# Patient Record
Sex: MALE | Race: WHITE | NOT HISPANIC OR LATINO | Employment: UNEMPLOYED | URBAN - METROPOLITAN AREA
[De-identification: names, ages, dates, MRNs, and addresses within clinical notes are randomized per-mention and may not be internally consistent; named-entity substitution may affect disease eponyms.]

---

## 2017-01-02 ENCOUNTER — TELEPHONE (OUTPATIENT)
Dept: FAMILY MEDICINE | Facility: CLINIC | Age: 57
End: 2017-01-02

## 2017-01-02 ENCOUNTER — TELEPHONE (OUTPATIENT)
Dept: PHARMACY | Facility: OTHER | Age: 57
End: 2017-01-02

## 2017-01-02 NOTE — TELEPHONE ENCOUNTER
MTM referral from: [facility]     MTM referral outreach attempt #1 on January 2, 2017 at 2:33 PM      Outcome: Left Message    Dianne Rosa MTM Coordinator

## 2017-01-09 ENCOUNTER — HOSPITAL ENCOUNTER (OUTPATIENT)
Dept: GENERAL RADIOLOGY | Facility: CLINIC | Age: 57
Discharge: HOME | End: 2017-01-09
Attending: UROLOGY | Admitting: UROLOGY
Payer: MEDICAID

## 2017-01-09 ENCOUNTER — OFFICE VISIT (OUTPATIENT)
Dept: UROLOGY | Facility: CLINIC | Age: 57
End: 2017-01-09

## 2017-01-09 VITALS
SYSTOLIC BLOOD PRESSURE: 145 MMHG | WEIGHT: 261 LBS | DIASTOLIC BLOOD PRESSURE: 97 MMHG | BODY MASS INDEX: 34.44 KG/M2 | HEART RATE: 95 BPM

## 2017-01-09 DIAGNOSIS — N35.919 URETHRAL STRICTURE: ICD-10-CM

## 2017-01-09 DIAGNOSIS — N99.111 POSTPROCEDURAL BULBOUS URETHRAL STRICTURE: Primary | ICD-10-CM

## 2017-01-09 PROCEDURE — 51600 INJECTION FOR BLADDER X-RAY: CPT

## 2017-01-09 PROCEDURE — 25500064 ZZH RX 255 OP 636: Performed by: RADIOLOGY

## 2017-01-09 RX ORDER — IOPAMIDOL 510 MG/ML
150 INJECTION, SOLUTION INTRAVASCULAR ONCE
Status: COMPLETED | OUTPATIENT
Start: 2017-01-09 | End: 2017-01-09

## 2017-01-09 RX ADMIN — IOPAMIDOL 150 ML: 510 INJECTION, SOLUTION INTRAVASCULAR at 09:20

## 2017-01-09 ASSESSMENT — PAIN SCALES - GENERAL: PAINLEVEL: NO PAIN (0)

## 2017-01-09 NOTE — PATIENT INSTRUCTIONS
Follow up with Dr Burger for a cystoscopy on 3/27/17 at 1230 pm. Arrive to clinic with a comfortably full bladder for urine stream test    It was a pleasure meeting with you today.  Thank you for allowing me and my team the privilege of caring for you today.  YOU are the reason we are here, and I truly hope we provided you with the excellent service you deserve.  Please let us know if there is anything else we can do for you so that we can be sure you are leaving completely satisfied with your care experience.

## 2017-01-09 NOTE — PROGRESS NOTES
Urethroplasty post-operative visit:    Procedure: ventral onlay bulbar urethroplasty  Date: 16    HPI:  Flakito Hilliard is a 57 year old male who is 4 weeks status post ventral onlay urethroplasty for a 1.5 cm stricture. He had VCUG today that showed resolution of stricture with no extravasation. He emptied his bladder well during the test.    Brief hx:  history of a 5 cm bulbomembranous urethral stricture who underwent complex single stage anterior urethral reconstruction and posterior reconstruction utilizing dorsal onlay buccal graft harvested from the left. He developed a recurrence at the proximal bulbar urethra    Pain is normal  Appetite is good  Bowel movements are normal    /97 mmHg  Pulse 95  Wt 118.389 kg (261 lb)  Incision clean, dry, intact  No tenderness or evidence of cellulitis  No hematoma  Sutures are intact  Mouth healed well    Imagin2017  RUG/VCUG: no extrav       A/P: 57 year old male 4 weeks status post ventral onlay bulbar urethroplasty with history of prior dorsal onlay  Excellent outcome.    We discussed that thought his catheter was removed, he is still in the healing phase of surgery. He understands he should not lift heavy weights > 10 lbs for another 2 weeks. He is to avoid straddle activities (biking, motorcycle, horse riding, tractor work etc) for another 3 months and in general should avoid these activities. He understands should he require catheterization in the future, to make the performing provider aware of his urethral reconstruction. He expressed understanding.  F/U: 3 months for urethroscopy    _________________________________________________________________  Lenny Garza DO  Fellow/Instructor  Department of Urology

## 2017-01-09 NOTE — Clinical Note
2017       RE: Flakito Hilliard  [address] [facility] [address]     Dear Colleague,    Thank you for referring your patient, Flakito Hilliard, to the [facility] UROLOGY AND INST FOR PROSTATE AND UROLOGIC CANCERS at [facility]. Please see a copy of my visit note below.    Urethroplasty post-operative visit:    Procedure: ventral onlay bulbar urethroplasty  Date: 16    HPI:  Flakito Hilliard is a 57 year old male who is 4 weeks status post ventral onlay urethroplasty for a 1.5 cm stricture. He had VCUG today that showed resolution of stricture with no extravasation. He emptied his bladder well during the test.    Brief hx:  history of a 5 cm bulbomembranous urethral stricture who underwent complex single stage anterior urethral reconstruction and posterior reconstruction utilizing dorsal onlay buccal graft harvested from the left. He developed a recurrence at the proximal bulbar urethra    Pain is normal  Appetite is good  Bowel movements are normal    /97 mmHg  Pulse 95  Wt 118.389 kg (261 lb)  Incision clean, dry, intact  No tenderness or evidence of cellulitis  No hematoma  Sutures are intact  Mouth healed well    Imagin2017  RUG/VCUG: no extrav       A/P: 57 year old male 4 weeks status post ventral onlay bulbar urethroplasty with history of prior dorsal onlay  Excellent outcome.    We discussed that thought his catheter was removed, he is still in the healing phase of surgery. He understands he should not lift heavy weights > 10 lbs for another 2 weeks. He is to avoid straddle activities (biking, motorcycle, horse riding, tractor work etc) for another 3 months and in general should avoid these activities. He understands should he require catheterization in the future, to make the performing provider aware of his urethral reconstruction. He expressed understanding.  F/U: 3 months for  urethroscopy    _________________________________________________________________  Lenny Garza DO  Fellow/Instructor  Department of Urology

## 2017-01-09 NOTE — MR AVS SNAPSHOT
After Visit Summary   1/9/2017    Flakito Hilliard    MRN: [id number]           Patient Information     Date Of Birth          1960        Visit Information        Provider Department      1/9/2017 10:30 AM Lenny Garza DO [facility] Urology and [facility]rolog[facility]        Care Instructions    Follow up with Dr Burger for a cystoscopy on 3/27/17 at 1230 pm. Arrive to clinic with a comfortably full bladder for urine stream test    It was a pleasure meeting with you today.  Thank you for allowing me and my team the privilege of caring for you today.  YOU are the reason we are here, and I truly hope we provided you with the excellent service you deserve.  Please let us know if there is anything else we can do for you so that we can be sure you are leaving completely satisfied with your care experience.                Follow-ups after your visit        Your next 10 appointments already scheduled     Feb 06, 2017  9:30 AM   Return Visit with Rodriguez Burns   [facility] ([facility])    [address] [facility] [id number] [phone]            Mar 27, 2017 12:30 PM   (Arrive by 12:15 PM)   Cystoscopy with Niraj Burger MD   [facility] Urology and [facility] ([facility])    [address] [place] [id number] [phone]              Who to contact     Please call your clinic at [phone] to:    Ask questions about your health    Make or cancel appointments    Discuss your medicines    Learn about your test results    Speak to your doctor   If you have compliments or concerns about an experience at your clinic, or if you wish to file a complaint, please contact [facility] Patient Relations at [phone] or email us at [email or web address]         Additional Information About Your Visit        MyChart Information      BrainSINS is an electronic gateway that provides easy, online access to your medical records. With BrainSINS, you can request a clinic appointment, read your test results, renew a prescription or communicate with your care team.     To sign up for [employer]INS visit the website at [email or web address]   You will be asked to enter the access code listed below, as well as some personal information. Please follow the directions to create your username and password.     Your access code is: [email or web address]  Expires: 3/2/2017  9:19 AM     Your access code will  in 90 days. If you need help or a new code, please contact your [facility] or call [phone] for assistance.        Care EveryWhere ID     This is your Care EveryWhere ID. This could be used by other organizations to access your [place] medical records  [id number]        Your Vitals Were     Pulse                   95            Blood Pressure from Last 3 Encounters:   17 145/97   16 98/72   16 130/88    Weight from Last 3 Encounters:   17 118.389 kg (261 lb)   16 118.389 kg (261 lb)   16 118.389 kg (261 lb)              Today, you had the following     No orders found for display       Primary Care Provider Office Phone # Fax #    Susan Hand PA-C [phone]       [facility] [address] [place] [id number]        Goals        Patient-Stated    I will call and get appointment with [facility] at [place].  I will call by 16.     Goal Comments - Note created  8/15/2016 11:08 AM by Jesi Russell LSW    As of today's date 8/15/2016 goal is met at 26 - 50%.   Goal Status:  Active          Thank you!     Thank you for choosing [facility] UROLOGY AND [facility] FOR PROSTATE AND UROLOGIC CANCERS  for your care. Our goal is always to provide you with excellent care. Hearing back from our patients is one way we can continue to improve our services. Please take a few  minutes to complete the written survey that you may receive in the mail after your visit with us. Thank you!             Your Updated Medication List - Protect others around you: Learn how to safely use, store and throw away your medicines at www.disposemymeds.org.          This list is accurate as of: 1/9/17 11:39 AM.  Always use your most recent med list.                   Brand Name Dispense Instructions for use    amLODIPine 5 MG tablet    NORVASC    90 tablet    Take 1 tablet (5 mg) by mouth daily       aspirin 81 MG chewable tablet     108 tablet    Take 1 tablet (81 mg) by mouth daily       atorvastatin 20 MG tablet    LIPITOR    90 tablet    Take 1 tablet (20 mg) by mouth daily If not tolerating medication, contact clinic.       bacitracin ointment     14 g    Apply to incision daily.       buPROPion 300 MG 24 hr tablet    WELLBUTRIN XL    90 tablet    Take 1 tablet (300 mg) by mouth every morning       diltiazem 360 MG 24 hr CD capsule    CARDIZEM CD; CARTIA XT    90 capsule    Take 1 capsule (360 mg) by mouth daily       hydrochlorothiazide 25 MG tablet    HYDRODIURIL    1 tablet    Take 1 tablet (25 mg) by mouth daily Instead of lisinopril-Hydrochlorothiazide before surgery then resume lisinopril-Hydrochlorothiazide after surgery.       HYDROcodone-acetaminophen 5-325 MG per tablet    NORCO    30 tablet    Take 1-2 tablets by mouth every 4 hours as needed for moderate to severe pain maximum 12 tablet(s) per day       IBUPROFEN PO      Take 800 mg by mouth every 8 hours as needed for moderate pain       lisinopril 40 MG tablet    PRINIVIL/ZESTRIL     Take 40 mg by mouth       lisinopril-hydrochlorothiazide 20-12.5 MG per tablet    PRINZIDE/ZESTORETIC    180 tablet    Take 2 tablets by mouth daily       senna 8.6 MG tablet    SENOKOT    120 tablet    Take 1 tablet by mouth daily       sertraline 100 MG tablet    ZOLOFT    90 tablet    Take 1 tablet (100 mg) by mouth every evening

## 2017-02-13 DIAGNOSIS — F33.1 MAJOR DEPRESSIVE DISORDER, RECURRENT EPISODE, MODERATE (H): ICD-10-CM

## 2017-02-13 NOTE — TELEPHONE ENCOUNTER
Zoloft 100mg     Last Written Prescription Date: 11/2/16  Last Fill Quantity: 90, # refills: 0  Last Office Visit with [facility] primary care provider:  12/2/16        Last PHQ-9 score on record=   PHQ-9 SCORE 12/5/2016   Total Score 0   Total Score -       Marnie Rios [facility]

## 2017-02-13 NOTE — TELEPHONE ENCOUNTER
Routing refill request to provider for review/approval because:  F/U plan? MTM referral attempts not reached.    Cheryl Farnsworth, Pharm.D.  [facility]  [phone]

## 2017-02-16 RX ORDER — SERTRALINE HYDROCHLORIDE 100 MG/1
100 TABLET, FILM COATED ORAL EVERY EVENING
Qty: 90 TABLET | Refills: 1 | Status: SHIPPED | OUTPATIENT
Start: 2017-02-16 | End: 2018-03-21

## 2017-03-23 ENCOUNTER — PRE VISIT (OUTPATIENT)
Dept: UROLOGY | Facility: CLINIC | Age: 57
End: 2017-03-23

## 2017-03-23 NOTE — TELEPHONE ENCOUNTER
Patient with a history of urethroplasty coming in for a cystoscopy. Chart reviewed and all records are available. Reminder call placed asking pt to come with a full bladder.

## 2017-03-27 ENCOUNTER — OFFICE VISIT (OUTPATIENT)
Dept: UROLOGY | Facility: CLINIC | Age: 57
End: 2017-03-27

## 2017-03-27 VITALS
DIASTOLIC BLOOD PRESSURE: 98 MMHG | WEIGHT: 265 LBS | HEIGHT: 73 IN | SYSTOLIC BLOOD PRESSURE: 148 MMHG | BODY MASS INDEX: 35.12 KG/M2 | HEART RATE: 83 BPM

## 2017-03-27 DIAGNOSIS — Z87.448 HISTORY OF URETHRAL STRICTURE: ICD-10-CM

## 2017-03-27 DIAGNOSIS — N52.01 ERECTILE DYSFUNCTION DUE TO ARTERIAL INSUFFICIENCY: Primary | ICD-10-CM

## 2017-03-27 RX ORDER — SILDENAFIL 100 MG/1
100 TABLET, FILM COATED ORAL DAILY PRN
Qty: 10 TABLET | Refills: 1 | Status: SHIPPED | OUTPATIENT
Start: 2017-03-27 | End: 2017-08-18

## 2017-03-27 ASSESSMENT — PAIN SCALES - GENERAL: PAINLEVEL: NO PAIN (0)

## 2017-03-27 NOTE — NURSING NOTE
"Chief Complaint   Patient presents with     Cystoscopy     urethroplasty follow up       Blood pressure (!) 148/98, pulse 83, height 1.854 m (6' 1\"), weight 120.2 kg (265 lb). Body mass index is 34.96 kg/(m^2).    Patient Active Problem List   Diagnosis     Acute bacterial prostatitis     Lower urinary tract infectious disease     severe HTN (hypertension)     Suicidal ideation     Major depressive disorder, recurrent episode, moderate (H)     Urinary retention     Urethral stricture     PATRICE (obstructive sleep apnea)     Complicated UTI (urinary tract infection)     RIO (generalized anxiety disorder)     At risk for cardiovascular event     Elevated hemoglobin (H)     Bladder stones       Allergies   Allergen Reactions     Contrast Dye Difficulty breathing       Current Outpatient Prescriptions   Medication Sig Dispense Refill     sertraline (ZOLOFT) 100 MG tablet Take 1 tablet (100 mg) by mouth every evening 90 tablet 1     senna (SENOKOT) 8.6 MG tablet Take 1 tablet by mouth daily 120 tablet 1     bacitracin ointment Apply to incision daily. 14 g 0     lisinopril (PRINIVIL/ZESTRIL) 40 MG tablet Take 40 mg by mouth       HYDROcodone-acetaminophen (NORCO) 5-325 MG per tablet Take 1-2 tablets by mouth every 4 hours as needed for moderate to severe pain maximum 12 tablet(s) per day 30 tablet 0     diltiazem (CARDIZEM CD; CARTIA XT) 360 MG 24 hr CD capsule Take 1 capsule (360 mg) by mouth daily 90 capsule 1     hydrochlorothiazide (HYDRODIURIL) 25 MG tablet Take 1 tablet (25 mg) by mouth daily Instead of lisinopril-Hydrochlorothiazide before surgery then resume lisinopril-Hydrochlorothiazide after surgery. 1 tablet 0     buPROPion (WELLBUTRIN XL) 300 MG 24 hr tablet Take 1 tablet (300 mg) by mouth every morning 90 tablet 1     amLODIPine (NORVASC) 5 MG tablet Take 1 tablet (5 mg) by mouth daily 90 tablet 1     atorvastatin (LIPITOR) 20 MG tablet Take 1 tablet (20 mg) by mouth daily If not tolerating medication, contact " clinic. 90 tablet 3     lisinopril-hydrochlorothiazide (PRINZIDE,ZESTORETIC) 20-12.5 MG per tablet Take 2 tablets by mouth daily 180 tablet 2     aspirin 81 MG chewable tablet Take 1 tablet (81 mg) by mouth daily 108 tablet 3     IBUPROFEN PO Take 800 mg by mouth every 8 hours as needed for moderate pain         Social History   Substance Use Topics     Smoking status: Never Smoker     Smokeless tobacco: Never Used     Alcohol use Yes      Comment: No drinking for 2 weeks     Invasive Procedure Safety Checklist:    Procedure: cystoscopy    Action: Complete sections and checkboxes as appropriate.    Pre-procedure:  1. Patient ID Verified with 2 identifiers (Marquita and  or MRN) : YES    2. Procedure and site verified with patient/designee (when able) : YES    3. Accurate consent documentation in medical record : YES    4. H&P (or appropriate assessment) documented in medical record : NO  H&P must be up to 30 days prior to procedure an updated within 24 hours of                 Procedure as applicable.     5. Relevant diagnostic and radiology test results appropriately labeled and displayed as applicable : NO    6. Blood products, implants, devices, and/or special equipment available for the procedure as applicable : NO    7. Procedure site(s) marked with provider initials [Exclusions: none] : NO    8. Marking not required. Reason : Yes  Procedure does not require site marking    Time Out:     Time-Out performed immediately prior to starting procedure, including verbal and active participation of all team members addressing: YES    1. Correct patient identity.  2. Confirmed that the correct side and site are marked.  3. An accurate procedure to be done.  4. Agreement on the procedure to be done.  5. Correct patient position.  6. Relevant images and results are properly labeled and appropriately displayed.  7. The need to administer antibiotics or fluids for irrigation purposes during the procedure as applicable.  8.  Safety precautions based on patient history or medication use.    During Procedure: Verification of correct person, site, and procedure occurs any time the responsibility for care of the patient is transferred to another member of the care team.    Rosemary Bradford LPN  3/27/2017  12:24 PM

## 2017-03-27 NOTE — LETTER
"3/27/2017       RE: Flakito Hilliard  [address] [facility] [address]     Dear Colleague,    Thank you for referring your patient, Flakito Hilliard, to the [facility] UROLOGY AND INST FOR PROSTATE AND UROLOGIC CANCERS at [facility]. Please see a copy of my visit note below.    Urethroplasty Follow-up Visit with Surveillance Urethroscopy    PRE-PROCEDURE DIAGNOSIS: History of urethral stricture  POST-PROCEDURE DIAGNOSIS: No evidence of urethral stricture   PROCEDURE: Urethroscopy    HISTORY: Flakito Hilliard is a 57 year old man 3 months status-post buccal graft ventral onlay urethroplasty for urethral stricture which was done after failed prior dorsal onlay urethroplasty. He complains of ED -- present before; worse now. Gets a \"good\" erection but can't hold it.     Pain in the perineum is absent.   Numbness in the perineum is absent.     Questionnaires reviewed. See flowsheet for details.    REVIEW OF OFFICE STUDIES:  Urinary Flow Rate  Peak Flow: 17.8 mL/s  Average Flow: 11.4 mL/s  Voided (mL): 202 mL  Character of Curve: Bell Shape     DESCRIPTION OF PROCEDURE:  After informed consent was obtained, the patient was brought to the procedure room where he was placed in the supine position with all pressure points well padded.  He was prepped and draped in a sterile fashion. A flexible cystoscope was introduced through a well-lubricated urethra.  The anterior urethra up to the point of reconstruction was normal in appearance. At the site of reconstruction there was not evidence of stricture recurrence. The caliber of the urethra at the reconstruction was estimated to be 18 F at the distal end of the 1st urethroplasty and 20F throughout the new one. The flexible cystoscope passed easily. The voluntary sphincter was identified and the scope withdrawn.    ASSESSMENT AND PLAN:  Excellent outcome after urethroplasty. The patient will follow-up in 9 months with with uroflowmetry, post-void " residual urine volume measurement, Urethroplasty PROM, SEJAL, MSHQ, and CLSS and post-op questionnaires. Cystoscopy will be needed. If symptoms recur cystoscopy will be done sooner.  Rx for Viagra. Risks discussed.       Again, thank you for allowing me to participate in the care of your patient.      Sincerely,    Niraj Burger MD

## 2017-03-27 NOTE — PATIENT INSTRUCTIONS
Return in 9 months for with a full bladder for a urine flow test and a cystoscopy.    It was a pleasure meeting with you today.  Thank you for allowing me and my team the privilege of caring for you today.  YOU are the reason we are here, and I truly hope we provided you with the excellent service you deserve.  Please let us know if there is anything else we can do for you so that we can be sure you are leaving completely satisfied with your care experience.

## 2017-03-27 NOTE — MR AVS SNAPSHOT
After Visit Summary   3/27/2017    Flakito Hilliard    MRN: [id number]           Patient Information     Date Of Birth          1960        Visit Information        Provider Department      3/27/2017 12:30 PM Niraj Burger MD [facility] and [facility]        Today's Diagnoses     Erectile dysfunction due to arterial insufficiency    -  1    History of urethral stricture          Care Instructions    Return in 9 months for with a full bladder for a urine flow test and a cystoscopy.    It was a pleasure meeting with you today.  Thank you for allowing me and my team the privilege of caring for you today.  YOU are the reason we are here, and I truly hope we provided you with the excellent service you deserve.  Please let us know if there is anything else we can do for you so that we can be sure you are leaving completely satisfied with your care experience.                Follow-ups after your visit        Your next 10 appointments already scheduled     Jan 08, 2018 12:30 PM CST   (Arrive by 12:15 PM)   Cystoscopy with Niraj Burger MD   [facility] and [facility] ([facility])    [address] [place] [address] [phone]              Who to contact     Please call your clinic at [phone] to:    Ask questions about your health    Make or cancel appointments    Discuss your medicines    Learn about your test results    Speak to your doctor   If you have compliments or concerns about an experience at your clinic, or if you wish to file a complaint, please contact [facility] Patient Relations at [phone] or email us at [email or web address]         Additional Information About Your Visit        Reblehart Information     [employer] is an electronic gateway that provides easy, online access to your medical records. With [employer], you  "can request a clinic appointment, read your test results, renew a prescription or communicate with your care team.     To sign up for [employer] visit the website at [email or web address]   You will be asked to enter the access code listed below, as well as some personal information. Please follow the directions to create your username and password.     Your access code is: [email or web address]  Expires: 2017  6:30 AM     Your access code will  in 90 days. If you need help or a new code, please contact your [facility] or call [phone] for assistance.        Care EveryWhere ID     This is your Care EveryWhere ID. This could be used by other organizations to access your [facility] medical records  [id number]        Your Vitals Were     Pulse Height BMI (Body Mass Index)             83 1.854 m (6' 1\") 34.96 kg/m2          Blood Pressure from Last 3 Encounters:   17 (!) 148/98   17 (!) 145/97   16 98/72    Weight from Last 3 Encounters:   17 120.2 kg (265 lb)   17 118.4 kg (261 lb)   16 118.4 kg (261 lb)              We Performed the Following     COMPLEX UROFLOWMETRY     Cystoscopy (12306)          Today's Medication Changes          These changes are accurate as of: 3/27/17 12:52 PM.  If you have any questions, ask your nurse or doctor.               Start taking these medicines.        Dose/Directions    sildenafil 100 MG cap/tab   Commonly known as:  VIAGRA   Used for:  Erectile dysfunction due to arterial insufficiency   Started by:  Niraj Burger MD        Dose:  100 mg   Take 1 tablet (100 mg) by mouth daily as needed for erectile dysfunction   Quantity:  10 tablet   Refills:  1            Where to get your medicines      These medications were sent to [facility] [address] [address]     Phone:  [phone]     sildenafil 100 MG cap/tab             "    Primary Care Provider Office Phone # Fax #    Susan Hand PA-C [phone]       [facility] [address] [address]        Goals        General    I will call and get appointment with CNS at [place].  I will call by 8-22-16. (pt-stated)     Notes - Note created  8/15/2016 11:08 AM by Jesi Russell LSW    As of today's date 8/15/2016 goal is met at 26 - 50%.   Goal Status:  Active          Thank you!     Thank you for choosing [facility]OLOG[facility] AND [facility] PR[facility]OLOGIC [facility]CERS  for your care. Our goal is always to provide you with excellent care. Hearing back from our patients is one way we can continue to improve our services. Please take a few minutes to complete the written survey that you may receive in the mail after your visit with us. Thank you!             Your Updated Medication List - Protect others around you: Learn how to safely use, store and throw away your medicines at www.disposemymeds.org.          This list is accurate as of: 3/27/17 12:52 PM.  Always use your most recent med list.                   Brand Name Dispense Instructions for use    amLODIPine 5 MG tablet    NORVASC    90 tablet    Take 1 tablet (5 mg) by mouth daily       aspirin 81 MG chewable tablet     108 tablet    Take 1 tablet (81 mg) by mouth daily       atorvastatin 20 MG tablet    LIPITOR    90 tablet    Take 1 tablet (20 mg) by mouth daily If not tolerating medication, contact clinic.       bacitracin ointment     14 g    Apply to incision daily.       buPROPion 300 MG 24 hr tablet    WELLBUTRIN XL    90 tablet    Take 1 tablet (300 mg) by mouth every morning       diltiazem 360 MG 24 hr CD capsule    CARDIZEM CD; CARTIA XT    90 capsule    Take 1 capsule (360 mg) by mouth daily       hydrochlorothiazide 25 MG tablet    HYDRODIURIL    1 tablet    Take 1 tablet (25 mg) by mouth daily Instead of lisinopril-Hydrochlorothiazide before surgery then resume  lisinopril-Hydrochlorothiazide after surgery.       HYDROcodone-acetaminophen 5-325 MG per tablet    NORCO    30 tablet    Take 1-2 tablets by mouth every 4 hours as needed for moderate to severe pain maximum 12 tablet(s) per day       IBUPROFEN PO      Take 800 mg by mouth every 8 hours as needed for moderate pain       lisinopril 40 MG tablet    PRINIVIL/ZESTRIL     Take 40 mg by mouth       lisinopril-hydrochlorothiazide 20-12.5 MG per tablet    PRINZIDE/ZESTORETIC    180 tablet    Take 2 tablets by mouth daily       senna 8.6 MG tablet    SENOKOT    120 tablet    Take 1 tablet by mouth daily       sertraline 100 MG tablet    ZOLOFT    90 tablet    Take 1 tablet (100 mg) by mouth every evening       sildenafil 100 MG cap/tab    VIAGRA    10 tablet    Take 1 tablet (100 mg) by mouth daily as needed for erectile dysfunction

## 2017-03-27 NOTE — PROGRESS NOTES
"Urethroplasty Follow-up Visit with Surveillance Urethroscopy    PRE-PROCEDURE DIAGNOSIS: History of urethral stricture  POST-PROCEDURE DIAGNOSIS: No evidence of urethral stricture   PROCEDURE: Urethroscopy    HISTORY: Flakito Hilliard is a 57 year old man 3 months status-post buccal graft ventral onlay urethroplasty for urethral stricture which was done after failed prior dorsal onlay urethroplasty. He complains of ED -- present before; worse now. Gets a \"good\" erection but can't hold it.     Pain in the perineum is absent.   Numbness in the perineum is absent.     Questionnaires reviewed. See flowsheet for details.    REVIEW OF OFFICE STUDIES:  Urinary Flow Rate  Peak Flow: 17.8 mL/s  Average Flow: 11.4 mL/s  Voided (mL): 202 mL  Character of Curve: Bell Shape     DESCRIPTION OF PROCEDURE:  After informed consent was obtained, the patient was brought to the procedure room where he was placed in the supine position with all pressure points well padded.  He was prepped and draped in a sterile fashion. A flexible cystoscope was introduced through a well-lubricated urethra.  The anterior urethra up to the point of reconstruction was normal in appearance. At the site of reconstruction there was not evidence of stricture recurrence. The caliber of the urethra at the reconstruction was estimated to be 18 F at the distal end of the 1st urethroplasty and 20F throughout the new one. The flexible cystoscope passed easily. The voluntary sphincter was identified and the scope withdrawn.    ASSESSMENT AND PLAN:  Excellent outcome after urethroplasty. The patient will follow-up in 9 months with with uroflowmetry, post-void residual urine volume measurement, Urethroplasty PROM, SEJAL, MSHQ, and CLSS and post-op questionnaires. Cystoscopy will be needed. If symptoms recur cystoscopy will be done sooner.  Rx for Viagra. Risks discussed.     "

## 2017-04-10 DIAGNOSIS — N52.9 ED (ERECTILE DYSFUNCTION): Primary | ICD-10-CM

## 2017-04-10 RX ORDER — SILDENAFIL CITRATE 20 MG/1
100 TABLET ORAL PRN
Qty: 50 TABLET | Refills: 3 | Status: SHIPPED | OUTPATIENT
Start: 2017-04-10 | End: 2018-07-18 | Stop reason: ALTCHOICE

## 2017-04-26 ENCOUNTER — OFFICE VISIT (OUTPATIENT)
Dept: FAMILY MEDICINE | Facility: CLINIC | Age: 57
End: 2017-04-26
Payer: COMMERCIAL

## 2017-04-26 VITALS
SYSTOLIC BLOOD PRESSURE: 118 MMHG | BODY MASS INDEX: 34.07 KG/M2 | DIASTOLIC BLOOD PRESSURE: 72 MMHG | WEIGHT: 258.2 LBS | HEART RATE: 100 BPM | TEMPERATURE: 97 F

## 2017-04-26 DIAGNOSIS — J20.9 ACUTE BRONCHITIS WITH SYMPTOMS > 10 DAYS: Primary | ICD-10-CM

## 2017-04-26 PROCEDURE — 99213 OFFICE O/P EST LOW 20 MIN: CPT | Performed by: PHYSICIAN ASSISTANT

## 2017-04-26 RX ORDER — AZITHROMYCIN 250 MG/1
TABLET, FILM COATED ORAL
Qty: 6 TABLET | Refills: 0 | Status: SHIPPED | OUTPATIENT
Start: 2017-04-26 | End: 2017-05-09

## 2017-04-26 RX ORDER — CODEINE PHOSPHATE AND GUAIFENESIN 10; 100 MG/5ML; MG/5ML
1-2 SOLUTION ORAL EVERY 4 HOURS PRN
Qty: 120 ML | Refills: 0 | Status: SHIPPED | OUTPATIENT
Start: 2017-04-26 | End: 2017-08-18

## 2017-04-26 RX ORDER — PREDNISONE 20 MG/1
60 TABLET ORAL DAILY
Qty: 15 TABLET | Refills: 0 | Status: SHIPPED | OUTPATIENT
Start: 2017-04-26 | End: 2017-05-09

## 2017-04-26 ASSESSMENT — ENCOUNTER SYMPTOMS
HEADACHES: 1
WEAKNESS: 0
NAUSEA: 0
STRIDOR: 0
MUSCULOSKELETAL NEGATIVE: 1
SORE THROAT: 0
CHILLS: 0
VOMITING: 0
EYE REDNESS: 0
FEVER: 0
SHORTNESS OF BREATH: 0
EYE DISCHARGE: 0
PSYCHIATRIC NEGATIVE: 1
SPUTUM PRODUCTION: 1
COUGH: 1
WHEEZING: 1
EYE PAIN: 0

## 2017-04-26 ASSESSMENT — ANXIETY QUESTIONNAIRES
3. WORRYING TOO MUCH ABOUT DIFFERENT THINGS: NOT AT ALL
2. NOT BEING ABLE TO STOP OR CONTROL WORRYING: NOT AT ALL
6. BECOMING EASILY ANNOYED OR IRRITABLE: NOT AT ALL
GAD7 TOTAL SCORE: 0
7. FEELING AFRAID AS IF SOMETHING AWFUL MIGHT HAPPEN: NOT AT ALL
1. FEELING NERVOUS, ANXIOUS, OR ON EDGE: NOT AT ALL
5. BEING SO RESTLESS THAT IT IS HARD TO SIT STILL: NOT AT ALL

## 2017-04-26 ASSESSMENT — PATIENT HEALTH QUESTIONNAIRE - PHQ9: 5. POOR APPETITE OR OVEREATING: NOT AT ALL

## 2017-04-26 NOTE — NURSING NOTE
"Chief Complaint   Patient presents with     URI       Initial /72 (BP Location: Right arm, Patient Position: Chair, Cuff Size: Adult Large)  Pulse 100  Temp 97  F (36.1  C) (Tympanic)  Wt 258 lb 3.2 oz (117.1 kg)  BMI 34.07 kg/m2 Estimated body mass index is 34.07 kg/(m^2) as calculated from the following:    Height as of 3/27/17: 6' 1\" (1.854 m).    Weight as of this encounter: 258 lb 3.2 oz (117.1 kg).  Medication Reconciliation: complete    Health Maintenance that is potentially due pending provider review:  NONE    n/a    Aliza COMER CMA    "

## 2017-04-26 NOTE — MR AVS SNAPSHOT
"              After Visit Summary   4/26/2017    Flakito Hilliard    MRN: [id number]           Patient Information     Date Of Birth          1960        Visit Information        Provider Department      4/26/2017 8:40 AM Flakito Lutz PA-C [facility]        Today's Diagnoses     Acute bronchitis with symptoms > 10 days    -  1       Follow-ups after your visit        Follow-up notes from your care team     Return if symptoms worsen or fail to improve.      Your next 10 appointments already scheduled     Jan 08, 2018 12:30 PM CST   (Arrive by 12:15 PM)   Cystoscopy with Niraj Burger MD   [facility] and [facility]rologic C[facility] ([facility])    [address] [place] [address]  4th [facility] [address] [phone]              Who to contact     If you have questions or need follow up information about today's clinic visit or your schedule please contact [facility] directly at [phone].  Normal or non-critical lab and imaging results will be communicated to you by [employer], letter or phone within 4 business days after the clinic has received the results. If you do not hear from us within 7 days, please contact the clinic through [employer] or phone. If you have a critical or abnormal lab result, we will notify you by phone as soon as possible.  Submit refill requests through [employer] or call your pharmacy and they will forward the refill request to us. Please allow 3 business days for your refill to be completed.          Additional Information About Your Visit        [employer] Information     [employer] lets you send messages to your doctor, view your test results, renew your prescriptions, schedule appointments and more. To sign up, go to [email or web address] . Click on \"Log in\" on the left side of the screen, which will take you to the Welcome page. Then click on \"Sign up Now\" on the right side of the page.     You will " be asked to enter the access code listed below, as well as some personal information. Please follow the directions to create your username and password.     Your access code is: [email or web address]  Expires: 2017  6:30 AM     Your access code will  in 90 days. If you need help or a new code, please call your [facility] clinic or [phone].        Care EveryWhere ID     This is your Care EveryWhere ID. This could be used by other organizations to access your [facility] medical records  [id number]        Your Vitals Were     Pulse Temperature BMI (Body Mass Index)             100 97  F (36.1  C) (Tympanic) 34.07 kg/m2          Blood Pressure from Last 3 Encounters:   17 118/72   17 (!) 148/98   17 (!) 145/97    Weight from Last 3 Encounters:   17 258 lb 3.2 oz (117.1 kg)   17 265 lb (120.2 kg)   17 261 lb (118.4 kg)              Today, you had the following     No orders found for display         Today's Medication Changes          These changes are accurate as of: 17 10:06 AM.  If you have any questions, ask your nurse or doctor.               Start taking these medicines.        Dose/Directions    azithromycin 250 MG tablet   Commonly known as:  ZITHROMAX   Used for:  Acute bronchitis with symptoms > 10 days   Started by:  Flakito Lutz PA-C        Two tablets first day, then one tablet daily for four days.   Quantity:  6 tablet   Refills:  0       guaiFENesin-codeine 100-10 MG/5ML Soln solution   Commonly known as:  ROBITUSSIN AC   Used for:  Acute bronchitis with symptoms > 10 days   Started by:  Flakito Lutz PA-C        Dose:  1-2 tsp.   Take 5-10 mLs by mouth every 4 hours as needed for cough   Quantity:  120 mL   Refills:  0       predniSONE 20 MG tablet   Commonly known as:  DELTASONE   Used for:  Acute bronchitis with symptoms > 10 days   Started by:  Flakito Lutz PA-C        Dose:  60 mg   Take 3 tablets (60 mg) by mouth daily   Quantity:  15 tablet    Refills:  0            Where to get your medicines      [place] [facility] [place] [address] [place] [address]  [address] [place] [address]     Phone:  [phone]     azithromycin 250 MG tablet    predniSONE 20 MG tablet         Some of these will need a paper prescription and others can be bought over the counter.  Ask your nurse if you have questions.     Bring a paper prescription for each of these medications     guaiFENesin-codeine 100-10 MG/5ML Soln solution                Primary Care Provider Office Phone # Fax #    Susan Hand PA-C [phone]       [facility] [address] [place] [address]        Goals        General    I will call and get appointment with CNS at [place].  I will call by 8-22-16. (pt-stated)     Notes - Note created  8/15/2016 11:08 AM by Jesi Russell LSW    As of today's date 8/15/2016 goal is met at 26 - 50%.   Goal Status:  Active          Thank you!     Thank you for choosing [facility]  for your care. Our goal is always to provide you with excellent care. Hearing back from our patients is one way we can continue to improve our services. Please take a few minutes to complete the written survey that you may receive in the mail after your visit with us. Thank you!             Your Updated Medication List - Protect others around you: Learn how to safely use, store and throw away your medicines at www.disposemymeds.org.          This list is accurate as of: 4/26/17 10:06 AM.  Always use your most recent med list.                   Brand Name Dispense Instructions for use    amLODIPine 5 MG tablet    NORVASC    90 tablet    Take 1 tablet (5 mg) by mouth daily       aspirin 81 MG chewable tablet     108 tablet    Take 1 tablet (81 mg) by mouth daily       atorvastatin 20 MG tablet    LIPITOR    90 tablet    Take 1 tablet (20 mg) by mouth daily If not tolerating  medication, contact clinic.       azithromycin 250 MG tablet    ZITHROMAX    6 tablet    Two tablets first day, then one tablet daily for four days.       buPROPion 300 MG 24 hr tablet    WELLBUTRIN XL    90 tablet    Take 1 tablet (300 mg) by mouth every morning       diltiazem 360 MG 24 hr CD capsule    CARDIZEM CD; CARTIA XT    90 capsule    Take 1 capsule (360 mg) by mouth daily       guaiFENesin-codeine 100-10 MG/5ML Soln solution    ROBITUSSIN AC    120 mL    Take 5-10 mLs by mouth every 4 hours as needed for cough       IBUPROFEN PO      Take 800 mg by mouth every 8 hours as needed for moderate pain       lisinopril-hydrochlorothiazide 20-12.5 MG per tablet    PRINZIDE/ZESTORETIC    180 tablet    Take 2 tablets by mouth daily       predniSONE 20 MG tablet    DELTASONE    15 tablet    Take 3 tablets (60 mg) by mouth daily       sertraline 100 MG tablet    ZOLOFT    90 tablet    Take 1 tablet (100 mg) by mouth every evening       sildenafil 100 MG cap/tab    VIAGRA    10 tablet    Take 1 tablet (100 mg) by mouth daily as needed for erectile dysfunction       sildenafil 20 MG tablet    REVATIO/VIAGRA    50 tablet    Take 5 tablets (100 mg) by mouth as needed For Erectile Dysfunction. Never use with nitroglycerin, terazosin or doxazosin.

## 2017-04-26 NOTE — PROGRESS NOTES
HPI    SUBJECTIVE:                                                    Flakito Hilliard is a 57 year old male who presents to clinic today for cough on and off for 3 months. Sputum production is present as well as wheezing    ENT Symptoms             Symptoms: cc Present Absent Comment   Fever/Chills   x    Fatigue  x     Muscle Aches  x     Eye Irritation   x    Sneezing   x    Nasal Tavo/Drg  x     Sinus Pressure/Pain   x    Loss of smell  x     Dental pain   x    Sore Throat  x  On and off    Swollen Glands   x    Ear Pain/Fullness   x    Cough  x     Wheeze  x     Chest Pain  x     Shortness of breath  x     Rash       Other         Symptom duration:  On and off since January    Symptom severity:     Treatments tried:     Contacts:       Problem list and histories reviewed & adjusted, as indicated.  Additional history: as documented    Patient Active Problem List   Diagnosis     Acute bacterial prostatitis     Lower urinary tract infectious disease     severe HTN (hypertension)     Suicidal ideation     Major depressive disorder, recurrent episode, moderate (H)     Urinary retention     Urethral stricture     PATRICE (obstructive sleep apnea)     Complicated UTI (urinary tract infection)     RIO (generalized anxiety disorder)     At risk for cardiovascular event     Elevated hemoglobin (H)     Bladder stones     Past Surgical History:   Procedure Laterality Date     BLADDER SURGERY       CYSTOSTOMY, INSERT TUBE SUPRAPUBIC, COMBINED N/A 9/8/2014    Procedure: COMBINED CYSTOSTOMY, INSERT TUBE SUPRAPUBIC;  Surgeon: Min Prasad MD;  Location: [facility] OR     GENITOURINARY SURGERY      prostate surgery for stones     LASER HOLMIUM CYSTOSCOPY, INTERNAL URETHROTOMY, COMBINED N/A 10/24/2014    Procedure: COMBINED LASER HOLMIUM CYSTOSCOPY, INTERNAL URETHROTOMY;  Surgeon: Valentin Villatoro MD;  Location: [facility] OR     URETHROPLASTY N/A 12/10/2014    Procedure: URETHROPLASTY;  Surgeon: Niraj Burger MD;  Location: [facility] OR      URETHROPLASTY WITH BUCCAL GRAFT N/A 12/9/2016    Procedure: URETHROPLASTY WITH BUCCAL GRAFT;  Surgeon: Niraj Burger MD;  Location:  OR       Social History   Substance Use Topics     Smoking status: Never Smoker     Smokeless tobacco: Never Used     Alcohol use Yes      Comment: No drinking for 2 weeks     Family History   Problem Relation Age of Onset     Hypertension Mother      Depression Father      Hypertension Father      MENTAL ILLNESS Sister          Current Outpatient Prescriptions   Medication Sig Dispense Refill     predniSONE (DELTASONE) 20 MG tablet Take 3 tablets (60 mg) by mouth daily 15 tablet 0     azithromycin (ZITHROMAX) 250 MG tablet Two tablets first day, then one tablet daily for four days. 6 tablet 0     guaiFENesin-codeine (ROBITUSSIN AC) 100-10 MG/5ML SOLN solution Take 5-10 mLs by mouth every 4 hours as needed for cough 120 mL 0     sertraline (ZOLOFT) 100 MG tablet Take 1 tablet (100 mg) by mouth every evening 90 tablet 1     diltiazem (CARDIZEM CD; CARTIA XT) 360 MG 24 hr CD capsule Take 1 capsule (360 mg) by mouth daily 90 capsule 1     buPROPion (WELLBUTRIN XL) 300 MG 24 hr tablet Take 1 tablet (300 mg) by mouth every morning 90 tablet 1     amLODIPine (NORVASC) 5 MG tablet Take 1 tablet (5 mg) by mouth daily 90 tablet 1     atorvastatin (LIPITOR) 20 MG tablet Take 1 tablet (20 mg) by mouth daily If not tolerating medication, contact clinic. 90 tablet 3     lisinopril-hydrochlorothiazide (PRINZIDE,ZESTORETIC) 20-12.5 MG per tablet Take 2 tablets by mouth daily 180 tablet 2     aspirin 81 MG chewable tablet Take 1 tablet (81 mg) by mouth daily 108 tablet 3     IBUPROFEN PO Take 800 mg by mouth every 8 hours as needed for moderate pain       sildenafil (REVATIO/VIAGRA) 20 MG tablet Take 5 tablets (100 mg) by mouth as needed For Erectile Dysfunction. Never use with nitroglycerin, terazosin or doxazosin. (Patient not taking: Reported on 4/26/2017) 50 tablet 3     sildenafil  (VIAGRA) 100 MG cap/tab Take 1 tablet (100 mg) by mouth daily as needed for erectile dysfunction (Patient not taking: Reported on 4/26/2017) 10 tablet 1     Allergies   Allergen Reactions     Contrast Dye Difficulty breathing     Labs reviewed in EPIC    Reviewed and updated as needed this visit by clinical staff       Reviewed and updated as needed this visit by Provider       Review of Systems   Constitutional: Negative for chills and fever.   HENT: Positive for congestion. Negative for ear discharge, ear pain, hearing loss and sore throat.    Eyes: Negative for pain, discharge and redness.   Respiratory: Positive for cough, sputum production and wheezing. Negative for shortness of breath and stridor.    Cardiovascular: Negative for chest pain.   Gastrointestinal: Negative for nausea and vomiting.   Genitourinary: Negative.    Musculoskeletal: Negative.    Skin: Negative for itching and rash.   Neurological: Positive for headaches. Negative for weakness.   Endo/Heme/Allergies: Negative.    Psychiatric/Behavioral: Negative.          Physical Exam   Constitutional: He is oriented to person, place, and time and well-developed, well-nourished, and in no distress.   HENT:   Head: Normocephalic and atraumatic.   Right Ear: Hearing, tympanic membrane, external ear and ear canal normal.   Left Ear: Hearing, tympanic membrane, external ear and ear canal normal.   Nose: Rhinorrhea present. No septal deviation. Right sinus exhibits maxillary sinus tenderness. Left sinus exhibits maxillary sinus tenderness.   Mouth/Throat: Oropharyngeal exudate, posterior oropharyngeal edema and posterior oropharyngeal erythema present. No tonsillar abscesses.   Eyes: Conjunctivae and EOM are normal. Pupils are equal, round, and reactive to light. Right eye exhibits no discharge. Left eye exhibits no discharge. No scleral icterus.   Neck: Normal range of motion. Neck supple. No thyromegaly present.   Cardiovascular: Normal rate, regular  rhythm, normal heart sounds and intact distal pulses.  Exam reveals no gallop and no friction rub.    No murmur heard.  Pulmonary/Chest: Effort normal. No respiratory distress. He has wheezes. He has rhonchi. He has no rales. He exhibits no tenderness.   Abdominal: Soft. Bowel sounds are normal. He exhibits no distension and no mass. There is no tenderness. There is no rebound and no guarding.   Musculoskeletal: Normal range of motion. He exhibits no edema or tenderness.   Lymphadenopathy:     He has no cervical adenopathy.   Neurological: He is alert and oriented to person, place, and time. He has normal reflexes. No cranial nerve deficit. He exhibits normal muscle tone. Gait normal. Coordination normal.   Skin: Skin is warm and dry. No rash noted. No erythema.   Psychiatric: Mood, memory, affect and judgment normal.         (J20.9) Acute bronchitis with symptoms > 10 days  (primary encounter diagnosis)  Comment:   Plan: predniSONE (DELTASONE) 20 MG tablet,         azithromycin (ZITHROMAX) 250 MG tablet,         guaiFENesin-codeine (ROBITUSSIN AC) 100-10         MG/5ML SOLN solution          Follow up if not improving

## 2017-04-27 ASSESSMENT — PATIENT HEALTH QUESTIONNAIRE - PHQ9: SUM OF ALL RESPONSES TO PHQ QUESTIONS 1-9: 0

## 2017-04-27 ASSESSMENT — ANXIETY QUESTIONNAIRES: GAD7 TOTAL SCORE: 0

## 2017-05-09 ENCOUNTER — OFFICE VISIT (OUTPATIENT)
Dept: FAMILY MEDICINE | Facility: CLINIC | Age: 57
End: 2017-05-09
Payer: COMMERCIAL

## 2017-05-09 ENCOUNTER — RADIANT APPOINTMENT (OUTPATIENT)
Dept: GENERAL RADIOLOGY | Facility: CLINIC | Age: 57
End: 2017-05-09
Attending: PHYSICIAN ASSISTANT
Payer: COMMERCIAL

## 2017-05-09 ENCOUNTER — TELEPHONE (OUTPATIENT)
Dept: FAMILY MEDICINE | Facility: CLINIC | Age: 57
End: 2017-05-09

## 2017-05-09 VITALS
OXYGEN SATURATION: 97 % | SYSTOLIC BLOOD PRESSURE: 170 MMHG | TEMPERATURE: 98.8 F | HEART RATE: 119 BPM | WEIGHT: 262 LBS | DIASTOLIC BLOOD PRESSURE: 116 MMHG | BODY MASS INDEX: 34.57 KG/M2

## 2017-05-09 DIAGNOSIS — R05.9 COUGH: ICD-10-CM

## 2017-05-09 DIAGNOSIS — R05.9 COUGH: Primary | ICD-10-CM

## 2017-05-09 DIAGNOSIS — J20.9 ACUTE BRONCHITIS WITH SYMPTOMS > 10 DAYS: ICD-10-CM

## 2017-05-09 PROCEDURE — 99214 OFFICE O/P EST MOD 30 MIN: CPT | Performed by: PHYSICIAN ASSISTANT

## 2017-05-09 PROCEDURE — 71020 XR CHEST 2 VW: CPT

## 2017-05-09 RX ORDER — PREDNISONE 20 MG/1
60 TABLET ORAL DAILY
Qty: 21 TABLET | Refills: 0 | Status: SHIPPED | OUTPATIENT
Start: 2017-05-09 | End: 2017-08-18

## 2017-05-09 RX ORDER — LEVOFLOXACIN 500 MG/1
500 TABLET, FILM COATED ORAL DAILY
Qty: 10 TABLET | Refills: 0 | Status: SHIPPED | OUTPATIENT
Start: 2017-05-09 | End: 2017-08-18

## 2017-05-09 RX ORDER — BENZONATATE 200 MG/1
200 CAPSULE ORAL 3 TIMES DAILY PRN
Qty: 15 CAPSULE | Refills: 0 | Status: SHIPPED | OUTPATIENT
Start: 2017-05-09 | End: 2017-08-18

## 2017-05-09 ASSESSMENT — ENCOUNTER SYMPTOMS
DIZZINESS: 0
DYSURIA: 0
DOUBLE VISION: 0
WEIGHT LOSS: 0
SPUTUM PRODUCTION: 1
PHOTOPHOBIA: 0
HEMOPTYSIS: 0
INSOMNIA: 0
WEAKNESS: 0
MYALGIAS: 0
WHEEZING: 1
BACK PAIN: 0
HEADACHES: 0
CONSTIPATION: 0
DIAPHORESIS: 0
DIARRHEA: 0
NECK PAIN: 0
FOCAL WEAKNESS: 0
FREQUENCY: 0
HEARTBURN: 0
HALLUCINATIONS: 0
FEVER: 0
SENSORY CHANGE: 0
EYE REDNESS: 0
SEIZURES: 0
NEUROLOGICAL NEGATIVE: 1
COUGH: 1
NERVOUS/ANXIOUS: 0
VOMITING: 0
EYE PAIN: 0
TINGLING: 0
ORTHOPNEA: 0
SHORTNESS OF BREATH: 0
EYE DISCHARGE: 0
BLOOD IN STOOL: 0
PALPITATIONS: 0
ABDOMINAL PAIN: 0
SORE THROAT: 0
BLURRED VISION: 0
LOSS OF CONSCIOUSNESS: 0
NAUSEA: 0
DEPRESSION: 0

## 2017-05-09 ASSESSMENT — LIFESTYLE VARIABLES: SUBSTANCE_ABUSE: 0

## 2017-05-09 NOTE — MR AVS SNAPSHOT
"              After Visit Summary   5/9/2017    Flakito Hilliard    MRN: [id number]           Patient Information     Date Of Birth          1960        Visit Information        Provider Department      5/9/2017 2:00 PM Flakito Lutz PA-C [facility]        Today's Diagnoses     Cough    -  1    Acute bronchitis with symptoms > 10 days           Follow-ups after your visit        Follow-up notes from your care team     Return if symptoms worsen or fail to improve.      Your next 10 appointments already scheduled     Jan 08, 2018 12:30 PM CST   (Arrive by 12:15 PM)   Cystoscopy with Niraj Burger MD   [facility] ([facility])    [address]  [address] [facility] [address] [phone]              Who to contact     If you have questions or need follow up information about today's clinic visit or your schedule please contact [facility] directly at [phone].  Normal or non-critical lab and imaging results will be communicated to you by Gymtrackhart, letter or phone within 4 business days after the clinic has received the results. If you do not hear from us within 7 days, please contact the clinic through Prism Digitalt or phone. If you have a critical or abnormal lab result, we will notify you by phone as soon as possible.  Submit refill requests through DataRank or call your pharmacy and they will forward the refill request to us. Please allow 3 business days for your refill to be completed.          Additional Information About Your Visit        Gymtrackhart Information     DataRank lets you send messages to your doctor, view your test results, renew your prescriptions, schedule appointments and more. To sign up, go to [email or web address] . Click on \"Log in\" on the left side of the screen, which will take you to the Welcome page. Then click on \"Sign up Now\" on the right side of the page. "     You will be asked to enter the access code listed below, as well as some personal information. Please follow the directions to create your username and password.     Your access code is: [email or web address]  Expires: 2017  6:30 AM     Your access code will  in 90 days. If you need help or a new code, please call your [place] clinic or [phone].        Care EveryWhere ID     This is your Care EveryWhere ID. This could be used by other organizations to access your [facility] medical records  [id number]        Your Vitals Were     Pulse Temperature Pulse Oximetry BMI (Body Mass Index)          119 98.8  F (37.1  C) (Tympanic) 97% 34.57 kg/m2         Blood Pressure from Last 3 Encounters:   17 (!) 170/116   17 118/72   17 (!) 148/98    Weight from Last 3 Encounters:   17 262 lb (118.8 kg)   17 258 lb 3.2 oz (117.1 kg)   17 265 lb (120.2 kg)                 Today's Medication Changes          These changes are accurate as of: 17  2:37 PM.  If you have any questions, ask your nurse or doctor.               Start taking these medicines.        Dose/Directions    benzonatate 200 MG capsule   Commonly known as:  TESSALON   Used for:  Acute bronchitis with symptoms > 10 days, Cough   Started by:  Flakito Lutz PA-C        Dose:  200 mg   Take 1 capsule (200 mg) by mouth 3 times daily as needed for cough   Quantity:  15 capsule   Refills:  0       levofloxacin 500 MG tablet   Commonly known as:  LEVAQUIN   Used for:  Acute bronchitis with symptoms > 10 days   Started by:  Flakito Lutz PA-C        Dose:  500 mg   Take 1 tablet (500 mg) by mouth daily   Quantity:  10 tablet   Refills:  0            Where to get your medicines      These medications were sent to [facility] [address] [address]     Phone:  [phone]     benzonatate 200 MG capsule    levofloxacin 500 MG tablet    predniSONE 20  MG tablet                Primary Care Provider Office Phone # Fax #    Susan Hand PA-C [phone]       [facility] [address] [place] [id number]        Goals        General    I will call and get appointment with [facility] at [place].  I will call by 8-22-16. (pt-stated)     Notes - Note created  8/15/2016 11:08 AM by Jesi Russell LSW    As of today's date 8/15/2016 goal is met at 26 - 50%.   Goal Status:  Active          Thank you!     Thank you for choosing [facility]  for your care. Our goal is always to provide you with excellent care. Hearing back from our patients is one way we can continue to improve our services. Please take a few minutes to complete the written survey that you may receive in the mail after your visit with us. Thank you!             Your Updated Medication List - Protect others around you: Learn how to safely use, store and throw away your medicines at www.disposemymeds.org.          This list is accurate as of: 5/9/17  2:37 PM.  Always use your most recent med list.                   Brand Name Dispense Instructions for use    amLODIPine 5 MG tablet    NORVASC    90 tablet    Take 1 tablet (5 mg) by mouth daily       aspirin 81 MG chewable tablet     108 tablet    Take 1 tablet (81 mg) by mouth daily       atorvastatin 20 MG tablet    LIPITOR    90 tablet    Take 1 tablet (20 mg) by mouth daily If not tolerating medication, contact clinic.       benzonatate 200 MG capsule    TESSALON    15 capsule    Take 1 capsule (200 mg) by mouth 3 times daily as needed for cough       buPROPion 300 MG 24 hr tablet    WELLBUTRIN XL    90 tablet    Take 1 tablet (300 mg) by mouth every morning       diltiazem 360 MG 24 hr CD capsule    CARDIZEM CD; CARTIA XT    90 capsule    Take 1 capsule (360 mg) by mouth daily       guaiFENesin-codeine 100-10 MG/5ML Soln solution    ROBITUSSIN AC    120 mL    Take 5-10 mLs by mouth every 4 hours as needed  for cough       IBUPROFEN PO      Take 800 mg by mouth every 8 hours as needed for moderate pain       levofloxacin 500 MG tablet    LEVAQUIN    10 tablet    Take 1 tablet (500 mg) by mouth daily       lisinopril-hydrochlorothiazide 20-12.5 MG per tablet    PRINZIDE/ZESTORETIC    180 tablet    Take 2 tablets by mouth daily       predniSONE 20 MG tablet    DELTASONE    21 tablet    Take 3 tablets (60 mg) by mouth daily       sertraline 100 MG tablet    ZOLOFT    90 tablet    Take 1 tablet (100 mg) by mouth every evening       sildenafil 100 MG cap/tab    VIAGRA    10 tablet    Take 1 tablet (100 mg) by mouth daily as needed for erectile dysfunction       sildenafil 20 MG tablet    REVATIO/VIAGRA    50 tablet    Take 5 tablets (100 mg) by mouth as needed For Erectile Dysfunction. Never use with nitroglycerin, terazosin or doxazosin.

## 2017-05-09 NOTE — PROGRESS NOTES
HPI     SUBJECTIVE:                                                    Flakito Hilliard is a 57 year old male who presents to clinic today for persistent cough that has been present for many months and has been severe for the past few weeks. He was cheating with Zithromax and prednisone but symptoms did not resolve. He states that he may have felt slightly better for a short time.      Recheck cough  Was treated with Zpak and prednisone on 4/26/17  Still coughing    ENT Symptoms             Symptoms: cc Present Absent Comment   Fever/Chills  x     Fatigue  x     Muscle Aches  x     Eye Irritation   x    Sneezing   x    Nasal Tavo/Drg  x     Sinus Pressure/Pain   x    Loss of smell  x     Dental pain   x    Sore Throat  x  dryness   Swollen Glands   x    Ear Pain/Fullness   x    Cough x      Wheeze  x     Chest Pain   x    Shortness of breath  x     Rash   x    Other         Symptom duration:  3-4 weeks - on and off since Feb.   Symptom severity:  severe   Treatments tried:  zpak, prednisone, cough syrup   Contacts:  none         Problem list and histories reviewed & adjusted, as indicated.  Additional history: as documented    Patient Active Problem List   Diagnosis     Acute bacterial prostatitis     Lower urinary tract infectious disease     severe HTN (hypertension)     Suicidal ideation     Major depressive disorder, recurrent episode, moderate (H)     Urinary retention     Urethral stricture     PATRICE (obstructive sleep apnea)     Complicated UTI (urinary tract infection)     RIO (generalized anxiety disorder)     At risk for cardiovascular event     Elevated hemoglobin (H)     Bladder stones     Past Surgical History:   Procedure Laterality Date     BLADDER SURGERY       CYSTOSTOMY, INSERT TUBE SUPRAPUBIC, COMBINED N/A 9/8/2014    Procedure: COMBINED CYSTOSTOMY, INSERT TUBE SUPRAPUBIC;  Surgeon: Min Prasad MD;  Location: [facility] OR     GENITOURINARY SURGERY      prostate surgery for stones     LASER HOLMIUM  CYSTOSCOPY, INTERNAL URETHROTOMY, COMBINED N/A 10/24/2014    Procedure: COMBINED LASER HOLMIUM CYSTOSCOPY, INTERNAL URETHROTOMY;  Surgeon: Valentin Villatoro MD;  Location: [facility] OR     URETHROPLASTY N/A 12/10/2014    Procedure: URETHROPLASTY;  Surgeon: Niraj Burger MD;  Location: [facility] OR     URETHROPLASTY WITH BUCCAL GRAFT N/A 12/9/2016    Procedure: URETHROPLASTY WITH BUCCAL GRAFT;  Surgeon: Niraj Burger MD;  Location: [facility] OR       Social History   Substance Use Topics     Smoking status: Never Smoker     Smokeless tobacco: Never Used     Alcohol use Yes      Comment: No drinking for 2 weeks     Family History   Problem Relation Age of Onset     Hypertension Mother      Depression Father      Hypertension Father      MENTAL ILLNESS Sister          Current Outpatient Prescriptions   Medication Sig Dispense Refill     levofloxacin (LEVAQUIN) 500 MG tablet Take 1 tablet (500 mg) by mouth daily 10 tablet 0     predniSONE (DELTASONE) 20 MG tablet Take 3 tablets (60 mg) by mouth daily 21 tablet 0     benzonatate (TESSALON) 200 MG capsule Take 1 capsule (200 mg) by mouth 3 times daily as needed for cough 15 capsule 0     sildenafil (REVATIO/VIAGRA) 20 MG tablet Take 5 tablets (100 mg) by mouth as needed For Erectile Dysfunction. Never use with nitroglycerin, terazosin or doxazosin. 50 tablet 3     sildenafil (VIAGRA) 100 MG cap/tab Take 1 tablet (100 mg) by mouth daily as needed for erectile dysfunction 10 tablet 1     sertraline (ZOLOFT) 100 MG tablet Take 1 tablet (100 mg) by mouth every evening 90 tablet 1     diltiazem (CARDIZEM CD; CARTIA XT) 360 MG 24 hr CD capsule Take 1 capsule (360 mg) by mouth daily 90 capsule 1     buPROPion (WELLBUTRIN XL) 300 MG 24 hr tablet Take 1 tablet (300 mg) by mouth every morning 90 tablet 1     amLODIPine (NORVASC) 5 MG tablet Take 1 tablet (5 mg) by mouth daily 90 tablet 1     atorvastatin (LIPITOR) 20 MG tablet Take 1 tablet (20 mg) by mouth daily If not tolerating  medication, contact clinic. 90 tablet 3     lisinopril-hydrochlorothiazide (PRINZIDE,ZESTORETIC) 20-12.5 MG per tablet Take 2 tablets by mouth daily 180 tablet 2     aspirin 81 MG chewable tablet Take 1 tablet (81 mg) by mouth daily 108 tablet 3     IBUPROFEN PO Take 800 mg by mouth every 8 hours as needed for moderate pain       guaiFENesin-codeine (ROBITUSSIN AC) 100-10 MG/5ML SOLN solution Take 5-10 mLs by mouth every 4 hours as needed for cough (Patient not taking: Reported on 5/9/2017) 120 mL 0     Allergies   Allergen Reactions     Contrast Dye Difficulty breathing     Labs reviewed in EPIC    Reviewed and updated as needed this visit by clinical staff  Allergies       Reviewed and updated as needed this visit by Provider                 Review of Systems   Constitutional: Negative for diaphoresis, fever, malaise/fatigue and weight loss.   HENT: Negative for congestion, ear discharge, ear pain, hearing loss, nosebleeds and sore throat.    Eyes: Negative for blurred vision, double vision, photophobia, pain, discharge and redness.   Respiratory: Positive for cough, sputum production and wheezing. Negative for hemoptysis and shortness of breath.    Cardiovascular: Negative for chest pain, palpitations, orthopnea and leg swelling.   Gastrointestinal: Negative for abdominal pain, blood in stool, constipation, diarrhea, heartburn, melena, nausea and vomiting.   Genitourinary: Negative.  Negative for dysuria, frequency and urgency.   Musculoskeletal: Negative for back pain, joint pain, myalgias and neck pain.   Skin: Negative for itching and rash.   Neurological: Negative.  Negative for dizziness, tingling, sensory change, focal weakness, seizures, loss of consciousness, weakness and headaches.   Endo/Heme/Allergies: Negative.    Psychiatric/Behavioral: Negative for depression, hallucinations, substance abuse and suicidal ideas. The patient is not nervous/anxious and does not have insomnia.          Physical Exam    Constitutional: He is oriented to person, place, and time and well-developed, well-nourished, and in no distress.   HENT:   Head: Normocephalic and atraumatic.   Right Ear: External ear normal.   Left Ear: External ear normal.   Nose: Nose normal.   Mouth/Throat: Oropharynx is clear and moist.   Eyes: Conjunctivae and EOM are normal. Pupils are equal, round, and reactive to light. Right eye exhibits no discharge. Left eye exhibits no discharge. No scleral icterus.   Neck: Normal range of motion. Neck supple. No thyromegaly present.   Cardiovascular: Normal rate, regular rhythm, normal heart sounds and intact distal pulses.  Exam reveals no gallop and no friction rub.    No murmur heard.  Pulmonary/Chest: Effort normal. No respiratory distress. He has wheezes. He has no rales. He exhibits no tenderness.   Abdominal: Soft. Bowel sounds are normal. He exhibits no distension and no mass. There is no tenderness. There is no rebound and no guarding.   Musculoskeletal: Normal range of motion. He exhibits no edema or tenderness.   Lymphadenopathy:     He has no cervical adenopathy.   Neurological: He is alert and oriented to person, place, and time. He has normal reflexes. No cranial nerve deficit. He exhibits normal muscle tone. Gait normal. Coordination normal.   Skin: Skin is warm and dry. No rash noted. No erythema.   Psychiatric: Mood, memory, affect and judgment normal.         (R05) Cough  (primary encounter diagnosis)  Comment:   Plan: XR Chest 2 Views, benzonatate (TESSALON) 200 MG        capsule            (J20.9) Acute bronchitis with symptoms > 10 days  Comment:   Plan: levofloxacin (LEVAQUIN) 500 MG tablet,         predniSONE (DELTASONE) 20 MG tablet,         benzonatate (TESSALON) 200 MG capsule          Chest x-ray shows no infiltrate or other problems.  We will treat the bronchitis with a repeat course of prednisone, Levaquin 500 mg ×7-10 days and Tessalon Perles. Follow-up if symptoms do not improve

## 2017-05-09 NOTE — NURSING NOTE
"Chief Complaint   Patient presents with     Cough     Recheck after bronchitis       Initial BP (!) 170/116  Pulse 119  Temp 98.8  F (37.1  C) (Tympanic)  Wt 262 lb (118.8 kg)  SpO2 97%  BMI 34.57 kg/m2 Estimated body mass index is 34.57 kg/(m^2) as calculated from the following:    Height as of 3/27/17: 6' 1\" (1.854 m).    Weight as of this encounter: 262 lb (118.8 kg).  Medication Reconciliation: complete    Health Maintenance that is potentially due pending provider review:  NONE    n/a      "

## 2017-05-09 NOTE — TELEPHONE ENCOUNTER
Reason for call:  Patient reporting a symptom    Symptom or request: bronchitis    Duration (how long have symptoms been present): 10 days    Have you been treated for this before? Yes    Additional comments: patient called stating he has finished his 3 medications from his April 26th and says he feels possibly worse than before    Phone Number patient can be reached at:  Cell number on file:    Telephone Information:   Mobile [phone]       Best Time:  any    Can we leave a detailed message on this number:  this was a message   Mahogany Briggs  Clinic Station  [facility]      Call taken on 5/9/2017 at 7:59 AM by Mahogany Briggs

## 2017-05-09 NOTE — TELEPHONE ENCOUNTER
S-(situation): Flakito states that he is not any better    B-(background): denies fever or chills or wheezing.  Sometimes will cough 5-10 minutes without stopping.       A-(assessment): cough, no fever     R-(recommendations): advised needs to be seen if worse or no better  Tiffanie Saeed RN

## 2017-06-13 ENCOUNTER — TELEPHONE (OUTPATIENT)
Dept: FAMILY MEDICINE | Facility: CLINIC | Age: 57
End: 2017-06-13

## 2017-06-13 NOTE — TELEPHONE ENCOUNTER
I am reviewing charts today and noticed that your blood pressure was high at your last visit in clinic. We are concerned about providing good health care. If you check your blood pressure at home, please send me a few readings.  If not, you can schedule an appointment with the clinic RN for a blood pressure check.  You can call [phone] to schedule.  There is no charge for the blood pressure check in clinic.     left message for patient to return call.  Tiffanie Saeed RN

## 2017-06-27 NOTE — TELEPHONE ENCOUNTER
MTM referral from: [facility]     MTM referral outreach attempt #2 on January 3, 2017 at 3:25 PM      Outcome: Patient not reachable after several attempts, will route to MTM Pharmacist/Provider as an FYI. Thank you for the referral.    Dianne Rosa, MTM Coordinator       never

## 2017-07-05 DIAGNOSIS — I10 BENIGN ESSENTIAL HYPERTENSION: ICD-10-CM

## 2017-07-05 RX ORDER — AMLODIPINE BESYLATE 5 MG/1
TABLET ORAL
Qty: 30 TABLET | Refills: 0 | Status: SHIPPED | OUTPATIENT
Start: 2017-07-05 | End: 2017-08-14

## 2017-07-05 NOTE — TELEPHONE ENCOUNTER
amLODIPine (NORVASC) 5 MG tablet      Last Written Prescription Date: 11/10/16  Last Fill Quantity: 90, # refills: 1  Last Office Visit with G, P or [facility] prescribing provider: 5/9/17       Potassium   Date Value Ref Range Status   12/06/2016 3.9 3.4 - 5.3 mmol/L Final     Creatinine   Date Value Ref Range Status   12/06/2016 1.21 0.66 - 1.25 mg/dL Final     BP Readings from Last 3 Encounters:   05/09/17 (!) 170/116   04/26/17 118/72   03/27/17 (!) 148/98

## 2017-08-14 ENCOUNTER — TELEPHONE (OUTPATIENT)
Dept: PHARMACY | Facility: OTHER | Age: 57
End: 2017-08-14

## 2017-08-14 ENCOUNTER — TELEPHONE (OUTPATIENT)
Dept: FAMILY MEDICINE | Facility: CLINIC | Age: 57
End: 2017-08-14

## 2017-08-14 ENCOUNTER — ALLIED HEALTH/NURSE VISIT (OUTPATIENT)
Dept: FAMILY MEDICINE | Facility: CLINIC | Age: 57
End: 2017-08-14
Payer: COMMERCIAL

## 2017-08-14 VITALS — DIASTOLIC BLOOD PRESSURE: 106 MMHG | SYSTOLIC BLOOD PRESSURE: 148 MMHG

## 2017-08-14 DIAGNOSIS — I10 HTN (HYPERTENSION): Primary | ICD-10-CM

## 2017-08-14 PROCEDURE — 99207 ZZC NO CHARGE NURSE ONLY: CPT | Performed by: PHYSICIAN ASSISTANT

## 2017-08-14 NOTE — MR AVS SNAPSHOT
"              After Visit Summary   8/14/2017    Flakito Hilliard    MRN: [id number]           Patient Information     Date Of Birth          1960        Visit Information        Provider Department      8/14/2017 12:02 PM Susan Hand PA-C [facility]        Today's Diagnoses     severe HTN (hypertension)    -  1       Follow-ups after your visit        Your next 10 appointments already scheduled     Jan 08, 2018 12:30 PM CST   (Arrive by 12:15 PM)   Cystoscopy with Niraj Burger MD   [facility]rologic Cancers ([facility])    [address] [place] [address]  4th [facility] [address] [phone]              Who to contact     If you have questions or need follow up information about today's clinic visit or your schedule please contact [facility] directly at [phone].  Normal or non-critical lab and imaging results will be communicated to you by MyChart, letter or phone within 4 business days after the clinic has received the results. If you do not hear from us within 7 days, please contact the clinic through MyChart or phone. If you have a critical or abnormal lab result, we will notify you by phone as soon as possible.  Submit refill requests through Iz[employer] or call your pharmacy and they will forward the refill request to us. Please allow 3 business days for your refill to be completed.          Additional Information About Your Visit        MyChart Information     I[employer] lets you send messages to your doctor, view your test results, renew your prescriptions, schedule appointments and more. To sign up, go to [email or web address] . Click on \"Log in\" on the left side of the screen, which will take you to the Welcome page. Then click on \"Sign up Now\" on the right side of the page.     You will be asked to enter the access code listed below, as well as some personal information. Please " follow the directions to create your username and password.     Your access code is: [email or web address]  Expires: 2017  9:02 AM     Your access code will  in 90 days. If you need help or a new code, please call your [facility] clinic or [phone].        Care EveryWhere ID     This is your Care EveryWhere ID. This could be used by other organizations to access your [facility] medical records  [id number]         Blood Pressure from Last 3 Encounters:   17 (!) 142/94   17 (!) 148/106   17 (!) 170/116    Weight from Last 3 Encounters:   17 265 lb (120.2 kg)   17 262 lb (118.8 kg)   17 258 lb 3.2 oz (117.1 kg)              Today, you had the following     No orders found for display         Today's Medication Changes          These changes are accurate as of: 17 11:59 PM.  If you have any questions, ask your nurse or doctor.               Start taking these medicines.        Dose/Directions    CARTIA  MG 24 hr capsule   Used for:  Benign essential hypertension   Generic drug:  diltiazem   Started by:  Susan Hand PA-C        TAKE THREE CAPSULES (360MG) BY MOUTH DAILY   Quantity:  270 capsule   Refills:  0         These medicines have changed or have updated prescriptions.        Dose/Directions    amLODIPine 5 MG tablet   Commonly known as:  NORVASC   This may have changed:  See the new instructions.   Used for:  Benign essential hypertension   Changed by:  Susan Hand PA-C        TAKE ONE TABLET BY MOUTH EVERY DAY (MUST HAVE FOR BLOOD PRESSURE CHECK IN CLINIC OR PHARMACY FOR FUTHER REFILLS)   Quantity:  30 tablet   Refills:  0       buPROPion 300 MG 24 hr tablet   Commonly known as:  WELLBUTRIN XL   This may have changed:  See the new instructions.   Used for:  Major depressive disorder, recurrent episode, moderate (H)   Changed by:  Susan Hand PA-C        TAKE ONE TABLET BY MOUTH EVERY MORNING   Quantity:  90 tablet   Refills:  1        lisinopril-hydrochlorothiazide 20-12.5 MG per tablet   Commonly known as:  PRINZIDE/ZESTORETIC   This may have changed:  See the new instructions.   Used for:  Severe hypertension   Changed by:  Susan Hand PA-C        TAKE TWO TABLETS BY MOUTH EVERY DAY   Quantity:  180 tablet   Refills:  0            Where to get your medicines      These medications were sent to [facility] [place] [id number]     Phone:  [phone]     amLODIPine 5 MG tablet    buPROPion 300 MG 24 hr tablet    CARTIA  MG 24 hr capsule    lisinopril-hydrochlorothiazide 20-12.5 MG per tablet                Primary Care Provider Office Phone # Fax #    Susan Hand PA-C [phone]       [address] [place] [address]        Goals        General    I will call and get appointment with CNS at [place].  I will call by 8-22-16. (pt-stated)     Notes - Note created  8/15/2016 11:08 AM by Jesi Russell LSW    As of today's date 8/15/2016 goal is met at 26 - 50%.   Goal Status:  Active          Equal Access to Services     ARLYN CARRILLO AH: Hadii charles grace hadasho Soomaali, waaxda luqadaha, qaybta kaalmada adeegyada, toby clemente. So [facility] [phone].    ATENCIÓN: Si habla español, tiene a larson disposición servicios gratuitos de asistencia lingüística. Llame al [phone].    We comply with applicable federal civil rights laws and [place] laws. We do not discriminate on the basis of race, color, national origin, age, disability sex, sexual orientation or gender identity.            Thank you!     Thank you for choosing [facility]  for your care. Our goal is always to provide you with excellent care. Hearing back from our patients is one way we can continue to improve our services. Please take a few minutes to complete the written survey that you may receive in the mail after  your visit with us. Thank you!             Your Updated Medication List - Protect others around you: Learn how to safely use, store and throw away your medicines at www.disposemymeds.org.          This list is accurate as of: 8/14/17 11:59 PM.  Always use your most recent med list.                   Brand Name Dispense Instructions for use Diagnosis    amLODIPine 5 MG tablet    NORVASC    30 tablet    TAKE ONE TABLET BY MOUTH EVERY DAY (MUST HAVE FOR BLOOD PRESSURE CHECK IN CLINIC OR PHARMACY FOR FUTHER REFILLS)    Benign essential hypertension       aspirin 81 MG chewable tablet     108 tablet    Take 1 tablet (81 mg) by mouth daily    Severe hypertension       atorvastatin 20 MG tablet    LIPITOR    90 tablet    Take 1 tablet (20 mg) by mouth daily If not tolerating medication, contact clinic.    At risk for cardiovascular event       buPROPion 300 MG 24 hr tablet    WELLBUTRIN XL    90 tablet    TAKE ONE TABLET BY MOUTH EVERY MORNING    Major depressive disorder, recurrent episode, moderate (H)       CARTIA  MG 24 hr capsule   Generic drug:  diltiazem     270 capsule    TAKE THREE CAPSULES (360MG) BY MOUTH DAILY    Benign essential hypertension       IBUPROFEN PO      Take 800 mg by mouth every 8 hours as needed for moderate pain        lisinopril-hydrochlorothiazide 20-12.5 MG per tablet    PRINZIDE/ZESTORETIC    180 tablet    TAKE TWO TABLETS BY MOUTH EVERY DAY    Severe hypertension       sertraline 100 MG tablet    ZOLOFT    90 tablet    Take 1 tablet (100 mg) by mouth every evening    Major depressive disorder, recurrent episode, moderate (H)       sildenafil 100 MG cap/tab    VIAGRA    10 tablet    Take 1 tablet (100 mg) by mouth daily as needed for erectile dysfunction    Erectile dysfunction due to arterial insufficiency       sildenafil 20 MG tablet    REVATIO/VIAGRA    50 tablet    Take 5 tablets (100 mg) by mouth as needed For Erectile Dysfunction. Never use with nitroglycerin, terazosin or  doxazosin.    ED (erectile dysfunction)

## 2017-08-14 NOTE — PROGRESS NOTES
Flakito Hilliard is enrolled/participating in the retail pharmacy Blood Pressure Goals Achievement Program (BPGAP).  Flakito Hilliard was evaluated at [facility] on August 14, 2017 at which time his blood pressure was:    BP Readings from Last 3 Encounters:   08/14/17 (!) 148/106   05/09/17 (!) 170/116   04/26/17 118/72     Reviewed lifestyle modifications for blood pressure control and reduction: including making healthy food choices, managing weight, getting regular exercise, smoking cessation, reducing alcohol consumption, monitoring blood pressure regularly.     Flakito Hilliard is not experiencing symptoms.    Follow-Up: BP is at goal of < 140/90mmHg (patient 18+ years of age with or without diabetes).  Recommended follow-up at pharmacy in 6 months.     Recommendation to Provider: Patient has not taken his Lisinopril/HCTZ or Diltiazem for 2 days - Would continue current therapy and recheck BP once patient has restarted meds.    Flakito Hilliard was evaluated for enrollment into the PGEN study today.    Patient eligible for enrollment:  No - Patient on 4 different med classes  Patient interested in enrollment:  No    Completed by: Darien Ordoñez PharmD - Float Pharmacist, on behalf of [facility]

## 2017-08-14 NOTE — LETTER
[facility] PHARM D PROJECT  [address] [facility] [address]      August 28, 2017    Flakito Hilliard                                                                                                                     [address] [facility] [address]      Dear Flakito,    Your [facility] has recommended you schedule a Medication Therapy Management (MTM) appointment. MTM is designed to help you get the most of out of your medicines.     During an MTM appointment a specially trained pharmacist will review all of your medicines, both prescription and over-the-counter. They will make sure your medicines are the best choice for you and are safe and convenient for you.  MTM pharmacists work together with you and your doctor to help you understand your medicines, solve any problems related to your medicines and help you get the best results from taking your medicines.     At [facility], we strongly believe in a team approach to health care. We want to help you understand your medicines and health conditions. To learn more about how you might benefit from MTM services, watch the patient video at [email or web address].     To make an appointment, please call the MTM scheduling line at [phone] (toll-free at [phone]).    We look forward to hearing from you!        Zane May, Viviane  Medication Therapy Management Provider  Pager (voicemail): [phone]

## 2017-08-14 NOTE — TELEPHONE ENCOUNTER
MTM referral from: [facility]     MTM referral outreach attempt #1 on August 14, 2017 at 3:52 PM      Outcome: Left Message    Dianne Rosa MTM Coordinator

## 2017-08-15 NOTE — TELEPHONE ENCOUNTER
MTM referral from: [facility]     MTM referral outreach attempt #2 on August 15, 2017 at 1:07 PM      Outcome: Patient not reachable after several attempts, will route to MTM Pharmacist/Provider as an FYI. Thank you for the referral.    Dianne Rosa, MTM Coordinator

## 2017-08-18 ENCOUNTER — TELEPHONE (OUTPATIENT)
Dept: FAMILY MEDICINE | Facility: CLINIC | Age: 57
End: 2017-08-18

## 2017-08-18 ENCOUNTER — OFFICE VISIT (OUTPATIENT)
Dept: FAMILY MEDICINE | Facility: CLINIC | Age: 57
End: 2017-08-18
Payer: COMMERCIAL

## 2017-08-18 VITALS
HEIGHT: 73 IN | BODY MASS INDEX: 35.12 KG/M2 | DIASTOLIC BLOOD PRESSURE: 94 MMHG | OXYGEN SATURATION: 98 % | SYSTOLIC BLOOD PRESSURE: 142 MMHG | HEART RATE: 95 BPM | WEIGHT: 265 LBS | TEMPERATURE: 97.7 F

## 2017-08-18 DIAGNOSIS — F33.1 MAJOR DEPRESSIVE DISORDER, RECURRENT EPISODE, MODERATE (H): ICD-10-CM

## 2017-08-18 DIAGNOSIS — I10 BENIGN ESSENTIAL HYPERTENSION: Primary | ICD-10-CM

## 2017-08-18 DIAGNOSIS — N52.9 ERECTILE DYSFUNCTION, UNSPECIFIED ERECTILE DYSFUNCTION TYPE: ICD-10-CM

## 2017-08-18 DIAGNOSIS — G47.33 OSA (OBSTRUCTIVE SLEEP APNEA): ICD-10-CM

## 2017-08-18 DIAGNOSIS — M25.50 MULTIPLE JOINT PAIN: ICD-10-CM

## 2017-08-18 DIAGNOSIS — Z91.89 AT RISK FOR CARDIOVASCULAR EVENT: ICD-10-CM

## 2017-08-18 PROCEDURE — 99214 OFFICE O/P EST MOD 30 MIN: CPT | Performed by: PHYSICIAN ASSISTANT

## 2017-08-18 RX ORDER — AMLODIPINE BESYLATE 5 MG/1
10 TABLET ORAL DAILY
Qty: 30 TABLET | Refills: 0
Start: 2017-08-18 | End: 2018-03-21

## 2017-08-18 NOTE — MR AVS SNAPSHOT
After Visit Summary   8/18/2017    Flakito Hilliard    MRN: [id number]           Patient Information     Date Of Birth          1960        Visit Information        Provider Department      8/18/2017 8:00 AM Susan Hand PA-C [facility]        Today's Diagnoses     Benign essential hypertension          Care Instructions    BP -  Increase amlodipine to 10 mg - 2 tabs of current 5 mgs since just refilled  Make sure you are taking all meds listed on med sheet - let me know if find otherwise  Nurse check next week for further med adjustment, or ask to have pills switched to right strength  Watch caffeine intake  Can take some BP meds at night and some in morning - may give better control  To emergency if not feeling right -  chest pains, chest pressures, SOB or sudden change of endurance.      Joint aches -  Doubtful from cholesterol med since usually would be muscle aches, but can try off med for a few weeks to figure this out  If do better off med, call me  If not doing better off med, would go back on med    Generic viagra should be at pharmacy or I will prescribe    Strongly recommend staying on all depression meds  I would restart zoloft at 1/2 tab daily x 2 weeks before resuming usual dose of 1 tab daily    Call if questions          Follow-ups after your visit        Your next 10 appointments already scheduled     Jan 08, 2018 12:30 PM CST   (Arrive by 12:15 PM)   Cystoscopy with Niraj Burger MD   [facility] Prostate and Urologic Cancers ([facility])    [address] [place] [address] [phone]              Who to contact     If you have questions or need follow up information about today's clinic visit or your schedule please contact [facility] directly at [phone].  Normal or non-critical lab and imaging results will be communicated to you by MyChart, letter  "or phone within 4 business days after the clinic has received the results. If you do not hear from us within 7 days, please contact the clinic through [employer] or phone. If you have a critical or abnormal lab result, we will notify you by phone as soon as possible.  Submit refill requests through [employer]Juvo or call your pharmacy and they will forward the refill request to us. Please allow 3 business days for your refill to be completed.          Additional Information About Your Visit        coJuvo Information     [employer] lets you send messages to your doctor, view your test results, renew your prescriptions, schedule appointments and more. To sign up, go to [email or web address] . Click on \"Log in\" on the left side of the screen, which will take you to the Welcome page. Then click on \"Sign up Now\" on the right side of the page.     You will be asked to enter the access code listed below, as well as some personal information. Please follow the directions to create your username and password.     Your access code is: [email or web address]  Expires: 2017  9:02 AM     Your access code will  in 90 days. If you need help or a new code, please call your [facility] clinic or [phone].        Care EveryWhere ID     This is your Care EveryWhere ID. This could be used by other organizations to access your [facility] medical records  [id number]        Your Vitals Were     Pulse Temperature Height Pulse Oximetry BMI (Body Mass Index)       95 97.7  F (36.5  C) (Tympanic) 6' 1\" (1.854 m) 98% 34.96 kg/m2        Blood Pressure from Last 3 Encounters:   17 (!) 179/105   17 (!) 148/106   17 (!) 170/116    Weight from Last 3 Encounters:   17 265 lb (120.2 kg)   17 262 lb (118.8 kg)   17 258 lb 3.2 oz (117.1 kg)              We Performed the Following     RN HTN MGMT          Today's Medication Changes          These changes are accurate as of: 17  9:02 AM.  If you have any questions, ask " your nurse or doctor.               These medicines have changed or have updated prescriptions.        Dose/Directions    amLODIPine 5 MG tablet   Commonly known as:  NORVASC   This may have changed:  See the new instructions.   Used for:  Benign essential hypertension   Changed by:  Susan Hand PA-C        Dose:  10 mg   Take 2 tablets (10 mg) by mouth daily   Quantity:  30 tablet   Refills:  0         Stop taking these medicines if you haven't already. Please contact your care team if you have questions.     sildenafil 100 MG cap/tab   Commonly known as:  VIAGRA   Stopped by:  Susan Hand PA-C                Where to get your medicines      Some of these will need a paper prescription and others can be bought over the counter.  Ask your nurse if you have questions.     You don't need a prescription for these medications     amLODIPine 5 MG tablet                Primary Care Provider Office Phone # Fax #    Susan Hand PA-C [phone] [address] [place] [address]        Goals        General    I will call and get appointment with CNS at [place].  I will call by 8-22-16. (pt-stated)     Notes - Note created  8/15/2016 11:08 AM by Jesi Russell LSW    As of today's date 8/15/2016 goal is met at 26 - 50%.   Goal Status:  Active          Equal Access to Services     FIDE CARRILLO AH: Hadii charles grace hadasho Soomaali, waaxda luqadaha, qaybta kaalmada adeegyada, toby singletayr hayfeliz clemente. So [facility] [phone].    ATENCIÓN: Si habla español, tiene a larson disposición servicios gratuitos de asistencia lingüística. Llame al [phone].    We comply with applicable federal civil rights laws and [place] laws. We do not discriminate on the basis of race, color, national origin, age, disability sex, sexual orientation or gender identity.            Thank you!     Thank you for choosing [facility]  for your care. Our goal is always to  provide you with excellent care. Hearing back from our patients is one way we can continue to improve our services. Please take a few minutes to complete the written survey that you may receive in the mail after your visit with us. Thank you!             Your Updated Medication List - Protect others around you: Learn how to safely use, store and throw away your medicines at www.disposemymeds.org.          This list is accurate as of: 8/18/17  9:02 AM.  Always use your most recent med list.                   Brand Name Dispense Instructions for use Diagnosis    amLODIPine 5 MG tablet    NORVASC    30 tablet    Take 2 tablets (10 mg) by mouth daily    Benign essential hypertension       aspirin 81 MG chewable tablet     108 tablet    Take 1 tablet (81 mg) by mouth daily    Severe hypertension       atorvastatin 20 MG tablet    LIPITOR    90 tablet    Take 1 tablet (20 mg) by mouth daily If not tolerating medication, contact clinic.    At risk for cardiovascular event       buPROPion 300 MG 24 hr tablet    WELLBUTRIN XL    90 tablet    TAKE ONE TABLET BY MOUTH EVERY MORNING    Major depressive disorder, recurrent episode, moderate (H)       CARTIA  MG 24 hr capsule   Generic drug:  diltiazem     270 capsule    TAKE THREE CAPSULES (360MG) BY MOUTH DAILY    Benign essential hypertension       IBUPROFEN PO      Take 800 mg by mouth every 8 hours as needed for moderate pain        lisinopril-hydrochlorothiazide 20-12.5 MG per tablet    PRINZIDE/ZESTORETIC    180 tablet    TAKE TWO TABLETS BY MOUTH EVERY DAY    Severe hypertension       sertraline 100 MG tablet    ZOLOFT    90 tablet    Take 1 tablet (100 mg) by mouth every evening    Major depressive disorder, recurrent episode, moderate (H)       sildenafil 20 MG tablet    REVATIO/VIAGRA    50 tablet    Take 5 tablets (100 mg) by mouth as needed For Erectile Dysfunction. Never use with nitroglycerin, terazosin or doxazosin.    ED (erectile dysfunction)

## 2017-08-18 NOTE — TELEPHONE ENCOUNTER
I am reviewing charts today and noticed that his blood pressure was high at his last visit in clinic. We are concerned about providing good health care. If he checks his blood pressure at home, please send me a few readings.  If not,he can schedule an appointment with the clinic RN for a blood pressure check.  He can call [phone] to schedule.  There is no charge for the blood pressure check in clinic. left message for patient to return call.  Tiffanie Saeed RN

## 2017-08-18 NOTE — PATIENT INSTRUCTIONS
BP -  Increase amlodipine to 10 mg - 2 tabs of current 5 mgs since just refilled  Make sure you are taking all meds listed on med sheet - let me know if find otherwise  Nurse check next week for further med adjustment, or ask to have pills switched to right strength  Watch caffeine intake  Can take some BP meds at night and some in morning - may give better control  To emergency if not feeling right -  chest pains, chest pressures, SOB or sudden change of endurance.      Joint aches -  Doubtful from cholesterol med since usually would be muscle aches, but can try off med for a few weeks to figure this out  If do better off med, call me  If not doing better off med, would go back on med    Generic viagra should be at pharmacy or I will prescribe    Strongly recommend staying on all depression meds  I would restart zoloft at 1/2 tab daily x 2 weeks before resuming usual dose of 1 tab daily    Call if questions

## 2017-08-18 NOTE — PROGRESS NOTES
"  SUBJECTIVE:   Flakito Hilliard is a 57 year old male who presents to clinic today for the following health issues:    Medication Followup of Atorvastatin    Taking Medication as prescribed: yes    Side Effects:  Muscle aches in knees, hips and elbows     Medication Helping Symptoms:  Unsure        Medication Followup of Viagra    Taking Medication as prescribed: NO    Side Effects:  None    Medication Helping Symptoms:  not applicable, patient would like something different due to pricing     HTN - very high today on initial check, 179/105, hadn't been this high recently at pharmacy.  Pills taken 7:30 this morning.  3 glasses wine last night, not typical.  Coffee caffeinated 2-3 cups every morning.  Using CPAP, last saw specialist in March 2016.  Mood doing well, \"I don't get stressed anymore\", quit zoloft but continued his wellbutrin.  History of severe recurrent depression.  No chest pains, chest pressures, SOB or sudden change of endurance.   Sometimes eyes will seem funny if misses med.    Bladder - doing well, in infections, good flow.    ? Atorvastatin - knees, hips, elbows.  ? If old athletic injuries.  No history of gout, personal or fam.    Problem list and histories reviewed & adjusted, as indicated.  Additional history: as documented    BP Readings from Last 3 Encounters:   08/18/17 (!) 142/94   08/14/17 (!) 148/106   05/09/17 (!) 170/116    Wt Readings from Last 3 Encounters:   08/18/17 265 lb (120.2 kg)   05/09/17 262 lb (118.8 kg)   04/26/17 258 lb 3.2 oz (117.1 kg)        Labs reviewed in EPIC      Reviewed and updated as needed this visit by clinical staffTobacco  Allergies  Meds  Problems  Med Hx  Surg Hx  Fam Hx       Reviewed and updated as needed this visit by Provider  Allergies  Meds  Problems  Med Hx  Surg Hx  Fam Hx         ROS:  Constitutional, HEENT, cardiovascular, pulmonary, GI, , musculoskeletal, neuro, skin, endocrine and psych systems are negative, except as otherwise " "noted.      OBJECTIVE:   BP (!) 142/94 (BP Location: Right arm, Patient Position: Chair, Cuff Size: Adult Large)  Pulse 95  Temp 97.7  F (36.5  C) (Tympanic)  Ht 6' 1\" (1.854 m)  Wt 265 lb (120.2 kg)  SpO2 98%  BMI 34.96 kg/m2  Body mass index is 34.96 kg/(m^2).  GENERAL: healthy, alert and no distress  CV: regular rate and rhythm, normal S1 S2, no S3 or S4, no murmur, click or rub  PSYCH: mentation appears normal, affect normal/bright  MS: no overt joint deformities or swellings      ASSESSMENT/PLAN:       ICD-10-CM    1. Benign essential hypertension I10 amLODIPine (NORVASC) 5 MG tablet     RN HTN MGMT   2. Major depressive disorder, recurrent episode, moderate (H) F33.1    3. At risk for cardiovascular event Z91.89    4. Multiple joint pain M25.50    5. PATRICE (obstructive sleep apnea) G47.33    6. Erectile dysfunction, unspecified erectile dysfunction type N52.9    consider PATRICE recheck if BP not improving.  On 4 meds currently for HTN.    Patient Instructions   BP -  Increase amlodipine to 10 mg - 2 tabs of current 5 mgs since just refilled  Make sure you are taking all meds listed on med sheet - let me know if find otherwise  Nurse check next week for further med adjustment, or ask to have pills switched to right strength  Watch caffeine intake  Can take some BP meds at night and some in morning - may give better control  To emergency if not feeling right -  chest pains, chest pressures, SOB or sudden change of endurance.      Joint aches -  Doubtful from cholesterol med since usually would be muscle aches, but can try off med for a few weeks to figure this out  If do better off med, call me  If not doing better off med, would go back on med    Generic viagra should be at pharmacy or I will prescribe    Strongly recommend staying on all depression meds  I would restart zoloft at 1/2 tab daily x 2 weeks before resuming usual dose of 1 tab daily    Call if questions      YVON Murray " [facility]

## 2017-08-18 NOTE — NURSING NOTE
"Chief Complaint   Patient presents with     Medication Problem     viagra is too expensive      Hyperlipidemia     review medication        Initial LMP 02/24/2017 (Exact Date) Estimated body mass index is 23.49 kg/(m^2) as calculated from the following:    Height as of 4/4/17: 5' 7\" (1.702 m).    Weight as of 4/4/17: 150 lb (68 kg).  Medication Reconciliation: complete     Amber Oliver, NORMA   "

## 2017-08-18 NOTE — LETTER
[facility]  [address] [place] [id number] [phone]          August 18, 2017    Flakitocatalino Hilliard                                                                                                                     [address] [place] [phone]            Dear Flakito,  I am reviewing charts today and noticed that your blood pressure was high at your last visit in clinic. We are concerned about providing good health care. If you check your blood pressure at home, please send me a few readings.  If not, you can schedule an appointment with the clinic RN for a blood pressure check.  You can call [phone] to schedule.  There is no charge for the blood pressure check in clinic.   Tiffanie Saeed RN         Sincerely,         Susan Hand PA-C/Tiffanie Saeed RN

## 2017-08-28 NOTE — TELEPHONE ENCOUNTER
Referral letter sent.    Ingrid LauD  Medication Therapy Management Provider  Pager (voicemail): [phone]

## 2017-09-18 ENCOUNTER — ALLIED HEALTH/NURSE VISIT (OUTPATIENT)
Dept: FAMILY MEDICINE | Facility: CLINIC | Age: 57
End: 2017-09-18
Payer: COMMERCIAL

## 2017-09-18 VITALS — DIASTOLIC BLOOD PRESSURE: 88 MMHG | SYSTOLIC BLOOD PRESSURE: 140 MMHG

## 2017-09-18 DIAGNOSIS — I10 BENIGN ESSENTIAL HYPERTENSION: ICD-10-CM

## 2017-09-18 DIAGNOSIS — Z01.30 BP CHECK: Primary | ICD-10-CM

## 2017-09-18 PROCEDURE — 99207 ZZC NO CHARGE NURSE ONLY: CPT | Performed by: PHYSICIAN ASSISTANT

## 2017-09-18 RX ORDER — AMLODIPINE BESYLATE 5 MG/1
TABLET ORAL
Qty: 30 TABLET | Refills: 0 | Status: SHIPPED | OUTPATIENT
Start: 2017-09-18 | End: 2017-10-27

## 2017-09-18 NOTE — TELEPHONE ENCOUNTER
Amlodipine      Last Written Prescription Date: 08/18/17  Last Fill Quantity: 30, # refills: 0  Last Office Visit with [facility], [facility] or [facility] prescribing provider: 08/18/17       Potassium   Date Value Ref Range Status   12/06/2016 3.9 3.4 - 5.3 mmol/L Final     Creatinine   Date Value Ref Range Status   12/06/2016 1.21 0.66 - 1.25 mg/dL Final     BP Readings from Last 3 Encounters:   08/18/17 (!) 142/94   08/14/17 (!) 148/106   05/09/17 (!) 170/116

## 2017-09-18 NOTE — PROGRESS NOTES
Flakito Hilliard is enrolled/participating in the retail pharmacy Blood Pressure Goals Achievement Program (BPGAP).  Flakito Hilliard was evaluated at [facility] on September 18, 2017 at which time his blood pressure was:    BP Readings from Last 3 Encounters:   09/18/17 140/88   08/18/17 (!) 142/94   08/14/17 (!) 148/106     Reviewed lifestyle modifications for blood pressure control and reduction: including making healthy food choices, managing weight, getting regular exercise, smoking cessation, reducing alcohol consumption, monitoring blood pressure regularly.     Flakito Hilliard is not experiencing symptoms.    Follow-Up: BP is at goal of < 140/90mmHg (patient 18+ years of age with or without diabetes).  Recommended follow-up at pharmacy in 6 months.     Recommendation to Provider: continue current therapy    Flakito Hilliard was evaluated for enrollment into the PGEN study today.    Patient eligible for enrollment:  No  Patient interested in enrollment:  No    Completed by: Chayo Wilkinson, PharmD Student

## 2017-09-18 NOTE — MR AVS SNAPSHOT
"              After Visit Summary   9/18/2017    Flakito Hilliard    MRN: [id number]           Patient Information     Date Of Birth          1960        Visit Information        Provider Department      9/18/2017 3:37 PM Susan Hand PA-C [facility]        Today's Diagnoses     BP check    -  1       Follow-ups after your visit        Your next 10 appointments already scheduled     Jan 08, 2018 12:30 PM CST   (Arrive by 12:15 PM)   Cystoscopy with Niraj Burger MD   [facility] ([facility])    [address] [place] [address]  [address] [employer] [address] [phone]              Who to contact     If you have questions or need follow up information about today's clinic visit or your schedule please contact [facility] directly at [phone].  Normal or non-critical lab and imaging results will be communicated to you by MyChart, letter or phone within 4 business days after the clinic has received the results. If you do not hear from us within 7 days, please contact the clinic through MyChart or phone. If you have a critical or abnormal lab result, we will notify you by phone as soon as possible.  Submit refill requests through [employer] or call your pharmacy and they will forward the refill request to us. Please allow 3 business days for your refill to be completed.          Additional Information About Your Visit        MyChart Information     [employer] lets you send messages to your doctor, view your test results, renew your prescriptions, schedule appointments and more. To sign up, go to [email or web address] . Click on \"Log in\" on the left side of the screen, which will take you to the Welcome page. Then click on \"Sign up Now\" on the right side of the page.     You will be asked to enter the access code listed below, as well as some personal information. Please follow the directions " to create your username and password.     Your access code is: [email or web address]  Expires: 2017  9:02 AM     Your access code will  in 90 days. If you need help or a new code, please call your [place] clinic or [phone].        Care EveryWhere ID     This is your Care EveryWhere ID. This could be used by other organizations to access your [place] medical records  [id number]         Blood Pressure from Last 3 Encounters:   17 140/88   17 (!) 142/94   17 (!) 148/106    Weight from Last 3 Encounters:   17 265 lb (120.2 kg)   17 262 lb (118.8 kg)   17 258 lb 3.2 oz (117.1 kg)              Today, you had the following     No orders found for display         Today's Medication Changes          These changes are accurate as of: 17 11:59 PM.  If you have any questions, ask your nurse or doctor.               These medicines have changed or have updated prescriptions.        Dose/Directions    * amLODIPine 5 MG tablet   Commonly known as:  NORVASC   This may have changed:  Another medication with the same name was added. Make sure you understand how and when to take each.   Used for:  Benign essential hypertension   Changed by:  Susan Hand PA-C        Dose:  10 mg   Take 2 tablets (10 mg) by mouth daily   Quantity:  30 tablet   Refills:  0       * amLODIPine 5 MG tablet   Commonly known as:  NORVASC   This may have changed:  You were already taking a medication with the same name, and this prescription was added. Make sure you understand how and when to take each.   Used for:  Benign essential hypertension   Changed by:  Susan Hand PA-C        TAKE ONE TABLET BY MOUTH EVERY DAY (MUST HAVE FOR BLOOD PRESSURE CHECK IN CLINIC OR PHARMACY FOR FUTHER REFILLS)   Quantity:  30 tablet   Refills:  0       * Notice:  This list has 2 medication(s) that are the same as other medications prescribed for you. Read the directions carefully, and ask your doctor or  other care provider to review them with you.         Where to get your medicines      These medications were sent to [facility], [place] - [address] [place] [address]  [address] [place] [address]     Phone:  [phone]     amLODIPine 5 MG tablet                Primary Care Provider Office Phone # Fax #    Susan Hand PA-C [phone] [address] [place] [phone]        Goals        General    I will call and get appointment with CNS at [place].  I will call by 8-22-16. (pt-stated)     Notes - Note created  8/15/2016 11:08 AM by Jesi Russell LSW    As of today's date 8/15/2016 goal is met at 26 - 50%.   Goal Status:  Active          Equal Access to Services     FIDE CARRILLO : Hadii charles grace hadasho Soaníbal, waaxda luqadaha, qaybta kaalmada adeegyada, toby carter . So [facility] [phone].    ATENCIÓN: Si habla español, tiene a larson disposición servicios gratuitos de asistencia lingüística. Llame al [phone].    We comply with applicable federal civil rights laws and [place] laws. We do not discriminate on the basis of race, color, national origin, age, disability, sex, sexual orientation, or gender identity.            Thank you!     Thank you for choosing [facility]  for your care. Our goal is always to provide you with excellent care. Hearing back from our patients is one way we can continue to improve our services. Please take a few minutes to complete the written survey that you may receive in the mail after your visit with us. Thank you!             Your Updated Medication List - Protect others around you: Learn how to safely use, store and throw away your medicines at www.disposemymeds.org.          This list is accurate as of: 9/18/17 11:59 PM.  Always use your most recent med list.                   Brand Name Dispense Instructions for use Diagnosis    * amLODIPine 5 MG tablet     NORVASC    30 tablet    Take 2 tablets (10 mg) by mouth daily    Benign essential hypertension       * amLODIPine 5 MG tablet    NORVASC    30 tablet    TAKE ONE TABLET BY MOUTH EVERY DAY (MUST HAVE FOR BLOOD PRESSURE CHECK IN CLINIC OR PHARMACY FOR FUTHER REFILLS)    Benign essential hypertension       aspirin 81 MG chewable tablet     108 tablet    Take 1 tablet (81 mg) by mouth daily    Severe hypertension       atorvastatin 20 MG tablet    LIPITOR    90 tablet    Take 1 tablet (20 mg) by mouth daily If not tolerating medication, contact clinic.    At risk for cardiovascular event       buPROPion 300 MG 24 hr tablet    WELLBUTRIN XL    90 tablet    TAKE ONE TABLET BY MOUTH EVERY MORNING    Major depressive disorder, recurrent episode, moderate (H)       CARTIA  MG 24 hr capsule   Generic drug:  diltiazem     270 capsule    TAKE THREE CAPSULES (360MG) BY MOUTH DAILY    Benign essential hypertension       IBUPROFEN PO      Take 800 mg by mouth every 8 hours as needed for moderate pain        lisinopril-hydrochlorothiazide 20-12.5 MG per tablet    PRINZIDE/ZESTORETIC    180 tablet    TAKE TWO TABLETS BY MOUTH EVERY DAY    Severe hypertension       sertraline 100 MG tablet    ZOLOFT    90 tablet    Take 1 tablet (100 mg) by mouth every evening    Major depressive disorder, recurrent episode, moderate (H)       sildenafil 20 MG tablet    REVATIO    50 tablet    Take 5 tablets (100 mg) by mouth as needed For Erectile Dysfunction. Never use with nitroglycerin, terazosin or doxazosin.    ED (erectile dysfunction)       * Notice:  This list has 2 medication(s) that are the same as other medications prescribed for you. Read the directions carefully, and ask your doctor or other care provider to review them with you.

## 2017-09-25 ENCOUNTER — TELEPHONE (OUTPATIENT)
Dept: FAMILY MEDICINE | Facility: CLINIC | Age: 57
End: 2017-09-25

## 2017-10-27 DIAGNOSIS — Z91.89 AT RISK FOR CARDIOVASCULAR EVENT: ICD-10-CM

## 2017-10-27 DIAGNOSIS — I10 BENIGN ESSENTIAL HYPERTENSION: ICD-10-CM

## 2017-10-27 RX ORDER — ATORVASTATIN CALCIUM 20 MG/1
20 TABLET, FILM COATED ORAL DAILY
Qty: 90 TABLET | Refills: 0 | Status: SHIPPED | OUTPATIENT
Start: 2017-10-27 | End: 2018-03-19

## 2017-10-27 RX ORDER — AMLODIPINE BESYLATE 5 MG/1
TABLET ORAL
Qty: 30 TABLET | Refills: 0 | Status: SHIPPED | OUTPATIENT
Start: 2017-10-27 | End: 2017-12-18

## 2017-10-27 NOTE — LETTER
[facility]  [address] [place] [id number] [phone]        November 5, 2018    Flakito Hilliard  [address] [place] [phone]              Dear Flakito Hilliard    This is to remind you that your provider wanted you to return to the clinic for fasting lab test(s),    please fast for 10-12 hours. Morning medications can be taken with water.    You may call our office at [facility] at [phone] to schedule an appointment.    Please disregard this notice if you have already had your labs drawn or made an appointment.          Sincerely,        Susan Hand PA-C/ demarcus

## 2017-10-27 NOTE — TELEPHONE ENCOUNTER
BP Readings from Last 6 Encounters:   09/18/17 140/88   08/18/17 (!) 142/94   08/14/17 (!) 148/106   05/09/17 (!) 170/116   04/26/17 118/72   03/27/17 (!) 148/98     Component      Latest Ref Rng & Units 12/6/2016   Sodium      133 - 144 mmol/L 139   Potassium      3.4 - 5.3 mmol/L 3.9   Chloride      94 - 109 mmol/L 104   Carbon Dioxide      20 - 32 mmol/L 28   Anion Gap      3 - 14 mmol/L 7   Glucose      70 - 99 mg/dL 89   Urea Nitrogen      7 - 30 mg/dL 16   Creatinine      0.66 - 1.25 mg/dL 1.21   GFR Estimate      >60 mL/min/1.7m2 62   GFR Estimate If Black      >60 mL/min/1.7m2 75   Calcium      8.5 - 10.1 mg/dL 8.8     Component      Latest Ref Rng & Units 9/29/2016   Cholesterol      <200 mg/dL 229 (H)   Triglycerides      <150 mg/dL 196 (H)   HDL Cholesterol      >39 mg/dL 42   LDL Cholesterol Calculated      <100 mg/dL 148 (H)   Non HDL Cholesterol      <130 mg/dL 187 (H)       Refill given with attached message advising RN visit for BP check, fasting lab brettt.  ILYA Amaya RN

## 2017-12-18 DIAGNOSIS — I10 SEVERE HYPERTENSION: ICD-10-CM

## 2017-12-18 DIAGNOSIS — I10 BENIGN ESSENTIAL HYPERTENSION: ICD-10-CM

## 2017-12-18 RX ORDER — LISINOPRIL AND HYDROCHLOROTHIAZIDE 12.5; 2 MG/1; MG/1
TABLET ORAL
Qty: 180 TABLET | Refills: 0 | Status: SHIPPED | OUTPATIENT
Start: 2017-12-18 | End: 2018-04-25

## 2017-12-18 RX ORDER — AMLODIPINE BESYLATE 5 MG/1
TABLET ORAL
Qty: 30 TABLET | Refills: 0 | Status: SHIPPED | OUTPATIENT
Start: 2017-12-18 | End: 2018-02-07

## 2017-12-18 RX ORDER — DILTIAZEM HYDROCHLORIDE 120 MG/1
CAPSULE, EXTENDED RELEASE ORAL
Qty: 270 CAPSULE | Refills: 0 | Status: SHIPPED | OUTPATIENT
Start: 2017-12-18 | End: 2018-04-25

## 2018-01-02 ENCOUNTER — PRE VISIT (OUTPATIENT)
Dept: UROLOGY | Facility: CLINIC | Age: 58
End: 2018-01-02

## 2018-01-02 NOTE — TELEPHONE ENCOUNTER
Patient with a history of urethral stricture coming in for his one year cystoscopy. Chart reviewed and all records available. Pt called and reminded to come with a full bladder for a flow/pvr.

## 2018-02-07 ENCOUNTER — ALLIED HEALTH/NURSE VISIT (OUTPATIENT)
Dept: FAMILY MEDICINE | Facility: CLINIC | Age: 58
End: 2018-02-07
Payer: COMMERCIAL

## 2018-02-07 VITALS — DIASTOLIC BLOOD PRESSURE: 92 MMHG | SYSTOLIC BLOOD PRESSURE: 142 MMHG

## 2018-02-07 DIAGNOSIS — Z01.30 BP CHECK: Primary | ICD-10-CM

## 2018-02-07 DIAGNOSIS — I10 BENIGN ESSENTIAL HYPERTENSION: ICD-10-CM

## 2018-02-07 PROCEDURE — 99207 ZZC NO CHARGE NURSE ONLY: CPT | Performed by: PHYSICIAN ASSISTANT

## 2018-02-07 RX ORDER — AMLODIPINE BESYLATE 10 MG/1
10 TABLET ORAL DAILY
Qty: 30 TABLET | Refills: 0 | Status: SHIPPED | OUTPATIENT
Start: 2018-02-07 | End: 2018-03-19

## 2018-02-07 NOTE — PROGRESS NOTES
Flakito Hilliard is enrolled/participating in the retail pharmacy Blood Pressure Goals Achievement Program (BPGAP).  Flakito Hilliard was evaluated at [facility] on February 7, 2018 at which time his blood pressure was:    BP Readings from Last 3 Encounters:   02/07/18 (!) 142/92   09/18/17 140/88   08/18/17 (!) 142/94     Reviewed lifestyle modifications for blood pressure control and reduction: including making healthy food choices, managing weight, getting regular exercise, smoking cessation, reducing alcohol consumption, monitoring blood pressure regularly.     Flakito Hilliard is not experiencing symptoms.    Follow-Up: BP is not at goal of < 140/90mmHg (patient 18+ years of age with or without diabetes), Recommended follow-up in 1 month at the pharmacy. Routing to PCP as an FYI.    Recommendation to Provider: n/a     Flakito Hilliard was evaluated for enrollment into the PGEN study today.    Patient eligible for enrollment:  No  Patient interested in enrollment:  No    Completed by: Adelia Ortgea  Pharm.D.  [facility]  [place] Pharmacist  On Behalf of [facility]

## 2018-02-07 NOTE — TELEPHONE ENCOUNTER
"Requested Prescriptions   Pending Prescriptions Disp Refills     amLODIPine (NORVASC) 5 MG tablet [Pharmacy Med Name: AMLODIPINE BESYLATE 5MG TABS] 30 tablet 0     Sig: TAKE ONE TABLET BY MOUTH EVERY DAY (MUST HAVE FOR BLOOD PRESSURE CHECK IN CLINIC OR PHARMACY FOR FURTHER REFILLS)    Calcium Channel Blockers Protocol  Failed    2/7/2018  9:54 AM       Failed - Blood pressure under 140/90    BP Readings from Last 3 Encounters:   02/07/18 (!) 142/92   09/18/17 140/88   08/18/17 (!) 142/94                Failed - Normal serum creatinine on file in past 12 months    Recent Labs   Lab Test  12/06/16   1131   CR  1.21            Passed - Recent or future visit with authorizing provider    Patient had office visit in the last year or has a visit in the next 30 days with authorizing provider.  See \"Patient Info\" tab in inbasket, or \"Choose Columns\" in Meds & Orders section of the refill encounter.            Passed - Patient is age 18 or older        Last Written Prescription Date:  8/18/17  Last Fill Quantity: 30,  # refills: 0   Last Office Visit with [facility] provider:  8/18/17   Future Office Visit:       "

## 2018-03-19 DIAGNOSIS — I10 BENIGN ESSENTIAL HYPERTENSION: ICD-10-CM

## 2018-03-19 DIAGNOSIS — Z91.89 AT RISK FOR CARDIOVASCULAR EVENT: ICD-10-CM

## 2018-03-21 ENCOUNTER — OFFICE VISIT (OUTPATIENT)
Dept: FAMILY MEDICINE | Facility: CLINIC | Age: 58
End: 2018-03-21
Payer: COMMERCIAL

## 2018-03-21 VITALS — SYSTOLIC BLOOD PRESSURE: 160 MMHG | HEART RATE: 84 BPM | DIASTOLIC BLOOD PRESSURE: 90 MMHG

## 2018-03-21 DIAGNOSIS — Z01.30 BP CHECK: Primary | ICD-10-CM

## 2018-03-21 PROCEDURE — 99207 ZZC NO CHARGE NURSE ONLY: CPT

## 2018-03-21 RX ORDER — AMLODIPINE BESYLATE 10 MG/1
TABLET ORAL
Qty: 30 TABLET | Refills: 0 | Status: SHIPPED | OUTPATIENT
Start: 2018-03-21 | End: 2018-04-25

## 2018-03-21 RX ORDER — ATORVASTATIN CALCIUM 20 MG/1
TABLET, FILM COATED ORAL
Qty: 90 TABLET | Refills: 0 | Status: SHIPPED | OUTPATIENT
Start: 2018-03-21 | End: 2018-07-12

## 2018-03-21 NOTE — TELEPHONE ENCOUNTER
"Requested Prescriptions   Pending Prescriptions Disp Refills     atorvastatin (LIPITOR) 20 MG tablet [Pharmacy Med Name: ATORVASTATIN CALCIUM 20MG TABS] 90 tablet 0     Sig: TAKE ONE TABLET BY MOUTH EVERY DAY (SCHEDULE FASTING LAB APPT)    Statins Protocol Failed    3/19/2018  2:50 PM       Failed - LDL on file in past 12 months    Recent Labs   Lab Test  09/29/16   1037   LDL  148*            Passed - No abnormal creatine kinase in past 12 months    No lab results found.            Passed - Recent (12 mo) or future (30 days) visit within the authorizing provider's specialty    Patient had office visit in the last 12 months or has a visit in the next 30 days with authorizing provider or within the authorizing provider's specialty.  See \"Patient Info\" tab in inbasket, or \"Choose Columns\" in Meds & Orders section of the refill encounter.           Passed - Patient is age 18 or older   Last Written Prescription Date:  10/27/17  Last Fill Quantity: 90,  # refills: 0   Last office visit: 8/18/2017 with prescribing provider:  08/18/17   Future Office Visit:         amLODIPine (NORVASC) 10 MG tablet [Pharmacy Med Name: AMLODIPINE BESYLATE 10MG TABS] 30 tablet 0     Sig: TAKE ONE TABLET BY MOUTH EVERY DAY (APPOINTMENT NEEDED BEFORE RUNNING OUT OF THIS MEDICATION)    Calcium Channel Blockers Protocol  Failed    3/19/2018  2:50 PM       Failed - Blood pressure under 140/90 in past 12 months    BP Readings from Last 3 Encounters:   03/21/18 160/90   02/07/18 (!) 142/92   09/18/17 140/88                Failed - Normal serum creatinine on file in past 12 months    Recent Labs   Lab Test  12/06/16   1131   CR  1.21            Passed - Recent (12 mo) or future (30 days) visit within the authorizing provider's specialty    Patient had office visit in the last 12 months or has a visit in the next 30 days with authorizing provider or within the authorizing provider's specialty.  See \"Patient Info\" tab in inbasket, or \"Choose Columns\" " in Meds & Orders section of the refill encounter.           Passed - Patient is age 18 or older      Last Written Prescription Date:  02/07/18  Last Fill Quantity: 30,  # refills: 0   Last office visit: 8/18/2017 with prescribing provider:  08/18/17   Future Office Visit:

## 2018-03-21 NOTE — MR AVS SNAPSHOT
"              After Visit Summary   3/21/2018    Flakito Hilliard    MRN: [id number]           Patient Information     Date Of Birth          1960        Visit Information        Provider Department      3/21/2018 10:15 AM FL NB RN [facility]        Today's Diagnoses     BP check    -  1       Follow-ups after your visit        Your next 10 appointments already scheduled     Apr 09, 2018  7:30 AM CDT   (Arrive by 7:15 AM)   Cystoscopy with Niraj Burger MD   [facility]rology and [facility] for Prostate and Urologic Cancers ([facility])    [address] [place] [address]  4th [facility] [address] [phone]              Who to contact     If you have questions or need follow up information about today's clinic visit or your schedule please contact [facility] directly at [phone].  Normal or non-critical lab and imaging results will be communicated to you by MyChart, letter or phone within 4 business days after the clinic has received the results. If you do not hear from us within 7 days, please contact the clinic through MyChart or phone. If you have a critical or abnormal lab result, we will notify you by phone as soon as possible.  Submit refill requests through Vinobo or call your pharmacy and they will forward the refill request to us. Please allow 3 business days for your refill to be completed.          Additional Information About Your Visit        MyChart Information     Vinobo lets you send messages to your doctor, view your test results, renew your prescriptions, schedule appointments and more. To sign up, go to [email or web address] . Click on \"Log in\" on the left side of the screen, which will take you to the Welcome page. Then click on \"Sign up Now\" on the right side of the page.     You will be asked to enter the access code listed below, as well as some personal information. Please follow the directions to create your " username and password.     Your access code is: [email or web address]  Expires: 3/25/2018  7:31 AM     Your access code will  in 90 days. If you need help or a new code, please call your [place] clinic or [phone].        Care EveryWhere ID     This is your Care EveryWhere ID. This could be used by other organizations to access your [place] medical records  [id number]        Your Vitals Were     Pulse                   84            Blood Pressure from Last 3 Encounters:   18 160/90   18 (!) 142/92   17 140/88    Weight from Last 3 Encounters:   17 265 lb (120.2 kg)   17 262 lb (118.8 kg)   17 258 lb 3.2 oz (117.1 kg)              Today, you had the following     No orders found for display       Primary Care Provider Office Phone # Fax #    Susan Hand PA-C [phone] [address] [place] [address]        Goals        General    I will call and get appointment with CNS at [place].  I will call by 16. (pt-stated)     Notes - Note created  8/15/2016 11:08 AM by Jesi Russell LSW    As of today's date 8/15/2016 goal is met at 26 - 50%.   Goal Status:  Active          Equal Access to Services     FIDE CARRILLO AH: Hadii charles grace hadvangieo Soaníbal, waaxda luqadaha, qaybta kaaltoby gonzalez. So [facility] [phone].    ATENCIÓN: Si habla español, tiene a larson disposición servicios gratuitos de asistencia lingüística. Llame al [phone].    We comply with applicable federal civil rights laws and [place] laws. We do not discriminate on the basis of race, color, national origin, age, disability, sex, sexual orientation, or gender identity.            Thank you!     Thank you for choosing [facility]  for your care. Our goal is always to provide you with excellent care. Hearing back from our patients is one way we can continue to improve our services. Please take a few minutes to  complete the written survey that you may receive in the mail after your visit with us. Thank you!             Your Updated Medication List - Protect others around you: Learn how to safely use, store and throw away your medicines at www.disposemymeds.org.          This list is accurate as of 3/21/18 12:35 PM.  Always use your most recent med list.                   Brand Name Dispense Instructions for use Diagnosis    amLODIPine 10 MG tablet    NORVASC    30 tablet    Take 1 tablet (10 mg) by mouth daily Be seen for clinic appt before running out of medication.    Benign essential hypertension       aspirin 81 MG chewable tablet     108 tablet    Take 1 tablet (81 mg) by mouth daily    Severe hypertension       atorvastatin 20 MG tablet    LIPITOR    90 tablet    Take 1 tablet (20 mg) by mouth daily PLEASE SCHEDULE FASTING LAB APPT    At risk for cardiovascular event       buPROPion 300 MG 24 hr tablet    WELLBUTRIN XL    90 tablet    TAKE ONE TABLET BY MOUTH EVERY MORNING    Major depressive disorder, recurrent episode, moderate (H)       CARTIA  MG 24 hr capsule   Generic drug:  diltiazem     270 capsule    TAKE THREE CAPSULES (360MG) BY MOUTH DAILY    Benign essential hypertension       IBUPROFEN PO      Take 800 mg by mouth every 8 hours as needed for moderate pain        lisinopril-hydrochlorothiazide 20-12.5 MG per tablet    PRINZIDE/ZESTORETIC    180 tablet    TAKE TWO TABLETS BY MOUTH EVERY DAY    Severe hypertension       sildenafil 20 MG tablet    REVATIO    50 tablet    Take 5 tablets (100 mg) by mouth as needed For Erectile Dysfunction. Never use with nitroglycerin, terazosin or doxazosin.    ED (erectile dysfunction)

## 2018-03-21 NOTE — PROGRESS NOTES
Flakito Hilliard is a 58 year old male who comes in today for a Blood Pressure check because of ongoing blood pressure monitoring.    Vitals as recorded, a large cuff was used.    Patient is taking medication as prescribed  Patient is tolerating medications well.  Patient is not monitoring Blood Pressure at home.      Current complaints: none    Disposition: come back to clinic tomorrow after you have taken meds at least an hour before coming in.  Tiffanie Saeed RN

## 2018-03-22 ENCOUNTER — OFFICE VISIT (OUTPATIENT)
Dept: FAMILY MEDICINE | Facility: CLINIC | Age: 58
End: 2018-03-22
Payer: COMMERCIAL

## 2018-03-22 VITALS — DIASTOLIC BLOOD PRESSURE: 76 MMHG | RESPIRATION RATE: 18 BRPM | SYSTOLIC BLOOD PRESSURE: 134 MMHG | HEART RATE: 84 BPM

## 2018-03-22 DIAGNOSIS — Z01.30 BP CHECK: Primary | ICD-10-CM

## 2018-03-22 PROCEDURE — 99207 ZZC NO CHARGE NURSE ONLY: CPT

## 2018-03-22 NOTE — PROGRESS NOTES
Flakito Hilliard is a 58 year old male who comes in today for a Blood Pressure check because of ongoing blood pressure monitoring.    Vitals as recorded, a large cuff was used.    Patient is taking medication as prescribed  Patient is tolerating medications well.  Patient is not monitoring Blood Pressure at home.     Current complaints: none    Disposition: patient to continue with the same medication  Tiffanie Saeed RN

## 2018-03-22 NOTE — MR AVS SNAPSHOT
"              After Visit Summary   3/22/2018    Flakito Hilliard    MRN: [id number]           Patient Information     Date Of Birth          1960        Visit Information        Provider Department      3/22/2018 8:30 AM FL NB RN [facility]        Today's Diagnoses     BP check    -  1       Follow-ups after your visit        Your next 10 appointments already scheduled     Apr 09, 2018  7:30 AM CDT   (Arrive by 7:15 AM)   Cystoscopy with Niraj Burger MD   [facility]rology and [facility] for Prostate and Urologic Cancers ([facility] [place])    [address] [place] [address]  4th [facility] [address] [phone]              Who to contact     If you have questions or need follow up information about today's clinic visit or your schedule please contact [facility] directly at [phone].  Normal or non-critical lab and imaging results will be communicated to you by MyChart, letter or phone within 4 business days after the clinic has received the results. If you do not hear from us within 7 days, please contact the clinic through MyChart or phone. If you have a critical or abnormal lab result, we will notify you by phone as soon as possible.  Submit refill requests through needmade or call your pharmacy and they will forward the refill request to us. Please allow 3 business days for your refill to be completed.          Additional Information About Your Visit        MyChart Information     needmade lets you send messages to your doctor, view your test results, renew your prescriptions, schedule appointments and more. To sign up, go to [email or web address] . Click on \"Log in\" on the left side of the screen, which will take you to the Welcome page. Then click on \"Sign up Now\" on the right side of the page.     You will be asked to enter the access code listed below, as well as some personal information. Please follow the directions to create your " username and password.     Your access code is: [email or web address]  Expires: 3/25/2018  7:31 AM     Your access code will  in 90 days. If you need help or a new code, please call your [place] clinic or [phone].        Care EveryWhere ID     This is your Care EveryWhere ID. This could be used by other organizations to access your [place] medical records  [id number]        Your Vitals Were     Pulse Respirations                84 18           Blood Pressure from Last 3 Encounters:   18 134/76   18 160/90   18 (!) 142/92    Weight from Last 3 Encounters:   17 265 lb (120.2 kg)   17 262 lb (118.8 kg)   17 258 lb 3.2 oz (117.1 kg)              Today, you had the following     No orders found for display       Primary Care Provider Office Phone # Fax #    Susan Hand PA-C [phone] [address] [place] [address]        Goals        General    I will call and get appointment with CNS at [place].  I will call by 16. (pt-stated)     Notes - Note created  8/15/2016 11:08 AM by Jesi Russell LSW    As of today's date 8/15/2016 goal is met at 26 - 50%.   Goal Status:  Active          Equal Access to Services     [facility] campusLEE AH: Hadii aad ku hadasho Sojoseali, waaxda luqadaha, qaybta kaalmada estebanyakeara, toby carter . So [facility] [phone].    ATENCIÓN: Si habla español, tiene a larson disposición servicios gratuitos de asistencia lingüística. Llame al [phone].    We comply with applicable federal civil rights laws and [place] laws. We do not discriminate on the basis of race, color, national origin, age, disability, sex, sexual orientation, or gender identity.            Thank you!     Thank you for choosing [facility]  for your care. Our goal is always to provide you with excellent care. Hearing back from our patients is one way we can continue to improve our services. Please take a few  minutes to complete the written survey that you may receive in the mail after your visit with us. Thank you!             Your Updated Medication List - Protect others around you: Learn how to safely use, store and throw away your medicines at www.disposemymeds.org.          This list is accurate as of 3/22/18  3:44 PM.  Always use your most recent med list.                   Brand Name Dispense Instructions for use Diagnosis    amLODIPine 10 MG tablet    NORVASC    30 tablet    TAKE ONE TABLET BY MOUTH EVERY DAY (APPOINTMENT NEEDED BEFORE RUNNING OUT OF THIS MEDICATION)    Benign essential hypertension       aspirin 81 MG chewable tablet     108 tablet    Take 1 tablet (81 mg) by mouth daily    Severe hypertension       atorvastatin 20 MG tablet    LIPITOR    90 tablet    TAKE ONE TABLET BY MOUTH EVERY DAY (SCHEDULE FASTING LAB APPT)    At risk for cardiovascular event       buPROPion 300 MG 24 hr tablet    WELLBUTRIN XL    90 tablet    TAKE ONE TABLET BY MOUTH EVERY MORNING    Major depressive disorder, recurrent episode, moderate (H)       CARTIA  MG 24 hr capsule   Generic drug:  diltiazem     270 capsule    TAKE THREE CAPSULES (360MG) BY MOUTH DAILY    Benign essential hypertension       IBUPROFEN PO      Take 800 mg by mouth every 8 hours as needed for moderate pain        lisinopril-hydrochlorothiazide 20-12.5 MG per tablet    PRINZIDE/ZESTORETIC    180 tablet    TAKE TWO TABLETS BY MOUTH EVERY DAY    Severe hypertension       sildenafil 20 MG tablet    REVATIO    50 tablet    Take 5 tablets (100 mg) by mouth as needed For Erectile Dysfunction. Never use with nitroglycerin, terazosin or doxazosin.    ED (erectile dysfunction)

## 2018-04-02 ENCOUNTER — PRE VISIT (OUTPATIENT)
Dept: UROLOGY | Facility: CLINIC | Age: 58
End: 2018-04-02

## 2018-04-02 NOTE — TELEPHONE ENCOUNTER
Reason for visit: One year Cystoscopy     Relevant information: history of urethroplasty    Records/imaging/labs: all records available    Pt called: Generic message left asking pt to come with a full bladder    Rooming: flow/pvr

## 2018-04-09 ENCOUNTER — OFFICE VISIT (OUTPATIENT)
Dept: UROLOGY | Facility: CLINIC | Age: 58
End: 2018-04-09
Payer: COMMERCIAL

## 2018-04-09 VITALS
HEIGHT: 73 IN | SYSTOLIC BLOOD PRESSURE: 144 MMHG | BODY MASS INDEX: 35.66 KG/M2 | HEART RATE: 92 BPM | WEIGHT: 269.1 LBS | DIASTOLIC BLOOD PRESSURE: 95 MMHG

## 2018-04-09 DIAGNOSIS — Z87.448 HISTORY OF URETHRAL STRICTURE: Primary | ICD-10-CM

## 2018-04-09 ASSESSMENT — PAIN SCALES - GENERAL
PAINLEVEL: NO PAIN (0)
PAINLEVEL: NO PAIN (0)

## 2018-04-09 NOTE — LETTER
4/9/2018       RE: Flakito Hilliard  [address] [facility] [address]     Dear Colleague,    Thank you for referring your patient, Flakito Hilliard, to the [facility] UROLOGY AND [facility] FOR PROSTATE AND UROLOGIC CANCERS at [facility]. Please see a copy of my visit note below.    Urethroplasty Follow-up Visit with Surveillance Urethroscopy    PRE-PROCEDURE DIAGNOSIS: History of urethral stricture  POST-PROCEDURE DIAGNOSIS: No evidence of urethral stricture   PROCEDURE: Urethroscopy    HISTORY: Flakito Hilliard is a 58 year old man 1 year status-post buccal graft ventral onlay urethroplasty for urethral stricture that had recurred after a prior dorsal onlay.     BMG complaints: No  Positioning complaints: No  Perineal / Genital complaints: No  Urinary incontinence: Yes; pads per day: 1; 24 hour pad weight:  g      Questionnaires reviewed. See flowsheet for details.    REVIEW OF OFFICE STUDIES:  Urinary Flow Rate  Peak Flow: 16.7 mL/s  Average Flow: 10.3 mL/s  Voided (mL): 159 mL  Residual Volume by Ultrasound: 43 mL     DESCRIPTION OF PROCEDURE:  After informed consent was obtained, the patient was brought to the procedure room where he was placed in the supine position with all pressure points well padded.  He was prepped and draped in a sterile fashion. A flexible cystoscope was introduced through a well-lubricated urethra.  The anterior urethra up to the point of reconstruction was normal in appearance. At the site of reconstruction there was not evidence of stricture recurrence. The narrowest caliber of the urethra at the reconstruction was estimated to be 18F and this was located in distal bulbar urethra. Other areas of narrowing throughout the repair were more like 20F. The flexible cystoscope passed easily. The voluntary sphincter was identified and the scope withdrawn.    ASSESSMENT AND PLAN:  Excellent outcome after urethroplasty. The patient will follow-up in 12 months with with  uroflowmetry, post-void residual urine volume measurement, Urethroplasty PROM, SEJAL, MSHQ, and CLSS and post-op questionnaires. Cystoscopy will not be needed. If symptoms recur cystoscopy will be done sooner.    Again, thank you for allowing me to participate in the care of your patient.      Sincerely,    Niraj Burger MD

## 2018-04-09 NOTE — PROGRESS NOTES
Urethroplasty Follow-up Visit with Surveillance Urethroscopy    PRE-PROCEDURE DIAGNOSIS: History of urethral stricture  POST-PROCEDURE DIAGNOSIS: No evidence of urethral stricture   PROCEDURE: Urethroscopy    HISTORY: Flakito Hilliard is a 58 year old man 1 year status-post buccal graft ventral onlay urethroplasty for urethral stricture that had recurred after a prior dorsal onlay.     BMG complaints: No  Positioning complaints: No  Perineal / Genital complaints: No  Urinary incontinence: Yes; pads per day: 1; 24 hour pad weight:  g      Questionnaires reviewed. See flowsheet for details.    REVIEW OF OFFICE STUDIES:  Urinary Flow Rate  Peak Flow: 16.7 mL/s  Average Flow: 10.3 mL/s  Voided (mL): 159 mL  Residual Volume by Ultrasound: 43 mL     DESCRIPTION OF PROCEDURE:  After informed consent was obtained, the patient was brought to the procedure room where he was placed in the supine position with all pressure points well padded.  He was prepped and draped in a sterile fashion. A flexible cystoscope was introduced through a well-lubricated urethra.  The anterior urethra up to the point of reconstruction was normal in appearance. At the site of reconstruction there was not evidence of stricture recurrence. The narrowest caliber of the urethra at the reconstruction was estimated to be 18F and this was located in distal bulbar urethra. Other areas of narrowing throughout the repair were more like 20F. The flexible cystoscope passed easily. The voluntary sphincter was identified and the scope withdrawn.    ASSESSMENT AND PLAN:  Excellent outcome after urethroplasty. The patient will follow-up in 12 months with with uroflowmetry, post-void residual urine volume measurement, Urethroplasty PROM, SEJAL, MSHQ, and CLSS and post-op questionnaires. Cystoscopy will not be needed. If symptoms recur cystoscopy will be done sooner.

## 2018-04-09 NOTE — PATIENT INSTRUCTIONS
"Return on one year with a full bladder for a urine flow test.    AFTER YOUR CYSTOSCOPY        You have just completed a cystoscopy, or \"cysto\", which allowed your physician to learn more about your bladder (or to remove a stent placed after surgery). We suggest that you continue to avoid caffeine, fruit juice, and alcohol for the next 24 hours, however, you are encouraged to return to your normal activities.         A few things that are considered normal after your cystoscopy:     * Small amount of bleeding (or spotting) that clears within the next 24 hours     * Slight burning sensation with urination     * Sensation to of needing to avoid more frequently     * The feeling of \"air\" in your urine     * Mild discomfort that is relieved with Tylenol        Please contact our office promptly if you:     * Develop a fever above 101 degrees     * Are unable to urinate     * Develop bright red blood that does not stop     * Severe pain or swelling         Please contact our office with any concerns or questions @[email or web address].  "

## 2018-04-09 NOTE — NURSING NOTE
"Chief Complaint   Patient presents with     Cystoscopy     Urethroplasty follow up       Blood pressure (!) 144/95, pulse 92, height 1.854 m (6' 1\"), weight 122.1 kg (269 lb 1.6 oz). Body mass index is 35.5 kg/(m^2).    Patient Active Problem List   Diagnosis     Acute bacterial prostatitis     Lower urinary tract infectious disease     severe HTN (hypertension)     Suicidal ideation     Major depressive disorder, recurrent episode, moderate (H)     Urinary retention     Urethral stricture     PATRICE (obstructive sleep apnea)     Complicated UTI (urinary tract infection)     RIO (generalized anxiety disorder)     At risk for cardiovascular event     Elevated hemoglobin (H)     Bladder stones     Erectile dysfunction, unspecified erectile dysfunction type       Allergies   Allergen Reactions     Contrast Dye Difficulty breathing       Current Outpatient Prescriptions   Medication Sig Dispense Refill     TURMERIC CURCUMIN PO        atorvastatin (LIPITOR) 20 MG tablet TAKE ONE TABLET BY MOUTH EVERY DAY (SCHEDULE FASTING LAB APPT) 90 tablet 0     amLODIPine (NORVASC) 10 MG tablet TAKE ONE TABLET BY MOUTH EVERY DAY (APPOINTMENT NEEDED BEFORE RUNNING OUT OF THIS MEDICATION) 30 tablet 0     CARTIA  MG 24 hr capsule TAKE THREE CAPSULES (360MG) BY MOUTH DAILY 270 capsule 0     lisinopril-hydrochlorothiazide (PRINZIDE/ZESTORETIC) 20-12.5 MG per tablet TAKE TWO TABLETS BY MOUTH EVERY  tablet 0     buPROPion (WELLBUTRIN XL) 300 MG 24 hr tablet TAKE ONE TABLET BY MOUTH EVERY MORNING 90 tablet 1     sildenafil (REVATIO/VIAGRA) 20 MG tablet Take 5 tablets (100 mg) by mouth as needed For Erectile Dysfunction. Never use with nitroglycerin, terazosin or doxazosin. 50 tablet 3     IBUPROFEN PO Take 800 mg by mouth every 8 hours as needed for moderate pain         Social History   Substance Use Topics     Smoking status: Never Smoker     Smokeless tobacco: Never Used     Alcohol use Yes      Comment: No drinking for 2 weeks "       ISABEL Combs  4/9/2018  7:14 AM

## 2018-04-09 NOTE — MR AVS SNAPSHOT
"              After Visit Summary   4/9/2018    Flakito Hilliard    MRN: [id number]           Patient Information     Date Of Birth          1960        Visit Information        Provider Department      4/9/2018 7:30 AM Niraj Burger MD [facility] and [facility]        Today's Diagnoses     History of urethral stricture    -  1      Care Instructions    Return on one year with a full bladder for a urine flow test.    AFTER YOUR CYSTOSCOPY        You have just completed a cystoscopy, or \"cysto\", which allowed your physician to learn more about your bladder (or to remove a stent placed after surgery). We suggest that you continue to avoid caffeine, fruit juice, and alcohol for the next 24 hours, however, you are encouraged to return to your normal activities.         A few things that are considered normal after your cystoscopy:     * Small amount of bleeding (or spotting) that clears within the next 24 hours     * Slight burning sensation with urination     * Sensation to of needing to avoid more frequently     * The feeling of \"air\" in your urine     * Mild discomfort that is relieved with Tylenol        Please contact our office promptly if you:     * Develop a fever above 101 degrees     * Are unable to urinate     * Develop bright red blood that does not stop     * Severe pain or swelling         Please contact our office with any concerns or questions @[facility].          Follow-ups after your visit        Follow-up notes from your care team     Return in 12 months (on 4/9/2019).      Who to contact     Please call your clinic at [phone] to:    Ask questions about your health    Make or cancel appointments    Discuss your medicines    Learn about your test results    Speak to your doctor            Additional Information About Your Visit        [employer] Information     [employer] is an electronic gateway that provides easy, online access to your medical records. With " "MyChart, you can request a clinic appointment, read your test results, renew a prescription or communicate with your care team.     To sign up for [place] visit the website at [email or web address]   You will be asked to enter the access code listed below, as well as some personal information. Please follow the directions to create your username and password.     Your access code is: [email or web address]  Expires: 2018  6:31 AM     Your access code will  in 90 days. If you need help or a new code, please contact your [facility] or call [phone] for assistance.        Care EveryWhere ID     This is your Care EveryWhere ID. This could be used by other organizations to access your [place] medical records  [id number]        Your Vitals Were     Pulse Height BMI (Body Mass Index)             92 1.854 m (6' 1\") 35.5 kg/m2          Blood Pressure from Last 3 Encounters:   18 (!) 144/95   18 134/76   18 160/90    Weight from Last 3 Encounters:   18 122.1 kg (269 lb 1.6 oz)   17 120.2 kg (265 lb)   17 118.8 kg (262 lb)              We Performed the Following     CYSTOURETHROSCOPY        Primary Care Provider Office Phone # Fax #    Susan Hand PA-C [phone] [address] [place] [id number]        Goals        General    I will call and get appointment with [facility] at [place].  I will call by 16. (pt-stated)     Notes - Note created  8/15/2016 11:08 AM by Jesi Russell LSW    As of today's date 8/15/2016 goal is met at 26 - 50%.   Goal Status:  Active          Equal Access to Services     [facility] LORENA : Perlita Husain, waaxda luqadaha, qaybta kaalmada estebanyakeara, toby clemente. [facility] [phone].    ATENCIÓN: Si habla español, tiene a larson disposición servicios gratuitos de asistencia lingüística. Llame al [phone].    We comply with applicable federal " civil rights laws and [place] laws. We do not discriminate on the basis of race, color, national origin, age, disability, sex, sexual orientation, or gender identity.            Thank you!     Thank you for choosing [facility] AND [facility]  for your care. Our goal is always to provide you with excellent care. Hearing back from our patients is one way we can continue to improve our services. Please take a few minutes to complete the written survey that you may receive in the mail after your visit with us. Thank you!             Your Updated Medication List - Protect others around you: Learn how to safely use, store and throw away your medicines at www.disposemymeds.org.          This list is accurate as of 4/9/18  8:14 AM.  Always use your most recent med list.                   Brand Name Dispense Instructions for use Diagnosis    amLODIPine 10 MG tablet    NORVASC    30 tablet    TAKE ONE TABLET BY MOUTH EVERY DAY (APPOINTMENT NEEDED BEFORE RUNNING OUT OF THIS MEDICATION)    Benign essential hypertension       atorvastatin 20 MG tablet    LIPITOR    90 tablet    TAKE ONE TABLET BY MOUTH EVERY DAY (SCHEDULE FASTING LAB APPT)    At risk for cardiovascular event       buPROPion 300 MG 24 hr tablet    WELLBUTRIN XL    90 tablet    TAKE ONE TABLET BY MOUTH EVERY MORNING    Major depressive disorder, recurrent episode, moderate (H)       CARTIA  MG 24 hr capsule   Generic drug:  diltiazem     270 capsule    TAKE THREE CAPSULES (360MG) BY MOUTH DAILY    Benign essential hypertension       IBUPROFEN PO      Take 800 mg by mouth every 8 hours as needed for moderate pain        lisinopril-hydrochlorothiazide 20-12.5 MG per tablet    PRINZIDE/ZESTORETIC    180 tablet    TAKE TWO TABLETS BY MOUTH EVERY DAY    Severe hypertension       sildenafil 20 MG tablet    REVATIO    50 tablet    Take 5 tablets (100 mg) by mouth as needed For Erectile Dysfunction. Never use with nitroglycerin,  terazosin or doxazosin.    ED (erectile dysfunction)       TURMERIC CURCUMIN PO

## 2018-04-09 NOTE — NURSING NOTE
Invasive Procedure Safety Checklist:    Procedure:     Action: Complete sections and checkboxes as appropriate.    Pre-procedure:  1. Patient ID Verified with 2 identifiers (Marquita and  or MRN) : YES    2. Procedure and site verified with patient/designee (when able) : YES    3. Accurate consent documentation in medical record : YES    4. H&P (or appropriate assessment) documented in medical record : YES  H&P must be up to 30 days prior to procedure an updated within 24 hours of                 Procedure as applicable.     5. Relevant diagnostic and radiology test results appropriately labeled and displayed as applicable : YES    6. Blood products, implants, devices, and/or special equipment available for the procedure as applicable : YES    7. Procedure site(s) marked with provider initials [Exclusions: None] : NO    8. Marking not required. Reason : Yes  Procedure does not require site marking    Time Out:     Time-Out performed immediately prior to starting procedure, including verbal and active participation of all team members addressing: YES    1. Correct patient identity.  2. Confirmed that the correct side and site are marked.  3. An accurate procedure to be done.  4. Agreement on the procedure to be done.  5. Correct patient position.  6. Relevant images and results are properly labeled and appropriately displayed.  7. The need to administer antibiotics or fluids for irrigation purposes during the procedure as applicable.  8. Safety precautions based on patient history or medication use.    During Procedure: Verification of correct person, site, and procedure occurs any time the responsibility for care of the patient is transferred to another member of the care team.

## 2018-04-25 DIAGNOSIS — I10 SEVERE HYPERTENSION: ICD-10-CM

## 2018-04-25 DIAGNOSIS — I10 BENIGN ESSENTIAL HYPERTENSION: ICD-10-CM

## 2018-04-25 DIAGNOSIS — F33.1 MAJOR DEPRESSIVE DISORDER, RECURRENT EPISODE, MODERATE (H): ICD-10-CM

## 2018-04-25 RX ORDER — AMLODIPINE BESYLATE 10 MG/1
TABLET ORAL
Qty: 30 TABLET | Refills: 0 | Status: SHIPPED | OUTPATIENT
Start: 2018-04-25 | End: 2018-05-29

## 2018-04-25 RX ORDER — DILTIAZEM HYDROCHLORIDE 120 MG/1
CAPSULE, EXTENDED RELEASE ORAL
Qty: 90 CAPSULE | Refills: 0 | Status: SHIPPED | OUTPATIENT
Start: 2018-04-25 | End: 2018-05-29

## 2018-04-25 RX ORDER — LISINOPRIL AND HYDROCHLOROTHIAZIDE 12.5; 2 MG/1; MG/1
TABLET ORAL
Qty: 60 TABLET | Refills: 0 | Status: SHIPPED | OUTPATIENT
Start: 2018-04-25 | End: 2018-05-29

## 2018-04-25 RX ORDER — BUPROPION HYDROCHLORIDE 300 MG/1
TABLET ORAL
Qty: 30 TABLET | Refills: 0 | Status: SHIPPED | OUTPATIENT
Start: 2018-04-25 | End: 2018-05-29

## 2018-04-25 NOTE — TELEPHONE ENCOUNTER
"Requested Prescriptions   Pending Prescriptions Disp Refills     buPROPion (WELLBUTRIN XL) 300 MG 24 hr tablet [Pharmacy Med Name: BUPROPION HCL ER (XL) 300MG TB24] 90 tablet 1     Sig: TAKE ONE TABLET BY MOUTH EVERY MORNING    SSRIs Protocol Failed    4/25/2018  8:56 AM       Failed - PHQ-9 score less than 5 in past 6 months    Please review last PHQ-9 score.     PHQ-9 SCORE 11/14/2016 12/5/2016 4/26/2017   Total Score - 0 -   Total Score 1 - 0     RIO-7 SCORE 10/17/2016 11/14/2016 4/26/2017   Total Score - - -   Total Score 15 3 0                Passed - Medication is Bupropion    If the medication is Bupropion (Wellbutrin), and the patient is taking for smoking cessation; OK to refill.         Passed - Patient is age 18 or older       Passed - Recent (6 mo) or future (30 days) visit within the authorizing provider's specialty    Patient had office visit in the last 6 months or has a visit in the next 30 days with authorizing provider or within the authorizing provider's specialty.  See \"Patient Info\" tab in inbasket, or \"Choose Columns\" in Meds & Orders section of the refill encounter.      Last Written Prescription Date:  8/14/17  Last Fill Quantity: 90,  # refills: 1   Last office visit: 3/22/2018 with prescribing provider:     Future Office Visit:              CARTIA  MG 24 hr capsule [Pharmacy Med Name: CARTIA XT (DILTIAZEM) 120MG CP24] 270 capsule 0     Sig: TAKE THREE CAPSULES (360MG) BY MOUTH DAILY    Calcium Channel Blockers Protocol  Failed    4/25/2018  8:56 AM       Failed - Blood pressure under 140/90 in past 12 months    BP Readings from Last 3 Encounters:   04/09/18 (!) 144/95   03/22/18 134/76   03/21/18 160/90                Failed - Normal ALT in past 12 months    Recent Labs   Lab Test  05/23/16   1431   ALT  15            Failed - Normal serum creatinine on file in past 12 months    Recent Labs   Lab Test  12/06/16   1131   CR  1.21            Passed - Recent (12 mo) or future (30 days) " "visit within the authorizing provider's specialty    Patient had office visit in the last 12 months or has a visit in the next 30 days with authorizing provider or within the authorizing provider's specialty.  See \"Patient Info\" tab in inbasket, or \"Choose Columns\" in Meds & Orders section of the refill encounter.      Last Written Prescription Date:  12/18/17  Last Fill Quantity: 270,  # refills: 0   Last office visit: 3/22/2018 with prescribing provider:     Future Office Visit:             Passed - Patient is age 18 or older        amLODIPine (NORVASC) 10 MG tablet [Pharmacy Med Name: AMLODIPINE BESYLATE 10MG TABS] 30 tablet 0     Sig: TAKE ONE TABLET BY MOUTH EVERY DAY (APPOINTMENT NEEDED BEFORE RUNNING OUT OF THIS MEDICATION)    Calcium Channel Blockers Protocol  Failed    4/25/2018  8:56 AM       Failed - Blood pressure under 140/90 in past 12 months    BP Readings from Last 3 Encounters:   04/09/18 (!) 144/95   03/22/18 134/76   03/21/18 160/90                Failed - Normal ALT in past 12 months    Recent Labs   Lab Test  05/23/16   1431   ALT  15            Failed - Normal serum creatinine on file in past 12 months    Recent Labs   Lab Test  12/06/16   1131   CR  1.21            Passed - Recent (12 mo) or future (30 days) visit within the authorizing provider's specialty    Patient had office visit in the last 12 months or has a visit in the next 30 days with authorizing provider or within the authorizing provider's specialty.  See \"Patient Info\" tab in inbasket, or \"Choose Columns\" in Meds & Orders section of the refill encounter.      Last Written Prescription Date:  3/21/18  Last Fill Quantity: 30,  # refills: 0   Last office visit: 3/22/2018 with prescribing provider:     Future Office Visit:             Passed - Patient is age 18 or older        lisinopril-hydrochlorothiazide (PRINZIDE/ZESTORETIC) 20-12.5 MG per tablet [Pharmacy Med Name: LISINOPRIL-HYDROCHLORO 20-12.5 TABS] 180 tablet 0     Sig: TAKE " "TWO TABLETS BY MOUTH EVERY DAY    Diuretics (Including Combos) Protocol Failed    4/25/2018  8:56 AM       Failed - Blood pressure under 140/90 in past 12 months    BP Readings from Last 3 Encounters:   04/09/18 (!) 144/95   03/22/18 134/76   03/21/18 160/90                Failed - Normal serum creatinine on file in past 12 months    Recent Labs   Lab Test  12/06/16   1131   CR  1.21             Failed - Normal serum potassium on file in past 12 months    Recent Labs   Lab Test  12/06/16   1131   POTASSIUM  3.9                   Failed - Normal serum sodium on file in past 12 months    Recent Labs   Lab Test  12/06/16   1131   NA  139             Passed - Recent (12 mo) or future (30 days) visit within the authorizing provider's specialty    Patient had office visit in the last 12 months or has a visit in the next 30 days with authorizing provider or within the authorizing provider's specialty.  See \"Patient Info\" tab in inbasket, or \"Choose Columns\" in Meds & Orders section of the refill encounter.      Last Written Prescription Date:  12/18/17  Last Fill Quantity: 180,  # refills: 0   Last office visit: 3/22/2018 with prescribing provider:     Future Office Visit:             Passed - Patient is age 18 or older          "

## 2018-05-29 DIAGNOSIS — I10 SEVERE HYPERTENSION: ICD-10-CM

## 2018-05-29 DIAGNOSIS — F33.1 MAJOR DEPRESSIVE DISORDER, RECURRENT EPISODE, MODERATE (H): ICD-10-CM

## 2018-05-29 DIAGNOSIS — I10 BENIGN ESSENTIAL HYPERTENSION: ICD-10-CM

## 2018-05-29 RX ORDER — LISINOPRIL AND HYDROCHLOROTHIAZIDE 12.5; 2 MG/1; MG/1
TABLET ORAL
Qty: 60 TABLET | Refills: 0 | Status: SHIPPED | OUTPATIENT
Start: 2018-05-29 | End: 2018-07-12

## 2018-05-29 RX ORDER — AMLODIPINE BESYLATE 10 MG/1
TABLET ORAL
Qty: 30 TABLET | Refills: 0 | Status: SHIPPED | OUTPATIENT
Start: 2018-05-29 | End: 2018-07-12

## 2018-05-29 RX ORDER — BUPROPION HYDROCHLORIDE 300 MG/1
TABLET ORAL
Qty: 30 TABLET | Refills: 0 | Status: SHIPPED | OUTPATIENT
Start: 2018-05-29 | End: 2018-07-12

## 2018-05-29 RX ORDER — DILTIAZEM HYDROCHLORIDE 120 MG/1
CAPSULE, EXTENDED RELEASE ORAL
Qty: 90 CAPSULE | Refills: 0 | Status: SHIPPED | OUTPATIENT
Start: 2018-05-29 | End: 2018-07-12

## 2018-05-29 NOTE — TELEPHONE ENCOUNTER
"Requested Prescriptions   Pending Prescriptions Disp Refills     buPROPion (WELLBUTRIN XL) 300 MG 24 hr tablet [Pharmacy Med Name: BUPROPION HCL ER (XL) 300MG TB24] 30 tablet 0     Sig: TAKE ONE TABLET BY MOUTH EVERY MORNING    SSRIs Protocol Failed    5/29/2018  1:34 PM       Failed - PHQ-9 score less than 5 in past 6 months    Please review last PHQ-9 score.          Passed - Medication is Bupropion    If the medication is Bupropion (Wellbutrin), and the patient is taking for smoking cessation; OK to refill.         Passed - Patient is age 18 or older       Passed - Recent (6 mo) or future (30 days) visit within the authorizing provider's specialty    Patient had office visit in the last 6 months or has a visit in the next 30 days with authorizing provider or within the authorizing provider's specialty.  See \"Patient Info\" tab in inbasket, or \"Choose Columns\" in Meds & Orders section of the refill encounter.       Last Written Prescription Date:  04/25/18  Last Fill Quantity: 30,  # refills: 0   Last office visit: 3/22/2018 with prescribing provider:  08/18/17   Future Office Visit:         amLODIPine (NORVASC) 10 MG tablet [Pharmacy Med Name: AMLODIPINE BESYLATE 10MG TABS] 30 tablet 0     Sig: TAKE ONE TABLET BY MOUTH EVERY DAY (APPOINTMENT NEEDED BEFORE RUNNING OUT OF THIS MEDICATION)    Calcium Channel Blockers Protocol  Failed    5/29/2018  1:34 PM       Failed - Blood pressure under 140/90 in past 12 months    BP Readings from Last 3 Encounters:   04/09/18 (!) 144/95   03/22/18 134/76   03/21/18 160/90                Failed - Normal ALT in past 12 months    Recent Labs   Lab Test  05/23/16   1431   ALT  15            Failed - Normal serum creatinine on file in past 12 months    Recent Labs   Lab Test  12/06/16   1131   CR  1.21            Passed - Recent (12 mo) or future (30 days) visit within the authorizing provider's specialty    Patient had office visit in the last 12 months or has a visit in the next 30 " "days with authorizing provider or within the authorizing provider's specialty.  See \"Patient Info\" tab in inbasket, or \"Choose Columns\" in Meds & Orders section of the refill encounter.           Passed - Patient is age 18 or older   Last Written Prescription Date:  04/25/18  Last Fill Quantity: 30,  # refills: 0   Last office visit: 3/22/2018 with prescribing provider:  08/18/17   Future Office Visit:         CARTIA  MG 24 hr capsule [Pharmacy Med Name: CARTIA XT (DILTIAZEM) 120MG CP24] 90 capsule 0     Sig: TAKE THREE CAPSULES BY MOUTH EVERY DAY    Calcium Channel Blockers Protocol  Failed    5/29/2018  1:34 PM       Failed - Blood pressure under 140/90 in past 12 months    BP Readings from Last 3 Encounters:   04/09/18 (!) 144/95   03/22/18 134/76   03/21/18 160/90                Failed - Normal ALT in past 12 months    Recent Labs   Lab Test  05/23/16   1431   ALT  15            Failed - Normal serum creatinine on file in past 12 months    Recent Labs   Lab Test  12/06/16   1131   CR  1.21            Passed - Recent (12 mo) or future (30 days) visit within the authorizing provider's specialty    Patient had office visit in the last 12 months or has a visit in the next 30 days with authorizing provider or within the authorizing provider's specialty.  See \"Patient Info\" tab in inbasket, or \"Choose Columns\" in Meds & Orders section of the refill encounter.           Passed - Patient is age 18 or older   Last Written Prescription Date:  04/25/18  Last Fill Quantity: 90,  # refills: 0   Last office visit: 3/22/2018 with prescribing provider:  08/18/17   Future Office Visit:         lisinopril-hydrochlorothiazide (PRINZIDE/ZESTORETIC) 20-12.5 MG per tablet [Pharmacy Med Name: LISINOPRIL-HYDROCHLORO 20-12.5 TABS] 60 tablet 0     Sig: TAKE TWO TABLETS BY MOUTH EVERY DAY    Diuretics (Including Combos) Protocol Failed    5/29/2018  1:34 PM       Failed - Blood pressure under 140/90 in past 12 months    BP Readings " "from Last 3 Encounters:   04/09/18 (!) 144/95   03/22/18 134/76   03/21/18 160/90                Failed - Normal serum creatinine on file in past 12 months    Recent Labs   Lab Test  12/06/16   1131   CR  1.21             Failed - Normal serum potassium on file in past 12 months    Recent Labs   Lab Test  12/06/16   1131   POTASSIUM  3.9                   Failed - Normal serum sodium on file in past 12 months    Recent Labs   Lab Test  12/06/16   1131   NA  139             Passed - Recent (12 mo) or future (30 days) visit within the authorizing provider's specialty    Patient had office visit in the last 12 months or has a visit in the next 30 days with authorizing provider or within the authorizing provider's specialty.  See \"Patient Info\" tab in inbasket, or \"Choose Columns\" in Meds & Orders section of the refill encounter.           Passed - Patient is age 18 or older      Last Written Prescription Date:  04/25/18  Last Fill Quantity: 60,  # refills: 0   Last office visit: 3/22/2018 with prescribing provider:  08/18/17   Future Office Visit:        "

## 2018-07-12 DIAGNOSIS — I10 BENIGN ESSENTIAL HYPERTENSION: ICD-10-CM

## 2018-07-12 DIAGNOSIS — F33.1 MAJOR DEPRESSIVE DISORDER, RECURRENT EPISODE, MODERATE (H): ICD-10-CM

## 2018-07-12 DIAGNOSIS — I10 SEVERE HYPERTENSION: ICD-10-CM

## 2018-07-12 DIAGNOSIS — Z91.89 AT RISK FOR CARDIOVASCULAR EVENT: ICD-10-CM

## 2018-07-12 RX ORDER — ATORVASTATIN CALCIUM 20 MG/1
TABLET, FILM COATED ORAL
Qty: 30 TABLET | Refills: 0 | Status: SHIPPED | OUTPATIENT
Start: 2018-07-12 | End: 2018-08-23

## 2018-07-12 RX ORDER — DILTIAZEM HYDROCHLORIDE 120 MG/1
CAPSULE, EXTENDED RELEASE ORAL
Qty: 90 CAPSULE | Refills: 0 | Status: SHIPPED | OUTPATIENT
Start: 2018-07-12 | End: 2018-08-23

## 2018-07-12 RX ORDER — LISINOPRIL AND HYDROCHLOROTHIAZIDE 12.5; 2 MG/1; MG/1
TABLET ORAL
Qty: 60 TABLET | Refills: 0 | Status: SHIPPED | OUTPATIENT
Start: 2018-07-12 | End: 2018-08-23

## 2018-07-12 RX ORDER — BUPROPION HYDROCHLORIDE 300 MG/1
TABLET ORAL
Qty: 30 TABLET | Refills: 0 | Status: SHIPPED | OUTPATIENT
Start: 2018-07-12 | End: 2018-07-18

## 2018-07-12 RX ORDER — AMLODIPINE BESYLATE 10 MG/1
TABLET ORAL
Qty: 30 TABLET | Refills: 0 | Status: SHIPPED | OUTPATIENT
Start: 2018-07-12 | End: 2018-08-23

## 2018-07-12 NOTE — TELEPHONE ENCOUNTER
"Requested Prescriptions   Pending Prescriptions Disp Refills     buPROPion (WELLBUTRIN XL) 300 MG 24 hr tablet [Pharmacy Med Name: BUPROPION HCL ER (XL) 300MG TB24] 30 tablet 0     Sig: TAKE ONE TABLET BY MOUTH EVERY MORNING    SSRIs Protocol Failed    7/12/2018 12:32 PM       Failed - PHQ-9 score less than 5 in past 6 months    Please review last PHQ-9 score.     PHQ-9 SCORE 11/14/2016 12/5/2016 4/26/2017   Total Score - 0 -   Total Score 1 - 0     RIO-7 SCORE 10/17/2016 11/14/2016 4/26/2017   Total Score - - -   Total Score 15 3 0                Passed - Medication is Bupropion    If the medication is Bupropion (Wellbutrin), and the patient is taking for smoking cessation; OK to refill.         Passed - Patient is age 18 or older       Passed - Recent (6 mo) or future (30 days) visit within the authorizing provider's specialty    Patient had office visit in the last 6 months or has a visit in the next 30 days with authorizing provider or within the authorizing provider's specialty.  See \"Patient Info\" tab in inbasket, or \"Choose Columns\" in Meds & Orders section of the refill encounter.      Last Written Prescription Date:  5/29/18  Last Fill Quantity: 30,  # refills: 0   Last office visit: 3/22/2018 with prescribing provider:     Future Office Visit:   Next 5 appointments (look out 90 days)     Jul 18, 2018  9:00 AM CDT   SHORT with Susan Hand PA-C   [facility] ([facility])    [address] [place] [address] [phone]                           CARTIA  MG 24 hr capsule [Pharmacy Med Name: CARTIA XT (DILTIAZEM) 120MG CP24] 90 capsule 0     Sig: TAKE THREE CAPSULES BY MOUTH EVERY DAY (NEED TO BE SEEN IN CLINIC FOR FURTHER REFILLS)    Calcium Channel Blockers Protocol  Failed    7/12/2018 12:32 PM       Failed - Blood pressure under 140/90 in past 12 months    BP Readings from Last 3 Encounters:   04/09/18 (!) 144/95   03/22/18 134/76 " "  03/21/18 160/90                Failed - Normal ALT in past 12 months    Recent Labs   Lab Test  05/23/16   1431   ALT  15            Failed - Normal serum creatinine on file in past 12 months    Recent Labs   Lab Test  12/06/16   1131   CR  1.21            Passed - Recent (12 mo) or future (30 days) visit within the authorizing provider's specialty    Patient had office visit in the last 12 months or has a visit in the next 30 days with authorizing provider or within the authorizing provider's specialty.  See \"Patient Info\" tab in inbasket, or \"Choose Columns\" in Meds & Orders section of the refill encounter.           Passed - Patient is age 18 or older        lisinopril-hydrochlorothiazide (PRINZIDE/ZESTORETIC) 20-12.5 MG per tablet [Pharmacy Med Name: LISINOPRIL-HYDROCHLORO 20-12.5 TABS] 60 tablet 0     Sig: TAKE TWO TABLETS BY MOUTH EVERY DAY (NEED TO BE SEEN IN CLINIC FOR FURTHER REFILLS)    Diuretics (Including Combos) Protocol Failed    7/12/2018 12:32 PM       Failed - Blood pressure under 140/90 in past 12 months    BP Readings from Last 3 Encounters:   04/09/18 (!) 144/95   03/22/18 134/76   03/21/18 160/90                Failed - Normal serum creatinine on file in past 12 months    Recent Labs   Lab Test  12/06/16   1131   CR  1.21             Failed - Normal serum potassium on file in past 12 months    Recent Labs   Lab Test  12/06/16   1131   POTASSIUM  3.9                   Failed - Normal serum sodium on file in past 12 months    Recent Labs   Lab Test  12/06/16   1131   NA  139             Passed - Recent (12 mo) or future (30 days) visit within the authorizing provider's specialty    Patient had office visit in the last 12 months or has a visit in the next 30 days with authorizing provider or within the authorizing provider's specialty.  See \"Patient Info\" tab in inbasket, or \"Choose Columns\" in Meds & Orders section of the refill encounter.     Last Written Prescription Date:  5/29/18  Last Fill " "Quantity: 90,  # refills: 0   Last office visit: 3/22/2018 with prescribing provider:     Future Office Visit:   Next 5 appointments (look out 90 days)     Jul 18, 2018  9:00 AM CDT   SHORT with Susan Hand PA-C   [facility] ([facility])    [address] [place] [id number]   [phone]                           Passed - Patient is age 18 or older        amLODIPine (NORVASC) 10 MG tablet [Pharmacy Med Name: AMLODIPINE BESYLATE 10MG TABS] 30 tablet 0     Sig: TAKE ONE TABLET BY MOUTH EVERY DAY (APPT NEEDED BEFORE RUNNING OUT OF THIS MEDICATION)    Calcium Channel Blockers Protocol  Failed    7/12/2018 12:32 PM       Failed - Blood pressure under 140/90 in past 12 months    BP Readings from Last 3 Encounters:   04/09/18 (!) 144/95   03/22/18 134/76   03/21/18 160/90                Failed - Normal ALT in past 12 months    Recent Labs   Lab Test  05/23/16   1431   ALT  15            Failed - Normal serum creatinine on file in past 12 months    Recent Labs   Lab Test  12/06/16   1131   CR  1.21            Passed - Recent (12 mo) or future (30 days) visit within the authorizing provider's specialty    Patient had office visit in the last 12 months or has a visit in the next 30 days with authorizing provider or within the authorizing provider's specialty.  See \"Patient Info\" tab in inbasket, or \"Choose Columns\" in Meds & Orders section of the refill encounter.      Last Written Prescription Date:  5/29/18  Last Fill Quantity: 30,  # refills: 0   Last office visit: 3/22/2018 with prescribing provider:     Future Office Visit:   Next 5 appointments (look out 90 days)     Jul 18, 2018  9:00 AM CDT   SHORT with Susan Hand PA-C   [facility] ([facility])    [address] [place] [id number] [phone]                          Passed - Patient is age 18 or older        atorvastatin (LIPITOR) 20 MG tablet " "[Pharmacy Med Name: ATORVASTATIN CALCIUM 20MG TABS] 90 tablet 0     Sig: TAKE ONE TABLET BY MOUTH EVERY DAY (SCHEDULE FASTING LAB APPT)    Statins Protocol Failed    7/12/2018 12:32 PM       Failed - LDL on file in past 12 months    Recent Labs   Lab Test  09/29/16   1037   LDL  148*            Passed - No abnormal creatine kinase in past 12 months    No lab results found.            Passed - Recent (12 mo) or future (30 days) visit within the authorizing provider's specialty    Patient had office visit in the last 12 months or has a visit in the next 30 days with authorizing provider or within the authorizing provider's specialty.  See \"Patient Info\" tab in inbasket, or \"Choose Columns\" in Meds & Orders section of the refill encounter.      Last Written Prescription Date:  3/21/18  Last Fill Quantity: 90,  # refills: 0   Last office visit: 3/22/2018 with prescribing provider:     Future Office Visit:   Next 5 appointments (look out 90 days)     Jul 18, 2018  9:00 AM CDT   [facility]    [address] [place] [id number] [phone]                          Passed - Patient is age 18 or older          "

## 2018-07-18 ENCOUNTER — RADIANT APPOINTMENT (OUTPATIENT)
Dept: GENERAL RADIOLOGY | Facility: CLINIC | Age: 58
End: 2018-07-18
Attending: PHYSICIAN ASSISTANT
Payer: COMMERCIAL

## 2018-07-18 ENCOUNTER — OFFICE VISIT (OUTPATIENT)
Dept: FAMILY MEDICINE | Facility: CLINIC | Age: 58
End: 2018-07-18
Payer: COMMERCIAL

## 2018-07-18 VITALS
DIASTOLIC BLOOD PRESSURE: 98 MMHG | TEMPERATURE: 97.1 F | OXYGEN SATURATION: 98 % | WEIGHT: 262 LBS | RESPIRATION RATE: 20 BRPM | BODY MASS INDEX: 34.57 KG/M2 | SYSTOLIC BLOOD PRESSURE: 160 MMHG | HEART RATE: 101 BPM

## 2018-07-18 DIAGNOSIS — F32.5 MAJOR DEPRESSION IN REMISSION (H): Primary | ICD-10-CM

## 2018-07-18 DIAGNOSIS — Z91.89 AT RISK FOR CARDIOVASCULAR EVENT: ICD-10-CM

## 2018-07-18 DIAGNOSIS — I10 BENIGN ESSENTIAL HYPERTENSION: ICD-10-CM

## 2018-07-18 DIAGNOSIS — Z11.4 ENCOUNTER FOR SCREENING FOR HIV: ICD-10-CM

## 2018-07-18 DIAGNOSIS — N52.9 ERECTILE DYSFUNCTION, UNSPECIFIED ERECTILE DYSFUNCTION TYPE: ICD-10-CM

## 2018-07-18 DIAGNOSIS — D58.2 ELEVATED HEMOGLOBIN (H): ICD-10-CM

## 2018-07-18 DIAGNOSIS — M25.462 KNEE EFFUSION, LEFT: ICD-10-CM

## 2018-07-18 DIAGNOSIS — S83.209A TEAR MENISCUS KNEE: ICD-10-CM

## 2018-07-18 DIAGNOSIS — M25.562 LEFT KNEE PAIN, UNSPECIFIED CHRONICITY: ICD-10-CM

## 2018-07-18 PROCEDURE — 73560 X-RAY EXAM OF KNEE 1 OR 2: CPT | Mod: LT

## 2018-07-18 PROCEDURE — 99214 OFFICE O/P EST MOD 30 MIN: CPT | Performed by: PHYSICIAN ASSISTANT

## 2018-07-18 RX ORDER — SILDENAFIL 100 MG/1
100 TABLET, FILM COATED ORAL DAILY PRN
Qty: 12 TABLET | Refills: 11 | Status: SHIPPED | OUTPATIENT
Start: 2018-07-18 | End: 2019-09-19

## 2018-07-18 RX ORDER — AMLODIPINE BESYLATE 10 MG/1
TABLET ORAL
Qty: 30 TABLET | Refills: 0 | Status: CANCELLED | OUTPATIENT
Start: 2018-07-18

## 2018-07-18 RX ORDER — ATORVASTATIN CALCIUM 20 MG/1
TABLET, FILM COATED ORAL
Qty: 30 TABLET | Refills: 0 | Status: CANCELLED | OUTPATIENT
Start: 2018-07-18

## 2018-07-18 RX ORDER — DILTIAZEM HYDROCHLORIDE 120 MG/1
CAPSULE, COATED, EXTENDED RELEASE ORAL
Qty: 90 CAPSULE | Refills: 0 | Status: CANCELLED | OUTPATIENT
Start: 2018-07-18

## 2018-07-18 RX ORDER — SILDENAFIL CITRATE 20 MG/1
100 TABLET ORAL PRN
Qty: 50 TABLET | Refills: 3 | Status: CANCELLED | OUTPATIENT
Start: 2018-07-18

## 2018-07-18 RX ORDER — BUPROPION HYDROCHLORIDE 300 MG/1
300 TABLET ORAL EVERY MORNING
Qty: 90 TABLET | Refills: 3 | Status: ON HOLD | OUTPATIENT
Start: 2018-07-18 | End: 2019-07-24

## 2018-07-18 RX ORDER — LISINOPRIL AND HYDROCHLOROTHIAZIDE 12.5; 2 MG/1; MG/1
TABLET ORAL
Qty: 60 TABLET | Refills: 0 | Status: CANCELLED | OUTPATIENT
Start: 2018-07-18

## 2018-07-18 ASSESSMENT — PATIENT HEALTH QUESTIONNAIRE - PHQ9
SUM OF ALL RESPONSES TO PHQ QUESTIONS 1-9: 0
10. IF YOU CHECKED OFF ANY PROBLEMS, HOW DIFFICULT HAVE THESE PROBLEMS MADE IT FOR YOU TO DO YOUR WORK, TAKE CARE OF THINGS AT HOME, OR GET ALONG WITH OTHER PEOPLE: NOT DIFFICULT AT ALL
SUM OF ALL RESPONSES TO PHQ QUESTIONS 1-9: 0

## 2018-07-18 ASSESSMENT — ANXIETY QUESTIONNAIRES
3. WORRYING TOO MUCH ABOUT DIFFERENT THINGS: NOT AT ALL
GAD7 TOTAL SCORE: 0
7. FEELING AFRAID AS IF SOMETHING AWFUL MIGHT HAPPEN: NOT AT ALL
GAD7 TOTAL SCORE: 0
6. BECOMING EASILY ANNOYED OR IRRITABLE: NOT AT ALL
1. FEELING NERVOUS, ANXIOUS, OR ON EDGE: NOT AT ALL
2. NOT BEING ABLE TO STOP OR CONTROL WORRYING: NOT AT ALL
GAD7 TOTAL SCORE: 0
4. TROUBLE RELAXING: NOT AT ALL
5. BEING SO RESTLESS THAT IT IS HARD TO SIT STILL: NOT AT ALL
7. FEELING AFRAID AS IF SOMETHING AWFUL MIGHT HAPPEN: NOT AT ALL

## 2018-07-18 NOTE — MR AVS SNAPSHOT
After Visit Summary   7/18/2018    Flakito Hilliard    MRN: [id number]           Patient Information     Date Of Birth          1960        Visit Information        Provider Department      7/18/2018 9:00 AM Susan Hand PA-C [facility]        Today's Diagnoses     Major depression in remission (H)    -  1    Benign essential hypertension        At risk for cardiovascular event        Left knee pain, unspecified chronicity        Knee effusion, left        Erectile dysfunction, unspecified erectile dysfunction type        Encounter for screening for HIV          Care Instructions    Stay on depression med for life     Refill meds  For further refills, set up nurse BP appt in 2 weeks - labs at that time    Switching viagra prescription to version that should have to take less pills to get same dose.      For knee, Call [phone] to set up MRI.  Will get back to you with further plan based on that result.    Discuss shingles vaccine with pharmacy    Recheck as needed          Follow-ups after your visit        Your next 10 appointments already scheduled     Apr 15, 2019  8:00 AM CDT   (Arrive by 7:45 AM)   RETURN URETHRAL STRICTURE with Niraj Burger MD   [facility] ([facility])    [address] [place] [id number] [phone]              Future tests that were ordered for you today     Open Future Orders        Priority Expected Expires Ordered    HIV Antigen Antibody Combo Routine  7/18/2019 7/18/2018    MR Knee Left w/o Contrast Routine  7/18/2019 7/18/2018            Who to contact     If you have questions or need follow up information about today's clinic visit or your schedule please [facility] at [phone].  Normal or non-critical lab and imaging results will be communicated to you by MyChart, letter or phone  within 4 business days after the clinic has received the results. If you do not hear from us within 7 days, please contact the clinic through [employer] or phone. If you have a critical or abnormal lab result, we will notify you by phone as soon as possible.  Submit refill requests through [employer] or call your pharmacy and they will forward the refill request to us. Please allow 3 business days for your refill to be completed.          Additional Information About Your Visit        Care EveryWhere ID     This is your Care EveryWhere ID. This could be used by other organizations to access your [facility] medical records  [id number]        Your Vitals Were     Pulse Temperature Respirations Pulse Oximetry BMI (Body Mass Index)       101 97.1  F (36.2  C) (Tympanic) 20 98% 34.57 kg/m2        Blood Pressure from Last 3 Encounters:   07/18/18 (!) 160/98   04/09/18 (!) 144/95   03/22/18 134/76    Weight from Last 3 Encounters:   07/18/18 262 lb (118.8 kg)   04/09/18 269 lb 1.6 oz (122.1 kg)   08/18/17 265 lb (120.2 kg)                 Today's Medication Changes          These changes are accurate as of 7/18/18 10:08 AM.  If you have any questions, ask your nurse or doctor.               Start taking these medicines.        Dose/Directions    sildenafil 100 MG tablet   Commonly known as:  VIAGRA   Used for:  Erectile dysfunction, unspecified erectile dysfunction type   Started by:  Susan Hand PA-C        Dose:  100 mg   Take 1 tablet (100 mg) by mouth daily as needed 30 min to 4 hrs before sex. Do not use with nitroglycerin, terazosin or doxazosin.   Quantity:  12 tablet   Refills:  11         These medicines have changed or have updated prescriptions.        Dose/Directions    buPROPion 300 MG 24 hr tablet   Commonly known as:  WELLBUTRIN XL   This may have changed:  See the new instructions.   Used for:  Major depression in remission (H)   Changed by:  Susan Hand PA-C        Dose:  300 mg   Take 1  tablet (300 mg) by mouth every morning   Quantity:  90 tablet   Refills:  3         Stop taking these medicines if you haven't already. Please contact your care team if you have questions.     sildenafil 20 MG tablet   Commonly known as:  REVATIO   Stopped by:  Susan Hand PA-C                Where to get your medicines      These medications were sent to [facility] [place] [id number]     Phone:  [phone]     buPROPion 300 MG 24 hr tablet    sildenafil 100 MG tablet                Primary Care Provider Office Phone # Fax #    Susan Hand PA-C [phone]       [address] [place] [address]        Goals        General    I will call and get appointment with [facility] at [place].  I will call by 8-22-16. (pt-stated)     Notes - Note created  8/15/2016 11:08 AM by Jesi Russell LSW    As of today's date 8/15/2016 goal is met at 26 - 50%.   Goal Status:  Active          Equal Access to Services     [facility]: Hadii charles ku hadasho Soomaali, waaxda luqadaha, qaybta kaalmada adeegyakeara, toby carter . So [facility] [phone].    ATENCIÓN: Si habla español, tiene a larson disposición servicios gratuitos de asistencia lingüística. Llame al [phone].    We comply with applicable federal civil rights laws and [place] laws. We do not discriminate on the basis of race, color, national origin, age, disability, sex, sexual orientation, or gender identity.            Thank you!     Thank you for choosing [facility]  for your care. Our goal is always to provide you with excellent care. Hearing back from our patients is one way we can continue to improve our services. Please take a few minutes to complete the written survey that you may receive in the mail after your visit with us. Thank you!             Your Updated Medication List - Protect others around you:  Learn how to safely use, store and throw away your medicines at www.disposemymeds.org.          This list is accurate as of 7/18/18 10:08 AM.  Always use your most recent med list.                   Brand Name Dispense Instructions for use Diagnosis    amLODIPine 10 MG tablet    NORVASC    30 tablet    TAKE ONE TABLET BY MOUTH EVERY DAY (APPT NEEDED BEFORE RUNNING OUT OF THIS MEDICATION)    Benign essential hypertension       atorvastatin 20 MG tablet    LIPITOR    30 tablet    TAKE ONE TABLET BY MOUTH EVERY DAY (SCHEDULE FASTING LAB APPT)    At risk for cardiovascular event       buPROPion 300 MG 24 hr tablet    WELLBUTRIN XL    90 tablet    Take 1 tablet (300 mg) by mouth every morning    Major depression in remission (H)       CARTIA  MG 24 hr capsule   Generic drug:  diltiazem     90 capsule    TAKE THREE CAPSULES BY MOUTH EVERY DAY (NEED TO BE SEEN IN CLINIC FOR FURTHER REFILLS)    Benign essential hypertension       IBUPROFEN PO      Take 800 mg by mouth every 8 hours as needed for moderate pain        lisinopril-hydrochlorothiazide 20-12.5 MG per tablet    PRINZIDE/ZESTORETIC    60 tablet    TAKE TWO TABLETS BY MOUTH EVERY DAY (NEED TO BE SEEN IN CLINIC FOR FURTHER REFILLS)    Severe hypertension       sildenafil 100 MG tablet    VIAGRA    12 tablet    Take 1 tablet (100 mg) by mouth daily as needed 30 min to 4 hrs before sex. Do not use with nitroglycerin, terazosin or doxazosin.    Erectile dysfunction, unspecified erectile dysfunction type       TURMERIC CURCUMIN PO

## 2018-07-18 NOTE — NURSING NOTE
"Chief Complaint   Patient presents with     Hypertension     recheck - out of meds     Knee Pain     left knee swelling and painful     Depression     recheck       Initial BP (!) 160/98 (BP Location: Right arm)  Pulse 101  Temp 97.1  F (36.2  C) (Tympanic)  Resp 20  Wt 262 lb (118.8 kg)  SpO2 98%  BMI 34.57 kg/m2 Estimated body mass index is 34.57 kg/(m^2) as calculated from the following:    Height as of 4/9/18: 6' 1\" (1.854 m).    Weight as of this encounter: 262 lb (118.8 kg).      Health Maintenance that is potentially due pending provider review:  NONE    n/a    Is there anyone who you would like to be able to receive your results? No  If yes have patient fill out REYNOLD    "

## 2018-07-18 NOTE — PROGRESS NOTES
SUBJECTIVE:   Flakito Hilliard is a 58 year old male who presents to clinic today for the following health issues:  Answers for HPI/ROS submitted by the patient on 7/18/2018   If you checked off any problems, how difficult have these problems made it for you to do your work, take care of things at home, or get along with other people?: Not difficult at all  PHQ9 TOTAL SCORE: 0  RIO 7 TOTAL SCORE: 0  PHQ-9 ([employer]) 7/18/2018   1.  Little interest or pleasure in doing things 0   2.  Feeling down, depressed, or hopeless 0   3.  Trouble falling or staying asleep, or sleeping too much 0   4.  Feeling tired or having little energy 0   5.  Poor appetite or overeating 0   6.  Feeling bad about yourself 0   7.  Trouble concentrating 0   8.  Moving slowly or restless 0   9.  Suicidal or self-harm thoughts 0   PHQ-9 Total Score 0   Difficulty at work, home, or with people    1.  Little interest or pleasure in doing things Not at all   2.  Feeling down, depressed, or hopeless Not at all   3.  Trouble falling or staying asleep, or sleeping too much Not at all   4.  Feeling tired or having little energy Not at all   5.  Poor appetite or overeating Not at all   6.  Feeling bad about yourself Not at all   7.  Trouble concentrating Not at all   8.  Moving slowly or restless Not at all   9.  Suicidal or self-harm thoughts Not at all   PHQ-9 via [employer] TOTAL SCORE-----> 0   Difficulty at work, home, or with people Not difficult at all       Hypertension Follow-up    Outpatient blood pressures are not being checked.    Low Salt Diet: not monitoring salt    No chest pains, chest pressures, SOB or sudden change of endurance.    Ran out of med 3 days ago, didn't realize a 1 month prescription had been sent.    ASCVD risk - atorvastatin 20, no myalgias.    Depression Followup    Status since last visit: Stable     See PHQ-9 for current symptoms.  Other associated symptoms: None    Complicating factors:   Significant life event:   No   Current substance abuse:  None  Anxiety or Panic symptoms:  No    PHQ-9 11/14/2016 4/26/2017 7/18/2018   Total Score 1 0 0   Q9: Suicide Ideation Not at all Not at all Not at all       PHQ-9  English  PHQ-9   Any Language  Suicide Assessment Five-step Evaluation and Treatment (SAFE-T)    Amount of exercise or physical activity: gardens    Problems taking medications regularly: No    Medication side effects: none    Diet: regular (no restrictions)    Depression - bupropion 300.  Staying very active, enjoying his wine making hobby.  Drinks maybe 1-2 glasses wine 2-3 nights per week.    Musculoskeletal problem/pain      Duration: 3 months    Description  Location: left knee pain    Intensity:  moderate    Accompanying signs and symptoms: numbness, tingling, weakness of knee, warmth and swelling    History  Previous similar problem: no   Previous evaluation:  none    Precipitating or alleviating factors:  Trauma or overuse: YES- gardens  Aggravating factors include: none    Therapies tried and outcome: rest/inactivity and ice    Multiple sports injuries but no recent injury before this started.  Worsening x 3 mo.  Feels like will give out sometimes when standing.  Walking downhills is very painful.    Ibuprofen 800 bid.    No prostate symptoms.  Declines prostate screenings.  His recurrent UTIs doing much better.    Elev hemoglobin.    Problem list and histories reviewed & adjusted, as indicated.  Additional history: as documented    BP Readings from Last 3 Encounters:   07/18/18 (!) 160/98   04/09/18 (!) 144/95   03/22/18 134/76    Wt Readings from Last 3 Encounters:   07/18/18 262 lb (118.8 kg)   04/09/18 269 lb 1.6 oz (122.1 kg)   08/18/17 265 lb (120.2 kg)         Labs reviewed in EPIC    Reviewed and updated as needed this visit by clinical staff  Tobacco  Allergies  Meds  Problems  Med Hx  Surg Hx  Fam Hx  Soc Hx        Reviewed and updated as needed this visit by Provider  Tobacco  Allergies  Meds   Problems  Med Hx  Surg Hx  Fam Hx  Soc Hx          ROS:  Constitutional, cardiovascular, pulmonary, , musculoskeletal, and psych systems are negative, except as otherwise noted.    OBJECTIVE:     BP (!) 160/98 (BP Location: Right arm)  Pulse 101  Temp 97.1  F (36.2  C) (Tympanic)  Resp 20  Wt 262 lb (118.8 kg)  SpO2 98%  BMI 34.57 kg/m2  Body mass index is 34.57 kg/(m^2).  GENERAL: healthy, alert and no distress  RESP: lungs clear to auscultation - no rales, rhonchi or wheezes  CV: regular rate and rhythm, normal S1 S2, no S3 or S4, no murmur, click or rub  Knee has normal flexion but mildly reduced extension.  There is no crepitus and no joint space tenderness.  There is large effusion medially of joint space, no warmth tenderness or erythema.  Patella tracks well and has no tenderness with pressure and when displaced from condylar groove has no tenderness underneath.  Anterior and posterior draws negative.  Varus and valgus negative.  Zonia deferred.  He indicates pain location as indicated in picture.  PSYCH: affect calm, normal        Knee xray read by Susan Hand PA-C as question loose body on 1 xray view, not seen on subsequent view, no OA  ASSESSMENT/PLAN:       ICD-10-CM    1. Major depression in remission (H) F32.5 buPROPion (WELLBUTRIN XL) 300 MG 24 hr tablet   2. Elevated hemoglobin (H) D58.2 CBC with platelets   3. Benign essential hypertension I10    4. Left knee pain, unspecified chronicity M25.562 XR Knee Standing Left 2 Views     MR Knee Left w/o Contrast   5. Knee effusion, left M25.462 XR Knee Standing Left 2 Views     MR Knee Left w/o Contrast   6. At risk for cardiovascular event Z91.89    7. Erectile dysfunction, unspecified erectile dysfunction type N52.9 sildenafil (VIAGRA) 100 MG tablet   8. Encounter for screening for HIV Z11.4 HIV Antigen Antibody Combo   not interested in shingrix due to side effects with flu vaccine    Patient Instructions   Stay on depression  med for life     Refill meds  For further refills, set up nurse BP appt in 2 weeks - labs at that time    Switching viagra prescription to version that should have to take less pills to get same dose.      For knee, Call [phone] to set up MRI.  Will get back to you with further plan based on that result.    Discuss shingles vaccine with pharmacy    Recheck as needed      Susan Hand PA-C  [facility]

## 2018-07-18 NOTE — PATIENT INSTRUCTIONS
Stay on depression med for life     Refill meds  For further refills, set up nurse BP appt in 2 weeks - labs at that time    Switching viagra prescription to version that should have to take less pills to get same dose.      For knee, Call [phone] to set up MRI.  Will get back to you with further plan based on that result.    Discuss shingles vaccine with pharmacy    Recheck as needed

## 2018-07-19 ASSESSMENT — ANXIETY QUESTIONNAIRES: GAD7 TOTAL SCORE: 0

## 2018-07-19 ASSESSMENT — PATIENT HEALTH QUESTIONNAIRE - PHQ9: SUM OF ALL RESPONSES TO PHQ QUESTIONS 1-9: 0

## 2018-07-20 ENCOUNTER — HOSPITAL ENCOUNTER (OUTPATIENT)
Dept: MRI IMAGING | Facility: CLINIC | Age: 58
Discharge: HOME | End: 2018-07-20
Attending: PHYSICIAN ASSISTANT | Admitting: PHYSICIAN ASSISTANT
Payer: COMMERCIAL

## 2018-07-20 DIAGNOSIS — M25.462 KNEE EFFUSION, LEFT: ICD-10-CM

## 2018-07-20 DIAGNOSIS — M25.562 LEFT KNEE PAIN, UNSPECIFIED CHRONICITY: ICD-10-CM

## 2018-07-20 PROCEDURE — 73721 MRI JNT OF LWR EXTRE W/O DYE: CPT | Mod: LT

## 2018-07-27 ENCOUNTER — TRANSFERRED RECORDS (OUTPATIENT)
Dept: HEALTH INFORMATION MANAGEMENT | Facility: CLINIC | Age: 58
End: 2018-07-27

## 2018-08-23 DIAGNOSIS — F33.1 MAJOR DEPRESSIVE DISORDER, RECURRENT EPISODE, MODERATE (H): ICD-10-CM

## 2018-08-23 DIAGNOSIS — I10 BENIGN ESSENTIAL HYPERTENSION: ICD-10-CM

## 2018-08-23 DIAGNOSIS — Z91.89 AT RISK FOR CARDIOVASCULAR EVENT: ICD-10-CM

## 2018-08-23 DIAGNOSIS — I10 SEVERE HYPERTENSION: ICD-10-CM

## 2018-08-23 NOTE — TELEPHONE ENCOUNTER
"Requested Prescriptions   Pending Prescriptions Disp Refills     buPROPion (WELLBUTRIN XL) 300 MG 24 hr tablet [Pharmacy Med Name: BUPROPION HCL ER (XL) 300MG TB24] 30 tablet 0     Sig: TAKE ONE TABLET BY MOUTH EVERY MORNING    SSRIs Protocol Passed    8/23/2018  1:48 PM       Passed - PHQ-9 score less than 5 in past 6 months    Please review last PHQ-9 score.     PHQ-9 SCORE 12/5/2016 4/26/2017 7/18/2018   Total Score 0 - -   Total Score MyChart - - 0   Total Score - 0 0     RIO-7 SCORE 11/14/2016 4/26/2017 7/18/2018   Total Score - - -   Total Score - - 0 (minimal anxiety)   Total Score 3 0 0                Passed - Medication is Bupropion    If the medication is Bupropion (Wellbutrin), and the patient is taking for smoking cessation; OK to refill.         Passed - Patient is age 18 or older       Passed - Recent (6 mo) or future (30 days) visit within the authorizing provider's specialty    Patient had office visit in the last 6 months or has a visit in the next 30 days with authorizing provider or within the authorizing provider's specialty.  See \"Patient Info\" tab in [employer], or \"Choose Columns\" in Meds & Orders section of the refill encounter.      Look like this is a dupilcation from 7/18/18 90 with 3 refill            atorvastatin (LIPITOR) 20 MG tablet [Pharmacy Med Name: ATORVASTATIN CALCIUM 20MG TABS] 30 tablet 0     Sig: TAKE ONE TABLET BY MOUTH EVERY DAY    Statins Protocol Failed    8/23/2018  1:48 PM       Failed - LDL on file in past 12 months    Recent Labs   Lab Test  09/29/16   1037   LDL  148*            Passed - No abnormal creatine kinase in past 12 months    No lab results found.            Passed - Recent (12 mo) or future (30 days) visit within the authorizing provider's specialty    Patient had office visit in the last 12 months or has a visit in the next 30 days with authorizing provider or within the authorizing provider's specialty.  See \"Patient Info\" tab in [employer], or \"Choose " "Columns\" in Meds & Orders section of the refill encounter.      Last Written Prescription Date:  7/12/18  Last Fill Quantity: 30,  # refills: 0   Last office visit: 7/18/2018 with prescribing provider:     Future Office Visit:             Passed - Patient is age 18 or older        lisinopril-hydrochlorothiazide (PRINZIDE/ZESTORETIC) 20-12.5 MG per tablet [Pharmacy Med Name: LISINOPRIL-HYDROCHLORO 20-12.5 TABS] 60 tablet 0     Sig: TAKE TWO TABLETS BY MOUTH EVERY DAY (NEED TO BE SEEN IN CLINIC FOR FURTHER REFILLS)    Diuretics (Including Combos) Protocol Failed    8/23/2018  1:48 PM       Failed - Blood pressure under 140/90 in past 12 months    BP Readings from Last 3 Encounters:   07/18/18 (!) 160/98   04/09/18 (!) 144/95   03/22/18 134/76                Failed - Normal serum creatinine on file in past 12 months    Recent Labs   Lab Test  12/06/16   1131   CR  1.21             Failed - Normal serum potassium on file in past 12 months    Recent Labs   Lab Test  12/06/16   1131   POTASSIUM  3.9                   Failed - Normal serum sodium on file in past 12 months    Recent Labs   Lab Test  12/06/16   1131   NA  139             Passed - Recent (12 mo) or future (30 days) visit within the authorizing provider's specialty    Patient had office visit in the last 12 months or has a visit in the next 30 days with authorizing provider or within the authorizing provider's specialty.  See \"Patient Info\" tab in inbasket, or \"Choose Columns\" in Meds & Orders section of the refill encounter.      Last Written Prescription Date:  7/12/18  Last Fill Quantity: 60,  # refills: 0   Last office visit: 7/18/2018 with prescribing provider:     Future Office Visit:             Passed - Patient is age 18 or older        CARTIA  MG 24 hr capsule [Pharmacy Med Name: CARTIA XT (DILTIAZEM) 120MG CP24] 90 capsule 0     Sig: TAKE THREE CAPSULES BY MOUTH EVERY DAY (NEED TO BE SEEN IN CLINIC FOR FURTHER REFILLS)    Calcium Channel " "Blockers Protocol  Failed    8/23/2018  1:48 PM       Failed - Blood pressure under 140/90 in past 12 months    BP Readings from Last 3 Encounters:   07/18/18 (!) 160/98   04/09/18 (!) 144/95   03/22/18 134/76                Failed - Normal ALT in past 12 months    Recent Labs   Lab Test  05/23/16   1431   ALT  15            Failed - Normal serum creatinine on file in past 12 months    Recent Labs   Lab Test  12/06/16   1131   CR  1.21            Passed - Recent (12 mo) or future (30 days) visit within the authorizing provider's specialty    Patient had office visit in the last 12 months or has a visit in the next 30 days with authorizing provider or within the authorizing provider's specialty.  See \"Patient Info\" tab in inbasket, or \"Choose Columns\" in Meds & Orders section of the refill encounter.      Last Written Prescription Date:  7/12/18  Last Fill Quantity: 90,  # refills: 0   Last office visit: 7/18/2018 with prescribing provider:     Future Office Visit:             Passed - Patient is age 18 or older        amLODIPine (NORVASC) 10 MG tablet [Pharmacy Med Name: AMLODIPINE BESYLATE 10MG TABS] 30 tablet 0     Sig: TAKE ONE TABLET BY MOUTH EVERY DAY (APPT NEEDED BEFORE RUNNING OUT OF THIS MEDICATION)    Calcium Channel Blockers Protocol  Failed    8/23/2018  1:48 PM       Failed - Blood pressure under 140/90 in past 12 months    BP Readings from Last 3 Encounters:   07/18/18 (!) 160/98   04/09/18 (!) 144/95   03/22/18 134/76                Failed - Normal ALT in past 12 months    Recent Labs   Lab Test  05/23/16   1431   ALT  15            Failed - Normal serum creatinine on file in past 12 months    Recent Labs   Lab Test  12/06/16   1131   CR  1.21            Passed - Recent (12 mo) or future (30 days) visit within the authorizing provider's specialty    Patient had office visit in the last 12 months or has a visit in the next 30 days with authorizing provider or within the authorizing provider's specialty. " " See \"Patient Info\" tab in inbasket, or \"Choose Columns\" in Meds & Orders section of the refill encounter.      Last Written Prescription Date:  7/12/18  Last Fill Quantity: 90,  # refills: 0   Last office visit: 7/18/2018 with prescribing provider:     Future Office Visit:             Passed - Patient is age 18 or older          "

## 2018-08-24 RX ORDER — DILTIAZEM HYDROCHLORIDE 120 MG/1
360 CAPSULE, COATED, EXTENDED RELEASE ORAL DAILY
Qty: 90 CAPSULE | Refills: 0 | Status: SHIPPED | OUTPATIENT
Start: 2018-08-24 | End: 2018-10-01

## 2018-08-24 RX ORDER — BUPROPION HYDROCHLORIDE 300 MG/1
TABLET ORAL
Qty: 30 TABLET | Refills: 0 | OUTPATIENT
Start: 2018-08-24

## 2018-08-24 RX ORDER — ATORVASTATIN CALCIUM 20 MG/1
20 TABLET, FILM COATED ORAL DAILY
Qty: 30 TABLET | Refills: 0 | Status: SHIPPED | OUTPATIENT
Start: 2018-08-24 | End: 2018-10-01

## 2018-08-24 RX ORDER — AMLODIPINE BESYLATE 10 MG/1
10 TABLET ORAL DAILY
Qty: 30 TABLET | Refills: 0 | Status: SHIPPED | OUTPATIENT
Start: 2018-08-24 | End: 2018-10-01

## 2018-08-24 RX ORDER — LISINOPRIL AND HYDROCHLOROTHIAZIDE 12.5; 2 MG/1; MG/1
2 TABLET ORAL DAILY
Qty: 60 TABLET | Refills: 0 | Status: SHIPPED | OUTPATIENT
Start: 2018-08-24 | End: 2018-10-01

## 2018-08-24 NOTE — TELEPHONE ENCOUNTER
"Duplicate  Medication Detail      Disp Refills Start End ZABRINA   buPROPion (WELLBUTRIN XL) 300 MG 24 hr tablet 90 tablet 3 7/18/2018  No   Sig - Route: Take 1 tablet (300 mg) by mouth every morning - Oral   Class: E-Prescribe   Order: [id number]   E-Prescribing Status: Receipt confirmed by pharmacy (7/18/2018  9:45 AM CDT)     Called pt. He is going out of town tomorrow and will not return until after Labor Day. Explained to him we already gave him a levi period on refills last month, and he needs to have labs. Said he will \"write it on my calendar,\" and have lab work done after he returns. Future labs are already in place.    Medications are being filled for 1 time refill only due to:  Patient needs labs ..     Shruti EDGE RN        "

## 2018-10-01 DIAGNOSIS — I10 BENIGN ESSENTIAL HYPERTENSION: ICD-10-CM

## 2018-10-01 DIAGNOSIS — Z91.89 AT RISK FOR CARDIOVASCULAR EVENT: ICD-10-CM

## 2018-10-01 DIAGNOSIS — I10 SEVERE HYPERTENSION: ICD-10-CM

## 2018-10-01 RX ORDER — LISINOPRIL AND HYDROCHLOROTHIAZIDE 12.5; 2 MG/1; MG/1
2 TABLET ORAL DAILY
Qty: 60 TABLET | Refills: 0 | Status: SHIPPED | OUTPATIENT
Start: 2018-10-01 | End: 2018-11-08

## 2018-10-01 RX ORDER — ATORVASTATIN CALCIUM 20 MG/1
20 TABLET, FILM COATED ORAL DAILY
Qty: 30 TABLET | Refills: 0 | Status: SHIPPED | OUTPATIENT
Start: 2018-10-01 | End: 2018-11-08

## 2018-10-01 RX ORDER — DILTIAZEM HYDROCHLORIDE 120 MG/1
360 CAPSULE, COATED, EXTENDED RELEASE ORAL DAILY
Qty: 90 CAPSULE | Refills: 0 | Status: SHIPPED | OUTPATIENT
Start: 2018-10-01 | End: 2018-11-08

## 2018-10-01 RX ORDER — AMLODIPINE BESYLATE 10 MG/1
10 TABLET ORAL DAILY
Qty: 30 TABLET | Refills: 0 | Status: SHIPPED | OUTPATIENT
Start: 2018-10-01 | End: 2018-11-08

## 2018-10-01 NOTE — TELEPHONE ENCOUNTER
"Requested Prescriptions   Pending Prescriptions Disp Refills     CARTIA  MG 24 hr capsule [Pharmacy Med Name: CARTIA XT (DILTIAZEM) 120MG CP24] 90 capsule 0     Sig: TAKE THREE CAPSULES BY MOUTH EVERY DAY **DUE FOR LABS**    Calcium Channel Blockers Protocol  Failed    10/1/2018  9:10 AM       Failed - Blood pressure under 140/90 in past 12 months    BP Readings from Last 3 Encounters:   07/18/18 (!) 160/98   04/09/18 (!) 144/95   03/22/18 134/76                Failed - Normal ALT in past 12 months    Recent Labs   Lab Test  05/23/16   1431   ALT  15            Failed - Normal serum creatinine on file in past 12 months    Recent Labs   Lab Test  12/06/16   1131   CR  1.21            Passed - Recent (12 mo) or future (30 days) visit within the authorizing provider's specialty    Patient had office visit in the last 12 months or has a visit in the next 30 days with authorizing provider or within the authorizing provider's specialty.  See \"Patient Info\" tab in inbasket, or \"Choose Columns\" in Meds & Orders section of the refill encounter.      Last Written Prescription Date:  8/24/18  Last Fill Quantity: 90,  # refills: 0   Last office visit: 7/18/2018 with prescribing provider:     Future Office Visit:             Passed - Patient is age 18 or older        lisinopril-hydrochlorothiazide (PRINZIDE/ZESTORETIC) 20-12.5 MG per tablet [Pharmacy Med Name: LISINOPRIL-HYDROCHLORO 20-12.5 TABS] 60 tablet 0     Sig: TAKE TWO TABLETS BY MOUTH EVERY DAY **DUE FOR LABS**    Diuretics (Including Combos) Protocol Failed    10/1/2018  9:10 AM       Failed - Blood pressure under 140/90 in past 12 months    BP Readings from Last 3 Encounters:   07/18/18 (!) 160/98   04/09/18 (!) 144/95   03/22/18 134/76                Failed - Normal serum creatinine on file in past 12 months    Recent Labs   Lab Test  12/06/16   1131   CR  1.21             Failed - Normal serum potassium on file in past 12 months    Recent Labs   Lab Test  " "12/06/16   1131   POTASSIUM  3.9                   Failed - Normal serum sodium on file in past 12 months    Recent Labs   Lab Test  12/06/16   1131   NA  139             Passed - Recent (12 mo) or future (30 days) visit within the authorizing provider's specialty    Patient had office visit in the last 12 months or has a visit in the next 30 days with authorizing provider or within the authorizing provider's specialty.  See \"Patient Info\" tab in inbasket, or \"Choose Columns\" in Meds & Orders section of the refill encounter.      Last Written Prescription Date:  8/24/18  Last Fill Quantity: 60,  # refills: 0   Last office visit: 7/18/2018 with prescribing provider:     Future Office Visit:             Passed - Patient is age 18 or older        atorvastatin (LIPITOR) 20 MG tablet [Pharmacy Med Name: ATORVASTATIN CALCIUM 20MG TABS] 30 tablet 0     Sig: TAKE ONE TABLET BY MOUTH EVERY DAY **DUE FOR LABS**    Statins Protocol Failed    10/1/2018  9:10 AM       Failed - LDL on file in past 12 months    Recent Labs   Lab Test  09/29/16   1037   LDL  148*            Passed - No abnormal creatine kinase in past 12 months    No lab results found.            Passed - Recent (12 mo) or future (30 days) visit within the authorizing provider's specialty    Patient had office visit in the last 12 months or has a visit in the next 30 days with authorizing provider or within the authorizing provider's specialty.  See \"Patient Info\" tab in [employer], or \"Choose Columns\" in Meds & Orders section of the refill encounter.      Last Written Prescription Date:  8/24/18  Last Fill Quantity: 30,  # refills: 0   Last office visit: 7/18/2018 with prescribing provider:     Future Office Visit:             Passed - Patient is age 18 or older        amLODIPine (NORVASC) 10 MG tablet [Pharmacy Med Name: AMLODIPINE BESYLATE 10MG TABS] 30 tablet 0     Sig: TAKE ONE TABLET BY MOUTH EVERY DAY **DUE FOR LABS**    Calcium Channel Blockers Protocol  " "Failed    10/1/2018  9:10 AM       Failed - Blood pressure under 140/90 in past 12 months    BP Readings from Last 3 Encounters:   07/18/18 (!) 160/98   04/09/18 (!) 144/95   03/22/18 134/76                Failed - Normal ALT in past 12 months    Recent Labs   Lab Test  05/23/16   1431   ALT  15            Failed - Normal serum creatinine on file in past 12 months    Recent Labs   Lab Test  12/06/16   1131   CR  1.21            Passed - Recent (12 mo) or future (30 days) visit within the authorizing provider's specialty    Patient had office visit in the last 12 months or has a visit in the next 30 days with authorizing provider or within the authorizing provider's specialty.  See \"Patient Info\" tab in inbasket, or \"Choose Columns\" in Meds & Orders section of the refill encounter.      Last Written Prescription Date:  8/24/18  Last Fill Quantity: 30,  # refills: 0   Last office visit: 7/18/2018 with prescribing provider:     Future Office Visit:             Passed - Patient is age 18 or older          "

## 2018-11-08 DIAGNOSIS — Z91.89 AT RISK FOR CARDIOVASCULAR EVENT: ICD-10-CM

## 2018-11-08 DIAGNOSIS — I10 SEVERE HYPERTENSION: ICD-10-CM

## 2018-11-08 DIAGNOSIS — I10 BENIGN ESSENTIAL HYPERTENSION: ICD-10-CM

## 2018-11-08 NOTE — TELEPHONE ENCOUNTER
"Requested Prescriptions   Pending Prescriptions Disp Refills     lisinopril-hydrochlorothiazide (PRINZIDE/ZESTORETIC) 20-12.5 MG per tablet [Pharmacy Med Name: LISINOPRIL-HYDROCHLORO 20-12.5 TABS] 60 tablet 0     Sig: TAKE TWO TABLETS BY MOUTH EVERY DAY. MUST HAVE LAB AND BLOOD PRESSURE CHECK NOW!    Diuretics (Including Combos) Protocol Failed    11/8/2018 11:56 AM       Failed - Blood pressure under 140/90 in past 12 months    BP Readings from Last 3 Encounters:   07/18/18 (!) 160/98   04/09/18 (!) 144/95   03/22/18 134/76                Failed - Normal serum creatinine on file in past 12 months    Recent Labs   Lab Test  12/06/16   1131   CR  1.21             Failed - Normal serum potassium on file in past 12 months    Recent Labs   Lab Test  12/06/16   1131   POTASSIUM  3.9                   Failed - Normal serum sodium on file in past 12 months    Recent Labs   Lab Test  12/06/16   1131   NA  139             Passed - Recent (12 mo) or future (30 days) visit within the authorizing provider's specialty    Patient had office visit in the last 12 months or has a visit in the next 30 days with authorizing provider or within the authorizing provider's specialty.  See \"Patient Info\" tab in inbasket, or \"Choose Columns\" in Meds & Orders section of the refill encounter.      Last Written Prescription Date:  10/1/18  Last Fill Quantity: 60,  # refills: 0   Last office visit: 7/18/2018 with prescribing provider:     Future Office Visit:               Passed - Patient is age 18 or older        atorvastatin (LIPITOR) 20 MG tablet [Pharmacy Med Name: ATORVASTATIN CALCIUM 20MG TABS] 30 tablet 0     Sig: TAKE ONE TABLET BY MOUTH EVERY DAY. MUST HAVE LAB AND BLOOD PRESSURE CHECK NOW!    Statins Protocol Failed    11/8/2018 11:56 AM       Failed - LDL on file in past 12 months    Recent Labs   Lab Test  09/29/16   1037   LDL  148*            Passed - No abnormal creatine kinase in past 12 months    No lab results found.         " "   Passed - Recent (12 mo) or future (30 days) visit within the authorizing provider's specialty    Patient had office visit in the last 12 months or has a visit in the next 30 days with authorizing provider or within the authorizing provider's specialty.  See \"Patient Info\" tab in inbasket, or \"Choose Columns\" in Meds & Orders section of the refill encounter.      Last Written Prescription Date:  10/1/18  Last Fill Quantity: 30,  # refills: 0   Last office visit: 7/18/2018 with prescribing provider:     Future Office Visit:               Passed - Patient is age 18 or older        amLODIPine (NORVASC) 10 MG tablet [Pharmacy Med Name: AMLODIPINE BESYLATE 10MG TABS] 30 tablet 0     Sig: TAKE ONE TABLET BY MOUTH EVERY DAY. MUST HAVE LAB AND BLOOD PRESSURE CHECK NOW!    Calcium Channel Blockers Protocol  Failed    11/8/2018 11:56 AM       Failed - Blood pressure under 140/90 in past 12 months    BP Readings from Last 3 Encounters:   07/18/18 (!) 160/98   04/09/18 (!) 144/95   03/22/18 134/76                Failed - Normal ALT in past 12 months    Recent Labs   Lab Test  05/23/16   1431   ALT  15            Failed - Normal serum creatinine on file in past 12 months    Recent Labs   Lab Test  12/06/16   1131   CR  1.21            Passed - Recent (12 mo) or future (30 days) visit within the authorizing provider's specialty    Patient had office visit in the last 12 months or has a visit in the next 30 days with authorizing provider or within the authorizing provider's specialty.  See \"Patient Info\" tab in inbasket, or \"Choose Columns\" in Meds & Orders section of the refill encounter.      Last Written Prescription Date:  10/1/18  Last Fill Quantity: 30,  # refills: 0   Last office visit: 7/18/2018 with prescribing provider:  0   Future Office Visit:               Passed - Patient is age 18 or older        CARTIA  MG 24 hr capsule [Pharmacy Med Name: CARTIA XT (DILTIAZEM) 120MG CP24] 90 capsule 0     Sig: TAKE THREE " "CAPSULES BY MOUTH EVERY DAY. MUST HAVE LAB AND BLOOD PRESSURE CHECK NOW!    Calcium Channel Blockers Protocol  Failed    11/8/2018 11:56 AM       Failed - Blood pressure under 140/90 in past 12 months    BP Readings from Last 3 Encounters:   07/18/18 (!) 160/98   04/09/18 (!) 144/95   03/22/18 134/76                Failed - Normal ALT in past 12 months    Recent Labs   Lab Test  05/23/16   1431   ALT  15            Failed - Normal serum creatinine on file in past 12 months    Recent Labs   Lab Test  12/06/16   1131   CR  1.21            Passed - Recent (12 mo) or future (30 days) visit within the authorizing provider's specialty    Patient had office visit in the last 12 months or has a visit in the next 30 days with authorizing provider or within the authorizing provider's specialty.  See \"Patient Info\" tab in inbasket, or \"Choose Columns\" in Meds & Orders section of the refill encounter.      Last Written Prescription Date:  8/1/18  Last Fill Quantity: 90,  # refills: 0   Last office visit: 7/18/2018 with prescribing provider:     Future Office Visit:               Passed - Patient is age 18 or older          "

## 2018-11-08 NOTE — LETTER
[facility]  [address] [place] [id number]  Phone: [phone]  Fax: [phone]       November 9, 2018         Flakito Hilliard  [address] [place] [id number]            Dear Flakito:    We are concerned about your health care.  We recently provided you with medication refills.  Many medications require routine follow-up with your doctor.    Your prescription(s) have been refilled for cartia, lisinopril hydrochlorothiazide, atorvastatin and amlodipine so you may have time for the above noted follow-up. Please call to schedule soon so we can assure you have an appointment before your next refills are needed.    Thank you,      Susan Hand PA-C/ Tiffanie Saeed RN

## 2018-11-09 RX ORDER — DILTIAZEM HYDROCHLORIDE 120 MG/1
CAPSULE, EXTENDED RELEASE ORAL
Qty: 30 CAPSULE | Refills: 0 | Status: SHIPPED | OUTPATIENT
Start: 2018-11-09 | End: 2018-12-17

## 2018-11-09 RX ORDER — AMLODIPINE BESYLATE 10 MG/1
TABLET ORAL
Qty: 30 TABLET | Refills: 0 | Status: SHIPPED | OUTPATIENT
Start: 2018-11-09 | End: 2018-12-13

## 2018-11-09 RX ORDER — ATORVASTATIN CALCIUM 20 MG/1
TABLET, FILM COATED ORAL
Qty: 30 TABLET | Refills: 0 | Status: SHIPPED | OUTPATIENT
Start: 2018-11-09 | End: 2018-11-14

## 2018-11-09 RX ORDER — LISINOPRIL AND HYDROCHLOROTHIAZIDE 12.5; 2 MG/1; MG/1
TABLET ORAL
Qty: 60 TABLET | Refills: 0 | Status: SHIPPED | OUTPATIENT
Start: 2018-11-09 | End: 2018-12-13

## 2018-11-13 DIAGNOSIS — D58.2 ELEVATED HEMOGLOBIN (H): ICD-10-CM

## 2018-11-13 DIAGNOSIS — Z11.4 ENCOUNTER FOR SCREENING FOR HIV: ICD-10-CM

## 2018-11-13 DIAGNOSIS — I10 BENIGN ESSENTIAL HYPERTENSION: ICD-10-CM

## 2018-11-13 DIAGNOSIS — Z91.89 AT RISK FOR CARDIOVASCULAR EVENT: ICD-10-CM

## 2018-11-13 LAB
ANION GAP SERPL CALCULATED.3IONS-SCNC: 5 MMOL/L (ref 3–14)
BUN SERPL-MCNC: 17 MG/DL (ref 7–30)
CALCIUM SERPL-MCNC: 9 MG/DL (ref 8.5–10.1)
CHLORIDE SERPL-SCNC: 105 MMOL/L (ref 94–109)
CHOLEST SERPL-MCNC: 163 MG/DL
CO2 SERPL-SCNC: 29 MMOL/L (ref 20–32)
CREAT SERPL-MCNC: 1 MG/DL (ref 0.66–1.25)
ERYTHROCYTE [DISTWIDTH] IN BLOOD BY AUTOMATED COUNT: 12.7 % (ref 10–15)
GFR SERPL CREATININE-BSD FRML MDRD: 77 ML/MIN/1.7M2
GLUCOSE SERPL-MCNC: 93 MG/DL (ref 70–99)
HCT VFR BLD AUTO: 45.7 % (ref 40–53)
HDLC SERPL-MCNC: 47 MG/DL
HGB BLD-MCNC: 15.7 G/DL (ref 13.3–17.7)
HIV 1+2 AB+HIV1 P24 AG SERPL QL IA: NONREACTIVE
LDLC SERPL CALC-MCNC: 88 MG/DL
MCH RBC QN AUTO: 29.8 PG (ref 26.5–33)
MCHC RBC AUTO-ENTMCNC: 34.4 G/DL (ref 31.5–36.5)
MCV RBC AUTO: 87 FL (ref 78–100)
NONHDLC SERPL-MCNC: 116 MG/DL
PLATELET # BLD AUTO: 289 10E9/L (ref 150–450)
POTASSIUM SERPL-SCNC: 4.3 MMOL/L (ref 3.4–5.3)
RBC # BLD AUTO: 5.26 10E12/L (ref 4.4–5.9)
SODIUM SERPL-SCNC: 139 MMOL/L (ref 133–144)
TRIGL SERPL-MCNC: 141 MG/DL
WBC # BLD AUTO: 7.9 10E9/L (ref 4–11)

## 2018-11-13 PROCEDURE — 36415 COLL VENOUS BLD VENIPUNCTURE: CPT | Performed by: PHYSICIAN ASSISTANT

## 2018-11-13 PROCEDURE — 80048 BASIC METABOLIC PNL TOTAL CA: CPT | Performed by: PHYSICIAN ASSISTANT

## 2018-11-13 PROCEDURE — 80061 LIPID PANEL: CPT | Performed by: PHYSICIAN ASSISTANT

## 2018-11-13 PROCEDURE — 85027 COMPLETE CBC AUTOMATED: CPT | Performed by: PHYSICIAN ASSISTANT

## 2018-11-13 PROCEDURE — 87389 HIV-1 AG W/HIV-1&-2 AB AG IA: CPT | Performed by: PHYSICIAN ASSISTANT

## 2018-11-14 RX ORDER — ATORVASTATIN CALCIUM 20 MG/1
TABLET, FILM COATED ORAL
Qty: 90 TABLET | Refills: 3 | Status: ON HOLD | OUTPATIENT
Start: 2018-11-14 | End: 2019-07-24

## 2018-11-15 ENCOUNTER — TELEPHONE (OUTPATIENT)
Dept: FAMILY MEDICINE | Facility: CLINIC | Age: 58
End: 2018-11-15

## 2018-12-13 DIAGNOSIS — I10 SEVERE HYPERTENSION: ICD-10-CM

## 2018-12-13 DIAGNOSIS — I10 BENIGN ESSENTIAL HYPERTENSION: ICD-10-CM

## 2018-12-13 NOTE — TELEPHONE ENCOUNTER
"Requested Prescriptions   Pending Prescriptions Disp Refills     lisinopril-hydrochlorothiazide (PRINZIDE/ZESTORETIC) 20-12.5 MG tablet   Last Written Prescription Date:  11/9/18  Last Fill Quantity: 60,  # refills: 0   Last office visit: 7/18/2018 with prescribing provider:  MARYSE Hand    60 tablet 0     Sig: TAKE TWO TABLETS BY MOUTH EVERY DAY. MUST HAVE LAB AND BLOOD PRESSURE CHECK NOW!    Diuretics (Including Combos) Protocol Failed - 12/13/2018  3:38 PM       Failed - Blood pressure under 140/90 in past 12 months    BP Readings from Last 3 Encounters:   07/18/18 (!) 160/98   04/09/18 (!) 144/95   03/22/18 134/76                Passed - Recent (12 mo) or future (30 days) visit within the authorizing provider's specialty    Patient had office visit in the last 12 months or has a visit in the next 30 days with authorizing provider or within the authorizing provider's specialty.  See \"Patient Info\" tab in inbasket, or \"Choose Columns\" in Meds & Orders section of the refill encounter.             Passed - Patient is age 18 or older       Passed - Normal serum creatinine on file in past 12 months    Recent Labs   Lab Test 11/13/18  0959   CR 1.00             Passed - Normal serum potassium on file in past 12 months    Recent Labs   Lab Test 11/13/18  0959   POTASSIUM 4.3                   Passed - Normal serum sodium on file in past 12 months    Recent Labs   Lab Test 11/13/18  0959                 amLODIPine (NORVASC) 10 MG tablet   Last Written Prescription Date:  11/9/18  Last Fill Quantity: 30,  # refills: 0   Last office visit: 7/18/2018 with prescribing provider:  MARYSE Hand   Future Office Visit:     30 tablet 0     Sig: TAKE ONE TABLET BY MOUTH ONCE DAILY (MUST HAVE LAB AND BLOOD PRESSURE CHECK NOW AND NEEDS APPOINTMENT WITH THE DOCTOR FOR FURTHER REFILL)    Calcium Channel Blockers Protocol  Failed - 12/13/2018  3:38 PM       Failed - Blood pressure under 140/90 in past 12 months    BP " "Readings from Last 3 Encounters:   07/18/18 (!) 160/98   04/09/18 (!) 144/95   03/22/18 134/76                Passed - Recent (12 mo) or future (30 days) visit within the authorizing provider's specialty    Patient had office visit in the last 12 months or has a visit in the next 30 days with authorizing provider or within the authorizing provider's specialty.  See \"Patient Info\" tab in inbasket, or \"Choose Columns\" in Meds & Orders section of the refill encounter.             Passed - Patient is age 18 or older       Passed - Normal serum creatinine on file in past 12 months    Recent Labs   Lab Test 11/13/18  0959   CR 1.00               "

## 2018-12-17 RX ORDER — DILTIAZEM HYDROCHLORIDE 120 MG/1
CAPSULE, COATED, EXTENDED RELEASE ORAL
Qty: 30 CAPSULE | Refills: 0 | Status: SHIPPED | OUTPATIENT
Start: 2018-12-17 | End: 2019-02-28

## 2018-12-17 RX ORDER — LISINOPRIL AND HYDROCHLOROTHIAZIDE 12.5; 2 MG/1; MG/1
TABLET ORAL
Qty: 60 TABLET | Refills: 0 | Status: SHIPPED | OUTPATIENT
Start: 2018-12-17 | End: 2019-05-19

## 2018-12-17 RX ORDER — AMLODIPINE BESYLATE 10 MG/1
TABLET ORAL
Qty: 30 TABLET | Refills: 0 | Status: SHIPPED | OUTPATIENT
Start: 2018-12-17 | End: 2019-02-26

## 2019-02-18 ENCOUNTER — TELEPHONE (OUTPATIENT)
Dept: FAMILY MEDICINE | Facility: CLINIC | Age: 59
End: 2019-02-18

## 2019-02-26 DIAGNOSIS — I10 BENIGN ESSENTIAL HYPERTENSION: ICD-10-CM

## 2019-02-26 DIAGNOSIS — I10 SEVERE HYPERTENSION: ICD-10-CM

## 2019-02-26 RX ORDER — LISINOPRIL AND HYDROCHLOROTHIAZIDE 12.5; 2 MG/1; MG/1
TABLET ORAL
Qty: 30 TABLET | Refills: 0 | Status: SHIPPED | OUTPATIENT
Start: 2019-02-26 | End: 2019-06-14

## 2019-02-26 RX ORDER — AMLODIPINE BESYLATE 10 MG/1
10 TABLET ORAL DAILY
Qty: 15 TABLET | Refills: 0 | Status: SHIPPED | OUTPATIENT
Start: 2019-02-26 | End: 2019-06-14

## 2019-02-26 NOTE — TELEPHONE ENCOUNTER
"Requested Prescriptions   Pending Prescriptions Disp Refills     amLODIPine (NORVASC) 10 MG tablet [Pharmacy Med Name: AMLODIPINE BESYLATE 10MG TABS] 30 tablet 0     Sig: TAKE ONE TABLET BY MOUTH ONCE DAILY (MUST HAVE LAB AND BLOOD PRESSURE CHECK NOW AND NEEDS APPOINTMENT WITH THE DOCTOR FOR FURTHER REFILL)    Calcium Channel Blockers Protocol  Failed - 2/26/2019  8:36 AM       Failed - Blood pressure under 140/90 in past 12 months    BP Readings from Last 3 Encounters:   07/18/18 (!) 160/98   04/09/18 (!) 144/95 03/22/18 134/76                Passed - Recent (12 mo) or future (30 days) visit within the authorizing provider's specialty    Patient had office visit in the last 12 months or has a visit in the next 30 days with authorizing provider or within the authorizing provider's specialty.  See \"Patient Info\" tab in inbasket, or \"Choose Columns\" in Meds & Orders section of the refill encounter.      Last Written Prescription Date:  12/17/18  Last Fill Quantity: 30,  # refills: 0   Last office visit: 7/18/2018 with prescribing provider:     Future Office Visit:             Passed - Medication is active on med list       Passed - Patient is age 18 or older       Passed - Normal serum creatinine on file in past 12 months    Recent Labs   Lab Test 11/13/18  0959   CR 1.00             lisinopril-hydrochlorothiazide (PRINZIDE/ZESTORETIC) 20-12.5 MG tablet [Pharmacy Med Name: LISINOPRIL-HYDROCHLORO 20-12.5 TABS] 60 tablet 0     Sig: TAKE TWO TABLETS BY MOUTH EVERY DAY. MUST HAVE LAB AND BLOOD PRESSURE CHECK NOW!    Diuretics (Including Combos) Protocol Failed - 2/26/2019  8:36 AM       Failed - Blood pressure under 140/90 in past 12 months    BP Readings from Last 3 Encounters:   07/18/18 (!) 160/98   04/09/18 (!) 144/95   03/22/18 134/76                Passed - Recent (12 mo) or future (30 days) visit within the authorizing provider's specialty    Patient had office visit in the last 12 months or has a visit in the " "next 30 days with authorizing provider or within the authorizing provider's specialty.  See \"Patient Info\" tab in inbasket, or \"Choose Columns\" in Meds & Orders section of the refill encounter.             Passed - Medication is active on med list       Passed - Patient is age 18 or older       Passed - Normal serum creatinine on file in past 12 months    Recent Labs   Lab Test 11/13/18  0959   CR 1.00             Passed - Normal serum potassium on file in past 12 months    Recent Labs   Lab Test 11/13/18  0959   POTASSIUM 4.3                   Passed - Normal serum sodium on file in past 12 months    Recent Labs   Lab Test 11/13/18  0959                 Last Written Prescription Date:  12/17/18  Last Fill Quantity: 60,  # refills: 0   Last office visit: 7/18/2018 with prescribing provider:     Future Office Visit:      "

## 2019-02-26 NOTE — TELEPHONE ENCOUNTER
Call patient to let him know that he needs lab and BP check (RN or with me) before further refills.  I refilled for only 2 weeks.  Is due to see me by July if he sees nurse now.  If any mood concerns, must see me now.

## 2019-02-26 NOTE — TELEPHONE ENCOUNTER
I sent him a letter 2-18-19 that he is due for BP check  11-15-18- called him to let him know due for BP check, did not return call  Message is on med needs BP check  Tiffanie Saeed RN

## 2019-02-28 DIAGNOSIS — I10 BENIGN ESSENTIAL HYPERTENSION: ICD-10-CM

## 2019-02-28 RX ORDER — DILTIAZEM HYDROCHLORIDE 120 MG/1
CAPSULE, COATED, EXTENDED RELEASE ORAL
Qty: 14 CAPSULE | Refills: 0 | Status: ON HOLD | OUTPATIENT
Start: 2019-02-28 | End: 2019-07-24

## 2019-02-28 NOTE — TELEPHONE ENCOUNTER
"Requested Prescriptions   Pending Prescriptions Disp Refills     diltiazem ER COATED BEADS (CARTIA XT) 120 MG 24 hr capsule 30 capsule 0     Sig: TAKE THREE CAPSULES BY MOUTH EVERY DAY. MUST HAVE LAB AND BLOOD PRESSURE CHECK NOW!    Calcium Channel Blockers Protocol  Failed - 2/28/2019  1:59 PM       Failed - Blood pressure under 140/90 in past 12 months    BP Readings from Last 3 Encounters:   07/18/18 (!) 160/98   04/09/18 (!) 144/95   03/22/18 134/76                Failed - Normal ALT in past 12 months    Recent Labs   Lab Test 05/23/16  1431   ALT 15            Passed - Recent (12 mo) or future (30 days) visit within the authorizing provider's specialty    Patient had office visit in the last 12 months or has a visit in the next 30 days with authorizing provider or within the authorizing provider's specialty.  See \"Patient Info\" tab in inbasket, or \"Choose Columns\" in Meds & Orders section of the refill encounter.             Passed - Medication is active on med list       Passed - Patient is age 18 or older       Passed - Normal serum creatinine on file in past 12 months    Recent Labs   Lab Test 11/13/18  0959   CR 1.00              Last Written Prescription Date:  12/17/18  Last Fill Quantity: 30,  # refills: 0   Last office visit: 7/18/2018 with prescribing provider:  Yazan   Future Office Visit:      "

## 2019-04-04 ENCOUNTER — PRE VISIT (OUTPATIENT)
Dept: UROLOGY | Facility: CLINIC | Age: 59
End: 2019-04-04

## 2019-04-04 NOTE — TELEPHONE ENCOUNTER
Reason for visit: Urethroplasty follow up     Relevant information: pt is having insurance problems    Records/imaging/labs/orders: all records available    Pt called: yes    At Rooming: flow/pvr

## 2019-05-19 ENCOUNTER — HOSPITAL ENCOUNTER (EMERGENCY)
Facility: CLINIC | Age: 59
Discharge: PSYCHIATRIC OR SUBSTANCE USE FACILITY | End: 2019-05-19
Attending: STUDENT IN AN ORGANIZED HEALTH CARE EDUCATION/TRAINING PROGRAM | Admitting: STUDENT IN AN ORGANIZED HEALTH CARE EDUCATION/TRAINING PROGRAM
Payer: MEDICAID

## 2019-05-19 ENCOUNTER — HOSPITAL ENCOUNTER (INPATIENT)
Age: 59
End: 2019-05-19

## 2019-05-19 VITALS
SYSTOLIC BLOOD PRESSURE: 143 MMHG | WEIGHT: 260 LBS | BODY MASS INDEX: 35.21 KG/M2 | OXYGEN SATURATION: 97 % | DIASTOLIC BLOOD PRESSURE: 87 MMHG | HEART RATE: 91 BPM | RESPIRATION RATE: 16 BRPM | HEIGHT: 72 IN | TEMPERATURE: 98.3 F

## 2019-05-19 DIAGNOSIS — R41.89 THOUGHT DISORDER: ICD-10-CM

## 2019-05-19 DIAGNOSIS — F41.9 ANXIETY: ICD-10-CM

## 2019-05-19 DIAGNOSIS — Z86.59 HISTORY OF RECENT STRESSFUL LIFE EVENT: ICD-10-CM

## 2019-05-19 DIAGNOSIS — G47.00 INSOMNIA, UNSPECIFIED TYPE: ICD-10-CM

## 2019-05-19 LAB
ALBUMIN SERPL-MCNC: 3.8 G/DL (ref 3.4–5)
ALBUMIN UR-MCNC: NEGATIVE MG/DL
ALP SERPL-CCNC: 77 U/L (ref 40–150)
ALT SERPL W P-5'-P-CCNC: 26 U/L (ref 0–70)
AMPHETAMINES UR QL SCN: NEGATIVE
ANION GAP SERPL CALCULATED.3IONS-SCNC: 5 MMOL/L (ref 3–14)
APAP SERPL-MCNC: <2 MG/L (ref 10–20)
APPEARANCE UR: ABNORMAL
AST SERPL W P-5'-P-CCNC: 20 U/L (ref 0–45)
BACTERIA #/AREA URNS HPF: ABNORMAL /HPF
BARBITURATES UR QL: NEGATIVE
BASOPHILS # BLD AUTO: 0.1 10E9/L (ref 0–0.2)
BASOPHILS NFR BLD AUTO: 1 %
BENZODIAZ UR QL: NEGATIVE
BILIRUB SERPL-MCNC: 1.1 MG/DL (ref 0.2–1.3)
BILIRUB UR QL STRIP: NEGATIVE
BUN SERPL-MCNC: 9 MG/DL (ref 7–30)
CALCIUM SERPL-MCNC: 9.4 MG/DL (ref 8.5–10.1)
CANNABINOIDS UR QL SCN: NEGATIVE
CHLORIDE SERPL-SCNC: 103 MMOL/L (ref 94–109)
CO2 SERPL-SCNC: 28 MMOL/L (ref 20–32)
COCAINE UR QL: NEGATIVE
COLOR UR AUTO: YELLOW
CREAT SERPL-MCNC: 0.96 MG/DL (ref 0.66–1.25)
DIFFERENTIAL METHOD BLD: NORMAL
EOSINOPHIL # BLD AUTO: 0.2 10E9/L (ref 0–0.7)
EOSINOPHIL NFR BLD AUTO: 1.9 %
ERYTHROCYTE [DISTWIDTH] IN BLOOD BY AUTOMATED COUNT: 13.1 % (ref 10–15)
ETHANOL SERPL-MCNC: <0.01 G/DL
GFR SERPL CREATININE-BSD FRML MDRD: 87 ML/MIN/{1.73_M2}
GLUCOSE SERPL-MCNC: 101 MG/DL (ref 70–99)
GLUCOSE UR STRIP-MCNC: NEGATIVE MG/DL
HCT VFR BLD AUTO: 49.1 % (ref 40–53)
HGB BLD-MCNC: 16.6 G/DL (ref 13.3–17.7)
HGB UR QL STRIP: ABNORMAL
IMM GRANULOCYTES # BLD: 0 10E9/L (ref 0–0.4)
IMM GRANULOCYTES NFR BLD: 0.2 %
KETONES UR STRIP-MCNC: 5 MG/DL
LEUKOCYTE ESTERASE UR QL STRIP: ABNORMAL
LYMPHOCYTES # BLD AUTO: 1.2 10E9/L (ref 0.8–5.3)
LYMPHOCYTES NFR BLD AUTO: 15.3 %
MCH RBC QN AUTO: 28.9 PG (ref 26.5–33)
MCHC RBC AUTO-ENTMCNC: 33.8 G/DL (ref 31.5–36.5)
MCV RBC AUTO: 85 FL (ref 78–100)
MONOCYTES # BLD AUTO: 0.6 10E9/L (ref 0–1.3)
MONOCYTES NFR BLD AUTO: 7.3 %
MUCOUS THREADS #/AREA URNS LPF: PRESENT /LPF
NEUTROPHILS # BLD AUTO: 6 10E9/L (ref 1.6–8.3)
NEUTROPHILS NFR BLD AUTO: 74.3 %
NITRATE UR QL: NEGATIVE
NRBC # BLD AUTO: 0 10*3/UL
NRBC BLD AUTO-RTO: 0 /100
OPIATES UR QL SCN: NEGATIVE
PCP UR QL SCN: NEGATIVE
PH UR STRIP: 6 PH (ref 5–7)
PLATELET # BLD AUTO: 316 10E9/L (ref 150–450)
POTASSIUM SERPL-SCNC: 3.4 MMOL/L (ref 3.4–5.3)
PROT SERPL-MCNC: 7.5 G/DL (ref 6.8–8.8)
RBC # BLD AUTO: 5.75 10E12/L (ref 4.4–5.9)
RBC #/AREA URNS AUTO: 3 /HPF (ref 0–2)
SALICYLATES SERPL-MCNC: <2 MG/DL
SODIUM SERPL-SCNC: 136 MMOL/L (ref 133–144)
SOURCE: ABNORMAL
SP GR UR STRIP: 1.01 (ref 1–1.03)
SQUAMOUS #/AREA URNS AUTO: 2 /HPF (ref 0–1)
TSH SERPL DL<=0.005 MIU/L-ACNC: 2.01 MU/L (ref 0.4–4)
UROBILINOGEN UR STRIP-MCNC: 0 MG/DL (ref 0–2)
WBC # BLD AUTO: 8.1 10E9/L (ref 4–11)
WBC #/AREA URNS AUTO: 18 /HPF (ref 0–5)

## 2019-05-19 PROCEDURE — 80320 DRUG SCREEN QUANTALCOHOLS: CPT | Performed by: STUDENT IN AN ORGANIZED HEALTH CARE EDUCATION/TRAINING PROGRAM

## 2019-05-19 PROCEDURE — 99285 EMERGENCY DEPT VISIT HI MDM: CPT | Mod: Z6 | Performed by: STUDENT IN AN ORGANIZED HEALTH CARE EDUCATION/TRAINING PROGRAM

## 2019-05-19 PROCEDURE — 87086 URINE CULTURE/COLONY COUNT: CPT | Performed by: STUDENT IN AN ORGANIZED HEALTH CARE EDUCATION/TRAINING PROGRAM

## 2019-05-19 PROCEDURE — 25000132 ZZH RX MED GY IP 250 OP 250 PS 637: Performed by: EMERGENCY MEDICINE

## 2019-05-19 PROCEDURE — 81001 URINALYSIS AUTO W/SCOPE: CPT | Mod: XU | Performed by: STUDENT IN AN ORGANIZED HEALTH CARE EDUCATION/TRAINING PROGRAM

## 2019-05-19 PROCEDURE — 80307 DRUG TEST PRSMV CHEM ANLYZR: CPT | Performed by: STUDENT IN AN ORGANIZED HEALTH CARE EDUCATION/TRAINING PROGRAM

## 2019-05-19 PROCEDURE — 80053 COMPREHEN METABOLIC PANEL: CPT | Performed by: STUDENT IN AN ORGANIZED HEALTH CARE EDUCATION/TRAINING PROGRAM

## 2019-05-19 PROCEDURE — 25000132 ZZH RX MED GY IP 250 OP 250 PS 637: Performed by: STUDENT IN AN ORGANIZED HEALTH CARE EDUCATION/TRAINING PROGRAM

## 2019-05-19 PROCEDURE — 80329 ANALGESICS NON-OPIOID 1 OR 2: CPT | Performed by: STUDENT IN AN ORGANIZED HEALTH CARE EDUCATION/TRAINING PROGRAM

## 2019-05-19 PROCEDURE — 84443 ASSAY THYROID STIM HORMONE: CPT | Performed by: STUDENT IN AN ORGANIZED HEALTH CARE EDUCATION/TRAINING PROGRAM

## 2019-05-19 PROCEDURE — 99285 EMERGENCY DEPT VISIT HI MDM: CPT | Mod: 25 | Performed by: STUDENT IN AN ORGANIZED HEALTH CARE EDUCATION/TRAINING PROGRAM

## 2019-05-19 PROCEDURE — 85025 COMPLETE CBC W/AUTO DIFF WBC: CPT | Performed by: STUDENT IN AN ORGANIZED HEALTH CARE EDUCATION/TRAINING PROGRAM

## 2019-05-19 PROCEDURE — 90791 PSYCH DIAGNOSTIC EVALUATION: CPT

## 2019-05-19 RX ORDER — HYDROXYZINE HYDROCHLORIDE 25 MG/1
50 TABLET, FILM COATED ORAL ONCE
Status: COMPLETED | OUTPATIENT
Start: 2019-05-19 | End: 2019-05-19

## 2019-05-19 RX ORDER — OLANZAPINE 5 MG/1
5 TABLET, ORALLY DISINTEGRATING ORAL ONCE
Status: COMPLETED | OUTPATIENT
Start: 2019-05-19 | End: 2019-05-19

## 2019-05-19 RX ORDER — CEPHALEXIN 500 MG/1
1000 CAPSULE ORAL ONCE
Status: COMPLETED | OUTPATIENT
Start: 2019-05-19 | End: 2019-05-19

## 2019-05-19 RX ORDER — OLANZAPINE 5 MG/1
5 TABLET, ORALLY DISINTEGRATING ORAL AT BEDTIME
Status: DISCONTINUED | OUTPATIENT
Start: 2019-05-19 | End: 2019-05-19

## 2019-05-19 RX ADMIN — OLANZAPINE 5 MG: 5 TABLET, ORALLY DISINTEGRATING ORAL at 13:35

## 2019-05-19 RX ADMIN — HYDROXYZINE HYDROCHLORIDE 50 MG: 25 TABLET, FILM COATED ORAL at 06:55

## 2019-05-19 RX ADMIN — CEPHALEXIN 1000 MG: 500 CAPSULE ORAL at 18:17

## 2019-05-19 ASSESSMENT — MIFFLIN-ST. JEOR: SCORE: 2037.35

## 2019-05-19 NOTE — ED TRIAGE NOTES
"Patient states unable to sleep last night because his mind wont stop racing, recent stress including break up and moving and cleaning out old house. Feels very overwhelmed. Patient states that he just wants it all to stop. When asked what that meant he states that he just wants to end it all but does not have plan or know what to do. Patient states was out of Wellbutrin for three weeks and just restarted it last Thursday. Patient just continuously repeats \"I just don't know what to do anymore... I just want to rest peacefully.\"  "

## 2019-05-19 NOTE — ED PROVIDER NOTES
Emergency Department Psychiatric Patient Sign-out       Brief HPI:  This is a 59 year old male signed out to me by Dr. Hogan.  See initial ED Provider note for details of the presentation.     Patient is medically cleared for admission to a Behavioral Health unit.      Pending studies include none.      The patient is on a hold.  The type of hold is health officer.      The patient has required medication for agitation.    Medications   hydrOXYzine (ATARAX) tablet 50 mg (50 mg Oral Given 5/19/19 0655)   OLANZapine zydis (zyPREXA) ODT tab 5 mg (5 mg Oral Given 5/19/19 1335)       Exam:   Patient Vitals for the past 24 hrs:   BP Temp Temp src Pulse Heart Rate Resp SpO2 Height Weight   05/19/19 1304 -- 98.3  F (36.8  C) Oral -- -- -- -- -- --   05/19/19 1300 (!) 156/92 -- -- -- -- 14 97 % -- --   05/19/19 0730 (!) 172/113 -- -- 91 -- -- 97 % -- --   05/19/19 0715 (!) 183/113 -- -- 93 -- -- 96 % -- --   05/19/19 0700 (!) 166/113 -- -- 92 -- -- 96 % -- --   05/19/19 0630 (!) 174/103 -- -- 88 -- -- 97 % -- --   05/19/19 0620 (!) 192/120 97.9  F (36.6  C) Oral -- 88 18 97 % 1.829 m (6') 117.9 kg (260 lb)   05/19/19 0615 (!) 176/115 -- -- 95 -- -- 97 % -- --         ED Course:    There were no significant events while under my care.    Patient noted red spot on his right forearm measured 3 cm in diameter, he reports that it was a pustule that he opened and became red while he was here in the emergency department.  He was given a gram of cephalexin and told to warm pack this area.  Findings consistent with small area of cellulitis.      Impression:    ICD-10-CM    1. Anxiety F41.9 Urine Culture Aerobic Bacterial   2. Insomnia, unspecified type G47.00    3. Thought disorder R41.89    4. History of recent stressful life event Z86.59        Plan:    1. Await Transfer to Mental Health Facility      RESULTS:   Results for orders placed or performed during the hospital encounter of 05/19/19 (from the past 24 hour(s))   UA  reflex to Microscopic and Culture     Status: Abnormal    Collection Time: 05/19/19  6:48 AM   Result Value Ref Range    Color Urine Yellow     Appearance Urine Slightly Cloudy     Glucose Urine Negative NEG^Negative mg/dL    Bilirubin Urine Negative NEG^Negative    Ketones Urine 5 (A) NEG^Negative mg/dL    Specific Gravity Urine 1.010 1.003 - 1.035    Blood Urine Small (A) NEG^Negative    pH Urine 6.0 5.0 - 7.0 pH    Protein Albumin Urine Negative NEG^Negative mg/dL    Urobilinogen mg/dL 0.0 0.0 - 2.0 mg/dL    Nitrite Urine Negative NEG^Negative    Leukocyte Esterase Urine Small (A) NEG^Negative    Source Midstream Urine     RBC Urine 3 (H) 0 - 2 /HPF    WBC Urine 18 (H) 0 - 5 /HPF    Bacteria Urine Few (A) NEG^Negative /HPF    Squamous Epithelial /HPF Urine 2 (H) 0 - 1 /HPF    Mucous Urine Present (A) NEG^Negative /LPF   Drug abuse screen 77 urine (FL, RH, SH)     Status: None    Collection Time: 05/19/19  6:48 AM   Result Value Ref Range    Amphetamine Qual Urine Negative NEG^Negative    Barbiturates Qual Urine Negative NEG^Negative    Benzodiazepine Qual Urine Negative NEG^Negative    Cannabinoids Qual Urine Negative NEG^Negative    Cocaine Qual Urine Negative NEG^Negative    Opiates Qualitative Urine Negative NEG^Negative    PCP Qual Urine Negative NEG^Negative   Urine Culture Aerobic Bacterial     Status: None (Preliminary result)    Collection Time: 05/19/19  6:48 AM   Result Value Ref Range    Specimen Description Midstream Urine     Special Requests Specimen received in preservative     Culture Micro PENDING    CBC with platelets differential     Status: None    Collection Time: 05/19/19  7:06 AM   Result Value Ref Range    WBC 8.1 4.0 - 11.0 10e9/L    RBC Count 5.75 4.4 - 5.9 10e12/L    Hemoglobin 16.6 13.3 - 17.7 g/dL    Hematocrit 49.1 40.0 - 53.0 %    MCV 85 78 - 100 fl    MCH 28.9 26.5 - 33.0 pg    MCHC 33.8 31.5 - 36.5 g/dL    RDW 13.1 10.0 - 15.0 %    Platelet Count 316 150 - 450 10e9/L    Diff  Method Automated Method     % Neutrophils 74.3 %    % Lymphocytes 15.3 %    % Monocytes 7.3 %    % Eosinophils 1.9 %    % Basophils 1.0 %    % Immature Granulocytes 0.2 %    Nucleated RBCs 0 0 /100    Absolute Neutrophil 6.0 1.6 - 8.3 10e9/L    Absolute Lymphocytes 1.2 0.8 - 5.3 10e9/L    Absolute Monocytes 0.6 0.0 - 1.3 10e9/L    Absolute Eosinophils 0.2 0.0 - 0.7 10e9/L    Absolute Basophils 0.1 0.0 - 0.2 10e9/L    Abs Immature Granulocytes 0.0 0 - 0.4 10e9/L    Absolute Nucleated RBC 0.0    Comprehensive metabolic panel     Status: Abnormal    Collection Time: 05/19/19  7:06 AM   Result Value Ref Range    Sodium 136 133 - 144 mmol/L    Potassium 3.4 3.4 - 5.3 mmol/L    Chloride 103 94 - 109 mmol/L    Carbon Dioxide 28 20 - 32 mmol/L    Anion Gap 5 3 - 14 mmol/L    Glucose 101 (H) 70 - 99 mg/dL    Urea Nitrogen 9 7 - 30 mg/dL    Creatinine 0.96 0.66 - 1.25 mg/dL    GFR Estimate 87 >60 mL/min/[1.73_m2]    GFR Estimate If Black >90 >60 mL/min/[1.73_m2]    Calcium 9.4 8.5 - 10.1 mg/dL    Bilirubin Total 1.1 0.2 - 1.3 mg/dL    Albumin 3.8 3.4 - 5.0 g/dL    Protein Total 7.5 6.8 - 8.8 g/dL    Alkaline Phosphatase 77 40 - 150 U/L    ALT 26 0 - 70 U/L    AST 20 0 - 45 U/L   Salicylate level     Status: None    Collection Time: 05/19/19  7:06 AM   Result Value Ref Range    Salicylate Level <2 mg/dL   Acetaminophen level     Status: None    Collection Time: 05/19/19  7:06 AM   Result Value Ref Range    Acetaminophen Level <2 mg/L   Alcohol ethyl     Status: None    Collection Time: 05/19/19  7:06 AM   Result Value Ref Range    Ethanol g/dL <0.01 <0.01 g/dL   TSH     Status: None    Collection Time: 05/19/19  7:06 AM   Result Value Ref Range    TSH 2.01 0.40 - 4.00 mU/L         MD Efraín Curiel Scott L, MD  05/19/19 4571

## 2019-05-19 NOTE — ED NOTES
Pt having increased anxiety and nausea, plan to transfer to [facility]. Friend is coming to p/u vehicle

## 2019-05-19 NOTE — ED NOTES
Patient concerned about quarter sized reddened area on right forearm. Has known scab-like area that he squeezed yesterday and some pus-like discharge was expelled. Denies pain or irritation at this time. No fever. Continue to monitor. MD updated.

## 2019-05-19 NOTE — ED PROVIDER NOTES
"  History     Chief Complaint   Patient presents with     Anxiety     Depression     HPI  Flakito Hilliard is a 59 year old male with past medical history which includes hypertension, urinary tract infection, obstructive sleep apnea, depression and RIO who presents for evaluation of \"racing thoughts\".  Patient explains over the past 2-3 days he has felt increasingly anxious and on edge, grows frustrated at what he describes to be minor issues.  He admits that his significant other of >2 years had recently and abruptly broke up with him and he is also in the process of moving to [place], [place].  Records confirm that he had scheduled with a primary care provider on 5/13/2019 at which time he was restarted on prescription medications including antihypertensive agents and Wellbutrin.  He denies recent fever, chills, headache, chest pain, cough, shortness breath, abdominal pain, injury or physical illness.  He describes \"racing thoughts\" over the past 2-3 days and inability to sleep.  The patient admits that he has been questioning what it would be like \"to not be around anymore\" but denies or will not reveal any specific plan to harm himself or others.    Allergies:  Allergies   Allergen Reactions     Contrast Dye Difficulty breathing       Problem List:    Patient Active Problem List    Diagnosis Date Noted     Erectile dysfunction, unspecified erectile dysfunction type 08/18/2017     Priority: Medium     Bladder stones 10/24/2016     Priority: Medium     At risk for cardiovascular event 10/03/2016     Priority: Medium     Elevated hemoglobin (H) 10/03/2016     Priority: Medium     17.8.  ??? Recheck.       RIO (generalized anxiety disorder) 09/29/2016     Priority: Medium     Complicated UTI (urinary tract infection) 05/23/2016     Priority: Medium     PATRICE (obstructive sleep apnea) 10/20/2014     Priority: Medium     Severe PATRICE   -  4/10/2010 with weight 233 lbs, AHI 31.4, RDI 35.2, michael desat 84%, CPAP 7 cm " H2O optimal and included supine REM       Urethral stricture 09/09/2014     Priority: Medium     Urinary retention 09/08/2014     Priority: Medium     Major depressive disorder, recurrent episode, moderate (H) 08/29/2014     Priority: Medium     6/30/16 - Patient getting ECT at [facility].       Suicidal ideation 08/24/2014     Priority: Medium     severe HTN (hypertension) 08/11/2014     Priority: Medium     Lower urinary tract infectious disease 07/31/2014     Priority: Medium     Acute bacterial prostatitis 07/30/2014     Priority: Medium        Past Medical History:    Past Medical History:   Diagnosis Date     Depressive disorder      Hypertension      Sleep apnea        Past Surgical History:    Past Surgical History:   Procedure Laterality Date     BLADDER SURGERY       CYSTOSTOMY, INSERT TUBE SUPRAPUBIC, COMBINED N/A 9/8/2014    Procedure: COMBINED CYSTOSTOMY, INSERT TUBE SUPRAPUBIC;  Surgeon: Min Prasad MD;  Location: [facility] OR     GENITOURINARY SURGERY      prostate surgery for stones     LASER HOLMIUM CYSTOSCOPY, INTERNAL URETHROTOMY, COMBINED N/A 10/24/2014    Procedure: COMBINED LASER HOLMIUM CYSTOSCOPY, INTERNAL URETHROTOMY;  Surgeon: Valentin Villatoro MD;  Location: [facility] OR     URETHROPLASTY N/A 12/10/2014    Procedure: URETHROPLASTY;  Surgeon: Niraj Burger MD;  Location: [facility] OR     URETHROPLASTY WITH BUCCAL GRAFT N/A 12/9/2016    Procedure: URETHROPLASTY WITH BUCCAL GRAFT;  Surgeon: Niraj Burger MD;  Location: [facility] OR       Family History:    Family History   Problem Relation Age of Onset     Hypertension Mother      Depression Father      Hypertension Father      Mental Illness Sister        Social History:  Marital Status:   [4]  Social History     Tobacco Use     Smoking status: Never Smoker     Smokeless tobacco: Never Used   Substance Use Topics     Alcohol use: Yes     Comment: No drinking for 2 weeks     Drug use: No        Medications:      atorvastatin (LIPITOR) 20 MG  tablet   buPROPion (WELLBUTRIN XL) 300 MG 24 hr tablet   diltiazem ER COATED BEADS (CARTIA XT) 120 MG 24 hr capsule   lisinopril-hydrochlorothiazide (PRINZIDE/ZESTORETIC) 20-12.5 MG tablet   amLODIPine (NORVASC) 10 MG tablet   sildenafil (VIAGRA) 100 MG tablet         Review of Systems  Constitutional:  Negative for fever or recent illness.  Cardiovascular:  Negative for chest pain.  Respiratory:  Negative for cough or shortness of breath.  Gastrointestinal:  Negative for abdominal pain, nausea or vomiting.  Musculoskeletal: Denies musculoskeletal pain.  Neurological:  Negative for headache.  Skin:  Negative for skin injuries.    All others reviewed and are negative.      Physical Exam   BP: (!) 176/115  Pulse: 95  Heart Rate: 88  Temp: 97.9  F (36.6  C)  Resp: 18  Height: 182.9 cm (6')  Weight: 117.9 kg (260 lb)  SpO2: 97 %      Physical Exam  Constitutional:  Well developed, well nourished.  Appears anxious but nontoxic.  HENT:  Normocephalic and atraumatic.  Symmetric in appearance.  Eyes:  Conjunctivae are normal.  Neck:  Neck supple.  Cardiovascular:  No cyanosis.  RRR.  No audible murmurs noted.    Respiratory:  Effort normal without sign of respiratory distress.  CTAB without diminished regions.   Gastrointestinal:  Soft nondistended abdomen.  Nontender and without guarding.  No rigidity or rebound tenderness.    Musculoskeletal:  Moves extremities spontaneously.  Neurological:  Patient is alert.  Skin:  Skin is warm and dry.  Psych:  Well-kept appearance.  Patient does not show signs of intoxication.  Able to carry a conversation with organized speech but does seem uncertain about his future plans.  He seems to have insight into the severity of his anxiety and perceived condition, visibly anxious but not agitated or threatening.  Admits to questioning whether he wants to live but will not specify a plan to harm himself or commit suicide.  Denies visual or auditory hallucinations.  Without evidence of  delusions or psychosis.      ED Course        Procedures              Critical Care time:  none               Results for orders placed or performed during the hospital encounter of 05/19/19 (from the past 24 hour(s))   UA reflex to Microscopic and Culture   Result Value Ref Range    Color Urine Yellow     Appearance Urine Slightly Cloudy     Glucose Urine Negative NEG^Negative mg/dL    Bilirubin Urine Negative NEG^Negative    Ketones Urine 5 (A) NEG^Negative mg/dL    Specific Gravity Urine 1.010 1.003 - 1.035    Blood Urine Small (A) NEG^Negative    pH Urine 6.0 5.0 - 7.0 pH    Protein Albumin Urine Negative NEG^Negative mg/dL    Urobilinogen mg/dL 0.0 0.0 - 2.0 mg/dL    Nitrite Urine Negative NEG^Negative    Leukocyte Esterase Urine Small (A) NEG^Negative    Source Midstream Urine     RBC Urine 3 (H) 0 - 2 /HPF    WBC Urine 18 (H) 0 - 5 /HPF    Bacteria Urine Few (A) NEG^Negative /HPF    Squamous Epithelial /HPF Urine 2 (H) 0 - 1 /HPF    Mucous Urine Present (A) NEG^Negative /LPF   Drug abuse screen 77 urine (FL, RH, SH)   Result Value Ref Range    Amphetamine Qual Urine Negative NEG^Negative    Barbiturates Qual Urine Negative NEG^Negative    Benzodiazepine Qual Urine Negative NEG^Negative    Cannabinoids Qual Urine Negative NEG^Negative    Cocaine Qual Urine Negative NEG^Negative    Opiates Qualitative Urine Negative NEG^Negative    PCP Qual Urine Negative NEG^Negative   Urine Culture Aerobic Bacterial   Result Value Ref Range    Specimen Description Midstream Urine     Special Requests Specimen received in preservative     Culture Micro PENDING    CBC with platelets differential   Result Value Ref Range    WBC 8.1 4.0 - 11.0 10e9/L    RBC Count 5.75 4.4 - 5.9 10e12/L    Hemoglobin 16.6 13.3 - 17.7 g/dL    Hematocrit 49.1 40.0 - 53.0 %    MCV 85 78 - 100 fl    MCH 28.9 26.5 - 33.0 pg    MCHC 33.8 31.5 - 36.5 g/dL    RDW 13.1 10.0 - 15.0 %    Platelet Count 316 150 - 450 10e9/L    Diff Method Automated Method      % Neutrophils 74.3 %    % Lymphocytes 15.3 %    % Monocytes 7.3 %    % Eosinophils 1.9 %    % Basophils 1.0 %    % Immature Granulocytes 0.2 %    Nucleated RBCs 0 0 /100    Absolute Neutrophil 6.0 1.6 - 8.3 10e9/L    Absolute Lymphocytes 1.2 0.8 - 5.3 10e9/L    Absolute Monocytes 0.6 0.0 - 1.3 10e9/L    Absolute Eosinophils 0.2 0.0 - 0.7 10e9/L    Absolute Basophils 0.1 0.0 - 0.2 10e9/L    Abs Immature Granulocytes 0.0 0 - 0.4 10e9/L    Absolute Nucleated RBC 0.0    Comprehensive metabolic panel   Result Value Ref Range    Sodium 136 133 - 144 mmol/L    Potassium 3.4 3.4 - 5.3 mmol/L    Chloride 103 94 - 109 mmol/L    Carbon Dioxide 28 20 - 32 mmol/L    Anion Gap 5 3 - 14 mmol/L    Glucose 101 (H) 70 - 99 mg/dL    Urea Nitrogen 9 7 - 30 mg/dL    Creatinine 0.96 0.66 - 1.25 mg/dL    GFR Estimate 87 >60 mL/min/[1.73_m2]    GFR Estimate If Black >90 >60 mL/min/[1.73_m2]    Calcium 9.4 8.5 - 10.1 mg/dL    Bilirubin Total 1.1 0.2 - 1.3 mg/dL    Albumin 3.8 3.4 - 5.0 g/dL    Protein Total 7.5 6.8 - 8.8 g/dL    Alkaline Phosphatase 77 40 - 150 U/L    ALT 26 0 - 70 U/L    AST 20 0 - 45 U/L   Salicylate level   Result Value Ref Range    Salicylate Level <2 mg/dL   Acetaminophen level   Result Value Ref Range    Acetaminophen Level <2 mg/L   Alcohol ethyl   Result Value Ref Range    Ethanol g/dL <0.01 <0.01 g/dL   TSH   Result Value Ref Range    TSH 2.01 0.40 - 4.00 mU/L       Medications   hydrOXYzine (ATARAX) tablet 50 mg (50 mg Oral Given 5/19/19 0655)   OLANZapine zydis (zyPREXA) ODT tab 5 mg (5 mg Oral Given 5/19/19 1335)       Assessments & Plan (with Medical Decision Making)   Flakito Hilliard is a 59 year old male who presents to the department complaining of anxiety, racing thoughts, and sleeplessness.  He describes multiple life stressors recently including an abrupt and to a romantic relationship as well as difficulty planning future move.  He is visibly anxious and emotionally distraught, questions whether he wants  to live but has not verbalized to me a plan to harm himself.  He was independently interviewed by [facility] mental health  who has concerns regarding the patient's safety while in this mental state.   recommends transfer for psychiatric evaluation and management plan, patient is also in agreement with this recommendation as he has a history of anxiety, depression, and suicidal ideation in the patient treatment.  He has been given a dose of hydroxyzine for symptoms of anxiety, otherwise is only recently restarted his Wellbutrin and antihypertensive medications this week.  Eventually requested something else for anxiety and seemed to tolerate ODT Zyprexa well.  Patient has been accepted to a facility in [place], [place].  He will be signed out to oncoming physician for continued monitoring in the department until transfer arrangements can be made.       Disclaimer:  This note consists of symbols derived from keyboarding, dictation, and/or voice recognition software.  As a result, there may be errors in the script that have gone undetected.  Please consider this when interpreting information found in the chart.        I have reviewed the nursing notes.    I have reviewed the findings, diagnosis, plan and need for follow up with the patient.          Medication List      There are no discharge medications for this visit.         Final diagnoses:   Anxiety   Insomnia, unspecified type   Thought disorder   History of recent stressful life event       5/19/2019   [facility] EMERGENCY DEPARTMENT     Neftaly Hogan DO  05/19/19 1544

## 2019-05-20 LAB
BACTERIA SPEC CULT: NO GROWTH
Lab: NORMAL
SPECIMEN SOURCE: NORMAL

## 2019-06-14 ENCOUNTER — OFFICE VISIT (OUTPATIENT)
Dept: FAMILY MEDICINE | Facility: CLINIC | Age: 59
End: 2019-06-14
Payer: COMMERCIAL

## 2019-06-14 ENCOUNTER — TELEPHONE (OUTPATIENT)
Dept: FAMILY MEDICINE | Facility: CLINIC | Age: 59
End: 2019-06-14

## 2019-06-14 VITALS
HEIGHT: 72 IN | TEMPERATURE: 97.3 F | SYSTOLIC BLOOD PRESSURE: 140 MMHG | BODY MASS INDEX: 33.46 KG/M2 | DIASTOLIC BLOOD PRESSURE: 88 MMHG | WEIGHT: 247 LBS | HEART RATE: 88 BPM | RESPIRATION RATE: 18 BRPM

## 2019-06-14 DIAGNOSIS — I10 ESSENTIAL HYPERTENSION: ICD-10-CM

## 2019-06-14 DIAGNOSIS — F41.9 ANXIETY AND DEPRESSION: Primary | ICD-10-CM

## 2019-06-14 DIAGNOSIS — F32.A ANXIETY AND DEPRESSION: Primary | ICD-10-CM

## 2019-06-14 DIAGNOSIS — I10 SEVERE HYPERTENSION: ICD-10-CM

## 2019-06-14 PROCEDURE — 99214 OFFICE O/P EST MOD 30 MIN: CPT | Performed by: PHYSICIAN ASSISTANT

## 2019-06-14 RX ORDER — AMLODIPINE BESYLATE 10 MG/1
10 TABLET ORAL DAILY
Qty: 30 TABLET | Refills: 0 | Status: ON HOLD | OUTPATIENT
Start: 2019-06-14 | End: 2019-07-24

## 2019-06-14 RX ORDER — HYDROXYZINE PAMOATE 50 MG/1
50 CAPSULE ORAL 3 TIMES DAILY PRN
Qty: 90 CAPSULE | Refills: 0 | Status: ON HOLD | OUTPATIENT
Start: 2019-06-14 | End: 2019-07-24

## 2019-06-14 RX ORDER — LISINOPRIL AND HYDROCHLOROTHIAZIDE 12.5; 2 MG/1; MG/1
TABLET ORAL
Qty: 60 TABLET | Refills: 0 | Status: ON HOLD | OUTPATIENT
Start: 2019-06-14 | End: 2019-07-24

## 2019-06-14 ASSESSMENT — ENCOUNTER SYMPTOMS
EYE DISCHARGE: 0
MYALGIAS: 0
NERVOUS/ANXIOUS: 1
SHORTNESS OF BREATH: 0
BLURRED VISION: 0
VOMITING: 0
CHILLS: 0
NAUSEA: 0
SORE THROAT: 0
COUGH: 0
DIARRHEA: 0
ABDOMINAL PAIN: 0
EYE REDNESS: 0
FEVER: 0
PALPITATIONS: 0
WHEEZING: 0
HEADACHES: 0

## 2019-06-14 ASSESSMENT — ANXIETY QUESTIONNAIRES
7. FEELING AFRAID AS IF SOMETHING AWFUL MIGHT HAPPEN: SEVERAL DAYS
2. NOT BEING ABLE TO STOP OR CONTROL WORRYING: NEARLY EVERY DAY
5. BEING SO RESTLESS THAT IT IS HARD TO SIT STILL: NEARLY EVERY DAY
1. FEELING NERVOUS, ANXIOUS, OR ON EDGE: NEARLY EVERY DAY
GAD7 TOTAL SCORE: 19
6. BECOMING EASILY ANNOYED OR IRRITABLE: NEARLY EVERY DAY
3. WORRYING TOO MUCH ABOUT DIFFERENT THINGS: NEARLY EVERY DAY

## 2019-06-14 ASSESSMENT — MIFFLIN-ST. JEOR: SCORE: 1978.38

## 2019-06-14 ASSESSMENT — PATIENT HEALTH QUESTIONNAIRE - PHQ9
5. POOR APPETITE OR OVEREATING: NEARLY EVERY DAY
SUM OF ALL RESPONSES TO PHQ QUESTIONS 1-9: 23

## 2019-06-14 NOTE — TELEPHONE ENCOUNTER
Please call pharmacy-Pt has established care with Jaydon Ward MD from [facility]. Script needs to go to him. Maribel Luis RN

## 2019-06-14 NOTE — TELEPHONE ENCOUNTER
Routing refill request to provider for review/approval because: BPs out of range     BP Readings from Last 6 Encounters:   06/14/19 140/88   05/19/19 143/87   07/18/18 (!) 160/98   04/09/18 (!) 144/95   03/22/18 134/76   03/21/18 160/90     Seen by Same Day provider today.    Shruti EDGE RN

## 2019-06-14 NOTE — PROGRESS NOTES
Subjective     Flakito Hilliard is a 59 year old male who presents to clinic today for the following health issues:    HPI   Depression Followup    How are you doing with your depression since your last visit? Worsened Having racing thoughts at night and not getting much sleep.  Unsure of what to do     Are you having other symptoms that might be associated with depression? No    Have you had a significant life event?  Significant other left abruptly, was suppose to move and that didn't help, so is back      Are you feeling anxious or having panic attacks?   Yes:  very anxious    Do you have any concerns with your use of alcohol or other drugs? No    Social History     Tobacco Use     Smoking status: Never Smoker     Smokeless tobacco: Never Used   Substance Use Topics     Alcohol use: Yes     Comment: No drinking for 2 weeks     Drug use: No     PHQ 4/26/2017 7/18/2018 6/14/2019   PHQ-9 Total Score 0 0 23   Q9: Thoughts of better off dead/self-harm past 2 weeks Not at all Not at all Several days     RIO-7 SCORE 4/26/2017 7/18/2018 6/14/2019   Total Score - - -   Total Score - 0 (minimal anxiety) -   Total Score 0 0 19     No flowsheet data found.  No flowsheet data found.         Suicide Assessment Five-step Evaluation and Treatment (SAFE-T)    Amount of exercise or physical activity: None    Problems taking medications regularly: No    Medication side effects: none    Diet: regular (no restrictions)    Seeing a counselor in [place] next week. Was admitted twice in the last month for anxiety. No changes in medications were made. Today denies thoughts of hurting himself or others. Averages 3-4 hrs of sleep/night. Can't get his mind to stop thinking at night. Has used hydroxyzine in the past and that has worked well. No caffeine intake. Has been trying to walk more to help with anxiety. He is also requesting a refill of amlodipine for blood pressure.       Patient Active Problem List   Diagnosis     Acute bacterial  prostatitis     Lower urinary tract infectious disease     severe HTN (hypertension)     Suicidal ideation     Major depressive disorder, recurrent episode, moderate (H)     Urinary retention     Urethral stricture     PATRICE (obstructive sleep apnea)     Complicated UTI (urinary tract infection)     RIO (generalized anxiety disorder)     At risk for cardiovascular event     Elevated hemoglobin (H)     Bladder stones     Erectile dysfunction, unspecified erectile dysfunction type     Past Surgical History:   Procedure Laterality Date     BLADDER SURGERY       CYSTOSTOMY, INSERT TUBE SUPRAPUBIC, COMBINED N/A 9/8/2014    Procedure: COMBINED CYSTOSTOMY, INSERT TUBE SUPRAPUBIC;  Surgeon: Min Prasad MD;  Location: [facility] OR     GENITOURINARY SURGERY      prostate surgery for stones     LASER HOLMIUM CYSTOSCOPY, INTERNAL URETHROTOMY, COMBINED N/A 10/24/2014    Procedure: COMBINED LASER HOLMIUM CYSTOSCOPY, INTERNAL URETHROTOMY;  Surgeon: Valentin Villatoro MD;  Location: [facility] OR     URETHROPLASTY N/A 12/10/2014    Procedure: URETHROPLASTY;  Surgeon: Niraj Burger MD;  Location: [facility] OR     URETHROPLASTY WITH BUCCAL GRAFT N/A 12/9/2016    Procedure: URETHROPLASTY WITH BUCCAL GRAFT;  Surgeon: Niraj Burger MD;  [facility]       Social History     Tobacco Use     Smoking status: Never Smoker     Smokeless tobacco: Never Used   Substance Use Topics     Alcohol use: Yes     Comment: No drinking for 2 weeks     Family History   Problem Relation Age of Onset     Hypertension Mother      Depression Father      Hypertension Father      Mental Illness Sister          Current Outpatient Medications   Medication Sig Dispense Refill     amLODIPine (NORVASC) 10 MG tablet Take 1 tablet (10 mg) by mouth daily 30 tablet 0     atorvastatin (LIPITOR) 20 MG tablet TAKE ONE TABLET BY MOUTH EVERY DAY. 90 tablet 3     buPROPion (WELLBUTRIN XL) 300 MG 24 hr tablet Take 1 tablet (300 mg) by mouth every morning 90 tablet 3      diltiazem ER COATED BEADS (CARTIA XT) 120 MG 24 hr capsule TAKE THREE CAPSULES BY MOUTH EVERY DAY. MUST HAVE LAB AND BLOOD PRESSURE CHECK NOW! 14 capsule 0     hydrOXYzine (VISTARIL) 50 MG capsule Take 1 capsule (50 mg) by mouth 3 times daily as needed for anxiety 90 capsule 0     lisinopril-hydrochlorothiazide (PRINZIDE/ZESTORETIC) 20-12.5 MG tablet TAKE TWO TABLETS BY MOUTH EVERY DAY. MUST HAVE LAB AND BLOOD PRESSURE CHECK NOW! 30 tablet 0     sildenafil (VIAGRA) 100 MG tablet Take 1 tablet (100 mg) by mouth daily as needed 30 min to 4 hrs before sex. Do not use with nitroglycerin, terazosin or doxazosin. (Patient not taking: Reported on 6/14/2019) 12 tablet 11     Allergies   Allergen Reactions     Contrast Dye Difficulty breathing     BP Readings from Last 3 Encounters:   06/14/19 140/88   05/19/19 143/87   07/18/18 (!) 160/98    Wt Readings from Last 3 Encounters:   06/14/19 112 kg (247 lb)   05/19/19 117.9 kg (260 lb)   07/18/18 118.8 kg (262 lb)                  Reviewed and updated as needed this visit by Provider  Tobacco  Allergies  Meds  Problems  Med Hx  Surg Hx  Fam Hx         Review of Systems   Constitutional: Negative for chills, fever and malaise/fatigue.   HENT: Negative for congestion, ear pain and sore throat.    Eyes: Negative for blurred vision, discharge and redness.   Respiratory: Negative for cough, shortness of breath and wheezing.    Cardiovascular: Negative for chest pain and palpitations.   Gastrointestinal: Negative for abdominal pain, diarrhea, nausea and vomiting.   Musculoskeletal: Negative for joint pain and myalgias.   Skin: Negative for rash.   Neurological: Negative for headaches.   Psychiatric/Behavioral: The patient is nervous/anxious.            Objective    /88 (BP Location: Right arm, Patient Position: Chair, Cuff Size: Adult Large)   Pulse 88   Temp 97.3  F (36.3  C) (Tympanic)   Resp 18   Ht 1.829 m (6')   Wt 112 kg (247 lb)   BMI 33.50 kg/m    Body mass  index is 33.5 kg/m .    Physical Exam   Constitutional: He appears well-developed and well-nourished. No distress.   HENT:   Head: Normocephalic and atraumatic.   Right Ear: Tympanic membrane and ear canal normal.   Left Ear: Tympanic membrane and ear canal normal.   Mouth/Throat: Oropharynx is clear and moist.   Eyes: Pupils are equal, round, and reactive to light. Conjunctivae are normal.   Cardiovascular: Normal rate and regular rhythm.   Pulmonary/Chest: Effort normal and breath sounds normal.   Skin: Skin is warm and dry. No rash noted.   Psychiatric: He has a normal mood and affect. His behavior is normal.         Diagnostic Test Results:  none         Assessment & Plan     1. Anxiety and depression  Continue with wellbutirn. Will add hydroxyzine up to 3 times/day as needed. He has a counseling appointment in [place] next week and he will schedule follow up with his primary care provider in 1 week for recheck. Discussed in detail symptoms that would warrant emergent evaluation in the ED. Patient agrees with plan and will follow up as needed.      - hydrOXYzine (VISTARIL) 50 MG capsule; Take 1 capsule (50 mg) by mouth 3 times daily as needed for anxiety  Dispense: 90 capsule; Refill: 0    2. Essential hypertension  Refilled amlodipine x 1 month. Follow up with primary care provider in the next month.   - amLODIPine (NORVASC) 10 MG tablet; Take 1 tablet (10 mg) by mouth daily  Dispense: 30 tablet; Refill: 0         Krystina Caicedo PA-C  [facility]

## 2019-06-14 NOTE — TELEPHONE ENCOUNTER
"Requested Prescriptions   Pending Prescriptions Disp Refills     lisinopril-hydrochlorothiazide (PRINZIDE/ZESTORETIC) 20-12.5 MG tablet [Pharmacy Med Name: LISINOPRIL-HYDROCHLORO 20-12.5 TABS] 30 tablet 0     Sig: TAKE TWO TABLETS BY MOUTH ONCE DAILY (LAB AND FOR BLOOD PRESSURE CHECK NEEDED FOR REFILLS)       Diuretics (Including Combos) Protocol Failed - 6/14/2019 12:19 PM        Failed - Blood pressure under 140/90 in past 12 months     BP Readings from Last 3 Encounters:   06/14/19 140/88   05/19/19 143/87   07/18/18 (!) 160/98                 Passed - Recent (12 mo) or future (30 days) visit within the authorizing provider's specialty     Patient had office visit in the last 12 months or has a visit in the next 30 days with authorizing provider or within the authorizing provider's specialty.  See \"Patient Info\" tab in inbasket, or \"Choose Columns\" in Meds & Orders section of the refill encounter.              Passed - Medication is active on med list        Passed - Patient is age 18 or older        Passed - Normal serum creatinine on file in past 12 months     Recent Labs   Lab Test 05/19/19  0706   CR 0.96              Passed - Normal serum potassium on file in past 12 months     Recent Labs   Lab Test 05/19/19  0706   POTASSIUM 3.4                    Passed - Normal serum sodium on file in past 12 months     Recent Labs   Lab Test 05/19/19  0706                 lisinopril-hydrochlorothiazide (PRINZIDE/ZESTORETIC) 20-12.5 MG tablet  Last Written Prescription Date:  02/26/2019  Last Fill Quantity: 30 tablet,  # refills: 0   Last office visit: No previous visit found with prescribing provider:  07/18/2018 HAYDEE Hand   Future Office Visit:   Next 5 appointments (look out 90 days)    Jun 20, 2019 11:40 AM CDT  SHORT with Susan Hand PA-C  [facility] ([facility]) [address] [place] [id number] [phone]   Jun 27, 2019 10:00 AM CDT  SHORT " with Susan Hand PA-C  [facility] [address] [place] [id number] [phone]         Stephanie IRENE (LEE) (M)

## 2019-06-14 NOTE — NURSING NOTE
Chief Complaint   Patient presents with     Depression       Initial /88 (BP Location: Right arm, Patient Position: Chair, Cuff Size: Adult Large)   Pulse 88   Temp 97.3  F (36.3  C) (Tympanic)   Resp 18   Ht 1.829 m (6')   Wt 112 kg (247 lb)   BMI 33.50 kg/m   Estimated body mass index is 33.5 kg/m  as calculated from the following:    Height as of this encounter: 1.829 m (6').    Weight as of this encounter: 112 kg (247 lb).    Patient presents to the clinic using No DME    Health Maintenance that is potentially due pending provider review:  NONE    n/a    Is there anyone who you would like to be able to receive your results? No  If yes have patient fill out REYNOLD  Yosef Luna M.A.

## 2019-06-15 ASSESSMENT — ANXIETY QUESTIONNAIRES: GAD7 TOTAL SCORE: 19

## 2019-06-20 ENCOUNTER — HOSPITAL ENCOUNTER (INPATIENT)
Facility: CLINIC | Age: 59
LOS: 35 days | Discharge: PSYCHIATRIC OR SUBSTANCE USE FACILITY | End: 2019-07-25
Attending: EMERGENCY MEDICINE | Admitting: PSYCHIATRY & NEUROLOGY
Payer: COMMERCIAL

## 2019-06-20 ENCOUNTER — OFFICE VISIT (OUTPATIENT)
Dept: FAMILY MEDICINE | Facility: CLINIC | Age: 59
End: 2019-06-20
Payer: COMMERCIAL

## 2019-06-20 VITALS
WEIGHT: 244.4 LBS | SYSTOLIC BLOOD PRESSURE: 136 MMHG | BODY MASS INDEX: 33.15 KG/M2 | RESPIRATION RATE: 24 BRPM | DIASTOLIC BLOOD PRESSURE: 88 MMHG | HEART RATE: 115 BPM | TEMPERATURE: 97.9 F | OXYGEN SATURATION: 96 %

## 2019-06-20 DIAGNOSIS — K59.00 CONSTIPATION, UNSPECIFIED CONSTIPATION TYPE: ICD-10-CM

## 2019-06-20 DIAGNOSIS — F32.9 CURRENT EPISODE OF MAJOR DEPRESSIVE DISORDER WITHOUT PRIOR EPISODE, UNSPECIFIED DEPRESSION EPISODE SEVERITY: ICD-10-CM

## 2019-06-20 DIAGNOSIS — Z91.89 AT RISK FOR CARDIOVASCULAR EVENT: ICD-10-CM

## 2019-06-20 DIAGNOSIS — R33.9 URINARY RETENTION: ICD-10-CM

## 2019-06-20 DIAGNOSIS — F33.2 SEVERE RECURRENT MAJOR DEPRESSION WITHOUT PSYCHOTIC FEATURES (H): ICD-10-CM

## 2019-06-20 DIAGNOSIS — F41.9 ANXIETY: ICD-10-CM

## 2019-06-20 DIAGNOSIS — F33.2 SEVERE EPISODE OF RECURRENT MAJOR DEPRESSIVE DISORDER, WITHOUT PSYCHOTIC FEATURES (H): Primary | ICD-10-CM

## 2019-06-20 DIAGNOSIS — I10 ESSENTIAL HYPERTENSION: ICD-10-CM

## 2019-06-20 DIAGNOSIS — N39.0 LOWER URINARY TRACT INFECTIOUS DISEASE: ICD-10-CM

## 2019-06-20 DIAGNOSIS — R45.851 SUICIDAL IDEATION: Primary | ICD-10-CM

## 2019-06-20 DIAGNOSIS — F32.A ANXIETY AND DEPRESSION: ICD-10-CM

## 2019-06-20 DIAGNOSIS — R45.851 SUICIDE IDEATION: ICD-10-CM

## 2019-06-20 DIAGNOSIS — F41.9 ANXIETY AND DEPRESSION: ICD-10-CM

## 2019-06-20 DIAGNOSIS — G47.00 INSOMNIA, UNSPECIFIED TYPE: ICD-10-CM

## 2019-06-20 PROCEDURE — 99207 ZZC OFFICE-HOSPITAL ADMIT: CPT | Performed by: PHYSICIAN ASSISTANT

## 2019-06-20 PROCEDURE — 12400001 ZZH R&B MH UMMC

## 2019-06-20 PROCEDURE — 99285 EMERGENCY DEPT VISIT HI MDM: CPT | Mod: Z6 | Performed by: EMERGENCY MEDICINE

## 2019-06-20 PROCEDURE — 99285 EMERGENCY DEPT VISIT HI MDM: CPT | Performed by: EMERGENCY MEDICINE

## 2019-06-20 RX ORDER — ACETAMINOPHEN 325 MG/1
650 TABLET ORAL EVERY 4 HOURS PRN
Status: DISCONTINUED | OUTPATIENT
Start: 2019-06-20 | End: 2019-07-25 | Stop reason: HOSPADM

## 2019-06-20 RX ORDER — BISACODYL 10 MG
10 SUPPOSITORY, RECTAL RECTAL DAILY PRN
Status: DISCONTINUED | OUTPATIENT
Start: 2019-06-20 | End: 2019-07-25 | Stop reason: HOSPADM

## 2019-06-20 RX ORDER — DILTIAZEM HYDROCHLORIDE 120 MG/1
360 CAPSULE, COATED, EXTENDED RELEASE ORAL DAILY
Status: DISCONTINUED | OUTPATIENT
Start: 2019-06-21 | End: 2019-07-25 | Stop reason: HOSPADM

## 2019-06-20 RX ORDER — AMLODIPINE BESYLATE 5 MG/1
10 TABLET ORAL DAILY
Status: DISCONTINUED | OUTPATIENT
Start: 2019-06-21 | End: 2019-07-25 | Stop reason: HOSPADM

## 2019-06-20 RX ORDER — ALUMINA, MAGNESIA, AND SIMETHICONE 2400; 2400; 240 MG/30ML; MG/30ML; MG/30ML
30 SUSPENSION ORAL EVERY 4 HOURS PRN
Status: DISCONTINUED | OUTPATIENT
Start: 2019-06-20 | End: 2019-07-25 | Stop reason: HOSPADM

## 2019-06-20 RX ORDER — OLANZAPINE 5 MG/1
5-10 TABLET ORAL 3 TIMES DAILY PRN
Status: DISCONTINUED | OUTPATIENT
Start: 2019-06-20 | End: 2019-07-25 | Stop reason: HOSPADM

## 2019-06-20 RX ORDER — HYDROXYZINE HYDROCHLORIDE 25 MG/1
25-50 TABLET, FILM COATED ORAL EVERY 4 HOURS PRN
Status: DISCONTINUED | OUTPATIENT
Start: 2019-06-20 | End: 2019-07-25 | Stop reason: HOSPADM

## 2019-06-20 RX ORDER — ATORVASTATIN CALCIUM 20 MG/1
20 TABLET, FILM COATED ORAL DAILY
Status: DISCONTINUED | OUTPATIENT
Start: 2019-06-21 | End: 2019-07-25 | Stop reason: HOSPADM

## 2019-06-20 RX ORDER — OLANZAPINE 10 MG/2ML
5-10 INJECTION, POWDER, FOR SOLUTION INTRAMUSCULAR 3 TIMES DAILY PRN
Status: DISCONTINUED | OUTPATIENT
Start: 2019-06-20 | End: 2019-07-25 | Stop reason: HOSPADM

## 2019-06-20 RX ORDER — LISINOPRIL AND HYDROCHLOROTHIAZIDE 12.5; 2 MG/1; MG/1
2 TABLET ORAL DAILY
Status: DISCONTINUED | OUTPATIENT
Start: 2019-06-21 | End: 2019-06-27

## 2019-06-20 RX ORDER — TRAZODONE HYDROCHLORIDE 50 MG/1
50 TABLET, FILM COATED ORAL
Status: DISCONTINUED | OUTPATIENT
Start: 2019-06-20 | End: 2019-06-24

## 2019-06-20 RX ORDER — BUPROPION HYDROCHLORIDE 300 MG/1
300 TABLET ORAL EVERY MORNING
Status: DISCONTINUED | OUTPATIENT
Start: 2019-06-21 | End: 2019-06-24

## 2019-06-20 ASSESSMENT — ACTIVITIES OF DAILY LIVING (ADL)
DRESS: INDEPENDENT
AMBULATION: 0-->INDEPENDENT
TOILETING: 0-->INDEPENDENT
BATHING: 0-->INDEPENDENT
ORAL_HYGIENE: INDEPENDENT
DRESS: 0-->INDEPENDENT
HYGIENE/GROOMING: INDEPENDENT
LAUNDRY: WITH SUPERVISION
RETIRED_EATING: 0-->INDEPENDENT
FALL_HISTORY_WITHIN_LAST_SIX_MONTHS: NO
RETIRED_COMMUNICATION: 0-->UNDERSTANDS/COMMUNICATES WITHOUT DIFFICULTY
SWALLOWING: 0-->SWALLOWS FOODS/LIQUIDS WITHOUT DIFFICULTY
COGNITION: 0 - NO COGNITION ISSUES REPORTED
TRANSFERRING: 0-->INDEPENDENT

## 2019-06-20 ASSESSMENT — ANXIETY QUESTIONNAIRES
GAD7 TOTAL SCORE: 19
6. BECOMING EASILY ANNOYED OR IRRITABLE: NEARLY EVERY DAY
7. FEELING AFRAID AS IF SOMETHING AWFUL MIGHT HAPPEN: SEVERAL DAYS
2. NOT BEING ABLE TO STOP OR CONTROL WORRYING: NEARLY EVERY DAY
1. FEELING NERVOUS, ANXIOUS, OR ON EDGE: NEARLY EVERY DAY
5. BEING SO RESTLESS THAT IT IS HARD TO SIT STILL: NEARLY EVERY DAY
GAD7 TOTAL SCORE: 19
3. WORRYING TOO MUCH ABOUT DIFFERENT THINGS: NEARLY EVERY DAY
GAD7 TOTAL SCORE: 19
7. FEELING AFRAID AS IF SOMETHING AWFUL MIGHT HAPPEN: SEVERAL DAYS
4. TROUBLE RELAXING: NEARLY EVERY DAY

## 2019-06-20 ASSESSMENT — PATIENT HEALTH QUESTIONNAIRE - PHQ9
10. IF YOU CHECKED OFF ANY PROBLEMS, HOW DIFFICULT HAVE THESE PROBLEMS MADE IT FOR YOU TO DO YOUR WORK, TAKE CARE OF THINGS AT HOME, OR GET ALONG WITH OTHER PEOPLE: EXTREMELY DIFFICULT
SUM OF ALL RESPONSES TO PHQ QUESTIONS 1-9: 25
SUM OF ALL RESPONSES TO PHQ QUESTIONS 1-9: 25

## 2019-06-20 ASSESSMENT — MIFFLIN-ST. JEOR: SCORE: 1955.7

## 2019-06-20 ASSESSMENT — PAIN SCALES - GENERAL: PAINLEVEL: MILD PAIN (2)

## 2019-06-20 NOTE — ED NOTES
"Pt states unable to sleep over the last month, having racing thoughts and gets \"startled awake,\" requesting possible medication change.  "

## 2019-06-20 NOTE — PROGRESS NOTES
Subjective     Flakito Hilliard is a 59 year old male who presents to clinic today for the following health issues:    HPI   Depression and Anxiety Follow-Up    How are you doing with your depression since your last visit? Worsened     How are you doing with your anxiety since your last visit?  Worsened     Are you having other symptoms that might be associated with depression or anxiety? Yes:  nausea and vomiting, not eating, feels lost, problems sleeping  Wt Readings from Last 4 Encounters:   06/20/19 110.9 kg (244 lb 6.4 oz)   06/14/19 112 kg (247 lb)   05/19/19 117.9 kg (260 lb)   07/18/18 118.8 kg (262 lb)       Have you had a significant life event? Weeks ago, relationship with girlfriend ended. Thought would move to [place] but that did not happen. move    Do you have any concerns with your use of alcohol or other drugs? No    Social History     Tobacco Use     Smoking status: Never Smoker     Smokeless tobacco: Never Used   Substance Use Topics     Alcohol use: Yes     Comment: No drinking for 2 weeks     Drug use: No     PHQ 6/14/2019 6/20/2019 6/20/2019   PHQ-9 Total Score 23 25 25   Q9: Thoughts of better off dead/self-harm past 2 weeks Several days More than half the days More than half the days   F/U: Thoughts of suicide or self-harm - Yes Yes   F/U: Self harm-plan - Yes Yes   F/U: Self-harm action - No No   F/U: Safety concerns - Yes Yes     RIO-7 SCORE 6/14/2019 6/20/2019 6/20/2019   Total Score - - -   Total Score - - 19 (severe anxiety)   Total Score 19 19 19   Answers for HPI/ROS submitted by the patient on 6/20/2019   If you checked off any problems, how difficult have these problems made it for you to do your work, take care of things at home, or get along with other people?: Extremely difficult  PHQ9 TOTAL SCORE: 25  RIO 7 TOTAL SCORE: 19    Suicide Assessment Five-step Evaluation and Treatment (SAFE-T)    Amount of exercise or physical activity: walks off and on    Problems taking medications  regularly: No    Medication side effects: none    Diet: regular (no restrictions)    Moderate depression starting in his 30s, history of numerous (8+) psychiatric hospitalizations.    Last seen by me in 2018.  Was doing very well.    Now - Thinks he started not doing well in mid May, told other provider at that time he'd been off meds for 3 wks, but states things worsened when he was on meds - timing of worsening and time off meds unclear, seems he restarted meds .  Feels he is just existing.  Hard to get out of bed.  No sense of purpose.  States he has been admitted twice recently for mood - both in [place].  Worried he is going to hurt himself.  Has thought of several options of how to hurt himself including slashing large knife across his abdomen - he actually got out the knife last night with intention to use it, considered his , was stopped by acting due to concern for the mess he would make.  He wonders if he should be in hospital again but doesn't want to do that, but also doesn't want to do outpatient care.  Again, he is worried for his safety.  He was moving to [place] but is now not sure.  He did start with a new counselor [place] but [facility]    He feels very anxious today for this appt, with new nausea he's attributing to his anxiety about being here.  He requested emesis bag.  Concentration off.  No energy.  Warm feeling up back of his head sometimes.    HTN.    BP Readings from Last 3 Encounters:   19 136/88   19 140/88   19 143/87    Wt Readings from Last 3 Encounters:   19 110.9 kg (244 lb 6.4 oz)   19 112 kg (247 lb)   19 117.9 kg (260 lb)        Reviewed and updated as needed this visit by Provider  Tobacco  Allergies  Meds  Problems  Med Hx  Surg Hx  Fam Hx         Review of Systems   ROS COMP: Constitutional, cardiovascular, pulmonary, and psych systems are negative, except as otherwise noted.       Objective    /88   Pulse 115   Temp 97.9  F (36.6  C)   Resp 24   Wt 110.9 kg (244 lb 6.4 oz)   SpO2 96%   BMI 33.15 kg/m    Body mass index is 33.15 kg/m .  Physical Exam   GENERAL: healthy, alert and no distress  PSYCH: mentation appears normal, affect subdued, sits hunched looking towards ground most of visit though does meet eye contact, judgement and insight intact and appearance fair grooming and dress        Assessment & Plan       ICD-10-CM    1. Severe episode of recurrent major depressive disorder, without psychotic features (H) F33.2    2. Suicide ideation R45.851    3. Anxiety F41.9      Mobile crisis team brought to clinic to meet with patient.  Agrees he needs admission.  He is not safe to transport self, will be taken via ambulance to [facility] for admission.  [facility], behavioral , accepting patient on behalf of ED.    Patient Instructions   Via ambulance to [facility] for psychiatric hospital admission      Return for post discharge recheck.    Susan Hand PA-C  [facility]

## 2019-06-20 NOTE — ED NOTES
Bed: ED16A  Expected date: 6/20/19  Expected time:   Means of arrival:   Comments:  [facility] 54 M SI

## 2019-06-20 NOTE — NURSING NOTE
Chief Complaint   Patient presents with     Depression       Initial There were no vitals taken for this visit. Estimated body mass index is 33.5 kg/m  as calculated from the following:    Height as of 6/14/19: 1.829 m (6').    Weight as of 6/14/19: 112 kg (247 lb).    Patient presents to the clinic using No DME    Health Maintenance that is potentially due pending provider review:  NONE    n/a    Is there anyone who you would like to be able to receive your results? not asked  If yes have patient fill out REYNOLD

## 2019-06-21 LAB
CHOLEST SERPL-MCNC: 139 MG/DL
HDLC SERPL-MCNC: 46 MG/DL
LDLC SERPL CALC-MCNC: 74 MG/DL
NONHDLC SERPL-MCNC: 93 MG/DL
TRIGL SERPL-MCNC: 95 MG/DL

## 2019-06-21 PROCEDURE — 12400001 ZZH R&B MH UMMC

## 2019-06-21 PROCEDURE — 36415 COLL VENOUS BLD VENIPUNCTURE: CPT | Performed by: CLINICAL NURSE SPECIALIST

## 2019-06-21 PROCEDURE — H2032 ACTIVITY THERAPY, PER 15 MIN: HCPCS

## 2019-06-21 PROCEDURE — 99223 1ST HOSP IP/OBS HIGH 75: CPT | Mod: AI | Performed by: PSYCHIATRY & NEUROLOGY

## 2019-06-21 PROCEDURE — 80061 LIPID PANEL: CPT | Performed by: CLINICAL NURSE SPECIALIST

## 2019-06-21 PROCEDURE — 25000132 ZZH RX MED GY IP 250 OP 250 PS 637: Performed by: CLINICAL NURSE SPECIALIST

## 2019-06-21 RX ADMIN — LISINOPRIL AND HYDROCHLOROTHIAZIDE 2 TABLET: 12.5; 2 TABLET ORAL at 09:00

## 2019-06-21 RX ADMIN — TRAZODONE HYDROCHLORIDE 50 MG: 50 TABLET ORAL at 01:04

## 2019-06-21 RX ADMIN — DILTIAZEM HYDROCHLORIDE 360 MG: 120 CAPSULE, COATED, EXTENDED RELEASE ORAL at 08:59

## 2019-06-21 RX ADMIN — TRAZODONE HYDROCHLORIDE 50 MG: 50 TABLET ORAL at 22:08

## 2019-06-21 RX ADMIN — ATORVASTATIN CALCIUM 20 MG: 20 TABLET, FILM COATED ORAL at 08:58

## 2019-06-21 RX ADMIN — AMLODIPINE BESYLATE 10 MG: 5 TABLET ORAL at 08:58

## 2019-06-21 RX ADMIN — BUPROPION HYDROCHLORIDE 300 MG: 300 TABLET, FILM COATED, EXTENDED RELEASE ORAL at 08:59

## 2019-06-21 ASSESSMENT — ACTIVITIES OF DAILY LIVING (ADL)
LAUNDRY: UNABLE TO COMPLETE
HYGIENE/GROOMING: INDEPENDENT
ORAL_HYGIENE: INDEPENDENT
DRESS: INDEPENDENT

## 2019-06-21 ASSESSMENT — PATIENT HEALTH QUESTIONNAIRE - PHQ9: SUM OF ALL RESPONSES TO PHQ QUESTIONS 1-9: 25

## 2019-06-21 ASSESSMENT — ANXIETY QUESTIONNAIRES: GAD7 TOTAL SCORE: 19

## 2019-06-21 NOTE — ED NOTES
ED to Behavioral Floor Handoff    SITUATION  Flakito Hilliard is a 59 year old male who speaks English and lives in a home with family members The patient arrived in the ED by ambulance from clinic with a complaint of Suicidal (Here from clinic, suicidal, held knife to self last night, went to clinic today, cooperative in route.)  .The patient's current symptoms started/worsened 2 week(s) ago and during this time the symptoms have increased.   In the ED, pt was diagnosed with   Final diagnoses:   Suicidal ideation        Initial vitals were: BP: (!) 139/95  Pulse: 103  Temp: 98  F (36.7  C)  Resp: 16  SpO2: 95 %   --------  Is the patient diabetic? No   If yes, last blood glucose? --     If yes, was this treated in the ED? --  --------  Is the patient inebriated (ETOH) No or Impaired on other substances? No  MSSA done? N/A  Last MSSA score: --    Were withdrawal symptoms treated? N/A  Does the patient have a seizure history? No. If yes, date of most recent seizure--  --------  Is the patient patient experiencing suicidal ideation? reports the following suicide factors: knife in hand yesterday    Homicidal ideation? denies current or recent homicidal ideation or behaviors.    Self-injurious behavior/urges? denies current or recent self injurious behavior or ideation.  ------  Was pt aggressive in the ED No  Was a code called No  Is the pt now cooperative? Yes  -------  Meds given in ED: Medications - No data to display   Family present during ED course? No  Family currently present? No    BACKGROUND  Does the patient have a cognitive impairment or developmental disability? Yes  Allergies:   Allergies   Allergen Reactions     Contrast Dye Difficulty breathing   .   Social demographics are   Social History     Socioeconomic History     Marital status:      Spouse name: None     Number of children: None     Years of education: None     Highest education level: None   Occupational History     None   Social Needs      Financial resource strain: None     Food insecurity:     Worry: None     Inability: None     Transportation needs:     Medical: None     Non-medical: None   Tobacco Use     Smoking status: Never Smoker     Smokeless tobacco: Never Used   Substance and Sexual Activity     Alcohol use: Yes     Comment: No drinking for 2 weeks     Drug use: No     Sexual activity: Never   Lifestyle     Physical activity:     Days per week: None     Minutes per session: None     Stress: None   Relationships     Social connections:     Talks on phone: None     Gets together: None     Attends Synagogue service: None     Active member of club or organization: None     Attends meetings of clubs or organizations: None     Relationship status: None     Intimate partner violence:     Fear of current or ex partner: None     Emotionally abused: None     Physically abused: None     Forced sexual activity: None   Other Topics Concern     Parent/sibling w/ CABG, MI or angioplasty before 65F 55M? Not Asked   Social History Narrative     None        ASSESSMENT  Labs results Labs Ordered and Resulted from Time of ED Arrival Up to the Time of Departure from the ED - No data to display   Imaging Studies: No results found for this or any previous visit (from the past 24 hour(s)).   Most recent vital signs BP (!) 139/95   Pulse 103   Temp 98  F (36.7  C) (Oral)   Resp 16   SpO2 95%    Abnormal labs/tests/findings requiring intervention:---   Pain control: pt had none  Nausea control: pt had none    RECOMMENDATION  Are any infection precautions needed (MRSA, VRE, etc.)? Yes If yes, what infection? --  ---  Does the patient have mobility issues? independently. If yes, what device does the pt use? ---  ---  Is patient on 72 hour hold or commitment? No If on 72 hour hold, have hold and rights been given to patient? N/A  Are admitting orders written if after 10 p.m. ?N/A  Tasks needing to be completed:---     Akila Dietz   [facility] [phone]  [place] [facility]   [phone] [facility] ED

## 2019-06-21 NOTE — PHARMACY-ADMISSION MEDICATION HISTORY
"Admission Medication History status for the 6/20/2019 admission is complete.  See EPIC admission navigator for Prior to Admission medications.    Medication history sources:  Patient, Yanci Bennett, Pharmacy Dispense report (via outside meds tab), [employer]     Medication history source reliability: Good. Patient knew medication names and strengths as well as what they were used for and his last doses.     Medication adherence:  Good. Patient takes medication regularly, but admitted that sometimes he \"cheats and takes 2\" hydroxyzine at once instead of the prescribed one capsule at a time.    Changes made to PTA medication list (reason)  Added: NA  Deleted: NA  Changed: NA    Additional medication history information (including reliability of information, actions taken by pharmacist):   - Pt was alert and responsive to questions. Curious about what was happening with his ED visit.   - Pt reports that he uses hydroxyzine 50mg capsules usually just once a day, but has been using it every day recently. He also said sometimes he \"cheats and takes 2\" instead of just one.  - Doses verified from patient and [facility] for amlodipine, atorvastatin, buprobion, hydroxyzine, and lisinopril-hydrochlorothiazide. Dose for Diltiazem verified from patient and pharmacy dispense report.   - Was not able to verify the dose of sildenafil used, but pt reports it has been \"many months\" since he last used it.    Time spent in this activity: 40 minutes    Medication history completed by: Leela Webber, [facility] Pharmacy Intern    Prior to Admission medications    Medication Sig Last Dose Taking? Auth Provider   amLODIPine (NORVASC) 10 MG tablet Take 1 tablet (10 mg) by mouth daily 6/20/2019 at AM Yes Krystina Caicedo PA-C   atorvastatin (LIPITOR) 20 MG tablet TAKE ONE TABLET BY MOUTH EVERY DAY. 6/20/2019 at AM Yes Susan Hand PA-C   buPROPion (WELLBUTRIN XL) 300 MG 24 hr tablet Take 1 tablet (300 mg) by mouth every " morning 6/20/2019 at AM Yes Susan Hand PA-C   diltiazem ER COATED BEADS (CARTIA XT) 120 MG 24 hr capsule TAKE THREE CAPSULES BY MOUTH EVERY DAY. MUST HAVE LAB AND BLOOD PRESSURE CHECK NOW! 6/20/2019 at AM Yes Susan Hand PA-C   hydrOXYzine (VISTARIL) 50 MG capsule Take 1 capsule (50 mg) by mouth 3 times daily as needed for anxiety 6/19/2019 at PM Yes Krystina Caicedo PA-C   lisinopril-hydrochlorothiazide (PRINZIDE/ZESTORETIC) 20-12.5 MG tablet TAKE TWO TABLETS BY MOUTH ONCE DAILY (LAB AND FOR BLOOD PRESSURE CHECK NEEDED FOR REFILLS) 6/20/2019 at AM Yes Krystina Caicedo PA-C   sildenafil (VIAGRA) 100 MG tablet Take 1 tablet (100 mg) by mouth daily as needed 30 min to 4 hrs before sex. Do not use with nitroglycerin, terazosin or doxazosin. More than a month at Unknown time  Susan Hand PA-C

## 2019-06-21 NOTE — PROGRESS NOTES
"CLINICAL NUTRITION SERVICES - ASSESSMENT NOTE     Nutrition Prescription    RECOMMENDATIONS FOR MDs/PROVIDERS TO ORDER:  None    Malnutrition Status:    Does not meet criteria    Recommendations already ordered by Registered Dietitian (RD):  None    Future/Additional Recommendations:  Consider addition of oral nutrition supplement if po intake declines.     REASON FOR ASSESSMENT  Flakito Hilliard is a/an 59 year old male assessed by the dietitian for Admission Nutrition Risk Screen for reduced oral intake over the last month    NUTRITION HISTORY  Patient reports reduced appetite/ intake for the past few weeks. He also notes that food is not tasting as good.    CURRENT NUTRITION ORDERS  Diet: Regular  Intake/Tolerance: Patient reports he has been able to eat okay for meals here so far.    LABS  Labs reviewed    MEDICATIONS  Medications reviewed    ANTHROPOMETRICS  Height: 182.9 cm (6' 0\")  Most Recent Weight: 109.8 kg (242 lb)    IBW: 78.8 kg  BMI: Obesity Grade I BMI 30-34.9  Weight History: Chart review suggests weight loss of 18 lbs (7%) in the past 1 month. Patient confirms that he has lost weight. He estimates weight loss of 20 lbs in the past few weeks.  Wt Readings from Last 5 Encounters:   06/20/19 109.8 kg (242 lb)   06/20/19 110.9 kg (244 lb 6.4 oz)   06/14/19 112 kg (247 lb)   05/19/19 117.9 kg (260 lb)   07/18/18 118.8 kg (262 lb)     Care Everywhere:  05/28/19 114.9 kg (253 lb 6.4 oz)  05/13/19 121.6 kg (268 lb)    Dosing Weight: 87 kg (adj for >120% IBW)    ASSESSED NUTRITION NEEDS  Estimated Energy Needs: 5047-7099 kcals/day (25 - 30 kcals/kg)  Justification: Maintenance  Estimated Protein Needs: 70-87 grams protein/day (0.8 - 1 grams of pro/kg)  Justification: Maintenance  Estimated Fluid Needs: 1 mL/kcal or per MD goals   Justification: Maintenance    PHYSICAL FINDINGS  See malnutrition section below.     MALNUTRITION  % Intake: Decreased intake does not meet criteria  % Weight Loss: > 5% in 1 month " (severe)  Subcutaneous Fat Loss: None observed  Muscle Loss: None observed  Fluid Accumulation/Edema: None noted  Malnutrition Diagnosis: Patient does not meet two of the above criteria necessary for diagnosing malnutrition    NUTRITION DIAGNOSIS  Predicted inadequate nutrient intake (energy/ protein) related to historical decreased appetite/ intake as evidenced by anticipated intake less than estimated needs.    INTERVENTIONS  Implementation  Nutrition Education: encouraged intake of regular, well-balanced meals to support adequate nutrition and healing.     Goals  Patient to consume % of nutritionally adequate meal trays TID, or the equivalent with supplements/snacks.     Monitoring/Evaluation  Progress toward goals will be monitored and evaluated per protocol.      Aliza Puente, MS, RD, LD
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
## 2019-06-21 NOTE — PLAN OF CARE
BEHAVIORAL TEAM DISCUSSION    Participants: Dr. Edwards and Dell Lee ([facility])  Progress: Continuing to assess.   Continued Stay Criteria/Rationale: Assessment, evaluation, and recommendations.   Medical/Physical: None.   Precautions:   Behavioral Orders   Procedures     Code 1 - Restrict to Unit     Routine Programming     As clinically indicated     Status 15     Every 15 minutes.     Suicide precautions     Patients on Suicide Precautions should have a Combination Diet ordered that includes a Diet selection(s) AND a Behavioral Tray selection for Safe Tray - with utensils, or Safe Tray - NO utensils       Plan: The plan is to assess the patient for mental health and medication needs. The patient will be prescribed medications to treat the identified symptoms. Patient will participate in therapeutic skill building groups on the unit. [facility] to coordinate discharge/after care planning.     Rationale for change in precautions or plan: None.

## 2019-06-21 NOTE — H&P
"Admitted:     06/20/2019      PSYCHIATRIC EVALUATION      The patient was seen for 70 minutes on 06/21/2019.  Greater than 50% of time was spent on counseling and coordinating care, clarifying diagnostic/prognostic issues, presence of support in community.      CHIEF COMPLAINT AND REASON FOR ADMISSION:  The patient is a 59-year-old   male who presented with worsening of depression, suicidal thoughts.      HISTORY OF PRESENT ILLNESS:  The patient presented as a reasonably reliable historian, although he was hard of hearing and initially not so easy to get information from.  He tended to answer in general phrases such as, \"I need to get out of life craziness.\"  Eventually he told me that he was in a 2 year long relationship and that they split up.  However, this female still is his friend.  He went to [place] where his daughter lives in hopes to be employed there and be closer to his daughter; however, it did not work out.  He had to return to a place where he rents at [place], [place].  He has a personal small business; however, did not have work for a long time and currently drawing his [employer] because he does not have any source of income.  Reports poor sleep, significant weight loss, increased anxiety, waking up in the middle of the night.  Lost about 20 pounds over the last couple of weeks.   Poor attention, poor concentration.  Started having thoughts that life is not worth living and eventually put a knife to his abdomen, was thinking of suicide.  He denies using street drugs, drinking alcohol.  States that he follows compliance off and on with his Wellbutrin because he ran out of it.  Tried to stretch it for a longer period of time.      PAST PSYCHIATRIC HISTORY:  Reports that he had multiple previous psychiatric hospitalizations, including this facility; the last one was 3 or 4 weeks ago at [place], [place].  He was there for 3 or 4 days.  He denied prior suicide attempts.  Denied " self-injurious behavior.  States that he had tried multiple medications.  Out of the list of psychotropic medications that I could see was Wellbutrin, Hydroxyzine, apparently sertraline, trazodone, clonazepam, Remeron.  He specifically does not like Remeron, says it makes him feel weird.  Seroquel.  Abilify.  He reported having ECT a couple of years ago; however, could not tell me too much about that.  Apparently it happened during his hospitalization at [facility] in 2016.  The patient also has tried Effexor, possibly other medications.  In addition to the above-mentioned hospitals, he was hospitalized at [facility].      ALLERGIES:  CONTRAST DYE.      PAST MEDICAL HISTORY:  Significant for sleep apnea, history of prostatitis, ureteral stones, history of hypertension.      FAMILY AND SOCIAL HISTORY:  Mother, maternal great grandmother has depression.  Grandmother had ECT and it helped.  Father has dementia.  He graduated from high school, has some college, went to trade school.  Was  for 25 years, .  He currently has no girlfriend after splitting up with his recent significant other.  Has 2 grown up children, a son and daughter.  Has 3 grandchildren, all through his daughter.  They live in [place].  He said that he would like to be close to them, but she cannot take him because of a too small place.  He has no income.  Denied any legal problems.  Has 3 living siblings and 1 .      HOME MEDICATIONS:   1.  Norvasc 10 mg daily.   2.  Lipitor 20 mg daily.   3.  Wellbutrin- mg every morning.   4.  Diltiazem, enteric-coated 120 mg, take 3 capsules by mouth every day.   5.  Hydroxyzine 50 mg 2 times a day p.r.n. for anxiety.   6.  Lisinopril/hydrochlorothiazide 20/12.5 mg, take 2 tablets by mouth once a day.   7.  Viagra 100 mg by mouth daily as needed.      PHYSICAL EXAMINATION/REVIEW OF SYSTEMS:   For physical examination and 12-point review of  systems, please refer to physician assistant Susan Hand from [facility] note dated by 06/20/2019; I reviewed this note and agree with it.      VITAL SIGNS:  Temperature 98.8, respirations 16, blood pressure 130/88, heart rate 111.      MENTAL STATUS EXAMINATION:  , unshaven male, hard of hearing, was interviewed at the patient's lounge.  Was pleasant and cooperative.  Reported feeling depressed.  Affect was restricted, blunted, congruent with mood.  Speech was slow, monotone but spontaneous.  He frequently asked to repeat, admitted that he is hard of hearing.  Reported passive suicidal ideation but not homicidal thoughts.  Contracted for safety here.  Denied any psychotic symptoms.  There was no evidence of hypomania or giselle.  Thought processes were linear, logical and goal directed.  He was alert and oriented x 3.  Fund of knowledge was average with proper usage of vocabulary.  Ability to focus and concentrate appeared to be mildly impaired.  Immediate short and long-term memories are impaired, could be due to depression.  Insight and judgment moderately impaired.  Showed some psychomotor retardation.  Fund of knowledge is average.  Motor stance within normal limits.  Gait slow, posture stooped.      ASSESSMENT:  Major depressive disorder, current, severe.  Rule out generalized anxiety disorder.  The patient was also diagnosed in the past with dependent personality disorder.      TREATMENT PLAN:  We will continue inpatient hospitalization.  Given the fact of patient's minimal social support I felt it appropriate.  If he demands to leave before significant improvement in his mental health is achieved, put him on 72-hour hold.  I discussed with him increasing his Wellbutrin to the maximum recommended dose, 450 mg daily.  He indicated that he would be okay with this plan.  I am going to review his past medical records and how much he had benefited from ECT.  I might consider  referring him to intensive residential treatment center.         CHONG CHAMPAGNE MD             D: 2019   T: 2019   MT: HAYLEY      Name:     XAVIER GAMBLE   MRN:      [id number]        Account:      [id number]   :      1960        Admitted:     2019                   Document: [id number]       cc: Susan Hand PA-C

## 2019-06-21 NOTE — ED PROVIDER NOTES
History     Chief Complaint   Patient presents with     Suicidal     Here from clinic, suicidal, held knife to self last night, went to clinic today, cooperative in route.     HPI  Flakito Hilliard is a 59 year old male who presents with suicidal ideation.  Ranjeet has a history of depression and multiple prior hospitalizations.  He is currently taking Wellbutrin and states that he stopped taking this for about a week in May but is currently taking his medication as prescribed.  He states he has had ongoing depression and suicidal ideation since May.  He was hospitalized in [place] at that time and states that since discharge he has had ongoing depression and suicidal thoughts.  He has a plan to cut his abdomen with a knife and actually held a knife up to his abdomen yesterday while contemplating suicide.  He reports stress related to social relations.  He states he lives on her property and has been having problems with the owner.  He states that the owner is an alcoholic and he has been having difficulty communicating with him.  Also the nearby neighbor has been causing him stress with arguments recently.  He denies any drugs or alcohol.      Allergies:  Allergies   Allergen Reactions     Contrast Dye Difficulty breathing       Problem List:    Patient Active Problem List    Diagnosis Date Noted     Erectile dysfunction, unspecified erectile dysfunction type 08/18/2017     Priority: Medium     Bladder stones 10/24/2016     Priority: Medium     At risk for cardiovascular event 10/03/2016     Priority: Medium     Elevated hemoglobin (H) 10/03/2016     Priority: Medium     17.8.  ??? Recheck.       RIO (generalized anxiety disorder) 09/29/2016     Priority: Medium     Complicated UTI (urinary tract infection) 05/23/2016     Priority: Medium     PATRICE (obstructive sleep apnea) 10/20/2014     Priority: Medium     Severe PATRICE   -  4/10/2010 with weight 233 lbs, AHI 31.4, RDI 35.2, michael desat 84%, CPAP 7 cm H2O optimal and  included supine REM       Urethral stricture 09/09/2014     Priority: Medium     Urinary retention 09/08/2014     Priority: Medium     Major depressive disorder, recurrent episode, moderate (H) 08/29/2014     Priority: Medium     6/30/16 - Patient getting ECT at [facility].       Suicidal ideation 08/24/2014     Priority: Medium     severe HTN (hypertension) 08/11/2014     Priority: Medium     Lower urinary tract infectious disease 07/31/2014     Priority: Medium     Acute bacterial prostatitis 07/30/2014     Priority: Medium        Past Medical History:    Past Medical History:   Diagnosis Date     Depressive disorder      Hypertension      Sleep apnea        Past Surgical History:    Past Surgical History:   Procedure Laterality Date     BLADDER SURGERY       CYSTOSTOMY, INSERT TUBE SUPRAPUBIC, COMBINED N/A 9/8/2014    Procedure: COMBINED CYSTOSTOMY, INSERT TUBE SUPRAPUBIC;  Surgeon: Min Prasad MD;  Location: [facility] OR     GENITOURINARY SURGERY      prostate surgery for stones     LASER HOLMIUM CYSTOSCOPY, INTERNAL URETHROTOMY, COMBINED N/A 10/24/2014    Procedure: COMBINED LASER HOLMIUM CYSTOSCOPY, INTERNAL URETHROTOMY;  Surgeon: Valentin Villatoro MD;  Location: [facility] OR     URETHROPLASTY N/A 12/10/2014    Procedure: URETHROPLASTY;  Surgeon: Niraj Burger MD;  Location: [facility] OR     URETHROPLASTY WITH BUCCAL GRAFT N/A 12/9/2016    Procedure: URETHROPLASTY WITH BUCCAL GRAFT;  Surgeon: Niraj Burger MD;  Location: [facility] OR       Family History:    Family History   Problem Relation Age of Onset     Hypertension Mother      Depression Father      Hypertension Father      Mental Illness Sister        Social History:  Marital Status:   [4]  Social History     Tobacco Use     Smoking status: Never Smoker     Smokeless tobacco: Never Used   Substance Use Topics     Alcohol use: Yes     Comment: No drinking for 2 weeks     Drug use: No        Medications:      No current outpatient medications on  file.      Review of Systems   All other systems reviewed and are negative.      Physical Exam   BP: (!) 139/95  Pulse: 103  Heart Rate: 96  Temp: 98  F (36.7  C)  Resp: 16  Height: 182.9 cm (6')  Weight: 109.8 kg (242 lb)  SpO2: 95 %      Physical Exam   Constitutional: He is oriented to person, place, and time. No distress.   HENT:   Head: Normocephalic and atraumatic.   Neck: Normal range of motion. Neck supple.   Cardiovascular: Normal rate.   Pulmonary/Chest: Effort normal. No respiratory distress. He has no wheezes.   Abdominal: Soft. There is no tenderness. There is no rebound and no guarding.   Musculoskeletal: Normal range of motion.   Neurological: He is alert and oriented to person, place, and time. No sensory deficit.   Skin: Skin is warm and dry. He is not diaphoretic.   Psychiatric: He has a normal mood and affect. His behavior is normal.   Nursing note and vitals reviewed.      ED Course        Procedures               Critical Care time:  none               No results found for this or any previous visit (from the past 24 hour(s)).    Medications   amLODIPine (NORVASC) tablet 10 mg (has no administration in time range)   atorvastatin (LIPITOR) tablet 20 mg (has no administration in time range)   buPROPion (WELLBUTRIN XL) 24 hr tablet 300 mg (has no administration in time range)   diltiazem ER COATED BEADS (CARDIZEM CD/CARTIA XT) 24 hr capsule 360 mg (has no administration in time range)   lisinopril-hydrochlorothiazide (PRINZIDE/ZESTORETIC) 20-12.5 MG per tablet 2 tablet (has no administration in time range)   hydrOXYzine (ATARAX) tablet 25-50 mg (has no administration in time range)   acetaminophen (TYLENOL) tablet 650 mg (has no administration in time range)   alum & mag hydroxide-simethicone (MYLANTA ES/MAALOX  ES) suspension 30 mL (has no administration in time range)   magnesium hydroxide (MILK OF MAGNESIA) suspension 30 mL (has no administration in time range)   bisacodyl (DULCOLAX) Suppository  10 mg (has no administration in time range)   traZODone (DESYREL) tablet 50 mg (has no administration in time range)   OLANZapine (zyPREXA) tablet 5-10 mg (has no administration in time range)     Or   OLANZapine (zyPREXA) injection 5-10 mg (has no administration in time range)       Assessments & Plan (with Medical Decision Making)   This is a 59-year-old male with history of depression who presents with suicidal ideation with a plan to cut himself in the abdomen with a knife. He will be admitted to psychiatry for further evaluation.      I have reviewed the nursing notes.    I have reviewed the findings, diagnosis, plan and need for follow up with the patient.          Medication List      There are no discharge medications for this visit.         Final diagnoses:   Suicidal ideation       6/20/2019   UR 20NB     Diallo Abarca MD  06/21/19 0031

## 2019-06-21 NOTE — PROGRESS NOTES
Pt was active in the milieu, social with others and attended groups this shift. Pt was calm and friendly to talk with. Pt reported no concerns. Pt reported no hallucinations, SI or SIB.        06/21/19 1600   Behavioral Health   Hallucinations denies / not responding to hallucinations   Thinking intact   Orientation time: oriented;date: oriented;place: oriented;person: oriented   Memory baseline memory   Insight insight appropriate to situation   Judgement intact   Eye Contact at examiner   Affect full range affect   Mood mood is calm   Physical Appearance/Attire attire appropriate to age and situation   Hygiene well groomed   Suicidality   (none reported)   1. Wish to be Dead No   2. Non-Specific Active Suicidal Thoughts  No   Self Injury   (none reported)   Elopement   (none observed)   Activity   (active in the milieu)   Speech coherent;clear   Medication Sensitivity no stated side effects   Psychomotor / Gait steady;balanced   Activities of Daily Living   Hygiene/Grooming independent   Oral Hygiene independent   Dress independent   Laundry unable to complete   Room Organization independent

## 2019-06-21 NOTE — PROGRESS NOTES
BELONGINGS:  Wallet, shoes, cell phone, keys, socks    BELONGINGS SENT TO SECURITY:  None      A               Admission:  I am responsible for any personal items that are not sent to the safe or pharmacy.  [facility] is not responsible for loss, theft or damage of any property in my possession.    Signature:  _________________________________ Date: _______  Time: _____                                              Staff Signature:  ____________________________ Date: ________  Time: _____      2nd Staff person, if patient is unable/unwilling to sign:    Signature: ________________________________ Date: ________  Time: _____     Discharge:  [facility] has returned all of my personal belongings:    Signature: _________________________________ Date: ________  Time: _____                                          Staff Signature:  ____________________________ Date: ________  Time: _____

## 2019-06-21 NOTE — PROGRESS NOTES
" 06/21/19 2200   Art Therapy   Type of Intervention structured groups   Response participates with encouragement/ redirection/ cues   Hours 3   Treatment Detail    (Art Therapy - DBt Wave of emotions/ Self Care   Problem- depression/ suicidal ideation      Goal- apply metaphor to their life through art directive, cope, express, regulate     Outcome- Pt attended all  three groups . He was most calm when working on a detailed coloring sheet where he layered and blended colored pencils and seemed pleased wit the results.  He talked a lot about enjoying his [place]dkids at the cabin. Affect was flat and his answers to group conversations were cynical and depressive. He said he felt \" confused.\" and he didn't understand what we were talking about when having conversations about DBT and Self care. He did stay present even when he felt confused as he reported or irritable. He also seemed relaxed when observed working [facility] a peer [facility].  "

## 2019-06-21 NOTE — PROGRESS NOTES
"SPIRITUAL HEALTH SERVICES  SPIRITUAL ASSESSMENT Progress Note  [facility] ([facility]) Station [address]     REFERRAL SOURCE: I did visit this morning patient Ranjeet per [facility] consult order. Pt was willing to talk but he is new for the unit and looks very confused. Pt said, \"why I don't understand what people saying or talking with each other? Why is things are not normal anymore? [place] what people saying? Am I the only one thinking this way or things really happen the way I see it?\" Pt is confused and didn't filter what he hear and take it the way it is and that makes him to be confused. I tried to make the pt calm down and talking about change and hope. I also encouraged him to talk with his Doctor or medical team the way he feel, in order to get good help and he promised me to do that. At the end of our conversation, I offered him a prayer and pt like that.    PLAN: I will remain open to provide spiritual care for the pt as needed.    aClin Card M.Div. ([place]lem), M.Th., D.Min., [facility]  Staff   Pager [phone]    "

## 2019-06-21 NOTE — PLAN OF CARE
Admission:     Admitted voluntarily to [facility] for increasing depression and suicidal ideation. Denies any past attempts. States he's been in mental health 6-7 times in the past. Reports poor sleep for the past month, poor appetite, racing thoughts at night, anhedonia, increased isolation, and low motivation. Cites stressors as an abrupt breakup of a relationship approximately a month ago, and dissatisfaction with living environment and his landlord. (See chart review for further details and hx)    Presents with blunted affect. Is well groomed. Speech is clear and coherent. Is calm, polite and cooperative completing admission. Oriented to room and unit. Encouraged to notify staff of needs, questions, and concerns. Pt voices understanding.

## 2019-06-22 PROCEDURE — 12400001 ZZH R&B MH UMMC

## 2019-06-22 PROCEDURE — 25000132 ZZH RX MED GY IP 250 OP 250 PS 637: Performed by: CLINICAL NURSE SPECIALIST

## 2019-06-22 PROCEDURE — 25000132 ZZH RX MED GY IP 250 OP 250 PS 637: Performed by: PSYCHIATRY & NEUROLOGY

## 2019-06-22 RX ADMIN — LISINOPRIL AND HYDROCHLOROTHIAZIDE 2 TABLET: 12.5; 2 TABLET ORAL at 08:57

## 2019-06-22 RX ADMIN — BENZOCAINE, MENTHOL 1 LOZENGE: 15; 3.6 LOZENGE ORAL at 20:36

## 2019-06-22 RX ADMIN — BUPROPION HYDROCHLORIDE 300 MG: 300 TABLET, FILM COATED, EXTENDED RELEASE ORAL at 08:57

## 2019-06-22 RX ADMIN — AMLODIPINE BESYLATE 10 MG: 5 TABLET ORAL at 08:57

## 2019-06-22 RX ADMIN — TRAZODONE HYDROCHLORIDE 50 MG: 50 TABLET ORAL at 21:40

## 2019-06-22 RX ADMIN — ACETAMINOPHEN 650 MG: 325 TABLET, FILM COATED ORAL at 22:45

## 2019-06-22 RX ADMIN — HYDROXYZINE HYDROCHLORIDE 25 MG: 25 TABLET ORAL at 13:40

## 2019-06-22 RX ADMIN — DILTIAZEM HYDROCHLORIDE 360 MG: 120 CAPSULE, COATED, EXTENDED RELEASE ORAL at 08:57

## 2019-06-22 RX ADMIN — TRAZODONE HYDROCHLORIDE 50 MG: 50 TABLET ORAL at 22:45

## 2019-06-22 RX ADMIN — BENZOCAINE, MENTHOL 1 LOZENGE: 15; 3.6 LOZENGE ORAL at 21:40

## 2019-06-22 RX ADMIN — ATORVASTATIN CALCIUM 20 MG: 20 TABLET, FILM COATED ORAL at 08:57

## 2019-06-22 ASSESSMENT — ACTIVITIES OF DAILY LIVING (ADL)
HYGIENE/GROOMING: INDEPENDENT
LAUNDRY: WITH SUPERVISION
ORAL_HYGIENE: INDEPENDENT
DRESS: INDEPENDENT
ORAL_HYGIENE: INDEPENDENT
DRESS: STREET CLOTHES
HYGIENE/GROOMING: INDEPENDENT
LAUNDRY: WITH SUPERVISION

## 2019-06-22 NOTE — PROGRESS NOTES
Pt said he thought his Wellbutrin was going to be increased, by his Dr. He said he will talk to him on Mon re this.

## 2019-06-22 NOTE — PROGRESS NOTES
Pt spent his tme in the room and comes out for making requests. He appears anxious and reports being depressed. He states that he did not sleep well last nite. He appears to be not focusing to one subject. He reports that he did not have a good appetite but forced self to eat. He reported in meeting that he was supposed to have increment of one medication but it did not happen.     06/22/19 1428   Behavioral Health   Hallucinations denies / not responding to hallucinations   Thinking distractable   Orientation time: oriented;date: oriented;place: oriented;person: oriented   Memory baseline memory   Insight poor   Judgement impaired   Eye Contact at examiner   Affect tense   Mood depressed;anxious   Physical Appearance/Attire appears stated age   Hygiene well groomed   Suicidality other (see comments)  (Denies)   1. Wish to be Dead No   2. Non-Specific Active Suicidal Thoughts  No   Self Injury other (see comment)  (Denies)   Activity isolative;withdrawn   Speech clear;coherent   Medication Sensitivity no stated side effects   Psychomotor / Gait balanced;steady   Psycho Education   Type of Intervention 1:1 intervention   Response participates, initiates socially appropriate   Hours 0.5   Activities of Daily Living   Hygiene/Grooming independent   Oral Hygiene independent   Dress street clothes   Laundry with supervision   Room Organization independent   Activity   Activity Assistance Provided independent

## 2019-06-22 NOTE — PROGRESS NOTES
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 He said rectal swab screening  likely was not done; no record of these in lab results, and pt does not recall having these. He said a private room for this situation is preferable. But, considering pt is on a behavioral unit, and not being treated for VRE currently, semi private room is acceptable. To encourage good hand hygiene/standard precautions. Explained this to pt, who verbalized understanding.

## 2019-06-22 NOTE — PROGRESS NOTES
06/21/19 2114   Therapeutic Recreation   Type of Intervention structured groups   Activity game   Response Participates, initiates socially appropriate   Hours 1     Pt participated in Therapeutic Recreation group with focus on leisure participation, social engagement, and strategic cognitive reasoning.  Engaged and cooperative in group recreational intervention via a group game.  Pt was a full participant throughout the entire duration of group, often acting out the clues. Pt mood was calm. Pt stated he enjoyed the game and had fun after the activity concluded. Pt showed progress towards goals.

## 2019-06-23 PROCEDURE — 25000132 ZZH RX MED GY IP 250 OP 250 PS 637: Performed by: PSYCHIATRY & NEUROLOGY

## 2019-06-23 PROCEDURE — 25000132 ZZH RX MED GY IP 250 OP 250 PS 637: Performed by: CLINICAL NURSE SPECIALIST

## 2019-06-23 PROCEDURE — 12400001 ZZH R&B MH UMMC

## 2019-06-23 RX ADMIN — DILTIAZEM HYDROCHLORIDE 360 MG: 120 CAPSULE, COATED, EXTENDED RELEASE ORAL at 08:30

## 2019-06-23 RX ADMIN — ATORVASTATIN CALCIUM 20 MG: 20 TABLET, FILM COATED ORAL at 08:30

## 2019-06-23 RX ADMIN — BENZOCAINE, MENTHOL 1 LOZENGE: 15; 3.6 LOZENGE ORAL at 16:24

## 2019-06-23 RX ADMIN — BENZOCAINE, MENTHOL 1 LOZENGE: 15; 3.6 LOZENGE ORAL at 13:16

## 2019-06-23 RX ADMIN — LISINOPRIL AND HYDROCHLOROTHIAZIDE 2 TABLET: 12.5; 2 TABLET ORAL at 08:29

## 2019-06-23 RX ADMIN — BENZOCAINE, MENTHOL 1 LOZENGE: 15; 3.6 LOZENGE ORAL at 09:36

## 2019-06-23 RX ADMIN — BUPROPION HYDROCHLORIDE 300 MG: 300 TABLET, FILM COATED, EXTENDED RELEASE ORAL at 08:30

## 2019-06-23 RX ADMIN — AMLODIPINE BESYLATE 10 MG: 5 TABLET ORAL at 08:30

## 2019-06-23 RX ADMIN — TRAZODONE HYDROCHLORIDE 50 MG: 50 TABLET ORAL at 21:04

## 2019-06-23 RX ADMIN — ACETAMINOPHEN 650 MG: 325 TABLET, FILM COATED ORAL at 21:04

## 2019-06-23 RX ADMIN — BENZOCAINE, MENTHOL 1 LOZENGE: 15; 3.6 LOZENGE ORAL at 21:04

## 2019-06-23 ASSESSMENT — ACTIVITIES OF DAILY LIVING (ADL)
DRESS: STREET CLOTHES;INDEPENDENT
HYGIENE/GROOMING: HANDWASHING;SHOWER;INDEPENDENT
DRESS: SCRUBS (BEHAVIORAL HEALTH);INDEPENDENT
LAUNDRY: WITH SUPERVISION
HYGIENE/GROOMING: HANDWASHING;SHOWER;INDEPENDENT
ORAL_HYGIENE: INDEPENDENT
ORAL_HYGIENE: INDEPENDENT

## 2019-06-23 ASSESSMENT — MIFFLIN-ST. JEOR: SCORE: 1977.02

## 2019-06-23 NOTE — PLAN OF CARE
"Pt was visible in the milieu and social with others. Denied Si/SIB/AH/VH. Pt reports was anxious earlier but prn hydroxyzine helped. Pt says his goal for today was \"tell people what is happening tome\". Pt states he feels like he has no feelings about anything. Pt says he has been in the hospital many times and when he leaves he feels like he has not gained/learned anything. Pt states he is frustrated that he can't tell what is happening to him and why he feels this way. Pt says he thinks he has \"blocked everything out\" as a way of coping. Pt says he has  with a girlfriend recently and he seems to go through this depression whenever he has problem with relationships of looses a family to death. He says he doesn't know how to deal with such losses and he ends up depressed.  "

## 2019-06-23 NOTE — PLAN OF CARE
"Pt states his mood is \"neutral.\" He is a bit disheveled and appears a bit unsettled. He denies si; rates his depression 4-5, anxiety 5. He said he doesn't have much of an appetite, but is eating meals. Pt said he slept pretty well, last night-then said he had some trouble getting back to sleep. He will try to move and walk around more, today. He said behind his knees feels \"tight.\" His other goal for the day is \" to use distraction to stay calm.\"   "

## 2019-06-24 PROCEDURE — G0177 OPPS/PHP; TRAIN & EDUC SERV: HCPCS

## 2019-06-24 PROCEDURE — 12400001 ZZH R&B MH UMMC

## 2019-06-24 PROCEDURE — 90853 GROUP PSYCHOTHERAPY: CPT

## 2019-06-24 PROCEDURE — 25000132 ZZH RX MED GY IP 250 OP 250 PS 637: Performed by: PSYCHIATRY & NEUROLOGY

## 2019-06-24 PROCEDURE — 99233 SBSQ HOSP IP/OBS HIGH 50: CPT | Performed by: PSYCHIATRY & NEUROLOGY

## 2019-06-24 PROCEDURE — 25000132 ZZH RX MED GY IP 250 OP 250 PS 637: Performed by: CLINICAL NURSE SPECIALIST

## 2019-06-24 RX ORDER — TRAZODONE HYDROCHLORIDE 100 MG/1
100 TABLET ORAL
Status: DISCONTINUED | OUTPATIENT
Start: 2019-06-24 | End: 2019-07-25 | Stop reason: HOSPADM

## 2019-06-24 RX ADMIN — DILTIAZEM HYDROCHLORIDE 360 MG: 120 CAPSULE, COATED, EXTENDED RELEASE ORAL at 08:51

## 2019-06-24 RX ADMIN — BENZOCAINE, MENTHOL 1 LOZENGE: 15; 3.6 LOZENGE ORAL at 11:16

## 2019-06-24 RX ADMIN — AMLODIPINE BESYLATE 10 MG: 5 TABLET ORAL at 08:51

## 2019-06-24 RX ADMIN — BUPROPION HYDROCHLORIDE 300 MG: 300 TABLET, FILM COATED, EXTENDED RELEASE ORAL at 08:51

## 2019-06-24 RX ADMIN — BENZOCAINE, MENTHOL 1 LOZENGE: 15; 3.6 LOZENGE ORAL at 19:39

## 2019-06-24 RX ADMIN — LISINOPRIL AND HYDROCHLOROTHIAZIDE 2 TABLET: 12.5; 2 TABLET ORAL at 08:52

## 2019-06-24 RX ADMIN — TRAZODONE HYDROCHLORIDE 100 MG: 100 TABLET ORAL at 21:48

## 2019-06-24 RX ADMIN — ATORVASTATIN CALCIUM 20 MG: 20 TABLET, FILM COATED ORAL at 08:51

## 2019-06-24 ASSESSMENT — ACTIVITIES OF DAILY LIVING (ADL)
LAUNDRY: WITH SUPERVISION
DRESS: SCRUBS (BEHAVIORAL HEALTH)
DRESS: SCRUBS (BEHAVIORAL HEALTH)
LAUNDRY: WITH SUPERVISION
ORAL_HYGIENE: INDEPENDENT
HYGIENE/GROOMING: INDEPENDENT
HYGIENE/GROOMING: INDEPENDENT
ORAL_HYGIENE: INDEPENDENT

## 2019-06-24 NOTE — PROGRESS NOTES
Pt was active in milieu and appropriate with staff and peers. Pt reported feeling depressed and anxious. Pt denies SI and SIB.          06/23/19 1531   Behavioral Health   Hallucinations denies / not responding to hallucinations   Thinking distractable   Orientation person: oriented;place: oriented;date: oriented;time: oriented   Memory baseline memory   Insight poor   Judgement impaired   Eye Contact at examiner   Affect blunted, flat   Mood depressed;mood is calm   Physical Appearance/Attire attire appropriate to age and situation   Hygiene neglected grooming - unclean body, hair, teeth   Suicidality other (see comments)  (Denies)   1. Wish to be Dead No   2. Non-Specific Active Suicidal Thoughts  No   3. Active Sucidal Ideation with any Methods (Not Plan) Without Intent to Act  No   4. Active Suicidal Ideation with Some Intent to Act, Without Specific Plan  No   5. Active Suicidal Ideation with Specific Plan and Intent  No   Self Injury other (see comment)  (None Observed)   Activity other (see comment)  (Active in milieu)   Speech clear;coherent   Medication Sensitivity no stated side effects;no observed side effects   Psychomotor / Gait balanced;steady   Coping/Psychosocial   Verbalized Emotional State anxiety;depression   Activities of Daily Living   Hygiene/Grooming handwashing;shower;independent   Oral Hygiene independent   Dress scrubs (behavioral health);independent   Laundry with supervision   Room Organization independent

## 2019-06-24 NOTE — PROGRESS NOTES
Pt attended groups this shift and he has been tense when checking with him. He denies suicidal ideations but expresses relationship and losing his parents. He appears tense when talking to him.     06/24/19 1423   Behavioral Health   Hallucinations denies / not responding to hallucinations   Thinking distractable   Orientation time: oriented;date: oriented;place: oriented;person: oriented   Memory baseline memory   Insight poor   Judgement impaired   Eye Contact at examiner   Affect/Mood (WDL) mood   Affect full range affect   Mood hopeless;mood is calm   Physical Appearance/Attire appears stated age   Hygiene well groomed   Suicidality other (see comments)  (Denies)   1. Wish to be Dead No   2. Non-Specific Active Suicidal Thoughts  No   Self Injury other (see comment)  (Denies)   Activity other (see comment)  (Social with others)   Speech coherent;clear   Medication Sensitivity no stated side effects   Psychomotor / Gait balanced;steady   Psycho Education   Type of Intervention 1:1 intervention   Response participates, initiates socially appropriate   Hours 0.5   Activities of Daily Living   Hygiene/Grooming independent   Oral Hygiene independent   Dress scrubs (behavioral health)   Laundry with supervision   Room Organization independent   Activity   Activity Assistance Provided independent

## 2019-06-24 NOTE — PROGRESS NOTES
"   06/24/19 1518   General Information   Date Initially Attended OT 06/24/19     Attended 2/3 occupational therapy groups offered this date. Overall content and cooperative.      Leisure exploration and participation group offered for increased intrinsic motivation to engage in social, non-obligatory occupations via a group game. Structured group was used to promote positive milieu interaction. Pt Response: Full participation. Demonstrated openness with peers however had difficulty articulating thoughts and stated \"I just can't think\" several times during prompted question. Continue to assess.     Pt's first attendance in Occupational Therapy Clinic. Pt Response: I to initiate, gather materials, sequence and adjust to workspace demands as needed for a detailed coloring project. Fair focus, planning, and problem solving. Able to ask for assistance as needed and appeared comfortable in the presence of peers and staff.     OT staff will meet with pt to review the role of occupational therapy and explain the value of having them involved in their treatment plan including options to meet current needs/self-identified goals. As group attendance is established, Pt will be given self-assessment to inform OT initial assessment.      "

## 2019-06-25 LAB
ALBUMIN UR-MCNC: 10 MG/DL
AMPHETAMINES UR QL SCN: NEGATIVE
APPEARANCE UR: ABNORMAL
BACTERIA #/AREA URNS HPF: ABNORMAL /HPF
BARBITURATES UR QL: NEGATIVE
BENZODIAZ UR QL: NEGATIVE
BILIRUB UR QL STRIP: NEGATIVE
CANNABINOIDS UR QL SCN: NEGATIVE
COCAINE UR QL: NEGATIVE
COLOR UR AUTO: YELLOW
ETHANOL UR QL SCN: NEGATIVE
GLUCOSE UR STRIP-MCNC: NEGATIVE MG/DL
HGB UR QL STRIP: NEGATIVE
HYALINE CASTS #/AREA URNS LPF: 1 /LPF (ref 0–2)
KETONES UR STRIP-MCNC: NEGATIVE MG/DL
LEUKOCYTE ESTERASE UR QL STRIP: ABNORMAL
MUCOUS THREADS #/AREA URNS LPF: PRESENT /LPF
NITRATE UR QL: POSITIVE
OPIATES UR QL SCN: NEGATIVE
PH UR STRIP: 6.5 PH (ref 5–7)
RBC #/AREA URNS AUTO: 3 /HPF (ref 0–2)
SOURCE: ABNORMAL
SP GR UR STRIP: 1.01 (ref 1–1.03)
SQUAMOUS #/AREA URNS AUTO: 1 /HPF (ref 0–1)
UROBILINOGEN UR STRIP-MCNC: NORMAL MG/DL (ref 0–2)
WBC #/AREA URNS AUTO: 35 /HPF (ref 0–5)
WBC CLUMPS #/AREA URNS HPF: PRESENT /HPF

## 2019-06-25 PROCEDURE — 80320 DRUG SCREEN QUANTALCOHOLS: CPT | Performed by: PSYCHIATRY & NEUROLOGY

## 2019-06-25 PROCEDURE — 84630 ASSAY OF ZINC: CPT | Performed by: PSYCHIATRY & NEUROLOGY

## 2019-06-25 PROCEDURE — 80307 DRUG TEST PRSMV CHEM ANLYZR: CPT | Performed by: PSYCHIATRY & NEUROLOGY

## 2019-06-25 PROCEDURE — G0177 OPPS/PHP; TRAIN & EDUC SERV: HCPCS

## 2019-06-25 PROCEDURE — 36415 COLL VENOUS BLD VENIPUNCTURE: CPT | Performed by: PSYCHIATRY & NEUROLOGY

## 2019-06-25 PROCEDURE — 81001 URINALYSIS AUTO W/SCOPE: CPT | Performed by: PSYCHIATRY & NEUROLOGY

## 2019-06-25 PROCEDURE — 87086 URINE CULTURE/COLONY COUNT: CPT | Performed by: PSYCHIATRY & NEUROLOGY

## 2019-06-25 PROCEDURE — 25000132 ZZH RX MED GY IP 250 OP 250 PS 637: Performed by: CLINICAL NURSE SPECIALIST

## 2019-06-25 PROCEDURE — 12400001 ZZH R&B MH UMMC

## 2019-06-25 PROCEDURE — 25000132 ZZH RX MED GY IP 250 OP 250 PS 637: Performed by: PSYCHIATRY & NEUROLOGY

## 2019-06-25 RX ADMIN — AMLODIPINE BESYLATE 10 MG: 5 TABLET ORAL at 08:12

## 2019-06-25 RX ADMIN — BUPROPION 450 MG: 150 TABLET, EXTENDED RELEASE ORAL at 08:13

## 2019-06-25 RX ADMIN — ATORVASTATIN CALCIUM 20 MG: 20 TABLET, FILM COATED ORAL at 08:13

## 2019-06-25 RX ADMIN — TRAZODONE HYDROCHLORIDE 100 MG: 100 TABLET ORAL at 21:41

## 2019-06-25 RX ADMIN — LISINOPRIL AND HYDROCHLOROTHIAZIDE 2 TABLET: 12.5; 2 TABLET ORAL at 08:13

## 2019-06-25 RX ADMIN — DILTIAZEM HYDROCHLORIDE 360 MG: 120 CAPSULE, COATED, EXTENDED RELEASE ORAL at 08:12

## 2019-06-25 ASSESSMENT — ACTIVITIES OF DAILY LIVING (ADL)
HYGIENE/GROOMING: INDEPENDENT
DRESS: SCRUBS (BEHAVIORAL HEALTH)
HYGIENE/GROOMING: INDEPENDENT
DRESS: INDEPENDENT
ORAL_HYGIENE: INDEPENDENT
ORAL_HYGIENE: INDEPENDENT

## 2019-06-25 ASSESSMENT — MIFFLIN-ST. JEOR: SCORE: 1978.84

## 2019-06-25 NOTE — PROGRESS NOTES
"   06/24/19 2000   Group Therapy Session   Group Attendance attended group session   Total Time (minutes) 45   Group Type psychotherapeutic   Group Topic Covered coping skills/lifestyle management   Patient Participation/Contribution cooperative with task;discussed personal experience with topic   Patient participated in psychotherapy group which included a mindfulness activity focusing on the 5 senses and then processing as a group. Ranjeet reported having difficulties thinking of his favorite sights, sounds, smells, etc. \"I've never thought about it before.\" He also stated that he has little sense of smell, except bad smells, or taste lately. He hopes to get those senses back because he misses \"the taste of a juicy hamburger.\"    He presented as tired and sad. He engaged in the activity and shared with the group.    "

## 2019-06-25 NOTE — PROGRESS NOTES
" Note  The patient participated in an hour long case management led group. The focus of the group was on Positive Thinking and Self-Affirmations to aid in mental health recovery. At the beginning of the group, the patient reported that he was feeling \"Confused.\" The patient had a flat affect throughout the group and was appropriate with both writer and peers in the group.     "

## 2019-06-25 NOTE — PROGRESS NOTES
"[facility] [place], [place]   Psychiatric Progress Note        Interim History:   The patient's care was discussed with the treatment team during the daily team meeting and/or staff's chart notes were reviewed.  Staff report patient spends a lot of time outside of his room, goes to groups, reports still feeling very depressed, but denies presence of Suicidal ideation. During today's visit he stated that he had been struggling with depression for a long time, not being able to figure out how he was different from others who were more successful in life: sometimes I think that I am dumb\". I pointed out that feeling that he had some deficiencies, bad about himself in general was very common for depressed people. Suggested that changing his attitude might take long time, encouraged him to be more patient with self. I also suggested to increase Wellbutrin XL, Trazodone doses, check Zn as he complained of muscle cramps. He agreed with my suggestions.         Medications:       amLODIPine  10 mg Oral Daily     atorvastatin  20 mg Oral Daily     buPROPion  300 mg Oral QAM     diltiazem ER COATED BEADS  360 mg Oral Daily     lisinopril-hydrochlorothiazide  2 tablet Oral Daily          Allergies:     Allergies   Allergen Reactions     Contrast Dye Difficulty breathing          Labs:   No results found for this or any previous visit (from the past 24 hour(s)).       Psychiatric Examination:     /80   Pulse 89   Temp 98.3  F (36.8  C) (Oral)   Resp 16   Ht 1.829 m (6')   Wt 111.9 kg (246 lb 11.2 oz)   SpO2 98%   BMI 33.46 kg/m    Weight is 246 lbs 11.2 oz  Body mass index is 33.46 kg/m .  Orthostatic Vitals       Most Recent      Sitting Orthostatic /95 06/24 0700    Sitting Orthostatic Pulse (bpm) 97 06/24 0700    Standing Orthostatic /91 06/24 0700    Standing Orthostatic Pulse (bpm) 101 06/24 0700            Appearance: dressed in hospital garbs  Attitude:  cooperative  Eye " Contact:  partial  Mood: depressed  Affect: constricted, sad  Speech: slow, monotone  Psychomotor Behavior: psychomotor retardation  Throught Process:  Slow, but logical  Associations:  tight  Thought Content: denies Suicidal ideation, psychosis  Insight: partial  Judgement:  Moderately impaired  Oriented to:  Self, time and place  Attention Span and Concentration: mildly impaired  Recent and Remote Memory: fair    Clinical Global Impressions  First:  Considering your total clinical experience with this particular patient population, how severe are the patient's symptoms at this time?: 7 (06/21/19 1701)  Compared to the patient's condition at the START of treatment, this patient's condition is:: 4 (06/21/19 1701)  Most recent:  Considering your total clinical experience with this particular patient population, how severe are the patient's symptoms at this time?: 7 (06/21/19 1701)  Compared to the patient's condition at the START of treatment, this patient's condition is:: 4 (06/21/19 1701)         Precautions:     Behavioral Orders   Procedures     Code 1 - Restrict to Unit     Routine Programming     As clinically indicated     Status 15     Every 15 minutes.     Suicide precautions     Patients on Suicide Precautions should have a Combination Diet ordered that includes a Diet selection(s) AND a Behavioral Tray selection for Safe Tray - with utensils, or Safe Tray - NO utensils            DIagnoses:     Major depressive disorder, current, severe.  Rule out generalized anxiety disorder.  The patient was also diagnosed in the past with dependent personality disorder.          Plan:     Will increase Wellbutrin XL dose, Trazodone prn. Will check as per discussion above Zn and UA. We discussed ECT, but defer decision about that until later on (patient wants to see if increased Wellbutrin XL is effective). Will also consider IRTS.

## 2019-06-25 NOTE — PLAN OF CARE
48 hour nursing assessment: Pt has been visible on the unit. Pt attended groups this shift. Pt stated that he got a good nights sleep last night and woke up feeling well, but that mood diminished as the day went on and pt became depressed. Pt denies SI/SIB. Pt is pleasant upon approach and selectively social.

## 2019-06-25 NOTE — PLAN OF CARE
"  Problem: OT General Care Plan  Goal: OT Frequency  Description  Pt will complete self-assessment within 3 days of initial attendance to OT group.     Attended 2/2 occupational therapy groups offered this date. Content, somewhat sad affect. Cooperative.    Mental health management group focusing on coping skill exploration. Education provided on maladaptive versus adaptive response to stress /symptoms and use within routine. Pt Response: Had difficulty identifying coping skills utilized to reduce stress and manage symptoms as stated \"I can't think of anything. 'm just not in the state of mind.. I typically just shut down. I never listened to what people taught me in the past\". Reported desire learn more about his diagnosis. Somewhat accepting of additional suggestions within conversation with peers and staff.     Occupational therapy clinic to continue work on I project. Pt Response: Demonstrated consistent performance skills as observed on previous dates.     Outcome: Improving     "

## 2019-06-26 LAB
BACTERIA SPEC CULT: NORMAL
Lab: NORMAL
SPECIMEN SOURCE: NORMAL

## 2019-06-26 PROCEDURE — 90837 PSYTX W PT 60 MINUTES: CPT

## 2019-06-26 PROCEDURE — 25000132 ZZH RX MED GY IP 250 OP 250 PS 637: Performed by: CLINICAL NURSE SPECIALIST

## 2019-06-26 PROCEDURE — G0177 OPPS/PHP; TRAIN & EDUC SERV: HCPCS

## 2019-06-26 PROCEDURE — 99232 SBSQ HOSP IP/OBS MODERATE 35: CPT | Performed by: PSYCHIATRY & NEUROLOGY

## 2019-06-26 PROCEDURE — 25000132 ZZH RX MED GY IP 250 OP 250 PS 637: Performed by: PSYCHIATRY & NEUROLOGY

## 2019-06-26 PROCEDURE — H2032 ACTIVITY THERAPY, PER 15 MIN: HCPCS

## 2019-06-26 PROCEDURE — 12400001 ZZH R&B MH UMMC

## 2019-06-26 RX ADMIN — ATORVASTATIN CALCIUM 20 MG: 20 TABLET, FILM COATED ORAL at 08:51

## 2019-06-26 RX ADMIN — AMLODIPINE BESYLATE 10 MG: 5 TABLET ORAL at 08:50

## 2019-06-26 RX ADMIN — LISINOPRIL AND HYDROCHLOROTHIAZIDE 2 TABLET: 12.5; 2 TABLET ORAL at 08:50

## 2019-06-26 RX ADMIN — BUPROPION 450 MG: 150 TABLET, EXTENDED RELEASE ORAL at 08:50

## 2019-06-26 RX ADMIN — DILTIAZEM HYDROCHLORIDE 360 MG: 120 CAPSULE, COATED, EXTENDED RELEASE ORAL at 08:50

## 2019-06-26 RX ADMIN — TRAZODONE HYDROCHLORIDE 100 MG: 100 TABLET ORAL at 22:33

## 2019-06-26 ASSESSMENT — ACTIVITIES OF DAILY LIVING (ADL)
HYGIENE/GROOMING: INDEPENDENT
ORAL_HYGIENE: INDEPENDENT
LAUNDRY: WITH SUPERVISION
DRESS: INDEPENDENT
ORAL_HYGIENE: INDEPENDENT
HYGIENE/GROOMING: INDEPENDENT
DRESS: SCRUBS (BEHAVIORAL HEALTH)
LAUNDRY: UNABLE TO COMPLETE

## 2019-06-26 NOTE — PROGRESS NOTES
Individual verbal psychotherapy assessment.    See group DMT note.       06/26/19 1500   Dance Movement Therapy   Type of Intervention 1:1 intervention   Response participates, initiates socially appropriate   Hours 1

## 2019-06-26 NOTE — PROGRESS NOTES
Writer emailed Eufemia Rausch to inquire about the availability of a psychotherapist to be able to see the patient, at his request.

## 2019-06-26 NOTE — PROGRESS NOTES
Patient reports continued depression but denies SI/SIB. Patient was visible and social throughout the shift.     06/25/19 2100   Behavioral Health   Hallucinations denies / not responding to hallucinations   Thinking intact   Orientation person: oriented;place: oriented;date: oriented;time: oriented   Memory baseline memory   Insight admits / accepts   Judgement intact   Eye Contact at examiner   Affect full range affect   Mood depressed   Physical Appearance/Attire appears stated age;attire appropriate to age and situation   Hygiene well groomed   Suicidality other (see comments)  (Denies)   1. Wish to be Dead No   2. Non-Specific Active Suicidal Thoughts  No   Self Injury other (see comment)  (Denies)   Activity other (see comment)  (Visible and social.)   Speech clear;coherent   Medication Sensitivity no stated side effects;no observed side effects   Psychomotor / Gait balanced;steady   Activities of Daily Living   Hygiene/Grooming independent   Oral Hygiene independent   Dress independent   Room Organization independent

## 2019-06-26 NOTE — PROGRESS NOTES
Pt attended groups this shift and he denies suicidal ideations or any hallucinations. He was observed in the lounge with others but distancing from others. No complain reported except he states that he does not know what to do.     06/26/19 1357   Behavioral Health   Hallucinations denies / not responding to hallucinations   Thinking distractable   Orientation time: oriented;date: oriented;place: oriented;person: oriented   Memory baseline memory   Insight insight appropriate to situation   Judgement impaired   Eye Contact at examiner   Affect full range affect   Mood depressed;anxious   Physical Appearance/Attire appears stated age   Hygiene well groomed   Suicidality other (see comments)  (Denies)   1. Wish to be Dead No   2. Non-Specific Active Suicidal Thoughts  No   Self Injury other (see comment)  (Denies)   Activity other (see comment)  (Social with others)   Speech coherent;clear   Medication Sensitivity no stated side effects   Psychomotor / Gait balanced;steady   Psycho Education   Type of Intervention structured groups   Response participates, initiates socially appropriate   Hours 0.5   Activities of Daily Living   Hygiene/Grooming independent   Oral Hygiene independent   Dress scrubs (behavioral health)   Laundry unable to complete   Room Organization independent   Activity   Activity Assistance Provided independent

## 2019-06-26 NOTE — PLAN OF CARE
Problem: OT General Care Plan  Goal: OT Frequency  Description  Pt will demonstrate consistent engagement in OT group activities that support recovery as evidenced by participating in >1 scheduled OT group/day for 5 days.     Attended 2/2 occupational therapy groups offered this date. Overall restricted affect yet full engagement.    Occupational therapy clinic. Pt Response: Demonstrated consistent performance skills as observed on previous dates.   Leisure Group: [employer] game with prompted questions to facilitate insight development r/t establishing healthy boundaries. Pt Response: I to participate; demonstrated good sustained attention, visual scanning, and following direction. Reported feeling like he has healthy boundaries as he has exercised assertive communication with family members in the past. Would like to strengthen boundary setting with his ex-wife.    Outcome: Improving

## 2019-06-26 NOTE — PROGRESS NOTES
Pt participated in a full group session of dance/movement therapy (DMT) and individual verbal psychotherapy.   Pt demonstratds significant depressive symptoms with cognitive confusion.  He expressed an inability to experience ralph doing the things he used to enjoy, as well as an inability to integrate previous losses.    He is eager to have someone to talk with because he cannot understand his multiple hospitalizations nor the recommendations of hospital staff. His  treatment team will have more information about his course of ECT and then can better determine length of stay this hospitalization.  He is uncomfortable (even confused) by movement therapy and is open to and more comfortable with individual verbal psychotherapy.     Will continue to assess pt clinical needs.       06/26/19 1015   Dance Movement Therapy   Type of Intervention structured groups   Response participates with encouragement   Hours 1

## 2019-06-26 NOTE — PROGRESS NOTES
Occupational Therapy Initial Assessment     Description: OT staff met with pt to review the role of occupational therapy and explain the value of having them involved in their treatment plan including options to meet current needs/self-identified goals. The below evaluation is a compilation of chart review, functional performance observation, and brief interview.     Pt reported minimal engagement in daily occupations d/t recent move and significant anhedonia. Currently unemployed.      06/26/19 0938   Clinical Impression   Affect Restricted   Orientation Oriented to person, place and time   Appearance and ADLs General cleanliness observed in most areas   Attention to Internal Stimuli No observed signs   Interaction Skills Interacts appropriately with staff;Interacts appropriately with peers   Ability to Communicate Needs Independent   Verbal Content Articulate   Ability to Maintain Boundaries Maintains appropriate physical boundaries;Maintains appropriate verbal boundaries   Participation Independently participates   Concentration Concentrates 30+ minutes   Ability to Concentrate With structure;Needs further assessment   Follows and Comprehends Directions Needs further assessment  (observation limited to simple tasks)   Memory Recall impaired, but retains orientation  (expressed difficulty remembering past events )   Organization Independently organizes simple tasks   Decision Making Independent   Planning and Problem Solving Independently plans ahead   Ability to Apply and Learn Concepts Comprehends concepts, but needs assist to apply   Frustrations / Stress Tolerance   (needs assistance to identify and utilize coping skills)   Level of Insight Insightful into needs, issues, goals  (issues: anhedonia and depression. goals: increased mood and engagement in occupations)   Self Esteem Needs further assessment   Social Supports Has knowledge of support systems  (limited to daughter which he has minimal contact with.  decreased contact with family)       Pt had a difficult time identifying interests to explore healthy coping strategies, however has been observed in leisure groups/games and colors a complex coloring poster during OT Clinics. Hopes to improve overall mood and engagement in significant and meaningful occupations.     Assessment: Pt would benefit from engagement in OT groups that support healthy recovery, specifically exploration of positive coping skills for symptom management/relapse prevention.     Plan: Initiate occupational therapy goals per plan of care.     Pt will demonstrate consistent engagement in OT group activities that support recovery as evidenced by participating in >1 scheduled OT group/day for 5 days.

## 2019-06-27 PROBLEM — F33.2 SEVERE RECURRENT MAJOR DEPRESSION WITHOUT PSYCHOTIC FEATURES (H): Status: ACTIVE | Noted: 2019-06-27

## 2019-06-27 LAB
ANION GAP SERPL CALCULATED.3IONS-SCNC: 6 MMOL/L (ref 3–14)
BUN SERPL-MCNC: 20 MG/DL (ref 7–30)
CALCIUM SERPL-MCNC: 9.1 MG/DL (ref 8.5–10.1)
CHLORIDE SERPL-SCNC: 104 MMOL/L (ref 94–109)
CO2 SERPL-SCNC: 29 MMOL/L (ref 20–32)
CREAT SERPL-MCNC: 1.19 MG/DL (ref 0.66–1.25)
GFR SERPL CREATININE-BSD FRML MDRD: 67 ML/MIN/{1.73_M2}
GLUCOSE SERPL-MCNC: 146 MG/DL (ref 70–99)
POTASSIUM SERPL-SCNC: 3.5 MMOL/L (ref 3.4–5.3)
SODIUM SERPL-SCNC: 139 MMOL/L (ref 133–144)
ZINC SERPL-MCNC: 81.6 UG/DL (ref 60–120)

## 2019-06-27 PROCEDURE — 25000132 ZZH RX MED GY IP 250 OP 250 PS 637: Performed by: CLINICAL NURSE SPECIALIST

## 2019-06-27 PROCEDURE — 25000132 ZZH RX MED GY IP 250 OP 250 PS 637: Performed by: PSYCHIATRY & NEUROLOGY

## 2019-06-27 PROCEDURE — 99232 SBSQ HOSP IP/OBS MODERATE 35: CPT | Performed by: PHYSICIAN ASSISTANT

## 2019-06-27 PROCEDURE — 99232 SBSQ HOSP IP/OBS MODERATE 35: CPT | Performed by: PSYCHIATRY & NEUROLOGY

## 2019-06-27 PROCEDURE — 12400001 ZZH R&B MH UMMC

## 2019-06-27 PROCEDURE — 93010 ELECTROCARDIOGRAM REPORT: CPT | Performed by: INTERNAL MEDICINE

## 2019-06-27 PROCEDURE — G0177 OPPS/PHP; TRAIN & EDUC SERV: HCPCS

## 2019-06-27 PROCEDURE — 36415 COLL VENOUS BLD VENIPUNCTURE: CPT | Performed by: PHYSICIAN ASSISTANT

## 2019-06-27 PROCEDURE — 93005 ELECTROCARDIOGRAM TRACING: CPT

## 2019-06-27 PROCEDURE — 99207 ZZC CDG-HISTORY COMP: MEETS EXP. PROBLEM FOCUSED - DOWN CODED LACK OF HPI: CPT | Performed by: PHYSICIAN ASSISTANT

## 2019-06-27 PROCEDURE — 80048 BASIC METABOLIC PNL TOTAL CA: CPT | Performed by: PHYSICIAN ASSISTANT

## 2019-06-27 RX ORDER — LISINOPRIL 40 MG/1
40 TABLET ORAL DAILY
Status: DISCONTINUED | OUTPATIENT
Start: 2019-06-28 | End: 2019-07-15

## 2019-06-27 RX ORDER — HYDROCHLOROTHIAZIDE 12.5 MG/1
25 CAPSULE ORAL DAILY
Status: DISCONTINUED | OUTPATIENT
Start: 2019-06-28 | End: 2019-07-15

## 2019-06-27 RX ADMIN — TRAZODONE HYDROCHLORIDE 100 MG: 100 TABLET ORAL at 21:58

## 2019-06-27 RX ADMIN — AMLODIPINE BESYLATE 10 MG: 5 TABLET ORAL at 08:22

## 2019-06-27 RX ADMIN — DILTIAZEM HYDROCHLORIDE 360 MG: 120 CAPSULE, COATED, EXTENDED RELEASE ORAL at 08:22

## 2019-06-27 RX ADMIN — BUPROPION 450 MG: 150 TABLET, EXTENDED RELEASE ORAL at 08:22

## 2019-06-27 RX ADMIN — ATORVASTATIN CALCIUM 20 MG: 20 TABLET, FILM COATED ORAL at 08:22

## 2019-06-27 RX ADMIN — LISINOPRIL AND HYDROCHLOROTHIAZIDE 2 TABLET: 12.5; 2 TABLET ORAL at 08:22

## 2019-06-27 ASSESSMENT — MIFFLIN-ST. JEOR: SCORE: 1973.85

## 2019-06-27 NOTE — PROGRESS NOTES
Behavioral Health  Note   Behavioral Health  Spirituality Group Note     Unit 20    Name: Flakito Hilliard    YOB: 1960   MRN: [id number]    Age: 59 year old     Patient attended -led group, which included discussion of spirituality, coping with illness and building resilience.   Patient attended group for 1.0 hrs.   The patient actively participated in group discussion     [facility]  Staff    Page [phone]

## 2019-06-27 NOTE — PROGRESS NOTES
Patient attended psycho-education group Re: mental illness diagnoses and treatment.  Appropriately participated.  Discussed his hopes of starting ECT.

## 2019-06-27 NOTE — CONSULTS
Internal Medicine Consult    Flakito Hilliard MRN# [id number]   Age: 59 year old YOB: 1960     Date of Admission: 6/20/2019  Date of Consult:  6/27/2019    Requesting Service: Behavioral Health - Sarkis Edwards MD  Reason for Consult: Pre ECT Medicine Evaluation   Indication for ECT: Refractory depression    Chief Complaint: Depression    HPI: Flakito Hilliard is a 59 year old male with history of HTN, sleep apnea, and depression who was admitted to inpatient psychiatry for worsening depression and suicidal ideation. The patient was noted to have improvement in symptoms with ECT in the past, therefore being considered for further management. At present, the patient denies any physical complaints.     Review of Systems:   Cardiovascular: negative  Pulmonary: No shortness of breath, dyspnea on exertion, cough, or hemoptysis  Neurological: negative    Past Medical History:   Prior Anesthesia: Yes  If yes, any complications: No    Prior ECT: Yes  If yes,   unknown  Response: Effective per patient  Side Effects: None    Cardiovascular: CAD - No, MI - No, HTN - Yes, CHF - No, pacemaker or ICD - No  Pulmonary: Asthma/COPD - No, Prior respiratory failure or need for emergent intubation - No, On theophylline - No (note that theophylline use is a contraindication to proceeding with ECT, needs to be tapered off prior)  Neurological: Brain tumor - No, TIA/CVA - No, Dementia - No, Neuromuscular Disease (including post polio syndrome) - No, Seizures and/or Epilepsy - No, Head Injury - YES, concussion. Date-2012., Intracranial Hemorrhage - No      Past Surgical History:   Procedure Laterality Date     BLADDER SURGERY       CYSTOSTOMY, INSERT TUBE SUPRAPUBIC, COMBINED N/A 9/8/2014    Procedure: COMBINED CYSTOSTOMY, INSERT TUBE SUPRAPUBIC;  Surgeon: Min Prasad MD;  Location: [facility] OR     GENITOURINARY SURGERY      prostate surgery for stones     LASER HOLMIUM CYSTOSCOPY, INTERNAL URETHROTOMY, COMBINED N/A  10/24/2014    Procedure: COMBINED LASER HOLMIUM CYSTOSCOPY, INTERNAL URETHROTOMY;  Surgeon: Valentin Villatoro MD;  Location: [facility] OR     URETHROPLASTY N/A 12/10/2014    Procedure: URETHROPLASTY;  Surgeon: Niraj Burger MD;  Location: [facility] OR     URETHROPLASTY WITH BUCCAL GRAFT N/A 12/9/2016    Procedure: URETHROPLASTY WITH BUCCAL GRAFT;  Surgeon: Niraj Burger MD;  Location: [facility] OR       Allergies:   Allergies   Allergen Reactions     Contrast Dye Difficulty breathing       Medication list reviewed.    Physical Exam:  /79   Pulse 83   Temp 97.3  F (36.3  C) (Oral)   Resp 16   Ht 1.829 m (6')   Wt 111.6 kg (246 lb)   SpO2 98%   BMI 33.36 kg/m     Cardiovascular: RRR. S1, S2. No murmurs, rubs, gallops.   Pulmonary: Effort normal. Lungs CTAB with no wheezing, rales, rhonchi.   Neurological: A&Ox3. No focal deficits. PERRLA.     Data:  EKG: Normal sinus rhythm with 1st degree AVB. No ST or T wave changes to suggest ischemia. Normal axis. QTc 425.    Head Imaging: None    Labs:   ROUTINE IP LABS (Last four results)  Recent Labs   Lab 06/27/19  1340      POTASSIUM 3.5   CHLORIDE 104   CO2 29   ANIONGAP 6   *   BUN 20   CR 1.19   AMILCAR 9.1                  Recommendations:   - Diuretics and oral hypoglycemics should be held the morning of ECT.   - All other antihypertensive medications can be continued.   - Patient currently takes combo lisinopril-hydrochlorothiazide, therefore will separate to aid with holding diuretics during ECT.     Patient does not have absolute medical contraindications to proceeding with ECT at this time. Treatment plan per psychiatry.       Felix Ruvalcaba PA-C  Internal Medicine [facility] Hospitalist  [facility]  Pager [phone]

## 2019-06-27 NOTE — PROGRESS NOTES
[facility], [place]   Psychiatric Progress Note        Interim History:     The patient's care was discussed with the treatment team during the daily team meeting and/or staff's chart notes were reviewed.  Staff report patient spends a lot of time outside of his room, goes to groups, reports still feeling very depressed, but denies presence of Suicidal ideation. He, in fact, appeared to be slightly more animated during his today's visit with me. He said that he was hoping to start ECT as soon as possible and that ECT would help with his depression. He denied any other concerns. Was reported being compliant with care, meds, pleasant and polite with treatment team members and other patients on the floor.          Medications:       amLODIPine  10 mg Oral Daily     atorvastatin  20 mg Oral Daily     buPROPion  450 mg Oral QAM     diltiazem ER COATED BEADS  360 mg Oral Daily     lisinopril-hydrochlorothiazide  2 tablet Oral Daily          Allergies:     Allergies   Allergen Reactions     Contrast Dye Difficulty breathing          Labs:     Recent Results (from the past 24 hour(s))   Basic metabolic panel    Collection Time: 06/27/19  1:40 PM   Result Value Ref Range    Sodium 139 133 - 144 mmol/L    Potassium 3.5 3.4 - 5.3 mmol/L    Chloride 104 94 - 109 mmol/L    Carbon Dioxide 29 20 - 32 mmol/L    Anion Gap 6 3 - 14 mmol/L    Glucose 146 (H) 70 - 99 mg/dL    Urea Nitrogen 20 7 - 30 mg/dL    Creatinine 1.19 0.66 - 1.25 mg/dL    GFR Estimate 67 >60 mL/min/[1.73_m2]    GFR Estimate If Black 77 >60 mL/min/[1.73_m2]    Calcium 9.1 8.5 - 10.1 mg/dL          Psychiatric Examination:     /79   Pulse 83   Temp 97.3  F (36.3  C) (Oral)   Resp 16   Ht 1.829 m (6')   Wt 111.6 kg (246 lb)   SpO2 98%   BMI 33.36 kg/m    Weight is 246 lbs 0 oz  Body mass index is 33.36 kg/m .     Orthostatic Vitals       Most Recent      Sitting Orthostatic /73 06/27 0821    Sitting Orthostatic Pulse  (bpm) 75 06/27 0821    Standing Orthostatic /80 06/26 0846    Standing Orthostatic Pulse (bpm) 103 06/26 0846         Appearance: dressed in hospital garbs  Attitude: cooperative  Eye Contact:  partial  Mood: depressed  Affect: constricted, sad, though, slightly more animated today  Speech: slow, monotone  Psychomotor Behavior: psychomotor retardation  Throught Process:  Slow, but logical  Associations:  tight  Thought Content: denies Suicidal ideation, psychosis  Insight: partial  Judgement:  Mildly impaired  Oriented to:  Self, time and place  Attention Span and Concentration: mildly impaired  Recent and Remote Memory: fair    Clinical Global Impressions  First:  Considering your total clinical experience with this particular patient population, how severe are the patient's symptoms at this time?: 7 (06/21/19 1701)  Compared to the patient's condition at the START of treatment, this patient's condition is:: 4 (06/21/19 1701)  Most recent:  Considering your total clinical experience with this particular patient population, how severe are the patient's symptoms at this time?: 7 (06/21/19 1701)  Compared to the patient's condition at the START of treatment, this patient's condition is:: 4 (06/21/19 1701)         Precautions:     Behavioral Orders   Procedures     Code 1 - Restrict to Unit     Electroconvulsive therapy     ECT every Monday, Wednesday, and Friday     Routine Programming     As clinically indicated     Status 15     Every 15 minutes.     Suicide precautions     Patients on Suicide Precautions should have a Combination Diet ordered that includes a Diet selection(s) AND a Behavioral Tray selection for Safe Tray - with utensils, or Safe Tray - NO utensils            DIagnoses:     Major depressive disorder, current, severe.  Rule out generalized anxiety disorder.  The patient was also diagnosed in the past with dependent personality disorder.          Plan:     Will, hopefully, start ECT tomorrow. No  medication changes today. Will continue to provide support and structure.  Will also consider IRTS.

## 2019-06-27 NOTE — PLAN OF CARE
"  Problem: OT General Care Plan  Goal: OT Frequency  Description  Pt will demonstrate consistent engagement in OT group activities that support recovery as evidenced by participating in >1 scheduled OT group/day for 5 days.     Attended 2/2 occupational therapy groups offered this date. Overall congruent affect and moderate engagement.    Mental health management group with a focus on coping through movement to facilitate relaxation and stress management via floor yoga and a 10-minute body scan meditation. Pt Response: Followed and engaged in min-no simple yoga practice as stated \"I can't get into those poses, however verbalized significant positive response to the meditation.   Topic group for general health and coping: Collage focused on insight development specifically past, present, and future supports/barriers related to recovery. Pt Response: Able to organize and apply concepts to collage, sequence activity, and clean-up. Demonstrated openness with peers; Pt identified positive memories with family and children in the past, experiencing a \"stormy\" season in life, and unclear future however somewhat hopeful. Significant increase in socialization and range of affect this session.     Outcome: Improving     "

## 2019-06-27 NOTE — PROGRESS NOTES
06/26/19 2200   Art Therapy   Type of Intervention structured groups   Response participates with encouragement   Hours 1   Treatment Detail    Art Therapy -animal symbolism   Problem- depression/ suicidal ideation      Goal-  cope, express, regulate, symbolism through Art Therapy directives     Outcome- Pt attended  group. He said he felt ok and he has social and had full affect. He chose the white tailed deer for his animal. He lauren the deer behind some tall grass. He seemed pleased with his representation. He said he used to hunt, but he just likes to watch the deer move in nature.

## 2019-06-27 NOTE — PLAN OF CARE
Pt visible in the milieu for the most part, attended and participated in groups this shift.  Pt continues to report depression, affect remains flat blunted. Pt was med compliant and denies any medication side effects.   Pt denies SI.

## 2019-06-27 NOTE — PROGRESS NOTES
CLINICAL NUTRITION SERVICES - REASSESSMENT NOTE     Nutrition Prescription    RECOMMENDATIONS FOR MDs/PROVIDERS TO ORDER:  None at this time    Malnutrition Status:    Patient does not meet two of the above criteria necessary for diagnosing malnutrition    Recommendations already ordered by Registered Dietitian (RD):  None at this time    Future/Additional Recommendations:  Monitor PO, wt trends, and appetite.     EVALUATION OF THE PROGRESS TOWARD GOALS   Diet: Regular    Intake: Pt reports that he doesn't really have an appetite but that he knows he should eat 3 meals a day so he has been eating >75% of meal trays TID.      NEW FINDINGS   Wt trends: Wt stable the past week. Up 4 lbs from admission (6/20). Down 14 lbs (5%) from UBW of 260 lbs.   Pt reported that he would be fine if his weight stays in the ~245 lb range.   06/27/19 0900 111.6 kg (246 lb) AS   06/25/19 0700 112.1 kg (247 lb 1.6 oz) TK   06/23/19 0824 111.9 kg (246 lb 11.2 oz) AP   06/20/19 2235 109.8 kg (242 lb) DO     MALNUTRITION  % Intake: No decreased intake noted  % Weight Loss: Up to 5% in 1 month (non-severe)  Subcutaneous Fat Loss: None observed  Muscle Loss: None observed  Fluid Accumulation/Edema: None noted  Malnutrition Diagnosis: Patient does not meet two of the above criteria necessary for diagnosing malnutrition    Previous Goals   Patient to consume % of nutritionally adequate meal trays TID, or the equivalent with supplements/snacks.  Evaluation: Met    Previous Nutrition Diagnosis  Predicted inadequate nutrient intake (energy/ protein) related to historical decreased appetite/ intake as evidenced by anticipated intake less than estimated needs.  Evaluation: Changed below    CURRENT NUTRITION DIAGNOSIS  Predicted inadequate nutrient intake (energy/protein) related to decreased appetite but still eating 3 meals a day as evidenced by potential for decline.       INTERVENTIONS  Implementation  Nutrition Education: Discussed current  appetite/intake. Encouraged continued intake of 3 meals a day.     Goals  Patient to consume % of nutritionally adequate meal trays TID, or the equivalent with supplements/snacks.    Monitoring/Evaluation  Progress toward goals will be monitored and evaluated per protocol.    Emerita Bautista, Nutrition   Unit pager: [phone]

## 2019-06-28 LAB
HGB BLD-MCNC: 16.5 G/DL (ref 13.3–17.7)
INTERPRETATION ECG - MUSE: NORMAL

## 2019-06-28 PROCEDURE — 25000132 ZZH RX MED GY IP 250 OP 250 PS 637: Performed by: CLINICAL NURSE SPECIALIST

## 2019-06-28 PROCEDURE — G0177 OPPS/PHP; TRAIN & EDUC SERV: HCPCS

## 2019-06-28 PROCEDURE — 25000132 ZZH RX MED GY IP 250 OP 250 PS 637: Performed by: PSYCHIATRY & NEUROLOGY

## 2019-06-28 PROCEDURE — 12400001 ZZH R&B MH UMMC

## 2019-06-28 PROCEDURE — 85018 HEMOGLOBIN: CPT | Performed by: PSYCHIATRY & NEUROLOGY

## 2019-06-28 PROCEDURE — 93005 ELECTROCARDIOGRAM TRACING: CPT

## 2019-06-28 PROCEDURE — 25000132 ZZH RX MED GY IP 250 OP 250 PS 637: Performed by: PHYSICIAN ASSISTANT

## 2019-06-28 PROCEDURE — 99232 SBSQ HOSP IP/OBS MODERATE 35: CPT | Performed by: PSYCHIATRY & NEUROLOGY

## 2019-06-28 PROCEDURE — 36415 COLL VENOUS BLD VENIPUNCTURE: CPT | Performed by: PSYCHIATRY & NEUROLOGY

## 2019-06-28 RX ADMIN — ATORVASTATIN CALCIUM 20 MG: 20 TABLET, FILM COATED ORAL at 11:56

## 2019-06-28 RX ADMIN — BUPROPION 450 MG: 150 TABLET, EXTENDED RELEASE ORAL at 11:56

## 2019-06-28 RX ADMIN — AMLODIPINE BESYLATE 10 MG: 5 TABLET ORAL at 08:48

## 2019-06-28 RX ADMIN — LISINOPRIL 40 MG: 40 TABLET ORAL at 08:48

## 2019-06-28 RX ADMIN — DILTIAZEM HYDROCHLORIDE 360 MG: 120 CAPSULE, COATED, EXTENDED RELEASE ORAL at 08:48

## 2019-06-28 RX ADMIN — TRAZODONE HYDROCHLORIDE 100 MG: 100 TABLET ORAL at 21:39

## 2019-06-28 RX ADMIN — HYDROCHLOROTHIAZIDE 25 MG: 12.5 CAPSULE ORAL at 11:56

## 2019-06-28 ASSESSMENT — ACTIVITIES OF DAILY LIVING (ADL)
ORAL_HYGIENE: INDEPENDENT
DRESS: INDEPENDENT
LAUNDRY: WITH SUPERVISION
HYGIENE/GROOMING: INDEPENDENT

## 2019-06-28 NOTE — PROGRESS NOTES
Pt talked about his long melchor of depression. Pt seemed hopeless and giving up on any effort to regain his mental health. Pt said nothing seemed to help him get better and also believe is support system is poor. He believe his friends and family make a big deal of little things does which makes him feel uncomfortable. Pt do not believe ECT will help with his depression; however agreed to start it tomorrow.

## 2019-06-28 NOTE — PROGRESS NOTES
"   06/28/19 1500   Occupational Therapy   Type of Intervention structured groups   Response Participates with encouragement   Hours 1     Required encouragement to attend 1/2 occupational therapy groups offered this date. Overall content and cooperative.     Meditative creative expression task for sustained attention and stress management. Pt Response:  Congruent affect; appropriate within group expectations. Appeared to achieve a relaxed state during this activity. Demonstrated poor attention as Pt included minimal detail to task and reported \"I don't know.. I was just coloring. I didn't really get into it\". Would benefit from more goal-directed, structured interventions.     "

## 2019-06-28 NOTE — PROGRESS NOTES
ECT called this AM to inform RN that patient must have a hemoglobin completed within the past 30 days in order for ECT to occur. Patients last hemoglobin was completed on 05/19/19. Orders needed for hemoglobin.  Provider notified.
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
## 2019-06-28 NOTE — PROGRESS NOTES
Call received from Lab re unable to get patient hemoglobin result due to blood clot in tubing.  Lab to re draw the blood asap by sending a staff over; to draw another blood from patient.  ECT nurse notified, told staff would report this to Dr. Liu.

## 2019-06-28 NOTE — PROGRESS NOTES
[facility], [place]   Psychiatric Progress Note        Interim History:     The patient's care was discussed with the treatment team during the daily team meeting and/or staff's chart notes were reviewed.  Staff report patient spends a lot of time outside of his room, goes to groups, reports still feeling very depressed, though, it was noted by number of staff members, starts conversation, shares good things from his past. Today he sounded put down by the fact that he had to wait for ECT and it was cancelled: Hgb could not be drawn as first sample was clotted. He, nevertheless, said that he realized he needed to be patient. He denied Suicidal ideation. Was seen reading book or watching TV.            Medications:       amLODIPine  10 mg Oral Daily     atorvastatin  20 mg Oral Daily     buPROPion  450 mg Oral QAM     diltiazem ER COATED BEADS  360 mg Oral Daily     hydrochlorothiazide  25 mg Oral Daily     lisinopril  40 mg Oral Daily          Allergies:     Allergies   Allergen Reactions     Contrast Dye Difficulty breathing          Labs:     Recent Results (from the past 24 hour(s))   Hemoglobin    Collection Time: 06/28/19 10:16 AM   Result Value Ref Range    Hemoglobin 16.5 13.3 - 17.7 g/dL          Psychiatric Examination:     BP (!) 160/101 (BP Location: Left arm)   Pulse 99   Temp 98.3  F (36.8  C) (Oral)   Resp 16   Ht 1.829 m (6')   Wt 111.6 kg (246 lb)   SpO2 98%   BMI 33.36 kg/m    Weight is 246 lbs 0 oz  Body mass index is 33.36 kg/m .     Orthostatic Vitals       Most Recent      Sitting Orthostatic /101 06/28 0837    Sitting Orthostatic Pulse (bpm) 96 06/28 0837    Standing Orthostatic /83 06/28 0837    Standing Orthostatic Pulse (bpm) 103 06/28 0837         Appearance: dressed in hospital garbs  Attitude: cooperative  Eye Contact:  partial  Mood: depressed  Affect: constricted, sad,  Speech: slow, monotone  Psychomotor Behavior: psychomotor  retardation  Throught Process:  Slow, but logical  Associations:  tight  Thought Content: denies Suicidal ideation, psychosis  Insight: partial  Judgement:  Mildly impaired  Oriented to:  Self, time and place  Attention Span and Concentration: mildly impaired  Recent and Remote Memory: fair    Clinical Global Impressions  First:  Considering your total clinical experience with this particular patient population, how severe are the patient's symptoms at this time?: 7 (06/21/19 1701)  Compared to the patient's condition at the START of treatment, this patient's condition is:: 4 (06/21/19 1701)  Most recent:  Considering your total clinical experience with this particular patient population, how severe are the patient's symptoms at this time?: 5 (6/28/2019 )  Compared to the patient's condition at the START of treatment, this patient's condition is:: 3 (6/28/2019)         Precautions:     Behavioral Orders   Procedures     Code 1 - Restrict to Unit     Electroconvulsive therapy     ECT every Monday, Wednesday, and Friday     Routine Programming     As clinically indicated     Status 15     Every 15 minutes.     Suicide precautions     Patients on Suicide Precautions should have a Combination Diet ordered that includes a Diet selection(s) AND a Behavioral Tray selection for Safe Tray - with utensils, or Safe Tray - NO utensils            DIagnoses:     Major depressive disorder, current, severe.  Rule out generalized anxiety disorder.  The patient was also diagnosed in the past with dependent personality disorder.          Plan:     ECT was cancelled today. Will plan 1st ECT on Monday. No medication changes today. Will continue to provide support and structure.  Will also consider IRTS after discharge.

## 2019-06-28 NOTE — PROGRESS NOTES
"   06/27/19 2000   Group Therapy Session   Group Attendance attended group session   Total Time (minutes) 45   Group Type psychotherapeutic   Group Topic Covered problem solving  (depression)   Patient Participation/Contribution confused;discussed personal experience with topic   CBT activity was utilized to help individuals identify and process a problem while considering various coping strategies. Ranjeet reported feeling confused. Each problem he identified, he reported feeling hopeless to find any sort of resolution. His mood was depressed and frustrated. Affect flat. He asked, \"how does anyone love or like themselves?\" He described not feeling ralph in anything, just \"going through the motions.\" He reported feeling as though \"everyone else understands, but I don't.\" This writer suggested taking small steps and encouraged him find one thing each day that he enjoys (something from dinner, a crossword, a movie, hot shower, etc.) and to name it. He agreed to try.  "

## 2019-06-29 PROCEDURE — 25000132 ZZH RX MED GY IP 250 OP 250 PS 637: Performed by: PSYCHIATRY & NEUROLOGY

## 2019-06-29 PROCEDURE — 25000132 ZZH RX MED GY IP 250 OP 250 PS 637: Performed by: PHYSICIAN ASSISTANT

## 2019-06-29 PROCEDURE — 12400001 ZZH R&B MH UMMC

## 2019-06-29 PROCEDURE — 25000132 ZZH RX MED GY IP 250 OP 250 PS 637: Performed by: CLINICAL NURSE SPECIALIST

## 2019-06-29 RX ADMIN — AMLODIPINE BESYLATE 10 MG: 5 TABLET ORAL at 08:48

## 2019-06-29 RX ADMIN — ATORVASTATIN CALCIUM 20 MG: 20 TABLET, FILM COATED ORAL at 08:48

## 2019-06-29 RX ADMIN — DILTIAZEM HYDROCHLORIDE 360 MG: 120 CAPSULE, COATED, EXTENDED RELEASE ORAL at 08:48

## 2019-06-29 RX ADMIN — LISINOPRIL 40 MG: 40 TABLET ORAL at 08:49

## 2019-06-29 RX ADMIN — TRAZODONE HYDROCHLORIDE 100 MG: 100 TABLET ORAL at 21:25

## 2019-06-29 RX ADMIN — HYDROCHLOROTHIAZIDE 25 MG: 12.5 CAPSULE ORAL at 08:48

## 2019-06-29 RX ADMIN — BUPROPION 450 MG: 150 TABLET, EXTENDED RELEASE ORAL at 08:48

## 2019-06-29 ASSESSMENT — ACTIVITIES OF DAILY LIVING (ADL)
LAUNDRY: WITH SUPERVISION
DRESS: INDEPENDENT
HYGIENE/GROOMING: INDEPENDENT
ORAL_HYGIENE: INDEPENDENT

## 2019-06-29 NOTE — PROGRESS NOTES
Pt denies SI/SIB and hallucinations. Pt reports feeling depressed. Pt states he will begin ECT on Monday but is not sure if it will help. Pt was calm and cooperative, reading in the milieu and watching TV.         06/28/19 2046   Behavioral Health   Hallucinations denies / not responding to hallucinations   Thinking poor concentration   Orientation person: oriented;place: oriented;time: oriented;date: oriented   Memory baseline memory   Insight poor   Judgement impaired   Eye Contact at examiner   Affect full range affect   Mood mood is calm   Physical Appearance/Attire attire appropriate to age and situation   Hygiene well groomed   Suicidality other (see comments)  (denies)   1. Wish to be Dead No   2. Non-Specific Active Suicidal Thoughts  No   Self Injury other (see comment)  (denies)   Elopement   (none observed)   Activity isolative;other (see comment)  (visible in the milieu)   Speech clear;coherent   Medication Sensitivity no stated side effects   Psychomotor / Gait balanced;steady   Activities of Daily Living   Hygiene/Grooming independent   Oral Hygiene independent   Dress independent   Laundry with supervision   Room Organization independent

## 2019-06-29 NOTE — PLAN OF CARE
Pt remains isolative and withdrawn. Pt rated dep at 6/10 and denied anxiety or hallucinations. Pt is hopeful that ECT will work, which will begin this Monday. Pt stated that he doesn't remember if it worked in the past or not, but that the doctors said it did. Pt denies SI/SIB or hallucinations. Pt spent the majority of the shift in his room sleeping. Pt was visible in the milieu eating his breakfast and lunch.

## 2019-06-30 PROCEDURE — 25000132 ZZH RX MED GY IP 250 OP 250 PS 637: Performed by: PHYSICIAN ASSISTANT

## 2019-06-30 PROCEDURE — 25000132 ZZH RX MED GY IP 250 OP 250 PS 637: Performed by: PSYCHIATRY & NEUROLOGY

## 2019-06-30 PROCEDURE — 25000132 ZZH RX MED GY IP 250 OP 250 PS 637: Performed by: CLINICAL NURSE SPECIALIST

## 2019-06-30 PROCEDURE — 12400001 ZZH R&B MH UMMC

## 2019-06-30 RX ADMIN — BUPROPION 450 MG: 150 TABLET, EXTENDED RELEASE ORAL at 09:06

## 2019-06-30 RX ADMIN — ATORVASTATIN CALCIUM 20 MG: 20 TABLET, FILM COATED ORAL at 09:06

## 2019-06-30 RX ADMIN — AMLODIPINE BESYLATE 10 MG: 5 TABLET ORAL at 09:07

## 2019-06-30 RX ADMIN — HYDROCHLOROTHIAZIDE 25 MG: 12.5 CAPSULE ORAL at 09:06

## 2019-06-30 RX ADMIN — TRAZODONE HYDROCHLORIDE 100 MG: 100 TABLET ORAL at 21:34

## 2019-06-30 RX ADMIN — LISINOPRIL 40 MG: 40 TABLET ORAL at 09:05

## 2019-06-30 RX ADMIN — DILTIAZEM HYDROCHLORIDE 360 MG: 120 CAPSULE, COATED, EXTENDED RELEASE ORAL at 09:06

## 2019-06-30 RX ADMIN — HYDROXYZINE HYDROCHLORIDE 25 MG: 25 TABLET ORAL at 09:07

## 2019-06-30 ASSESSMENT — ACTIVITIES OF DAILY LIVING (ADL)
DRESS: INDEPENDENT
HYGIENE/GROOMING: INDEPENDENT
ORAL_HYGIENE: INDEPENDENT
ORAL_HYGIENE: INDEPENDENT
HYGIENE/GROOMING: INDEPENDENT
DRESS: INDEPENDENT

## 2019-06-30 ASSESSMENT — MIFFLIN-ST. JEOR: SCORE: 1980.65

## 2019-06-30 NOTE — PROGRESS NOTES
06/30/19 1434   Behavioral Health   Hallucinations denies / not responding to hallucinations   Thinking poor concentration   Orientation person: oriented;date: oriented;place: oriented   Memory baseline memory   Insight poor   Judgement impaired   Eye Contact at examiner   Affect blunted, flat   Mood mood is calm   Physical Appearance/Attire attire appropriate to age and situation   Hygiene well groomed   Suicidality other (see comments)  (denies)   1. Wish to be Dead No   2. Non-Specific Active Suicidal Thoughts  No   Self Injury other (see comment)  (denies)   Elopement   (nothing to reportq)   Activity isolative;withdrawn   Speech clear;coherent   Medication Sensitivity no observed side effects   Psychomotor / Gait steady   Psycho Education   Type of Intervention 1:1 intervention   Response participates, initiates socially appropriate   Hours 0.5   Treatment Detail   (check in)   Activities of Daily Living   Hygiene/Grooming independent   Oral Hygiene independent   Dress independent   Room Organization independent   The patient was in his room most of the shift.  The patient reports his goal was to not isolate all shift and did come out of his room for about an hour and for both meals, but he was not social with peers while out.  The patient reports he is struggling with racing thoughts and is nervious about ECT with is starting tomorrow.  The patient was encouraged to try and stay occupied.  The patient was encouraged several times to engaged in his ADL's and the patient stated he would shower after lunch and did.  The patient denies SI and SiB.

## 2019-06-30 NOTE — PLAN OF CARE
Patient was intermittently visible out in the milieu and isolative to room at times, appeared withdrawn had a flat blunted affect, medication complaint, ate dinner, asked for a prn Trazodone 100 mg for sleep, denies any SI/SIB no hallucinations observed or reported patient did endorse depression rate of 7/10 denies anxiety, patient stated had nightmares that disturbed his sleep pattern last night, no other concerns will continue to monitor for changes.

## 2019-07-01 ENCOUNTER — ANESTHESIA (OUTPATIENT)
Dept: BEHAVIORAL HEALTH | Facility: CLINIC | Age: 59
End: 2019-07-01

## 2019-07-01 ENCOUNTER — ANESTHESIA EVENT (OUTPATIENT)
Dept: BEHAVIORAL HEALTH | Facility: CLINIC | Age: 59
End: 2019-07-01

## 2019-07-01 PROCEDURE — 25000132 ZZH RX MED GY IP 250 OP 250 PS 637: Performed by: PHYSICIAN ASSISTANT

## 2019-07-01 PROCEDURE — 25000132 ZZH RX MED GY IP 250 OP 250 PS 637: Performed by: CLINICAL NURSE SPECIALIST

## 2019-07-01 PROCEDURE — 25000125 ZZHC RX 250: Performed by: ANESTHESIOLOGY

## 2019-07-01 PROCEDURE — 90870 ELECTROCONVULSIVE THERAPY: CPT

## 2019-07-01 PROCEDURE — 25000128 H RX IP 250 OP 636: Performed by: ANESTHESIOLOGY

## 2019-07-01 PROCEDURE — 99232 SBSQ HOSP IP/OBS MODERATE 35: CPT | Mod: 25 | Performed by: PSYCHIATRY & NEUROLOGY

## 2019-07-01 PROCEDURE — 25000132 ZZH RX MED GY IP 250 OP 250 PS 637: Performed by: PSYCHIATRY & NEUROLOGY

## 2019-07-01 PROCEDURE — 12400001 ZZH R&B MH UMMC

## 2019-07-01 RX ORDER — KETOROLAC TROMETHAMINE 30 MG/ML
30 INJECTION, SOLUTION INTRAMUSCULAR; INTRAVENOUS ONCE
Status: COMPLETED | OUTPATIENT
Start: 2019-07-01 | End: 2019-07-01

## 2019-07-01 RX ORDER — KETOROLAC TROMETHAMINE 30 MG/ML
30 INJECTION, SOLUTION INTRAMUSCULAR; INTRAVENOUS EVERY 6 HOURS PRN
Status: DISCONTINUED | OUTPATIENT
Start: 2019-07-01 | End: 2019-07-01

## 2019-07-01 RX ADMIN — BUPROPION 450 MG: 150 TABLET, EXTENDED RELEASE ORAL at 12:30

## 2019-07-01 RX ADMIN — METHOHEXITAL SODIUM 100 MG: 500 INJECTION, POWDER, LYOPHILIZED, FOR SOLUTION INTRAMUSCULAR; INTRAVENOUS; RECTAL at 10:47

## 2019-07-01 RX ADMIN — LISINOPRIL 40 MG: 40 TABLET ORAL at 08:25

## 2019-07-01 RX ADMIN — ATORVASTATIN CALCIUM 20 MG: 20 TABLET, FILM COATED ORAL at 12:30

## 2019-07-01 RX ADMIN — AMLODIPINE BESYLATE 10 MG: 5 TABLET ORAL at 08:26

## 2019-07-01 RX ADMIN — TRAZODONE HYDROCHLORIDE 100 MG: 100 TABLET ORAL at 20:36

## 2019-07-01 RX ADMIN — KETOROLAC TROMETHAMINE 30 MG: 30 INJECTION, SOLUTION INTRAMUSCULAR at 11:13

## 2019-07-01 RX ADMIN — DILTIAZEM HYDROCHLORIDE 360 MG: 120 CAPSULE, COATED, EXTENDED RELEASE ORAL at 08:26

## 2019-07-01 RX ADMIN — HYDROCHLOROTHIAZIDE 25 MG: 12.5 CAPSULE ORAL at 12:29

## 2019-07-01 RX ADMIN — Medication 100 MG: at 10:47

## 2019-07-01 ASSESSMENT — ACTIVITIES OF DAILY LIVING (ADL)
HYGIENE/GROOMING: INDEPENDENT
LAUNDRY: WITH SUPERVISION
ORAL_HYGIENE: INDEPENDENT
DRESS: INDEPENDENT

## 2019-07-01 NOTE — PROCEDURES
Procedure/Surgery Information   [facility], [place]    Bedside Procedure Note  Date of Service (when I performed the procedure): 07/01/2019    Flakito Hilliard is a 59 year old male patient.  1. Suicidal ideation    2. Current episode of major depressive disorder without prior episode, unspecified depression episode severity      Past Medical History:   Diagnosis Date     Depressive disorder      Hypertension      Sleep apnea      Temp: 97.8  F (36.6  C) Temp src: Oral BP: 150/89                Procedures     Wilmer Liu     [facility], [place]   ECT Procedure Note   07/01/2019    Flakito Hilliard [id number]   59 year old 1960     Patient Status: Inpatient    Is this the first in a series of 12 treatments?  Yes    History and Physical: Reviewed in medical record    Consent: Informed consent was signed by: patient    Date Consent Signed: 6/27/19      Allergies   Allergen Reactions     Contrast Dye Difficulty breathing       Weight:  247 lbs 8 oz              Indications for ECT:   Medications ineffective and History of good ECT response in one or more previous episodes of illness         Clinical Narrative:   Patient was admitted with worsening depression and suicidal ideation.  He's had positive response to ECT in the past.  He's starting ECT for depression.  NPO after 2400.  Alert and Oriented x 3.  Mood remains very depressed.           Diagnosis:   Major depression         Pause for the Cause:     Right patient Yes   Right procedure/laterality settings: Yes          Intra-Procedure Documentation:     ECT #: 1   Treatment number this series: 1   Total treatment number: 1 + 2 previous series      Type of ECT:  Right, unilateral ultrabrief    ECT Medications:    Brevital: 100mg (switch to Etomidate 16mg next Tx)  Succinyl Choline: 100mg    ECT Strip Summary:   Energy Level: 50 percent  Motor Seizure Duration: 17 seconds  EEG Seizure Duration: 17  seconds    Complications: No    Plan:   Next ECT on 7/3/19    Wilmer Liu MD

## 2019-07-01 NOTE — PROGRESS NOTES
Patient adequate for discharge. Report called to inpatient RN Juwan. VSS, A/O, IV removed. Discharged with staff at this time. Patient states feeling scared due to the paralytic still in system when he woke up. Anesthesia aware and will not give as much paralytic next ECT treatment. 30 mg IV toradol given for Right sided headache at 11:03am. No NSAID medications to be given until 5pm tonight. Report given to bedside RN on unit 20.

## 2019-07-01 NOTE — PROGRESS NOTES
06/30/19 2100   Significant Event   Significant Event Other (see comments)  (shift summary)     Pt was withdrawn and isolative during the shift, sleeping/napping in his room, briefly came out in the milieu, ate dinner and watched TV.  No stated SI, SIB or hallucinations.  Calm, cooperative, flat/blunted affect and depressed..

## 2019-07-01 NOTE — ANESTHESIA POSTPROCEDURE EVALUATION
Anesthesia POST Procedure Evaluation    Patient: Flakito Hilliard   MRN:     [id number] Gender:   male   Age:    59 year old :      1960        Preoperative Diagnosis: * No pre-op diagnosis entered *   * No procedures listed *   Postop Comments: No value filed.       Anesthesia Type:  General  General    Reportable Event: NO     PAIN: Uncomplicated   Sign Out status: Comfortable, Well controlled pain     PONV: No PONV   Sign Out status:  No Nausea or Vomiting     Neuro/Psych: Uneventful perioperative course   Sign Out Status: Preoperative baseline; Age appropriate mentation     Airway/Resp.: Uneventful perioperative course   Sign Out Status: Non labored breathing, age appropriate RR; Resp. Status within EXPECTED Parameters     CV: Uneventful perioperative course   Sign Out status: Appropriate BP and perfusion indices; Appropriate HR/Rhythm     Disposition:   Sign Out in:  PACU  Disposition:  Phase II; Home  Recovery Course: Uneventful  Follow-Up: Not required           Last Anesthesia Record Vitals:  [facility] VITALS  2019 1031 - 2019 1131      2019             Resp Rate (set):  8          Last PACU Vitals:  Vitals Value Taken Time   /75 2019 11:24 AM   Temp     Pulse 77 2019 11:24 AM   Resp 16 2019 11:24 AM   SpO2 93 % 2019 11:18 AM   Temp src     NIBP     Pulse     SpO2     Resp     Temp     Ht Rate     Temp 2           Electronically Signed By: Mara Arellano MD, 2019, 11:31 AM

## 2019-07-01 NOTE — ANESTHESIA PREPROCEDURE EVALUATION
Anesthesia Pre-Procedure Evaluation    Patient: Flakito Hilliard   MRN:     [id number] Gender:   male   Age:    59 year old :      1960        Preoperative Diagnosis: * No surgery found *        Past Medical History:   Diagnosis Date     Depressive disorder      Hypertension      Sleep apnea       Past Surgical History:   Procedure Laterality Date     BLADDER SURGERY       CYSTOSTOMY, INSERT TUBE SUPRAPUBIC, COMBINED N/A 2014    Procedure: COMBINED CYSTOSTOMY, INSERT TUBE SUPRAPUBIC;  Surgeon: Min Prasad MD;  Location: [facility] OR     GENITOURINARY SURGERY      prostate surgery for stones     LASER HOLMIUM CYSTOSCOPY, INTERNAL URETHROTOMY, COMBINED N/A 10/24/2014    Procedure: COMBINED LASER HOLMIUM CYSTOSCOPY, INTERNAL URETHROTOMY;  Surgeon: Valentin Villatoro MD;  Location: [facility] OR     URETHROPLASTY N/A 12/10/2014    Procedure: URETHROPLASTY;  Surgeon: Niraj Burger MD;  Location: [facility] OR     URETHROPLASTY WITH BUCCAL GRAFT N/A 2016    Procedure: URETHROPLASTY WITH BUCCAL GRAFT;  Surgeon: Niraj Burger MD;  Location:  OR               [facility] AN PHYSICAL EXAM    Lab Results   Component Value Date    WBC 8.1 2019    HGB 16.5 2019    HCT 49.1 2019     2019     2019    POTASSIUM 3.5 2019    CHLORIDE 104 2019    CO2 29 2019    BUN 20 2019    CR 1.19 2019     (H) 2019    AMILCAR 9.1 2019    ALBUMIN 3.8 2019    PROTTOTAL 7.5 2019    ALT 26 2019    AST 20 2019    ALKPHOS 77 2019    BILITOTAL 1.1 2019    TSH 2.01 2019    T4 1.19 2016       Preop Vitals  BP Readings from Last 3 Encounters:   19 115/77   19 136/88   19 140/88    Pulse Readings from Last 3 Encounters:   19 98   19 115   19 88      Resp Readings from Last 3 Encounters:   19 16   19 24   19 18    SpO2 Readings from Last 3 Encounters:   19  98%   06/20/19 96%   05/19/19 97%      Temp Readings from Last 1 Encounters:   07/01/19 36.3  C (97.3  F) (Oral)    Ht Readings from Last 1 Encounters:   06/20/19 1.829 m (6')      Wt Readings from Last 1 Encounters:   06/30/19 112.3 kg (247 lb 8 oz)    Estimated body mass index is 33.57 kg/m  as calculated from the following:    Height as of this encounter: 1.829 m (6').    Weight as of this encounter: 112.3 kg (247 lb 8 oz).     LDA:  Peripheral IV 05/19/19 Left Upper arm (Active)   Number of days: 43            Assessment:   ASA SCORE: 2       Documentation: H&P complete; Preop Testing complete; Consents complete   Proceeding: Proceed without further delay  Tobacco Use:  NO Active use of Tobacco/UNKNOWN Tobacco use status     Plan:   Anes. Type:  General   Pre-Induction: Midazolam IV; Acetaminophen PO   Induction:  IV (Standard)   Airway: Oral ETT   Access/Monitoring: PIV   Maintenance: Balanced   Emergence: Procedure Site   Logistics: Same Day Surgery     Postop Pain/Sedation Strategy:  Standard-Options: Opioids PRN     PONV Management:  Adult Risk Factors:, Non-Smoker, Postop Opioids     CONSENT: Direct conversation   Plan and risks discussed with: Patient                            Mara Arellano MD

## 2019-07-01 NOTE — PLAN OF CARE
48 hour nursing assessment: Pt evaluation continues. Assessed mood, anxiety, thoughts and behavior.    Pt had his first ECT treatment today. Stated that he felt weird and scared d/t not being able to move his hands right after ECT. Rated dep at 4/10 and denied having anxiety or hallucinations. Pt endorses having SI thoughts only with no plan here, and contracts for safety here. Pt stated that when he woke up this morning he thought about a noose and a tree in his backyard, and that this was an option. Pt stated that he feels safe here. States he ate his lunch after ECT, but that his sleep has been off and on. Nursing to continue to monitor.

## 2019-07-01 NOTE — PROGRESS NOTES
[facility], [place]   Psychiatric Progress Note        Interim History:     The patient's care was discussed with the treatment team during the daily team meeting and/or staff's chart notes were reviewed.  Staff report patient spends a lot of time outside of his room, goes to groups, reports still feeling very depressed, though, it was noted by number of staff members, starts conversation, shares good things from his past. Today he had 1st ECT, reported waking up after ECT still being unable to move his extremities, it scared him. He also reported feeling sore, especially, his jaw. He also reported vague and passive Suicidal ideation, no plan, repeatedly contracted for safety here.             Medications:       amLODIPine  10 mg Oral Daily     atorvastatin  20 mg Oral Daily     buPROPion  450 mg Oral QAM     diltiazem ER COATED BEADS  360 mg Oral Daily     hydrochlorothiazide  25 mg Oral Daily     lisinopril  40 mg Oral Daily          Allergies:     Allergies   Allergen Reactions     Contrast Dye Difficulty breathing          Labs:     No results found for this or any previous visit (from the past 24 hour(s)).       Psychiatric Examination:     /79   Pulse 76   Temp 97.4  F (36.3  C) (Oral)   Resp 16   Ht 1.829 m (6')   Wt 112.3 kg (247 lb 8 oz)   SpO2 96%   BMI 33.57 kg/m    Weight is 247 lbs 8 oz  Body mass index is 33.57 kg/m .     Orthostatic Vitals       Most Recent      Sitting Orthostatic /89 06/30 0856    Sitting Orthostatic Pulse (bpm) 104 06/30 0856    Standing Orthostatic /81 06/30 0856    Standing Orthostatic Pulse (bpm) 114 06/30 0856         Appearance: dressed in hospital garbs  Attitude: cooperative  Eye Contact:  partial  Mood: depressed  Affect: constricted, sad,  Speech: slow, monotone  Psychomotor Behavior: psychomotor retardation  Throught Process:  Slow, but logical  Associations:  tight  Thought Content: denies Suicidal ideation,  psychosis  Insight: partial  Judgement:  Mildly impaired  Oriented to:  Self, time and place  Attention Span and Concentration: mildly impaired  Recent and Remote Memory: fair    Clinical Global Impressions  First:  Considering your total clinical experience with this particular patient population, how severe are the patient's symptoms at this time?: 7 (06/21/19 1701)  Compared to the patient's condition at the START of treatment, this patient's condition is:: 4 (06/21/19 1701)  Most recent:  Considering your total clinical experience with this particular patient population, how severe are the patient's symptoms at this time?: 5 (6/28/2019 )  Compared to the patient's condition at the START of treatment, this patient's condition is:: 3 (6/28/2019)         Precautions:     Behavioral Orders   Procedures     Code 1 - Restrict to Unit     Electroconvulsive therapy     ECT every Monday, Wednesday, and Friday     Electroconvulsive therapy     Series of up to 12 treatments. Begin Date: 6/28/19     Treating Psychiatrist providing ECT: Alexa    Notified on:  6/26/19     Routine Programming     As clinically indicated     Status 15     Every 15 minutes.     Suicide precautions     Patients on Suicide Precautions should have a Combination Diet ordered that includes a Diet selection(s) AND a Behavioral Tray selection for Safe Tray - with utensils, or Safe Tray - NO utensils            DIagnoses:     Major depressive disorder, current, severe.  Rule out generalized anxiety disorder.  The patient was also diagnosed in the past with dependent personality disorder.          Plan:     ECT #1 today and next ECT on Wednesday. No medication changes today. Will continue to provide support and structure.  Will also consider IRTS after discharge.

## 2019-07-02 PROCEDURE — 25000132 ZZH RX MED GY IP 250 OP 250 PS 637: Performed by: PSYCHIATRY & NEUROLOGY

## 2019-07-02 PROCEDURE — 25000132 ZZH RX MED GY IP 250 OP 250 PS 637: Performed by: CLINICAL NURSE SPECIALIST

## 2019-07-02 PROCEDURE — H2032 ACTIVITY THERAPY, PER 15 MIN: HCPCS

## 2019-07-02 PROCEDURE — 12400001 ZZH R&B MH UMMC

## 2019-07-02 PROCEDURE — 25000132 ZZH RX MED GY IP 250 OP 250 PS 637: Performed by: PHYSICIAN ASSISTANT

## 2019-07-02 PROCEDURE — G0177 OPPS/PHP; TRAIN & EDUC SERV: HCPCS

## 2019-07-02 RX ADMIN — AMLODIPINE BESYLATE 10 MG: 5 TABLET ORAL at 08:14

## 2019-07-02 RX ADMIN — ACETAMINOPHEN 650 MG: 325 TABLET, FILM COATED ORAL at 08:17

## 2019-07-02 RX ADMIN — LISINOPRIL 40 MG: 40 TABLET ORAL at 08:14

## 2019-07-02 RX ADMIN — ATORVASTATIN CALCIUM 20 MG: 20 TABLET, FILM COATED ORAL at 08:14

## 2019-07-02 RX ADMIN — BUPROPION 450 MG: 150 TABLET, EXTENDED RELEASE ORAL at 08:14

## 2019-07-02 RX ADMIN — TRAZODONE HYDROCHLORIDE 100 MG: 100 TABLET ORAL at 21:09

## 2019-07-02 RX ADMIN — HYDROCHLOROTHIAZIDE 25 MG: 12.5 CAPSULE ORAL at 08:14

## 2019-07-02 RX ADMIN — DILTIAZEM HYDROCHLORIDE 360 MG: 120 CAPSULE, COATED, EXTENDED RELEASE ORAL at 08:14

## 2019-07-02 ASSESSMENT — ACTIVITIES OF DAILY LIVING (ADL)
ORAL_HYGIENE: INDEPENDENT
HYGIENE/GROOMING: INDEPENDENT
LAUNDRY: WITH SUPERVISION
LAUNDRY: UNABLE TO COMPLETE
HYGIENE/GROOMING: INDEPENDENT
DRESS: INDEPENDENT
ORAL_HYGIENE: INDEPENDENT
DRESS: SCRUBS (BEHAVIORAL HEALTH);INDEPENDENT

## 2019-07-02 ASSESSMENT — MIFFLIN-ST. JEOR: SCORE: 1976

## 2019-07-02 NOTE — PROGRESS NOTES
" Note  The patient participated in an hour long case management led group. The focus of the group was on Coping Skills and Creating Coping Cards to aid in mental health recovery. At the beginning of the group, the patient reported that he was feeling \"Okay.\" The patient had a flat affect throughout the group and was appropriate with both writer and peers in the group.     "

## 2019-07-02 NOTE — PLAN OF CARE
"  Problem: OT General Care Plan  Goal: OT Frequency  Description  Pt will demonstrate consistent engagement in OT group activities that support recovery as evidenced by participating in >1 scheduled OT group/day for 5 days.     Attended 2/2 occupational therapy groups offered this date. Overall content and cooperative after encouragement.     Physical wellness sampler for mental health management and coping. Education was provided on benefits of movement for depression and anxiety as well as low-tech materials for use post discharge. Pt Response: Independent to participate in bodyweight and resistance band exercises, stretching, and self-massage. Reported positive response to intervention, as appeared to have a regulated response.   Multi-step creative expressions craft this date for relaxation, creative expression and coping with symptoms. Education provided on mental health benefits of mindfulness. Pt Response: I to initiate, make decisions, sequence and adjust to workspace demands as needed. Utilized verbal and visual instructions for direction. Demonstrated fair focus, planning, frustration tolerance, and problem solving. Reported overall experience as \"fine\". Continue to assess for personally motivating activities.     Outcome: Improving     "

## 2019-07-02 NOTE — PROGRESS NOTES
Pt had a calm shift. Pt went to groups. Pt had no SI or SIB. Pt did have a body odor, so  May need to take a shower.        07/02/19 1503   Behavioral Health   Hallucinations denies / not responding to hallucinations   Thinking intact   Orientation person: oriented;place: oriented;date: oriented   Memory baseline memory   Insight poor   Judgement intact   Eye Contact at examiner   Affect full range affect   Mood mood is calm   Physical Appearance/Attire attire appropriate to age and situation   Hygiene body odor   Suicidality other (see comments)  (pt denies)   1. Wish to be Dead No   2. Non-Specific Active Suicidal Thoughts  No   Self Injury other (see comment)  (denies)   Activity other (see comment)  (active in milieu)   Speech clear;coherent   Medication Sensitivity no stated side effects;no observed side effects   Psychomotor / Gait balanced;steady   Activities of Daily Living   Hygiene/Grooming independent   Oral Hygiene independent   Dress scrubs (behavioral health);independent   Laundry unable to complete   Room Organization independent

## 2019-07-02 NOTE — PLAN OF CARE
Patient was withdrawn and isolative stayed in his room most of the shift, stated ECT didn't have much effect on his depression, still endorses depression of 7/10 and feels hopeless and sad, denies SI/SIB ate dinner, asked for prn Trazodone 100 mg for sleep, and went to bed no other concerns will continue to monitor.

## 2019-07-03 ENCOUNTER — ANESTHESIA EVENT (OUTPATIENT)
Dept: BEHAVIORAL HEALTH | Facility: CLINIC | Age: 59
End: 2019-07-03

## 2019-07-03 ENCOUNTER — ANESTHESIA (OUTPATIENT)
Dept: BEHAVIORAL HEALTH | Facility: CLINIC | Age: 59
End: 2019-07-03

## 2019-07-03 PROCEDURE — 25000125 ZZHC RX 250: Performed by: ANESTHESIOLOGY

## 2019-07-03 PROCEDURE — 25000132 ZZH RX MED GY IP 250 OP 250 PS 637: Performed by: CLINICAL NURSE SPECIALIST

## 2019-07-03 PROCEDURE — 25000132 ZZH RX MED GY IP 250 OP 250 PS 637: Performed by: PHYSICIAN ASSISTANT

## 2019-07-03 PROCEDURE — 90853 GROUP PSYCHOTHERAPY: CPT

## 2019-07-03 PROCEDURE — 12400001 ZZH R&B MH UMMC

## 2019-07-03 PROCEDURE — 25000132 ZZH RX MED GY IP 250 OP 250 PS 637: Performed by: PSYCHIATRY & NEUROLOGY

## 2019-07-03 PROCEDURE — 90870 ELECTROCONVULSIVE THERAPY: CPT

## 2019-07-03 PROCEDURE — G0177 OPPS/PHP; TRAIN & EDUC SERV: HCPCS

## 2019-07-03 PROCEDURE — 25000128 H RX IP 250 OP 636: Performed by: PSYCHIATRY & NEUROLOGY

## 2019-07-03 PROCEDURE — 25000125 ZZHC RX 250: Performed by: PSYCHIATRY & NEUROLOGY

## 2019-07-03 PROCEDURE — 25000128 H RX IP 250 OP 636: Performed by: ANESTHESIOLOGY

## 2019-07-03 PROCEDURE — H2032 ACTIVITY THERAPY, PER 15 MIN: HCPCS

## 2019-07-03 PROCEDURE — 99232 SBSQ HOSP IP/OBS MODERATE 35: CPT | Mod: 25 | Performed by: PSYCHIATRY & NEUROLOGY

## 2019-07-03 RX ORDER — LABETALOL HYDROCHLORIDE 5 MG/ML
INJECTION, SOLUTION INTRAVENOUS PRN
Status: DISCONTINUED | OUTPATIENT
Start: 2019-07-03 | End: 2019-07-03

## 2019-07-03 RX ORDER — KETOROLAC TROMETHAMINE 30 MG/ML
30 INJECTION, SOLUTION INTRAMUSCULAR; INTRAVENOUS
Status: COMPLETED | OUTPATIENT
Start: 2019-07-03 | End: 2019-07-03

## 2019-07-03 RX ORDER — ETOMIDATE 2 MG/ML
INJECTION INTRAVENOUS PRN
Status: DISCONTINUED | OUTPATIENT
Start: 2019-07-03 | End: 2019-07-03

## 2019-07-03 RX ORDER — LIDOCAINE HYDROCHLORIDE 20 MG/ML
40 INJECTION, SOLUTION EPIDURAL; INFILTRATION; INTRACAUDAL; PERINEURAL
Status: COMPLETED | OUTPATIENT
Start: 2019-07-03 | End: 2019-07-03

## 2019-07-03 RX ADMIN — LABETALOL HYDROCHLORIDE 10 MG: 5 INJECTION INTRAVENOUS at 13:11

## 2019-07-03 RX ADMIN — AMLODIPINE BESYLATE 10 MG: 5 TABLET ORAL at 08:07

## 2019-07-03 RX ADMIN — LISINOPRIL 40 MG: 40 TABLET ORAL at 08:07

## 2019-07-03 RX ADMIN — TRAZODONE HYDROCHLORIDE 100 MG: 100 TABLET ORAL at 21:27

## 2019-07-03 RX ADMIN — DILTIAZEM HYDROCHLORIDE 360 MG: 120 CAPSULE, COATED, EXTENDED RELEASE ORAL at 08:07

## 2019-07-03 RX ADMIN — ETOMIDATE 20 MG: 2 INJECTION INTRAVENOUS at 13:01

## 2019-07-03 RX ADMIN — BUPROPION 450 MG: 150 TABLET, EXTENDED RELEASE ORAL at 14:14

## 2019-07-03 RX ADMIN — Medication 80 MG: at 13:01

## 2019-07-03 RX ADMIN — KETOROLAC TROMETHAMINE 30 MG: 30 INJECTION, SOLUTION INTRAMUSCULAR at 13:06

## 2019-07-03 RX ADMIN — HYDROCHLOROTHIAZIDE 25 MG: 12.5 CAPSULE ORAL at 14:14

## 2019-07-03 RX ADMIN — LIDOCAINE HYDROCHLORIDE 40 MG: 20 INJECTION, SOLUTION EPIDURAL; INFILTRATION; INTRACAUDAL; PERINEURAL at 13:05

## 2019-07-03 ASSESSMENT — ACTIVITIES OF DAILY LIVING (ADL)
DRESS: INDEPENDENT
ORAL_HYGIENE: INDEPENDENT
HYGIENE/GROOMING: INDEPENDENT

## 2019-07-03 NOTE — ANESTHESIA POSTPROCEDURE EVALUATION
Anesthesia POST Procedure Evaluation    Patient: Flakito Hilliard   MRN:     [id number] Gender:   male   Age:    59 year old :      1960        Preoperative Diagnosis: * No pre-op diagnosis entered *   * No procedures listed *   Postop Comments: No value filed.       Anesthesia Type:  General  No value filed.    Reportable Event: NO     PAIN: Uncomplicated   Sign Out status: Comfortable, Well controlled pain     PONV: No PONV   Sign Out status:  No Nausea or Vomiting     Neuro/Psych: Uneventful perioperative course   Sign Out Status: Preoperative baseline; Age appropriate mentation     Airway/Resp.: Uneventful perioperative course   Sign Out Status: Non labored breathing, age appropriate RR; Resp. Status within EXPECTED Parameters     CV: Uneventful perioperative course   Sign Out status: Appropriate BP and perfusion indices; Appropriate HR/Rhythm     Disposition:   Sign Out in:  PACU  Disposition:  Floor  Recovery Course: Uneventful  Follow-Up: Not required           Last Anesthesia Record Vitals:  CRNA VITALS  7/3/2019 1251 - 7/3/2019 1351      7/3/2019             Resp Rate (set):  8          Last PACU Vitals:  Vitals Value Taken Time   /88 7/3/2019  1:52 PM   Temp 36.6  C (97.8  F) 7/3/2019  1:52 PM   Pulse     Resp 16 7/3/2019  1:52 PM   SpO2 93 % 7/3/2019  1:52 PM   Temp src     NIBP     Pulse     SpO2     Resp     Temp     Ht Rate     Temp 2           Electronically Signed By: Irina Bhatt MD, July 3, 2019, 2:02 PM

## 2019-07-03 NOTE — PROGRESS NOTES
CLINICAL NUTRITION SERVICES - REASSESSMENT NOTE     Nutrition Prescription    RECOMMENDATIONS FOR MDs/PROVIDERS TO ORDER:  None at this time    Malnutrition Status:    Patient does not meet two of the above criteria necessary for diagnosing malnutrition but is at risk    Recommendations already ordered by Registered Dietitian (RD):  None at this time    Future/Additional Recommendations:  - If PO intake declines, consider ordering supplements      EVALUATION OF THE PROGRESS TOWARD GOALS   Diet: Regular    Intake: Per patient report - he still has a poor appetite but continues to eat about 75% of meals because he knows he is supposed to. He also eats a HS snack of osman crackers and peanut butter. Since about mid May when his depression worsened, he lost most of his sense of taste. He doesn't enjoy eating anymore.      NEW FINDINGS   Weight history: The pt's weight has remained since last reassessed on 6/27/19 and admission on 6/20/18  Wt Readings from Last 10 Encounters:   07/02/19 111.8 kg (246 lb 7.6 oz)   06/30/19 112.3 kg (247 lb 8 oz)   06/27/19  111.6 kg (246 lb)   06/25/19  112.1 kg (247 lb 1.6 oz)   06/23/19  111.9 kg (246 lb 11.2 oz)   06/20/19 110.9 kg (244 lb 6.4 oz)   06/14/19 112 kg (247 lb)   05/19/19 117.9 kg (260 lb)   07/18/18 118.8 kg (262 lb)   04/09/18 122.1 kg (269 lb 1.6 oz)       MALNUTRITION  % Intake: No decreased intake noted  % Weight Loss: Up to 5% in 1 month (non-severe)  Subcutaneous Fat Loss: None observed  Muscle Loss: None observed  Fluid Accumulation/Edema: None noted  Malnutrition Diagnosis: Patient does not meet two of the above criteria necessary for diagnosing malnutrition but is at risk    Previous Goals   Patient to consume % of nutritionally adequate meal trays TID, or the equivalent with supplements/snacks.  Evaluation: Met    Previous Nutrition Diagnosis  Predicted inadequate nutrient intake (energy/protein) related to decreased appetite but still eating 3 meals a  day as evidenced by potential for decline.    Evaluation: No change    CURRENT NUTRITION DIAGNOSIS  Predicted inadequate nutrient intake (energy/protein) related to decreased appetite but still eating 3 meals a day as evidenced by potential for decline.      INTERVENTIONS  Implementation  - Provided nutrition education on ways to experiment with the taste of foods by suggested adding different seasonings/sauces to see if this improves meal time enjoyment.     Goals  Patient to consume % of nutritionally adequate meal trays TID, or the equivalent with supplements/snacks.    Monitoring/Evaluation  Progress toward goals will be monitored and evaluated per protocol.      Reny Esquivel RD    I have reviewed and agree with above reassessment and plan of care.    Tiffanie Espinal RD, LD

## 2019-07-03 NOTE — PROGRESS NOTES
"Pt was more open to movement when framed as stretching, sports, \"wedding dances,\" humming....  Pt was delighted and able to chuckle at the movements of others.       07/03/19 1015   Dance Movement Therapy   Type of Intervention structured groups   Response participates with encouragement   Hours 1     "

## 2019-07-03 NOTE — PLAN OF CARE
Pt was visible in the milieu, had ECT today denies headache or any discomfort at this time. Pt reports feeling really tired after ECT and is currently resting in his room.   Pt denies SI/SIB.  Reports appetite and sleep is good. Will continue to assess.

## 2019-07-03 NOTE — ANESTHESIA PREPROCEDURE EVALUATION
Anesthesia Pre-Procedure Evaluation    Patient: Flakito Hilliard   MRN:     [id number] Gender:   male   Age:    59 year old :      1960        Preoperative Diagnosis: * No surgery found *        Past Medical History:   Diagnosis Date     Depressive disorder      Hypertension      Sleep apnea       Past Surgical History:   Procedure Laterality Date     BLADDER SURGERY       CYSTOSTOMY, INSERT TUBE SUPRAPUBIC, COMBINED N/A 2014    Procedure: COMBINED CYSTOSTOMY, INSERT TUBE SUPRAPUBIC;  Surgeon: Min Prasad MD;  Location: [facility] OR     GENITOURINARY SURGERY      prostate surgery for stones     LASER HOLMIUM CYSTOSCOPY, INTERNAL URETHROTOMY, COMBINED N/A 10/24/2014    Procedure: COMBINED LASER HOLMIUM CYSTOSCOPY, INTERNAL URETHROTOMY;  Surgeon: Valentin Villatoro MD;  Location: [facility] OR     URETHROPLASTY N/A 12/10/2014    Procedure: URETHROPLASTY;  Surgeon: Niraj Burger MD;  Location: [facility] OR     URETHROPLASTY WITH BUCCAL GRAFT N/A 2016    Procedure: URETHROPLASTY WITH BUCCAL GRAFT;  Surgeon: Niraj Burger MD;  Location: [facility] OR          Anesthesia Evaluation     .             ROS/MED HX    ENT/Pulmonary:     (+)sleep apnea, , . .    Neurologic:       Cardiovascular:     (+) hypertension----. : . . . :. .       METS/Exercise Tolerance:     Hematologic:         Musculoskeletal:         GI/Hepatic:         Renal/Genitourinary:     (+) Nephrolithiasis ,       Endo:         Psychiatric:     (+) psychiatric history depression      Infectious Disease:         Malignancy:         Other:                     [facility] AN PHYSICAL EXAM      Lab Results   Component Value Date    WBC 8.1 2019    HGB 16.5 2019    HCT 49.1 2019     2019     2019    POTASSIUM 3.5 2019    CHLORIDE 104 2019    CO2 29 2019    BUN 20 2019    CR 1.19 2019     (H) 2019    AMILCAR 9.1 2019    ALBUMIN 3.8 2019    PROTTOTAL 7.5 2019     ALT 26 2019    AST 20 2019    ALKPHOS 77 2019    BILITOTAL 1.1 2019    TSH 2.01 2019    T4 1.19 2016       Preop Vitals  BP Readings from Last 3 Encounters:   19 (!) 162/95   19 136/88   19 140/88    Pulse Readings from Last 3 Encounters:   19 106   19 115   19 88      Resp Readings from Last 3 Encounters:   19 16   19 24   19 18    SpO2 Readings from Last 3 Encounters:   19 95%   19 96%   19 97%      Temp Readings from Last 1 Encounters:   19 36.4  C (97.5  F) (Tympanic)    Ht Readings from Last 1 Encounters:   19 1.829 m (6')      Wt Readings from Last 1 Encounters:   19 111.8 kg (246 lb 7.6 oz)    Estimated body mass index is 33.43 kg/m  as calculated from the following:    Height as of 19: 1.829 m (6').    Weight as of 19: 111.8 kg (246 lb 7.6 oz).     LDA:  Peripheral IV 19 Left Upper arm (Active)   Number of days: 45            Assessment:   ASA SCORE: 2       Documentation: H&P complete; Preop Testing complete; Consents complete   Proceeding: Proceed without further delay  Tobacco Use:  NO Active use of Tobacco/UNKNOWN Tobacco use status     Plan:   Anes. Type:  General   Pre-Induction: Midazolam IV; Acetaminophen PO   Induction:  IV (Standard)   Airway: Oral ETT   Access/Monitoring: PIV   Maintenance: Balanced   Emergence: Procedure Site   Logistics: Same Day Surgery     Postop Pain/Sedation Strategy:  Standard-Options: Opioids PRN     PONV Management:  Adult Risk Factors:, Non-Smoker, Postop Opioids     CONSENT: Direct conversation   Plan and risks discussed with: Patient              Anesthesia Pre-Procedure Evaluation    Patient: Flakito Hilliard   MRN:     [id number] Gender:   male   Age:    59 year old :      1960        Preoperative Diagnosis: * No surgery found *        Past Medical History:   Diagnosis Date     Depressive disorder      Hypertension       Sleep apnea       Past Surgical History:   Procedure Laterality Date     BLADDER SURGERY       CYSTOSTOMY, INSERT TUBE SUPRAPUBIC, COMBINED N/A 9/8/2014    Procedure: COMBINED CYSTOSTOMY, INSERT TUBE SUPRAPUBIC;  Surgeon: Min Prasad MD;  Location: [facility] OR     GENITOURINARY SURGERY      prostate surgery for stones     LASER HOLMIUM CYSTOSCOPY, INTERNAL URETHROTOMY, COMBINED N/A 10/24/2014    Procedure: COMBINED LASER HOLMIUM CYSTOSCOPY, INTERNAL URETHROTOMY;  Surgeon: Valentin Villatoro MD;  [facility]     URETHROPLASTY N/A 12/10/2014    Procedure: URETHROPLASTY;  Surgeon: Niraj Burger MD;  [facility]     URETHROPLASTY WITH BUCCAL GRAFT N/A 12/9/2016    Procedure: URETHROPLASTY WITH BUCCAL GRAFT;  Surgeon: Niraj Burger MD;  Location:  OR               [facility] PHYSICAL EXAM    Lab Results   Component Value Date    WBC 8.1 05/19/2019    HGB 16.5 06/28/2019    HCT 49.1 05/19/2019     05/19/2019     06/27/2019    POTASSIUM 3.5 06/27/2019    CHLORIDE 104 06/27/2019    CO2 29 06/27/2019    BUN 20 06/27/2019    CR 1.19 06/27/2019     (H) 06/27/2019    AMILCAR 9.1 06/27/2019    ALBUMIN 3.8 05/19/2019    PROTTOTAL 7.5 05/19/2019    ALT 26 05/19/2019    AST 20 05/19/2019    ALKPHOS 77 05/19/2019    BILITOTAL 1.1 05/19/2019    TSH 2.01 05/19/2019    T4 1.19 05/25/2016       Preop Vitals  BP Readings from Last 3 Encounters:   07/03/19 (!) 162/95   06/20/19 136/88   06/14/19 140/88    Pulse Readings from Last 3 Encounters:   07/03/19 106   06/20/19 115   06/14/19 88      Resp Readings from Last 3 Encounters:   07/03/19 16   06/20/19 24   06/14/19 18    SpO2 Readings from Last 3 Encounters:   07/03/19 95%   06/20/19 96%   05/19/19 97%      Temp Readings from Last 1 Encounters:   07/03/19 36.4  C (97.5  F) (Tympanic)    Ht Readings from Last 1 Encounters:   06/20/19 1.829 m (6')      Wt Readings from Last 1 Encounters:   07/02/19 111.8 kg (246 lb 7.6 oz)    Estimated body mass  index is 33.43 kg/m  as calculated from the following:    Height as of 6/20/19: 1.829 m (6').    Weight as of 7/2/19: 111.8 kg (246 lb 7.6 oz).     LDA:  Peripheral IV 05/19/19 Left Upper arm (Active)   Number of days: 45            Assessment:   ASA SCORE: 2       Documentation: H&P complete; Preop Testing complete; Consents complete   Proceeding: Proceed without further delay  Tobacco Use:  NO Active use of Tobacco/UNKNOWN Tobacco use status     Plan:   Anes. Type:  General   Pre-Induction: Midazolam IV; Acetaminophen PO   Induction:  IV (Standard)   Airway: Oral ETT   Access/Monitoring: PIV   Maintenance: Balanced   Emergence: Procedure Site   Logistics: Same Day Surgery     Postop Pain/Sedation Strategy:  Standard-Options: Opioids PRN     PONV Management:  Adult Risk Factors:, Non-Smoker, Postop Opioids     CONSENT: Direct conversation   Plan and risks discussed with: Patient                            MD Irina Bone MD

## 2019-07-03 NOTE — PLAN OF CARE
Problem: OT General Care Plan  Goal: OT Frequency  Description  Pt will demonstrate consistent engagement in OT group activities that support recovery as evidenced by participating in >1 scheduled OT group/day for 5 days.     Attended 1/2 occupational therapy groups offered this date. Overall content and cooperative.  Occupational therapy clinic to to continue work on an independent project. Pt Response: Demonstrated consistent performance skills as observed on previous dates. Increased socialization.     Outcome: Improving

## 2019-07-03 NOTE — PROGRESS NOTES
07/02/19 2100   Behavioral Health   Hallucinations denies / not responding to hallucinations   Thinking intact   Orientation person: oriented;place: oriented   Memory baseline memory   Insight insight appropriate to events   Judgement impaired   Eye Contact at examiner   Affect full range affect   Mood mood is calm   Physical Appearance/Attire neat   Hygiene well groomed   Suicidality other (see comments)  (denies)   1. Wish to be Dead No   2. Non-Specific Active Suicidal Thoughts  No   Self Injury other (see comment)  (denies)   Activity other (see comment)  (visible in the milieu and attended groupds)   Speech clear;coherent   Activities of Daily Living   Hygiene/Grooming independent   Oral Hygiene independent   Dress independent   Laundry with supervision   Room Organization independent     Pt denied SI and SIB.  Pt ate supper, attended groups and was visible in the milieu.  Pt reported feeling anxious(7) and nervous (7) after speaking to his  about a therapist coming to see him tomorrow.  Pt reported he was constipated for two days, but had a dowel movement late afternoon today.

## 2019-07-03 NOTE — PROCEDURES
Procedure/Surgery Information   [facility], [place]    Bedside Procedure Note  Date of Service (when I performed the procedure): 07/03/2019    Flakito Hilliard is a 59 year old male patient.  1. Suicidal ideation    2. Current episode of major depressive disorder without prior episode, unspecified depression episode severity    3. Severe recurrent major depression without psychotic features (H)      Past Medical History:   Diagnosis Date     Depressive disorder      Hypertension      Sleep apnea      Temp: 97.5  F (36.4  C) Temp src: Tympanic BP: (!) 162/95 Pulse: 106   Resp: 16 SpO2: 95 % O2 Device: None (Room air)      Procedures     Wilmer Liu     [facility], [place]   ECT Procedure Note   07/03/2019    Flakito Hilliard [id number]   59 year old 1960     Patient Status: Inpatient    Is this the first in a series of 12 treatments?  No    History and Physical: Reviewed in medical record    Consent: Informed consent was signed by: patient    Date Consent Signed: 6/27/19      Allergies   Allergen Reactions     Contrast Dye Difficulty breathing       Weight:  246 lbs 7.59 oz              Indications for ECT:   Medications ineffective and History of good ECT response in one or more previous episodes of illness         Clinical Narrative:   Patient was admitted with worsening depression and suicidal ideation.  He's had positive response to ECT in the past.  He's starting ECT for depression.  NPO after 2400.  Alert and Oriented x 3.  Mood remains very depressed.  He got Toradol post ECT on 7/1/19 for HA.   He says the anesthetic wore off before the muscle relaxant.            Diagnosis:   Major depression         Pause for the Cause:     Right patient Yes   Right procedure/laterality settings: Yes          Intra-Procedure Documentation:     ECT #: 2   Treatment number this series: 2   Total treatment number: 2 + 2 previous series      Type of ECT:  Right,  unilateral ultrabrief    ECT Medications:    Toradol:  30mg   Etomidate 20mg (decrease to 18mg next Tx)  Succinyl Choline: 80mg (down from 100mg)  Next Tx give Versed: 2mg immediately after seizure (as he has the sense that he can't breathe and is paralyzed but he is breathing and not paralyzed).  Today:  Labetalol:  10mg post ECT for high bp     ECT Strip Summary:   Energy Level: 50 percent  Motor Seizure Duration:  18 seconds  EEG Seizure Duration:   25 seconds    Complications: No    Plan:   Next ECT on 7/5/19    Wilmer Liu MD

## 2019-07-03 NOTE — PROGRESS NOTES
Patient adequate for discharge. Report called to inpatient RN Suzanne. VSS, A/O, IV removed. Discharged with staff at this time. Toradol given 30 mg at 1306, no NSAIDS for 6 hours. Also please put depend on prior to treatment due to incontinence. New pants provided for patient. RN upstairs aware.

## 2019-07-03 NOTE — PROGRESS NOTES
07/02/19 2100   General Information   Art Directive other (see comments)   AT directive is to create a personal shield as a metaphor for inner strengths, protection/safety, future goals. Goals of directive: emotional expression, identifying personal strength and/or goals, trauma containment. Pt was a quiet participant, focused on task for the full duration of group. Pt identified personal skills as helping others and being a good grandfather as well as helping his daughter with repairs/projects around her house. Pt struggled with identifying a personal goal/ambition and left this section of the shield blank.  Pts mood was calm, engaged in artwork.

## 2019-07-03 NOTE — PROGRESS NOTES
[facility], [place]   Psychiatric Progress Note        Interim History:     The patient's care was discussed with the treatment team during the daily team meeting and/or staff's chart notes were reviewed.  Staff report patient spends a lot of time outside of his room, goes to groups, still appears to be flat and unmotivated. He was seen today in his bed. He again complained of waking up after 2nd ECT feeling paralyzed. I asked if he discussed it with his ECT team, he said he did (I found in Dr. Liu's note that Succinylcholine dose was decreased and Dr. Liu would consider giving patient next time Versed to decrease his anxiety. Flakito reports still feeling depressed and at times overwhelmed when he thinks about his future, but denies presence of Suicidal ideation. This is positive development. He says he understands that 2 ECT treatments is not enough to lead to significantly and persistently better mood. He also seemed to be ambivalent about going to IRTS after discharge, but, then, agreed that with minimal support he has in community it would be the best option. It sounded like he was more concerned about being at IRTS in a very noisy area and OK [place]RTS [place].          Medications:       amLODIPine  10 mg Oral Daily     atorvastatin  20 mg Oral Daily     buPROPion  450 mg Oral QAM     diltiazem ER COATED BEADS  360 mg Oral Daily     hydrochlorothiazide  25 mg Oral Daily     lisinopril  40 mg Oral Daily          Allergies:     Allergies   Allergen Reactions     Contrast Dye Difficulty breathing          Labs:     No results found for this or any previous visit (from the past 24 hour(s)).       Psychiatric Examination:     /73   Pulse 75   Temp 98  F (36.7  C) (Oral)   Resp 16   Ht 1.829 m (6')   Wt 111.8 kg (246 lb 7.6 oz)   SpO2 96%   BMI 33.43 kg/m    Weight is 246 lbs 7.59 oz  Body mass index is 33.43 kg/m .     Orthostatic Vitals       Most Recent       Sitting Orthostatic /89 07/02 0814    Sitting Orthostatic Pulse (bpm) 106 07/02 0814    Standing Orthostatic /83 07/03 0759    Standing Orthostatic Pulse (bpm) 109 07/03 0759         Appearance: dressed in hospital garbs  Attitude: cooperative  Eye Contact:  partial  Mood: slightly less depressed  Affect: constricted, sad,  Speech: slow, monotone  Psychomotor Behavior: psychomotor retardation  Throught Process:  Slow, but logical  Associations:  tight  Thought Content: denies Suicidal ideation, psychosis  Insight: partial  Judgement:  Mildly impaired  Oriented to:  Self, time and place  Attention Span and Concentration: mildly impaired  Recent and Remote Memory: fair    Clinical Global Impressions  First:  Considering your total clinical experience with this particular patient population, how severe are the patient's symptoms at this time?: 7 (06/21/19 1701)  Compared to the patient's condition at the START of treatment, this patient's condition is:: 4 (06/21/19 1701)  Most recent:  Considering your total clinical experience with this particular patient population, how severe are the patient's symptoms at this time?: 5 (6/28/2019 )  Compared to the patient's condition at the START of treatment, this patient's condition is:: 3 (6/28/2019)         Precautions:     Behavioral Orders   Procedures     Code 1 - Restrict to Unit     Electroconvulsive therapy     ECT every Monday, Wednesday, and Friday     Electroconvulsive therapy     Series of up to 12 treatments. Begin Date: 6/28/19     Treating Psychiatrist providing ECT: Alexa    Notified on:  6/26/19     Routine Programming     As clinically indicated     Status 15     Every 15 minutes.     Suicide precautions     Patients on Suicide Precautions should have a Combination Diet ordered that includes a Diet selection(s) AND a Behavioral Tray selection for Safe Tray - with utensils, or Safe Tray - NO utensils            DIagnoses:     Major depressive disorder,  current, severe.  Rule out generalized anxiety disorder.  The patient was also diagnosed in the past with dependent personality disorder.          Plan:     ECT #2 today and next ECT on Friday. No medication changes today. Will continue to provide support and structure.  Will also consider IRTS after discharge.

## 2019-07-04 LAB
ALBUMIN UR-MCNC: 10 MG/DL
APPEARANCE UR: ABNORMAL
BACTERIA #/AREA URNS HPF: ABNORMAL /HPF
BILIRUB UR QL STRIP: NEGATIVE
COLOR UR AUTO: YELLOW
GLUCOSE UR STRIP-MCNC: NEGATIVE MG/DL
HGB UR QL STRIP: NEGATIVE
KETONES UR STRIP-MCNC: NEGATIVE MG/DL
LEUKOCYTE ESTERASE UR QL STRIP: ABNORMAL
MUCOUS THREADS #/AREA URNS LPF: PRESENT /LPF
NITRATE UR QL: POSITIVE
PH UR STRIP: 6.5 PH (ref 5–7)
RBC #/AREA URNS AUTO: 2 /HPF (ref 0–2)
SOURCE: ABNORMAL
SP GR UR STRIP: 1.02 (ref 1–1.03)
SQUAMOUS #/AREA URNS AUTO: 2 /HPF (ref 0–1)
UROBILINOGEN UR STRIP-MCNC: NORMAL MG/DL (ref 0–2)
WBC #/AREA URNS AUTO: 32 /HPF (ref 0–5)

## 2019-07-04 PROCEDURE — G0177 OPPS/PHP; TRAIN & EDUC SERV: HCPCS

## 2019-07-04 PROCEDURE — 81001 URINALYSIS AUTO W/SCOPE: CPT | Performed by: PSYCHIATRY & NEUROLOGY

## 2019-07-04 PROCEDURE — 12400001 ZZH R&B MH UMMC

## 2019-07-04 PROCEDURE — 25000132 ZZH RX MED GY IP 250 OP 250 PS 637: Performed by: CLINICAL NURSE SPECIALIST

## 2019-07-04 PROCEDURE — 90853 GROUP PSYCHOTHERAPY: CPT

## 2019-07-04 PROCEDURE — 25000132 ZZH RX MED GY IP 250 OP 250 PS 637: Performed by: PSYCHIATRY & NEUROLOGY

## 2019-07-04 PROCEDURE — 25000132 ZZH RX MED GY IP 250 OP 250 PS 637: Performed by: PHYSICIAN ASSISTANT

## 2019-07-04 RX ADMIN — DILTIAZEM HYDROCHLORIDE 360 MG: 120 CAPSULE, COATED, EXTENDED RELEASE ORAL at 08:42

## 2019-07-04 RX ADMIN — ATORVASTATIN CALCIUM 20 MG: 20 TABLET, FILM COATED ORAL at 08:43

## 2019-07-04 RX ADMIN — HYDROCHLOROTHIAZIDE 25 MG: 12.5 CAPSULE ORAL at 08:43

## 2019-07-04 RX ADMIN — AMLODIPINE BESYLATE 10 MG: 5 TABLET ORAL at 08:42

## 2019-07-04 RX ADMIN — TRAZODONE HYDROCHLORIDE 100 MG: 100 TABLET ORAL at 21:48

## 2019-07-04 RX ADMIN — LISINOPRIL 40 MG: 40 TABLET ORAL at 08:42

## 2019-07-04 RX ADMIN — BUPROPION 450 MG: 150 TABLET, EXTENDED RELEASE ORAL at 08:42

## 2019-07-04 ASSESSMENT — MIFFLIN-ST. JEOR: SCORE: 1967.04

## 2019-07-04 ASSESSMENT — ACTIVITIES OF DAILY LIVING (ADL)
DRESS: INDEPENDENT
LAUNDRY: WITH SUPERVISION
ORAL_HYGIENE: INDEPENDENT
HYGIENE/GROOMING: INDEPENDENT

## 2019-07-04 NOTE — PROGRESS NOTES
07/04/19 1607   Behavioral Health   Hallucinations denies / not responding to hallucinations   Thinking poor concentration   Orientation person: oriented;place: oriented;date: oriented;time: oriented   Memory baseline memory   Insight poor   Judgement impaired   Eye Contact at examiner   Affect blunted, flat   Mood mood is calm   Physical Appearance/Attire attire appropriate to age and situation   Hygiene well groomed   Suicidality other (see comments)  (Denied)   1. Wish to be Dead No   2. Non-Specific Active Suicidal Thoughts  No   Self Injury other (see comment)  (Denied)   Elopement   (None Observed)   Activity other (see comment)  (attended all milieu group activities)   Speech coherent;clear   Medication Sensitivity no stated side effects   Psychomotor / Gait steady;balanced   Psycho Education   Type of Intervention 1:1 intervention   Response participates, initiates socially appropriate   Hours 0.5   Treatment Detail Check-In   Activities of Daily Living   Hygiene/Grooming independent   Oral Hygiene independent   Dress independent   Laundry with supervision   Room Organization independent     Pt was visible in the milieu for the majority of the shift. While in the milieu, pt ate meals, showered, socialized with peers, and engaged in community meeting and all therapeutic groups held for this shift. Pt expressed not having the will and drive to implement the changes he desires in his life to get him out of this depressive state. Pt reported feeling anxious and depressed, rating both of them a 7 and 5, respectively. However, pt denied having SI/SIB thoughts.

## 2019-07-04 NOTE — PROGRESS NOTES
Behavioral Health  Note   Behavioral Health  Spirituality Group Note     Unit 20    Name: Flakito Hilliard    YOB: 1960   MRN: [id number]    Age: 59 year old     Patient attended -led group, which included discussion of spirituality, coping with illness and building resilience.   Patient attended group for 1.0 hrs.   The patient actively participated in group discussion. Pt participated well but always say, I don't get it what we talked about because I am very confused. Pt said the same in his last week spirituality group time.    [facility]  Staff    Page [phone]

## 2019-07-04 NOTE — PLAN OF CARE
"Ranjeet attended an OT discussion group this morning on the topic of summertime, in both its challenging & joyful aspects. Presented with frustrated mood. Stated that he has \"brought himself here\" (referring to multiple inpatient units) many times, though \"never gets anything out of it,\" and alluded to his perception that others are gleaning more rewards from treatment than he. Despondent overall but open to the proposal of new points of view.   "

## 2019-07-04 NOTE — PROGRESS NOTES
"   07/03/19 2000   Group Therapy Session   Group Attendance attended group session   Total Time (minutes) 50   Group Type psychotherapeutic   Group Topic Covered emotions/expression   Patient Participation/Contribution cooperative with task;discussed personal experience with topic;listened actively   Psychotherapy group goal:  Identify and process emotions using techniques from expressive therapies.    Ranjeet was actively engaged in the activity (creating emotions wheel) and shared with the group. He presented as depressed; however, he demonstrated improved affect, some insight into his emotions, and ability to focus on the activity.  When discussing his \"emotions wheel,\" he reported that he lauren representations of \"happy\" and \"comfortable,\" but doesn't feel those emotions at this time. This writer commended him for including them. He was able to share that he has felt those emotions in the past and has some hope of feeling them again.   "

## 2019-07-04 NOTE — PROGRESS NOTES
07/03/19 2200   Significant Event   Significant Event Other (see comments)  (shift summary)     Pt was visible in the milieu, social, watched TV, participated and attended groups.  Flat/blunted affect, calm, cooperative, no stated SI, SIB or hallucinations.

## 2019-07-05 ENCOUNTER — ANESTHESIA (OUTPATIENT)
Dept: BEHAVIORAL HEALTH | Facility: CLINIC | Age: 59
End: 2019-07-05

## 2019-07-05 ENCOUNTER — ANESTHESIA EVENT (OUTPATIENT)
Dept: BEHAVIORAL HEALTH | Facility: CLINIC | Age: 59
End: 2019-07-05

## 2019-07-05 PROCEDURE — 25000125 ZZHC RX 250: Performed by: PSYCHIATRY & NEUROLOGY

## 2019-07-05 PROCEDURE — 25000128 H RX IP 250 OP 636: Performed by: PSYCHIATRY & NEUROLOGY

## 2019-07-05 PROCEDURE — 25000132 ZZH RX MED GY IP 250 OP 250 PS 637: Performed by: PSYCHIATRY & NEUROLOGY

## 2019-07-05 PROCEDURE — 25000128 H RX IP 250 OP 636: Performed by: ANESTHESIOLOGY

## 2019-07-05 PROCEDURE — 99232 SBSQ HOSP IP/OBS MODERATE 35: CPT | Mod: 25 | Performed by: PSYCHIATRY & NEUROLOGY

## 2019-07-05 PROCEDURE — 25000132 ZZH RX MED GY IP 250 OP 250 PS 637: Performed by: PHYSICIAN ASSISTANT

## 2019-07-05 PROCEDURE — 12400001 ZZH R&B MH UMMC

## 2019-07-05 PROCEDURE — 25000132 ZZH RX MED GY IP 250 OP 250 PS 637: Performed by: CLINICAL NURSE SPECIALIST

## 2019-07-05 PROCEDURE — 90870 ELECTROCONVULSIVE THERAPY: CPT

## 2019-07-05 PROCEDURE — H2032 ACTIVITY THERAPY, PER 15 MIN: HCPCS

## 2019-07-05 PROCEDURE — 25000125 ZZHC RX 250: Performed by: ANESTHESIOLOGY

## 2019-07-05 PROCEDURE — 90837 PSYTX W PT 60 MINUTES: CPT

## 2019-07-05 RX ORDER — CAFFEINE AND SODIUM BENZOATE 125 MG/ML
250 INJECTION, SOLUTION INTRAMUSCULAR; INTRAVENOUS
Status: COMPLETED | OUTPATIENT
Start: 2019-07-05 | End: 2019-07-05

## 2019-07-05 RX ORDER — KETOROLAC TROMETHAMINE 30 MG/ML
30 INJECTION, SOLUTION INTRAMUSCULAR; INTRAVENOUS
Status: COMPLETED | OUTPATIENT
Start: 2019-07-05 | End: 2019-07-05

## 2019-07-05 RX ORDER — LIDOCAINE HYDROCHLORIDE 20 MG/ML
40 INJECTION, SOLUTION EPIDURAL; INFILTRATION; INTRACAUDAL; PERINEURAL
Status: COMPLETED | OUTPATIENT
Start: 2019-07-05 | End: 2019-07-05

## 2019-07-05 RX ORDER — ETOMIDATE 2 MG/ML
INJECTION INTRAVENOUS PRN
Status: DISCONTINUED | OUTPATIENT
Start: 2019-07-05 | End: 2019-07-05

## 2019-07-05 RX ORDER — LABETALOL HYDROCHLORIDE 5 MG/ML
INJECTION, SOLUTION INTRAVENOUS PRN
Status: DISCONTINUED | OUTPATIENT
Start: 2019-07-05 | End: 2019-07-05

## 2019-07-05 RX ADMIN — DILTIAZEM HYDROCHLORIDE 360 MG: 120 CAPSULE, COATED, EXTENDED RELEASE ORAL at 08:01

## 2019-07-05 RX ADMIN — LISINOPRIL 40 MG: 40 TABLET ORAL at 08:01

## 2019-07-05 RX ADMIN — TRAZODONE HYDROCHLORIDE 100 MG: 100 TABLET ORAL at 21:51

## 2019-07-05 RX ADMIN — ATORVASTATIN CALCIUM 20 MG: 20 TABLET, FILM COATED ORAL at 10:58

## 2019-07-05 RX ADMIN — BUPROPION 450 MG: 150 TABLET, EXTENDED RELEASE ORAL at 10:57

## 2019-07-05 RX ADMIN — LIDOCAINE HYDROCHLORIDE 40 MG: 20 INJECTION, SOLUTION EPIDURAL; INFILTRATION; INTRACAUDAL; PERINEURAL at 09:54

## 2019-07-05 RX ADMIN — KETOROLAC TROMETHAMINE 30 MG: 30 INJECTION, SOLUTION INTRAMUSCULAR at 09:47

## 2019-07-05 RX ADMIN — CAFFEINE AND SODIUM BENZOATE 250 MG: 125 INJECTION, SOLUTION INTRAMUSCULAR; INTRAVENOUS at 09:55

## 2019-07-05 RX ADMIN — AMLODIPINE BESYLATE 10 MG: 5 TABLET ORAL at 08:01

## 2019-07-05 RX ADMIN — ETOMIDATE 18 MG: 2 INJECTION INTRAVENOUS at 09:53

## 2019-07-05 RX ADMIN — LABETALOL HYDROCHLORIDE 10 MG: 5 INJECTION INTRAVENOUS at 10:04

## 2019-07-05 RX ADMIN — Medication 80 MG: at 09:53

## 2019-07-05 RX ADMIN — HYDROCHLOROTHIAZIDE 25 MG: 12.5 CAPSULE ORAL at 10:57

## 2019-07-05 RX ADMIN — MIDAZOLAM 2 MG: 1 INJECTION INTRAMUSCULAR; INTRAVENOUS at 10:00

## 2019-07-05 ASSESSMENT — ACTIVITIES OF DAILY LIVING (ADL)
HYGIENE/GROOMING: INDEPENDENT
ORAL_HYGIENE: INDEPENDENT
LAUNDRY: WITH SUPERVISION
ORAL_HYGIENE: INDEPENDENT
DRESS: SCRUBS (BEHAVIORAL HEALTH);INDEPENDENT
HYGIENE/GROOMING: HANDWASHING;INDEPENDENT
DRESS: INDEPENDENT

## 2019-07-05 NOTE — PROGRESS NOTES
[facility], [place]   Psychiatric Progress Note        Interim History:     The patient's care was discussed with the treatment team during the daily team meeting and/or staff's chart notes were reviewed.  Staff report patient spends a lot of time outside of his room, goes to groups, appears to be slightly more social. He told RN that after ECT he sees new things in the world every day. During today's visit with me he told me that he felt slightly more optimistic about his future, but frequently overwhelmed with worries. Denied presence of Suicidal ideation. Today he had better experience with ECT, said he didn't wake up feeling paralyzed and scared. Told me that he had a history of UTI and urinary tract reconstructive surgery of unclear type. Denied burning on urination.         Medications:       amLODIPine  10 mg Oral Daily     atorvastatin  20 mg Oral Daily     buPROPion  450 mg Oral QAM     diltiazem ER COATED BEADS  360 mg Oral Daily     hydrochlorothiazide  25 mg Oral Daily     lisinopril  40 mg Oral Daily          Allergies:     Allergies   Allergen Reactions     Contrast Dye Difficulty breathing          Labs:     No results found for this or any previous visit (from the past 24 hour(s)).       Psychiatric Examination:     /77 (BP Location: Right arm)   Pulse 80   Temp 98.8  F (37.1  C) (Temporal)   Resp 16   Ht 1.829 m (6')   Wt 110.9 kg (244 lb 8 oz)   SpO2 96%   BMI 33.16 kg/m    Weight is 244 lbs 8 oz  Body mass index is 33.16 kg/m .     Orthostatic Vitals       Most Recent      Sitting Orthostatic /86 07/05 0700    Sitting Orthostatic Pulse (bpm) 103 07/05 0700    Standing Orthostatic /78 07/05 0700    Standing Orthostatic Pulse (bpm) 111 07/05 0700         Appearance: dressed in hospital garbs  Attitude: cooperative  Eye Contact:  partial  Mood: less depressed  Affect: constricted, sad,  Speech: less monotone  Psychomotor Behavior: psychomotor  retardation less pronounced  Throught Process:  Slow, but logical  Associations:  tight  Thought Content: denies Suicidal ideation, psychosis  Insight: partial  Judgement:  Mildly impaired  Oriented to:  Self, time and place  Attention Span and Concentration: mildly impaired  Recent and Remote Memory: fair    Clinical Global Impressions  First:  Considering your total clinical experience with this particular patient population, how severe are the patient's symptoms at this time?: 7 (06/21/19 1701)  Compared to the patient's condition at the START of treatment, this patient's condition is:: 4 (06/21/19 1701)  Most recent:  Considering your total clinical experience with this particular patient population, how severe are the patient's symptoms at this time?: 5 (6/28/2019 )  Compared to the patient's condition at the START of treatment, this patient's condition is:: 3 (6/28/2019)         Precautions:     Behavioral Orders   Procedures     Code 1 - Restrict to Unit     Code 2     Electroconvulsive therapy     ECT every Monday, Wednesday, and Friday     Electroconvulsive therapy     Series of up to 12 treatments. Begin Date: 6/28/19     Treating Psychiatrist providing ECT: Alexa    Notified on:  6/26/19     Routine Programming     As clinically indicated     Status 15     Every 15 minutes.     Suicide precautions     Patients on Suicide Precautions should have a Combination Diet ordered that includes a Diet selection(s) AND a Behavioral Tray selection for Safe Tray - with utensils, or Safe Tray - NO utensils            DIagnoses:     Major depressive disorder, current, severe.  Rule out generalized anxiety disorder.  The patient was also diagnosed in the past with dependent personality disorder.          Plan:     ECT #3 today and next ECT on Monday. No medication changes today. Will continue to provide support and structure. Will ask IM to review urinalysis.  Will also consider IRTS after discharge.

## 2019-07-05 NOTE — PROGRESS NOTES
"   07/04/19 2000   Group Therapy Session   Group Attendance attended group session   Total Time (minutes) 60   Group Type psychotherapeutic   Group Topic Covered other (see comments)   Patient Participation/Contribution cooperative with task;discussed personal experience with topic;offered helpful suggestions to peers;listened actively   CBT activity was utilized to help individuals reduce worry. Individuals were asked to identify a worry, consider worse consequence, and successful coping strategies used in the past.  Ranjeet reported having difficulty with this.  He remained engaged with group and provided feedback. Ranjeet identified feeling awkward being in the hospital yet he presented as calm and thoughtful.    Group then played \"therapy Jenga.\" Ranjeet and other group members became engaged in processing the various questions and had a positive therapeutic discussion.       "

## 2019-07-05 NOTE — PROCEDURES
Procedure/Surgery Information   [facility], [place]    Bedside Procedure Note  Date of Service (when I performed the procedure): 07/05/2019    Flakito Hilliard is a 59 year old male patient.  1. Suicidal ideation    2. Current episode of major depressive disorder without prior episode, unspecified depression episode severity    3. Severe recurrent major depression without psychotic features (H)      Past Medical History:   Diagnosis Date     Depressive disorder      Hypertension      Sleep apnea      Temp: 97.8  F (36.6  C) Temp src: Oral BP: 129/66 Pulse: 72   Resp: 16          Procedures     Wilmer Liu     [facility], [place]   ECT Procedure Note   07/05/2019    Flakito Hilliard [id number]   59 year old 1960     Patient Status: Inpatient    Is this the first in a series of 12 treatments?  No    History and Physical: Reviewed in medical record    Consent: Informed consent was signed by: patient    Date Consent Signed: 6/27/19      Allergies   Allergen Reactions     Contrast Dye Difficulty breathing       Weight:  244 lbs 8 oz              Indications for ECT:   Medications ineffective and History of good ECT response in one or more previous episodes of illness         Clinical Narrative:   Patient was admitted with worsening depression and suicidal ideation.  He's had positive response to ECT in the past.  He's starting ECT for depression.  NPO after 2400.  Alert and Oriented x 3.  Mood is depressed and anxious.   He has a feeling he can't breathe when he wakes up, and yet he is breathing well and has good oxygen saturation.  Today he'll get Versed when he wakes to help with his sense of anxiety.            Diagnosis:   Major depression         Pause for the Cause:     Right patient Yes   Right procedure/laterality settings: Yes          Intra-Procedure Documentation:     ECT #: 3   Treatment number this series: 3   Total treatment number: 3 + 2 previous  series      Type of ECT:  Right, unilateral ultrabrief    ECT Medications:    Toradol:  30mg   Lidocaine: 40mg  Etomidate 18mg   Caffeine: 250mg  Succinyl Choline: 80mg (increase back to 100mg next Tx)  Versed: 2mg immediately after seizure (as he has the sense that he can't breathe and is paralyzed but he is breathing and not paralyzed).  Today:  Labetalol:  10mg post ECT for high bp     ECT Strip Summary:   Energy Level: 55 percent  Motor Seizure Duration:  41 seconds  EEG Seizure Duration:   58 seconds    Complications: No    Plan:   Next ECT on 7/8/19    Wilmer Liu MD

## 2019-07-05 NOTE — PROGRESS NOTES
Patient adequate for discharge. Report called to inpatient RN Unit 20. VSS, A/O, IV removed. Discharged with staff at this time.

## 2019-07-05 NOTE — PLAN OF CARE
"Pt reported \"there is something new every day,\" since he started ect. Today it is that he was having bad dreams last night-something referring to females; vague re this. He said he is also having racing thoughts, in the mornings. At that time he had told a PA he was having some dizziness; he denied this when asked by this RN. Later after ect, pt did c/o some dizziness. He said this was better, after laying down to rest for a bit; BP wnl. Pt reports he is \"afraid and anxious,\" re if the ect will work. He said it has not helped him, so far. He denies si; rates depression 6; anxiety 6-7. He said he doesn't have much of an appetite, and is feeling \"run down.\" Pt stated he may attend grps in the afternoon, after he naps. Pt's goal for the day was \"to get through ect.\"  "

## 2019-07-05 NOTE — ANESTHESIA PREPROCEDURE EVALUATION
Anesthesia Pre-Procedure Evaluation    Patient: Flakito Hilliard   MRN:     [id number] Gender:   male   Age:    59 year old :      1960        Preoperative Diagnosis: * No surgery found *        Past Medical History:   Diagnosis Date     Depressive disorder      Hypertension      Sleep apnea       Past Surgical History:   Procedure Laterality Date     BLADDER SURGERY       CYSTOSTOMY, INSERT TUBE SUPRAPUBIC, COMBINED N/A 2014    Procedure: COMBINED CYSTOSTOMY, INSERT TUBE SUPRAPUBIC;  Surgeon: Min Prasad MD;  Location: [facility]     GENITOURINARY SURGERY      prostate surgery for stones     LASER HOLMIUM CYSTOSCOPY, INTERNAL URETHROTOMY, COMBINED N/A 10/24/2014    Procedure: COMBINED LASER HOLMIUM CYSTOSCOPY, INTERNAL URETHROTOMY;  Surgeon: Valentin Villatoro MD;  Location: [facility] OR     URETHROPLASTY N/A 12/10/2014    Procedure: URETHROPLASTY;  Surgeon: Niraj Burger MD;  Location: [facility]     URETHROPLASTY WITH BUCCAL GRAFT N/A 2016    Procedure: URETHROPLASTY WITH BUCCAL GRAFT;  Surgeon: Niraj Burger MD;  Location: [facility]          Anesthesia Evaluation     .             ROS/MED HX    ENT/Pulmonary:     (+)sleep apnea, , . .    Neurologic:       Cardiovascular:     (+) hypertension----. : . . . :. .       METS/Exercise Tolerance:     Hematologic:         Musculoskeletal:         GI/Hepatic:         Renal/Genitourinary:     (+) Nephrolithiasis ,       Endo:         Psychiatric:     (+) psychiatric history depression      Infectious Disease:         Malignancy:         Other:                         PHYSICAL EXAM:   Mental Status/Neuro: A/A/O   Airway: Facies: Feasible  Mallampati: II  Mouth/Opening: Full  TM distance: > 6 cm  Neck ROM: Full   Respiratory: Auscultation: CTAB     Resp. Rate: Normal     Resp. Effort: Normal      CV: Rhythm: Regular  Rate: Age appropriate  Heart: Normal Sounds   Comments:      Dental: Normal                  Lab Results   Component Value Date    WBC 8.1  05/19/2019    HGB 16.5 06/28/2019    HCT 49.1 05/19/2019     05/19/2019     06/27/2019    POTASSIUM 3.5 06/27/2019    CHLORIDE 104 06/27/2019    CO2 29 06/27/2019    BUN 20 06/27/2019    CR 1.19 06/27/2019     (H) 06/27/2019    AMILCAR 9.1 06/27/2019    ALBUMIN 3.8 05/19/2019    PROTTOTAL 7.5 05/19/2019    ALT 26 05/19/2019    AST 20 05/19/2019    ALKPHOS 77 05/19/2019    BILITOTAL 1.1 05/19/2019    TSH 2.01 05/19/2019    T4 1.19 05/25/2016       Preop Vitals  BP Readings from Last 3 Encounters:   07/04/19 129/66   06/20/19 136/88   06/14/19 140/88    Pulse Readings from Last 3 Encounters:   07/04/19 72   06/20/19 115   06/14/19 88      Resp Readings from Last 3 Encounters:   07/05/19 16   06/20/19 24   06/14/19 18    SpO2 Readings from Last 3 Encounters:   07/05/19 96%   06/20/19 96%   05/19/19 97%      Temp Readings from Last 1 Encounters:   07/05/19 36.6  C (97.8  F) (Oral)    Ht Readings from Last 1 Encounters:   06/20/19 1.829 m (6')      Wt Readings from Last 1 Encounters:   07/04/19 110.9 kg (244 lb 8 oz)    Estimated body mass index is 33.16 kg/m  as calculated from the following:    Height as of 6/20/19: 1.829 m (6').    Weight as of 7/4/19: 110.9 kg (244 lb 8 oz).     LDA:  Peripheral IV 07/05/19 Anterior;Left Wrist (Active)   Site Assessment WDL 7/5/2019  9:44 AM   Line Status Saline locked 7/5/2019  9:44 AM   Dressing Intervention New dressing  7/5/2019  9:44 AM   Number of days: 0            Assessment:   ASA SCORE: 2    NPO Status: > 6 hours since completed Solid [employer]   Documentation: H&P complete; Preop Testing complete; Consents complete   Proceeding: Proceed without further delay  Tobacco Use:  NO Active use of Tobacco/UNKNOWN Tobacco use status     Plan:   Anes. Type:  General      Induction:  IV (Standard)   Airway: Native Airway   Access/Monitoring: PIV   Maintenance: Balanced   Emergence: Procedure Site   Logistics: Same Day Surgery     Postop Pain/Sedation  Strategy:  Standard-Options: Opioids PRN     PONV Management:  Adult Risk Factors:, Non-Smoker, Postop Opioids     CONSENT: Direct conversation   Plan and risks discussed with: Patient              Anesthesia Pre-Procedure Evaluation    Patient: Flakito Hilliard   MRN:     [id number] Gender:   male   Age:    59 year old :      1960        Preoperative Diagnosis: * No surgery found *        Past Medical History:   Diagnosis Date     Depressive disorder      Hypertension      Sleep apnea       Past Surgical History:   Procedure Laterality Date     BLADDER SURGERY       CYSTOSTOMY, INSERT TUBE SUPRAPUBIC, COMBINED N/A 2014    Procedure: COMBINED CYSTOSTOMY, INSERT TUBE SUPRAPUBIC;  Surgeon: Min Prasad MD;  Location: [facility]     GENITOURINARY SURGERY      prostate surgery for stones     LASER HOLMIUM CYSTOSCOPY, INTERNAL URETHROTOMY, COMBINED N/A 10/24/2014    Procedure: COMBINED LASER HOLMIUM CYSTOSCOPY, INTERNAL URETHROTOMY;  Surgeon: Valentin Villatoro MD;  Location: [facility]     URETHROPLASTY N/A 12/10/2014    Procedure: URETHROPLASTY;  Surgeon: Niraj Burger MD;  Location: [facility]     URETHROPLASTY WITH BUCCAL GRAFT N/A 2016    Procedure: URETHROPLASTY WITH BUCCAL GRAFT;  Surgeon: Niraj Burger MD;  [facility]               JZG FV AN PHYSICAL EXAM    Lab Results   Component Value Date    WBC 8.1 2019    HGB 16.5 2019    HCT 49.1 2019     2019     2019    POTASSIUM 3.5 2019    CHLORIDE 104 2019    CO2 29 2019    BUN 20 2019    CR 1.19 2019     (H) 2019    AMILCAR 9.1 2019    ALBUMIN 3.8 2019    PROTTOTAL 7.5 2019    ALT 26 2019    AST 20 2019    ALKPHOS 77 2019    BILITOTAL 1.1 2019    TSH 2.01 2019    T4 1.19 2016       Preop Vitals  BP Readings from Last 3 Encounters:   19 129/66   19 136/88   19 140/88    Pulse Readings from  Last 3 Encounters:   07/04/19 72   06/20/19 115   06/14/19 88      Resp Readings from Last 3 Encounters:   07/05/19 16   06/20/19 24   06/14/19 18    SpO2 Readings from Last 3 Encounters:   07/05/19 96%   06/20/19 96%   05/19/19 97%      Temp Readings from Last 1 Encounters:   07/05/19 36.6  C (97.8  F) (Oral)    Ht Readings from Last 1 Encounters:   06/20/19 1.829 m (6')      Wt Readings from Last 1 Encounters:   07/04/19 110.9 kg (244 lb 8 oz)    Estimated body mass index is 33.16 kg/m  as calculated from the following:    Height as of 6/20/19: 1.829 m (6').    Weight as of 7/4/19: 110.9 kg (244 lb 8 oz).     LDA:  Peripheral IV 07/05/19 Anterior;Left Wrist (Active)   Site Assessment WDL 7/5/2019  9:44 AM   Line Status Saline locked 7/5/2019  9:44 AM   Dressing Intervention New dressing  7/5/2019  9:44 AM   Number of days: 0            Assessment:   ASA SCORE: 2       Documentation: H&P complete; Preop Testing complete; Consents complete   Proceeding: Proceed without further delay  Tobacco Use:  NO Active use of Tobacco/UNKNOWN Tobacco use status     Plan:   Anes. Type:  General   Pre-Induction: Midazolam IV; Acetaminophen PO   Induction:  IV (Standard)   Airway: Oral ETT   Access/Monitoring: PIV   Maintenance: Balanced   Emergence: Procedure Site   Logistics: Same Day Surgery     Postop Pain/Sedation Strategy:  Standard-Options: Opioids PRN     PONV Management:  Adult Risk Factors:, Non-Smoker, Postop Opioids     CONSENT: Direct conversation   Plan and risks discussed with: Patient                            MD Irina Bone MD

## 2019-07-05 NOTE — ANESTHESIA POSTPROCEDURE EVALUATION
Anesthesia POST Procedure Evaluation    Patient: Flakito Hilliard   MRN:     [id number] Gender:   male   Age:    59 year old :      1960        Preoperative Diagnosis: * No pre-op diagnosis entered *   * No procedures listed *   Postop Comments: No value filed.       Anesthesia Type:  General  No value filed.    Reportable Event: NO     PAIN: Uncomplicated   Sign Out status: Comfortable, Well controlled pain     PONV: No PONV   Sign Out status:  No Nausea or Vomiting     Neuro/Psych: Uneventful perioperative course   Sign Out Status: Preoperative baseline; Age appropriate mentation     Airway/Resp.: Uneventful perioperative course   Sign Out Status: Non labored breathing, age appropriate RR; Resp. Status within EXPECTED Parameters     CV: Uneventful perioperative course   Sign Out status: Appropriate BP and perfusion indices; Appropriate HR/Rhythm     Disposition:   Sign Out in:  PACU  Disposition:  Floor  Recovery Course: Uneventful  Follow-Up: Not required           Last Anesthesia Record Vitals:  CRNA VITALS  2019 0936 - 2019 1036      2019             Resp Rate (set):  8          Last PACU Vitals:  No vitals data found for the desired time range.        Electronically Signed By: Irina Bhatt MD, 2019, 11:37 AM

## 2019-07-05 NOTE — PLAN OF CARE
BEHAVIORAL TEAM DISCUSSION    Participants: Dr. Edwards, Dell Lee (CTC), Rosalinda (RN)  Progress: No Progress; staff report that the patient is still very flat and depressed.   Continued Stay Criteria/Rationale: Continued stabilization and ECT treatments.   Medical/Physical: No acute medical issues.   Precautions:   Behavioral Orders   Procedures     Code 1 - Restrict to Unit     Code 2     Electroconvulsive therapy     ECT every Monday, Wednesday, and Friday     Electroconvulsive therapy     Series of up to 12 treatments. Begin Date: 6/28/19     Treating Psychiatrist providing ECT: Alexa    Notified on:  6/26/19     Routine Programming     As clinically indicated     Status 15     Every 15 minutes.     Suicide precautions     Patients on Suicide Precautions should have a Combination Diet ordered that includes a Diet selection(s) AND a Behavioral Tray selection for Safe Tray - with utensils, or Safe Tray - NO utensils       Plan: The patient will continue with ECT treatments with his 3rd being today. The patient will be referred to IRTs placements as a disposition.   Rationale for change in precautions or plan: None.

## 2019-07-05 NOTE — PROGRESS NOTES
UA results from 7/4/19 are available in Epic for the provider to assess and address. Day staff notified and noet for provider placed in chart.

## 2019-07-05 NOTE — PROGRESS NOTES
"Pt was eager to engage in individual psychotherapy despite a morning ECT treatment.  He explained he had rested and eaten something and felt up for talking.      The session was primarily verbal, though pt shared many stories with strong sensory experiences, connecting him to memories from his past. Themes of belonging, and never feeling a sense of belonging permeated his sharing.  Pt was filled with questions about his past and present, with an eagerness to figure things out (where he will live, how he will survive financially, who he will relate with.)  Pt had significant negative self-talk with impatience toward his ability to understand and figure out \"what people are talking about here.\"  [Staff in the hospital.] He feels as if he \"should know this stuff already\" but doesn't.    Pt struggled some with word-finding during the session, however, he was clear about his physical senses and intuitive knowledge.  Long term memories were not only in tact, but very emotionally present for him (for example, a hospitalization of his adult son related to alcohol abuse.)  One therapeutic goal could be to support his current identity exploration based on making meaning out of past experiences.         07/05/19 1300   Dance Movement Therapy   Type of Intervention structured groups   Response participates, initiates socially appropriate   Hours 1.5     "

## 2019-07-06 PROCEDURE — 12400001 ZZH R&B MH UMMC

## 2019-07-06 PROCEDURE — 25000132 ZZH RX MED GY IP 250 OP 250 PS 637: Performed by: PHYSICIAN ASSISTANT

## 2019-07-06 PROCEDURE — 87086 URINE CULTURE/COLONY COUNT: CPT | Performed by: PHYSICIAN ASSISTANT

## 2019-07-06 PROCEDURE — 25000132 ZZH RX MED GY IP 250 OP 250 PS 637: Performed by: CLINICAL NURSE SPECIALIST

## 2019-07-06 PROCEDURE — 99231 SBSQ HOSP IP/OBS SF/LOW 25: CPT | Performed by: PHYSICIAN ASSISTANT

## 2019-07-06 PROCEDURE — 25000132 ZZH RX MED GY IP 250 OP 250 PS 637: Performed by: PSYCHIATRY & NEUROLOGY

## 2019-07-06 RX ADMIN — LISINOPRIL 40 MG: 40 TABLET ORAL at 08:58

## 2019-07-06 RX ADMIN — AMLODIPINE BESYLATE 10 MG: 5 TABLET ORAL at 08:55

## 2019-07-06 RX ADMIN — BUPROPION 450 MG: 150 TABLET, EXTENDED RELEASE ORAL at 08:53

## 2019-07-06 RX ADMIN — HYDROCHLOROTHIAZIDE 25 MG: 12.5 CAPSULE ORAL at 08:54

## 2019-07-06 RX ADMIN — DILTIAZEM HYDROCHLORIDE 360 MG: 120 CAPSULE, COATED, EXTENDED RELEASE ORAL at 08:54

## 2019-07-06 RX ADMIN — TRAZODONE HYDROCHLORIDE 100 MG: 100 TABLET ORAL at 21:03

## 2019-07-06 RX ADMIN — ATORVASTATIN CALCIUM 20 MG: 20 TABLET, FILM COATED ORAL at 08:55

## 2019-07-06 ASSESSMENT — ACTIVITIES OF DAILY LIVING (ADL)
HYGIENE/GROOMING: INDEPENDENT
DRESS: INDEPENDENT
ORAL_HYGIENE: INDEPENDENT
LAUNDRY: WITH SUPERVISION
HYGIENE/GROOMING: HANDWASHING;INDEPENDENT
DRESS: STREET CLOTHES;INDEPENDENT
ORAL_HYGIENE: INDEPENDENT

## 2019-07-06 NOTE — PLAN OF CARE
"Pt has a notably brighter affect today, vs yesterday. He reports his mood is \"content.\" He denies si; rates depression and anxiety both 4. Pt was happy that he slept \"very well\" last night. He is in the milieu, and attended community mtg. He knows to get a clean catch urine spec, for uc. Will bladder scan *1 post void, per order. Pt denies urinary sx/pain, and is afebrile. His goal for the day is \"to take a shower.\"  "

## 2019-07-06 NOTE — PROGRESS NOTES
Patient reports continued anxiety but overall improved mood, denies current SI/SIB. Patient was visible and social with full range affect throughout the shift and participated appropriately in all groups.     07/05/19 2300   Behavioral Health   Hallucinations denies / not responding to hallucinations   Thinking intact   Orientation person: oriented;place: oriented;date: oriented;time: oriented   Memory baseline memory   Insight admits / accepts   Judgement intact   Eye Contact at examiner   Affect full range affect   Mood anxious   Physical Appearance/Attire appears stated age;attire appropriate to age and situation   Hygiene well groomed   Suicidality other (see comments)  (Denies)   1. Wish to be Dead No   2. Non-Specific Active Suicidal Thoughts  No   Self Injury other (see comment)  (Denies)   Activity other (see comment)  (Visible and social.)   Speech clear;coherent   Medication Sensitivity no stated side effects;no observed side effects   Psychomotor / Gait balanced;steady   Activities of Daily Living   Hygiene/Grooming independent   Oral Hygiene independent   Dress independent   Laundry with supervision   Room Organization independent

## 2019-07-06 NOTE — PROGRESS NOTES
07/05/19 2200   Therapeutic Recreation   Type of Intervention structured groups   Activity game   Response Participates, initiates socially appropriate   Hours 1     Pt participated in Therapeutic Recreation group with focus on leisure participation, social engagement, and strategic cognitive reasoning.  Engaged and cooperative in group recreational intervention via a group game.  Pt was a full participant throughout the entire duration of group. Showed progress in session goals. Pt mood was calm and appropriate with interactions. Pt was a positive participant throughout the activity.

## 2019-07-06 NOTE — PROGRESS NOTES
"LITA Gary notified via page re pt's PVR of 40cc; uc sent. Pt also verbalizing twice, that he was distressed by another pt-\"going on and on in community mtg this morning.\" His mood has been somewhat more tense, vs earlier am assessment. He has mainly been in his room; out for meals.  "

## 2019-07-06 NOTE — CONSULTS
Internal Medicine Follow Up Note     Patient: Flakito Hilliard  MRN: [id number]  Admission Date: 6/20/2019  Hospital Day # 16    Assessment & Recommendations: Flakito Hilliard is a 59 year old woman with a history of major depressive disorder, HTN, PATRICE, and urethral stricture s/p dorsal onlay with recurrence s/p buccal graft ventral onlay urethroplasty in 2016. He is admitted to station 20 with depression. Medicine seeing today for abnormal UA.      Urethral Stricture s/p Urethroplasty.   Abnormal UA.   Extensive urologic history followed by Dr. Burger last in 4/2018 w/ cystoscopy showing excellent outcome after urethroplasty. Was due for f/u this spring (w/ uroflowmetry and PVR measurement) but missed d/t insurance lapse and distance from clinic. Is having post void dribbling/leakage. Question if new retention as as result of UTI or potentially could contribute to UTIs. Afebrile, no dysuria, no flank pain. UAs have been abnormal dating back to 2014 - admit UA 6/25 w/ + nitrite, large LE, 35 WBC but cx grew mixed urogenital luis fernando. UA was repeated 7/4 for unclear reasons and appears similar but no cx was sent.   - Discussed w/ RN to check a post void residual and page IM w/ results.   - Discussed w/ patient clean catch sample to be sent for urine cx. If retaining urine and new bacterial growth could consider treatment w/ report of his irritative voiding symptoms.   - Needs to schedule f/u w/ Dr. Burger after discharge. Can discuss at that time if he can be followed by urology closer to home or not.     Medicine will follow urine cx and bladder scan results, please page with any additional concerns.     Ade Bunch PA-C  Hospitalist Service  Pager: [phone]  _________________________________________________________________    Subjective & Interval History:  Ranjeet is feeling better overall today. Slept well. Missed his one year follow up with Dr. Burger (urology) this spring due to insurance lapse and distance  for him to get to clinic. Struggles with post void dribbling/leakage - unsure if he is emptying all the way. No fevers or flank pain. No dysuria. Has had past infections but difficult for him to sort out what is his normal vs. changed symptoms.     Last 24 hour care team notes reviewed.   ROS: 4 point ROS (including Respiratory, CV, GI and ) was performed and negative unless otherwise noted in HPI.     Medications: Reviewed in EPIC.    Physical Exam:    Blood pressure (!) 148/94, pulse 102, temperature 97.6  F (36.4  C), temperature source Oral, resp. rate 16, height 1.829 m (6'), weight 110.9 kg (244 lb 8 oz), SpO2 96 %.    GENERAL: Alert and oriented x 3. Ambulatory on unit, appears comfortable.   GI: Abdomen soft, non distended, non tender.

## 2019-07-07 LAB
BACTERIA SPEC CULT: NO GROWTH
Lab: NORMAL
SPECIMEN SOURCE: NORMAL

## 2019-07-07 PROCEDURE — H2032 ACTIVITY THERAPY, PER 15 MIN: HCPCS

## 2019-07-07 PROCEDURE — 25000132 ZZH RX MED GY IP 250 OP 250 PS 637: Performed by: PSYCHIATRY & NEUROLOGY

## 2019-07-07 PROCEDURE — 25000132 ZZH RX MED GY IP 250 OP 250 PS 637: Performed by: CLINICAL NURSE SPECIALIST

## 2019-07-07 PROCEDURE — 12400001 ZZH R&B MH UMMC

## 2019-07-07 PROCEDURE — 25000132 ZZH RX MED GY IP 250 OP 250 PS 637: Performed by: PHYSICIAN ASSISTANT

## 2019-07-07 RX ADMIN — AMLODIPINE BESYLATE 10 MG: 5 TABLET ORAL at 08:42

## 2019-07-07 RX ADMIN — ATORVASTATIN CALCIUM 20 MG: 20 TABLET, FILM COATED ORAL at 08:42

## 2019-07-07 RX ADMIN — DILTIAZEM HYDROCHLORIDE 360 MG: 120 CAPSULE, COATED, EXTENDED RELEASE ORAL at 08:43

## 2019-07-07 RX ADMIN — TRAZODONE HYDROCHLORIDE 100 MG: 100 TABLET ORAL at 22:08

## 2019-07-07 RX ADMIN — BUPROPION 450 MG: 150 TABLET, EXTENDED RELEASE ORAL at 08:42

## 2019-07-07 RX ADMIN — LISINOPRIL 40 MG: 40 TABLET ORAL at 08:42

## 2019-07-07 RX ADMIN — HYDROCHLOROTHIAZIDE 25 MG: 12.5 CAPSULE ORAL at 08:43

## 2019-07-07 ASSESSMENT — ACTIVITIES OF DAILY LIVING (ADL)
DRESS: SCRUBS (BEHAVIORAL HEALTH);INDEPENDENT
HYGIENE/GROOMING: INDEPENDENT
ORAL_HYGIENE: INDEPENDENT
HYGIENE/GROOMING: HANDWASHING;INDEPENDENT
ORAL_HYGIENE: INDEPENDENT
DRESS: SCRUBS (BEHAVIORAL HEALTH)

## 2019-07-07 ASSESSMENT — MIFFLIN-ST. JEOR: SCORE: 1956.61

## 2019-07-07 NOTE — PROGRESS NOTES
07/06/19 2100   Behavioral Health   Hallucinations denies / not responding to hallucinations   Thinking distractable   Orientation person: oriented;place: oriented   Memory baseline memory   Insight poor   Judgement impaired   Eye Contact at examiner   Affect full range affect   Mood mood is calm   Physical Appearance/Attire neat   Hygiene well groomed   Suicidality other (see comments)  (denies)   1. Wish to be Dead No   2. Non-Specific Active Suicidal Thoughts  No   Self Injury other (see comment)  (denies)   Activity other (see comment)  (visible in the milieu watching tv)   Speech clear;coherent   Activities of Daily Living   Hygiene/Grooming independent   Oral Hygiene independent   Dress independent   Laundry with supervision   Room Organization independent       Pt denied SI and SIB.  Pt ate supper, visible in the milieu and socialized with others.

## 2019-07-07 NOTE — PLAN OF CARE
"Pt is extremely extrinsically focused, with minimal insight. He feels he has no control over his life or emotions. He has negative comments and views-\"Even thinking about flowers in the fields doesn't make me feel good.\" He has difficulty grasping the differences/overlaps between grief and depression. His responses are somewhat delayed, and he perseverates on how unfulfilling his life has been. Pt states his mood is \"scared.\" He denies si; rates depression and anxiety both 6-7. He doesn't know if he'll go to groups, as he really doesn't want to be around people. He had breakfast, but said food doesn't taste good.  He said he may stay in his room and nap, today. Pt is not able to be particularly open to suggestions for support-both in hosp, and after discharge. He is also not able to make a goal for his day, as of yet. Support and reassurance given.  "

## 2019-07-08 ENCOUNTER — ANESTHESIA (OUTPATIENT)
Dept: BEHAVIORAL HEALTH | Facility: CLINIC | Age: 59
End: 2019-07-08

## 2019-07-08 ENCOUNTER — ANESTHESIA EVENT (OUTPATIENT)
Dept: BEHAVIORAL HEALTH | Facility: CLINIC | Age: 59
End: 2019-07-08

## 2019-07-08 PROCEDURE — 90853 GROUP PSYCHOTHERAPY: CPT

## 2019-07-08 PROCEDURE — 12400001 ZZH R&B MH UMMC

## 2019-07-08 PROCEDURE — 25000128 H RX IP 250 OP 636: Performed by: PSYCHIATRY & NEUROLOGY

## 2019-07-08 PROCEDURE — 25000132 ZZH RX MED GY IP 250 OP 250 PS 637: Performed by: PSYCHIATRY & NEUROLOGY

## 2019-07-08 PROCEDURE — 25000132 ZZH RX MED GY IP 250 OP 250 PS 637: Performed by: CLINICAL NURSE SPECIALIST

## 2019-07-08 PROCEDURE — 90870 ELECTROCONVULSIVE THERAPY: CPT

## 2019-07-08 PROCEDURE — 25000128 H RX IP 250 OP 636: Performed by: ANESTHESIOLOGY

## 2019-07-08 PROCEDURE — 25000125 ZZHC RX 250: Performed by: ANESTHESIOLOGY

## 2019-07-08 PROCEDURE — 25000125 ZZHC RX 250: Performed by: PSYCHIATRY & NEUROLOGY

## 2019-07-08 PROCEDURE — 99232 SBSQ HOSP IP/OBS MODERATE 35: CPT | Mod: 25 | Performed by: PSYCHIATRY & NEUROLOGY

## 2019-07-08 PROCEDURE — 25000132 ZZH RX MED GY IP 250 OP 250 PS 637: Performed by: PHYSICIAN ASSISTANT

## 2019-07-08 RX ORDER — LIDOCAINE HYDROCHLORIDE 20 MG/ML
40 INJECTION, SOLUTION EPIDURAL; INFILTRATION; INTRACAUDAL; PERINEURAL
Status: COMPLETED | OUTPATIENT
Start: 2019-07-08 | End: 2019-07-08

## 2019-07-08 RX ORDER — CAFFEINE AND SODIUM BENZOATE 125 MG/ML
250 INJECTION, SOLUTION INTRAMUSCULAR; INTRAVENOUS
Status: ACTIVE | OUTPATIENT
Start: 2019-07-08 | End: 2019-07-08

## 2019-07-08 RX ORDER — LABETALOL HYDROCHLORIDE 5 MG/ML
INJECTION, SOLUTION INTRAVENOUS PRN
Status: DISCONTINUED | OUTPATIENT
Start: 2019-07-08 | End: 2019-07-08

## 2019-07-08 RX ORDER — LIDOCAINE HYDROCHLORIDE 20 MG/ML
INJECTION, SOLUTION INFILTRATION; PERINEURAL PRN
Status: DISCONTINUED | OUTPATIENT
Start: 2019-07-08 | End: 2019-07-08

## 2019-07-08 RX ORDER — ETOMIDATE 2 MG/ML
INJECTION INTRAVENOUS PRN
Status: DISCONTINUED | OUTPATIENT
Start: 2019-07-08 | End: 2019-07-08

## 2019-07-08 RX ORDER — KETOROLAC TROMETHAMINE 30 MG/ML
30 INJECTION, SOLUTION INTRAMUSCULAR; INTRAVENOUS
Status: COMPLETED | OUTPATIENT
Start: 2019-07-08 | End: 2019-07-08

## 2019-07-08 RX ORDER — CAFFEINE AND SODIUM BENZOATE 125 MG/ML
250 INJECTION, SOLUTION INTRAMUSCULAR; INTRAVENOUS
Status: COMPLETED | OUTPATIENT
Start: 2019-07-08 | End: 2019-07-08

## 2019-07-08 RX ADMIN — AMLODIPINE BESYLATE 10 MG: 5 TABLET ORAL at 07:24

## 2019-07-08 RX ADMIN — ETOMIDATE 18 MG: 2 INJECTION INTRAVENOUS at 10:57

## 2019-07-08 RX ADMIN — DILTIAZEM HYDROCHLORIDE 360 MG: 120 CAPSULE, COATED, EXTENDED RELEASE ORAL at 07:24

## 2019-07-08 RX ADMIN — LISINOPRIL 40 MG: 40 TABLET ORAL at 07:24

## 2019-07-08 RX ADMIN — BUPROPION 450 MG: 150 TABLET, EXTENDED RELEASE ORAL at 12:10

## 2019-07-08 RX ADMIN — MIDAZOLAM 2 MG: 1 INJECTION INTRAMUSCULAR; INTRAVENOUS at 10:59

## 2019-07-08 RX ADMIN — KETOROLAC TROMETHAMINE 30 MG: 30 INJECTION, SOLUTION INTRAMUSCULAR at 10:49

## 2019-07-08 RX ADMIN — LIDOCAINE HYDROCHLORIDE 40 MG: 20 INJECTION, SOLUTION EPIDURAL; INFILTRATION; INTRACAUDAL; PERINEURAL at 10:57

## 2019-07-08 RX ADMIN — HYDROCHLOROTHIAZIDE 25 MG: 12.5 CAPSULE ORAL at 12:09

## 2019-07-08 RX ADMIN — Medication 100 MG: at 10:57

## 2019-07-08 RX ADMIN — LABETALOL HYDROCHLORIDE 10 MG: 5 INJECTION INTRAVENOUS at 11:04

## 2019-07-08 RX ADMIN — LIDOCAINE HYDROCHLORIDE 40 MG: 20 INJECTION, SOLUTION INFILTRATION; PERINEURAL at 10:56

## 2019-07-08 RX ADMIN — TRAZODONE HYDROCHLORIDE 100 MG: 100 TABLET ORAL at 21:36

## 2019-07-08 RX ADMIN — CAFFEINE AND SODIUM BENZOATE 250 MG: 125 INJECTION, SOLUTION INTRAMUSCULAR; INTRAVENOUS at 10:48

## 2019-07-08 RX ADMIN — LABETALOL HYDROCHLORIDE 10 MG: 5 INJECTION INTRAVENOUS at 11:08

## 2019-07-08 RX ADMIN — ATORVASTATIN CALCIUM 20 MG: 20 TABLET, FILM COATED ORAL at 12:10

## 2019-07-08 ASSESSMENT — ACTIVITIES OF DAILY LIVING (ADL)
DRESS: INDEPENDENT
HYGIENE/GROOMING: INDEPENDENT
ORAL_HYGIENE: INDEPENDENT

## 2019-07-08 NOTE — ANESTHESIA PREPROCEDURE EVALUATION
Anesthesia Pre-Procedure Evaluation    Patient: Flakito Hilliard   MRN:     [id number] Gender:   male   Age:    59 year old :      1960        Preoperative Diagnosis: * No surgery found *        Past Medical History:   Diagnosis Date     Depressive disorder      Hypertension      Sleep apnea       Past Surgical History:   Procedure Laterality Date     BLADDER SURGERY       CYSTOSTOMY, INSERT TUBE SUPRAPUBIC, COMBINED N/A 2014    Procedure: COMBINED CYSTOSTOMY, INSERT TUBE SUPRAPUBIC;  Surgeon: Min Prasad MD;  [facility]: [facility]     GENITOURINARY SURGERY      prostate surgery for stones     LASER HOLMIUM CYSTOSCOPY, INTERNAL URETHROTOMY, COMBINED N/A 10/24/2014    Procedure: COMBINED LASER HOLMIUM CYSTOSCOPY, INTERNAL URETHROTOMY;  Surgeon: Valentin Villatoro MD;  Location: [facility]     URETHROPLASTY N/A 12/10/2014    Procedure: URETHROPLASTY;  Surgeon: Niraj Burger MD;  Location: [facility]     URETHROPLASTY WITH BUCCAL GRAFT N/A 2016    Procedure: URETHROPLASTY WITH BUCCAL GRAFT;  Surgeon: Niraj Burger MD;  [facility]          Anesthesia Evaluation     .             ROS/MED HX    ENT/Pulmonary:     (+)sleep apnea, , . .    Neurologic:       Cardiovascular:     (+) hypertension----. : . . . :. .       METS/Exercise Tolerance:     Hematologic:         Musculoskeletal:         GI/Hepatic:         Renal/Genitourinary:     (+) Nephrolithiasis ,       Endo:         Psychiatric:     (+) psychiatric history depression      Infectious Disease:         Malignancy:         Other:                         PHYSICAL EXAM:   Mental Status/Neuro: A/A/O   Airway: Facies: Feasible  Mallampati: II  Mouth/Opening: Full  TM distance: > 6 cm  Neck ROM: Full   Respiratory: Auscultation: CTAB     Resp. Rate: Normal     Resp. Effort: Normal      CV: Rhythm: Regular  Rate: Age appropriate  Heart: Normal Sounds   Comments:      Dental: Normal                  Lab Results   Component Value Date    WBC 8.1  05/19/2019    HGB 16.5 06/28/2019    HCT 49.1 05/19/2019     05/19/2019     06/27/2019    POTASSIUM 3.5 06/27/2019    CHLORIDE 104 06/27/2019    CO2 29 06/27/2019    BUN 20 06/27/2019    CR 1.19 06/27/2019     (H) 06/27/2019    AMILCAR 9.1 06/27/2019    ALBUMIN 3.8 05/19/2019    PROTTOTAL 7.5 05/19/2019    ALT 26 05/19/2019    AST 20 05/19/2019    ALKPHOS 77 05/19/2019    BILITOTAL 1.1 05/19/2019    TSH 2.01 05/19/2019    T4 1.19 05/25/2016       Preop Vitals  BP Readings from Last 3 Encounters:   07/07/19 (!) 133/93   06/20/19 136/88   06/14/19 140/88    Pulse Readings from Last 3 Encounters:   07/07/19 92   06/20/19 115   06/14/19 88      Resp Readings from Last 3 Encounters:   07/08/19 16   06/20/19 24   06/14/19 18    SpO2 Readings from Last 3 Encounters:   07/08/19 94%   06/20/19 96%   05/19/19 97%      Temp Readings from Last 1 Encounters:   07/08/19 36.7  C (98  F) (Oral)    Ht Readings from Last 1 Encounters:   06/20/19 1.829 m (6')      Wt Readings from Last 1 Encounters:   07/07/19 109.9 kg (242 lb 3.2 oz)    Estimated body mass index is 32.85 kg/m  as calculated from the following:    Height as of 6/20/19: 1.829 m (6').    Weight as of 7/7/19: 109.9 kg (242 lb 3.2 oz).     LDA:            Assessment:   ASA SCORE: 2    NPO Status: > 6 hours since completed [employer]   Documentation: H&P complete; Preop Testing complete; Consents complete   Proceeding: Proceed without further delay  Tobacco Use:  NO Active use of Tobacco/UNKNOWN Tobacco use status     Plan:   Anes. Type:  General      Induction:  IV (Standard)   Airway: Native Airway   Access/Monitoring: PIV   Maintenance: Balanced   Emergence: Procedure Site   Logistics: Same Day Surgery     Postop Pain/Sedation Strategy:  Standard-Options: Opioids PRN     PONV Management:  Adult Risk Factors:, Non-Smoker, Postop Opioids     CONSENT: Direct conversation   Plan and risks discussed with: Patient              Anesthesia Pre-Procedure  Evaluation    Patient: Flakito Hilliard   MRN:     [id number] Gender:   male   Age:    59 year old :      1960        Preoperative Diagnosis: * No surgery found *        Past Medical History:   Diagnosis Date     Depressive disorder      Hypertension      Sleep apnea       Past Surgical History:   Procedure Laterality Date     BLADDER SURGERY       CYSTOSTOMY, INSERT TUBE SUPRAPUBIC, COMBINED N/A 2014    Procedure: COMBINED CYSTOSTOMY, INSERT TUBE SUPRAPUBIC;  Surgeon: Min Prasad MD;  [facility]     GENITOURINARY SURGERY      prostate surgery for stones     LASER HOLMIUM CYSTOSCOPY, INTERNAL URETHROTOMY, COMBINED N/A 10/24/2014    Procedure: COMBINED LASER HOLMIUM CYSTOSCOPY, INTERNAL URETHROTOMY;  Surgeon: Valentin Villatoro MD;  [facility]     URETHROPLASTY N/A 12/10/2014    Procedure: URETHROPLASTY;  Surgeon: Niraj Burger MD;  [facility]: [facility]     URETHROPLASTY WITH BUCCAL GRAFT N/A 2016    Procedure: URETHROPLASTY WITH BUCCAL GRAFT;  Surgeon: Niraj Burger MD;  Location:  OR               [facility] AN PHYSICAL EXAM    Lab Results   Component Value Date    WBC 8.1 2019    HGB 16.5 2019    HCT 49.1 2019     2019     2019    POTASSIUM 3.5 2019    CHLORIDE 104 2019    CO2 29 2019    BUN 20 2019    CR 1.19 2019     (H) 2019    AMILCAR 9.1 2019    ALBUMIN 3.8 2019    PROTTOTAL 7.5 2019    ALT 26 2019    AST 20 2019    ALKPHOS 77 2019    BILITOTAL 1.1 2019    TSH 2.01 2019    T4 1.19 2016       Preop Vitals  BP Readings from Last 3 Encounters:   19 (!) 133/93   19 136/88   19 140/88    Pulse Readings from Last 3 Encounters:   19 92   19 115   19 88      Resp Readings from Last 3 Encounters:   19 16   19 24   19 18    SpO2 Readings from Last 3 Encounters:   19 94%   19 96%    05/19/19 97%      Temp Readings from Last 1 Encounters:   07/08/19 36.7  C (98  F) (Oral)    Ht Readings from Last 1 Encounters:   06/20/19 1.829 m (6')      Wt Readings from Last 1 Encounters:   07/07/19 109.9 kg (242 lb 3.2 oz)    Estimated body mass index is 32.85 kg/m  as calculated from the following:    Height as of 6/20/19: 1.829 m (6').    Weight as of 7/7/19: 109.9 kg (242 lb 3.2 oz).     LDA:            Assessment:   ASA SCORE: 2       Documentation: H&P complete; Preop Testing complete; Consents complete   Proceeding: Proceed without further delay  Tobacco Use:  NO Active use of Tobacco/UNKNOWN Tobacco use status     Plan:   Anes. Type:  General   Pre-Induction: Midazolam IV; Acetaminophen PO   Induction:  IV (Standard)   Airway: Oral ETT   Access/Monitoring: PIV   Maintenance: Balanced   Emergence: Procedure Site   Logistics: Same Day Surgery     Postop Pain/Sedation Strategy:  Standard-Options: Opioids PRN     PONV Management:  Adult Risk Factors:, Non-Smoker, Postop Opioids     CONSENT: Direct conversation   Plan and risks discussed with: Patient                            MD Radha Sullivan MD

## 2019-07-08 NOTE — PLAN OF CARE
Pt has been out in the milieu most of the shift. Pt had ECT today. No side effects noted as of right now. Pt endorses depression but denies all other MH Sx. Pt appears flat and blunted, brightens on approach. Pt is polite, cooperative and med compliant. Will continue to monitor for safety.

## 2019-07-08 NOTE — PROCEDURES
Procedure/Surgery Information   [facility], [place]    Bedside Procedure Note  Date of Service (when I performed the procedure): 07/08/2019    Flakito Hilliard is a 59 year old male patient.  1. Suicidal ideation    2. Current episode of major depressive disorder without prior episode, unspecified depression episode severity    3. Severe recurrent major depression without psychotic features (H)      Past Medical History:   Diagnosis Date     Depressive disorder      Hypertension      Sleep apnea      Temp: 98  F (36.7  C) Temp src: Oral BP: (!) 133/93 Pulse: 92   Resp: 16 SpO2: 94 % O2 Device: None (Room air)      Procedures     Wilmer Liu     [facility], [place]   ECT Procedure Note   07/08/2019    Flakito Hilliard [id number]   59 year old 1960     Patient Status: Inpatient    Is this the first in a series of 12 treatments?  No    History and Physical: Reviewed in medical record    Consent: Informed consent was signed by: patient    Date Consent Signed: 6/27/19      Allergies   Allergen Reactions     Contrast Dye Difficulty breathing       Weight:  242 lbs 3.2 oz              Indications for ECT:   Medications ineffective and History of good ECT response in one or more previous episodes of illness         Clinical Narrative:   Patient was admitted with worsening depression and suicidal ideation.  He's had positive response to ECT in the past.  He's starting ECT for depression.  NPO after 2400.  Alert and Oriented x 3.    Mood is getting better.   He had no problems with his last ECT.          Diagnosis:   Major depression         Pause for the Cause:     Right patient Yes   Right procedure/laterality settings: Yes          Intra-Procedure Documentation:     ECT #: 4   Treatment number this series: 4   Total treatment number: 4 + 2 previous series      Type of ECT:  Right, unilateral ultrabrief    ECT Medications:    Toradol:  30mg   Lidocaine:  40mg  Etomidate 18mg  Caffeine: 250mg (in IV room)  Succinyl Choline: 100mg   Versed: 2mg immediately after seizure (as he has the sense that he can't breathe and is paralyzed but he is breathing and not paralyzed).  Today:  Labetalol:  10mg post ECT for high bp     ECT Strip Summary:   Energy Level: 55 percent  Motor Seizure Duration:  36 seconds  EEG Seizure Duration:   42 seconds    Complications: No    Plan:   Next ECT on 7/10/19    Wilmer Liu MD

## 2019-07-08 NOTE — PROGRESS NOTES
Name  Phone Update   Abiodun Dumont  [phone] 7/8 -  Referral Sent   [place] [facility]  [phone] 7/8 -  Referral Sent   [facility]  [phone] 7/8 -  Referral Sent   [facility]  [phone] 7/8 -  Referral Sent   [facility]  [phone] 7/8 -  Referral Sent   [place] [phone] 7/8 -  Referral Sent   [facility]  [phone] or [place] [phone] 7/8 -  Referral Sent   [facility]  [phone] 7/8 -  Referral Sent   Trans[facility] on [place] [phone] 7/8 -  Referral Sent   [facility]  [phone] 7/8 -  Referral Sent   [facility]  [phone] 7/8 -  Referral Sent   [facility]  [phone] 7/8 -  Referral Sent   [facility] [phone] 7/8 -  Referral Sent.

## 2019-07-08 NOTE — PROGRESS NOTES
Participated in Music Therapy group with focus on mood elevation, validation and decreasing anxiety and improved group cohesiveness. Engaged and cooperative in music listening interventions.   Showed progress in session goals.     Mostly quiet, but did request a Journey song at the end of group.

## 2019-07-08 NOTE — PROGRESS NOTES
"Patient reports continued depression, but denies SI/SIB. Patient states he has difficulty concentrating, feeling like a \"fog\" is over him, and states his problems all stem form the end of his relationship. Patient says he does not know how to move on and does not know what he wants to do or enjoys doing. Patient was visible in the milieu but mostly withdrawn with flat to sad affect.     07/07/19 2100   Behavioral Health   Hallucinations denies / not responding to hallucinations   Thinking distractable;poor concentration   Orientation person: oriented;place: oriented;date: oriented;time: oriented   Memory baseline memory   Insight poor   Judgement impaired   Eye Contact at examiner   Affect sad   Mood depressed   Physical Appearance/Attire disheveled   Hygiene other (see comment)  (Okay)   Suicidality other (see comments)  (Denies)   1. Wish to be Dead No   2. Non-Specific Active Suicidal Thoughts  No   Self Injury other (see comment)  (Denies)   Activity withdrawn   Speech clear;coherent   Medication Sensitivity no stated side effects;no observed side effects   Psychomotor / Gait balanced;steady   Therapeutic Recreation   Activity leisure education   Music Therapy   Type of Participation Music therapy group   Response Participates independently;Participates with encouragement   Hours 1   Activities of Daily Living   Hygiene/Grooming independent   Oral Hygiene independent   Dress scrubs (behavioral health)   Room Organization independent     "

## 2019-07-08 NOTE — PROGRESS NOTES
Brief Medicine Note     Chart reviewed. Urine culture negative at 48h, unlikely UTI.  Recommend outpatient follow up with urologist for irritative voiding symptoms.     Please page medicine if culture later returns positive, although highly unlikely at this point in time.     Medicine will sign off. Please page on call provider for any additional concerns.    Felix Ruvalcaba PA-C

## 2019-07-08 NOTE — PROGRESS NOTES
Patient adequate for discharge. Report called to inpatient LOPEZ Doyle. VSS, A/O, IV removed. Discharged with staff at this time.

## 2019-07-08 NOTE — PROGRESS NOTES
"[facility], [place]   Psychiatric Progress Note        Interim History:     The patient's care was discussed with the treatment team during the daily team meeting and/or staff's chart notes were reviewed.  Staff report patient spends a lot of time outside of his room, goes to groups, appears to be slightly more social. Today he had ECT#4. Said during today's visit with me that he felt that there was \"light in the end of tunnel\", denied having Suicidal ideation. He also started asking questions about discharge: he initially told me that he might go to his place, then, agreed that there was no support for him there and that he would benefit from going to IRTS, instead. He also asked about how many more ECTs he would get, I told him that average number is around 8-10, but it would depend on his progress. He accepted my explanation.          Medications:       amLODIPine  10 mg Oral Daily     atorvastatin  20 mg Oral Daily     buPROPion  450 mg Oral QAM     caffeine-sodium benzoate  250 mg Intravenous Once in ECT     diltiazem ER COATED BEADS  360 mg Oral Daily     hydrochlorothiazide  25 mg Oral Daily     lisinopril  40 mg Oral Daily          Allergies:     Allergies   Allergen Reactions     Contrast Dye Difficulty breathing          Labs:     No results found for this or any previous visit (from the past 24 hour(s)).       Psychiatric Examination:     /77 (BP Location: Left arm)   Pulse 73   Temp 98.3  F (36.8  C) (Oral)   Resp 16   Ht 1.829 m (6')   Wt 109.9 kg (242 lb 3.2 oz)   SpO2 92%   BMI 32.85 kg/m    Weight is 242 lbs 3.2 oz  Body mass index is 32.85 kg/m .     Orthostatic Vitals       Most Recent      Sitting Orthostatic /82 07/08 1143    Sitting Orthostatic Pulse (bpm) 74 07/08 1143    Standing Orthostatic BP 97/68 07/08 1143    Standing Orthostatic Pulse (bpm) 74 07/08 1143         Appearance: dressed in hospital garbs  Attitude: cooperative  Eye Contact:  " partial  Mood: less depressed  Affect: constricted, sad,  Speech: less monotone  Psychomotor Behavior: psychomotor retardation less pronounced  Throught Process:  Slow, but logical  Associations:  tight  Thought Content: denies Suicidal ideation, psychosis  Insight: partial  Judgement:  Mildly impaired, but improving   Oriented to:  Self, time and place  Attention Span and Concentration: mildly impaired  Recent and Remote Memory: fair    Clinical Global Impressions  First:  Considering your total clinical experience with this particular patient population, how severe are the patient's symptoms at this time?: 7 (06/21/19 1701)  Compared to the patient's condition at the START of treatment, this patient's condition is:: 4 (06/21/19 1701)  Most recent:  Considering your total clinical experience with this particular patient population, how severe are the patient's symptoms at this time?: 4 (7/8/2019)  Compared to the patient's condition at the START of treatment, this patient's condition is:: 3 (7/8/2019)         Precautions:     Behavioral Orders   Procedures     Code 1 - Restrict to Unit     Code 2     Electroconvulsive therapy     ECT every Monday, Wednesday, and Friday     Electroconvulsive therapy     Series of up to 12 treatments. Begin Date: 6/28/19     Treating Psychiatrist providing ECT: Alexa    Notified on:  6/26/19     Routine Programming     As clinically indicated     Status 15     Every 15 minutes.     Suicide precautions     Patients on Suicide Precautions should have a Combination Diet ordered that includes a Diet selection(s) AND a Behavioral Tray selection for Safe Tray - with utensils, or Safe Tray - NO utensils            DIagnoses:     Major depressive disorder, current, severe.  Rule out generalized anxiety disorder.  The patient was also diagnosed in the past with dependent personality disorder.          Plan:     ECT #4 today and next ECT on Wednesday. No medication changes today. Will continue  to provide support and structure. CTC will start looking into IRTS options.

## 2019-07-08 NOTE — ANESTHESIA POSTPROCEDURE EVALUATION
Anesthesia POST Procedure Evaluation    Patient: Flakito Hilliard   MRN:     [id number] Gender:   male   Age:    59 year old :      1960        Preoperative Diagnosis: * No pre-op diagnosis entered *   * No procedures listed *   Postop Comments: No value filed.       Anesthesia Type:  General  No value filed.    Reportable Event: NO     PAIN: Uncomplicated   Sign Out status: Comfortable, Well controlled pain     PONV: No PONV   Sign Out status:  No Nausea or Vomiting     Neuro/Psych: Uneventful perioperative course   Sign Out Status: Preoperative baseline; Age appropriate mentation     Airway/Resp.: Uneventful perioperative course   Sign Out Status: Non labored breathing, age appropriate RR; Resp. Status within EXPECTED Parameters     CV: Uneventful perioperative course   Sign Out status: Appropriate BP and perfusion indices; Appropriate HR/Rhythm     Disposition:   Sign Out in:  PACU  Disposition:  Phase II; Home  Recovery Course: Uneventful  Follow-Up: Not required           Last Anesthesia Record Vitals:  CRNA VITALS  2019 1040 - 2019 1132      2019             Resp Rate (set):  8          Last PACU Vitals:  No vitals data found for the desired time range.        Electronically Signed By: Radha Smalls MD, 2019, 11:32 AM

## 2019-07-09 PROCEDURE — 25000132 ZZH RX MED GY IP 250 OP 250 PS 637: Performed by: PHYSICIAN ASSISTANT

## 2019-07-09 PROCEDURE — G0177 OPPS/PHP; TRAIN & EDUC SERV: HCPCS

## 2019-07-09 PROCEDURE — 25000132 ZZH RX MED GY IP 250 OP 250 PS 637: Performed by: PSYCHIATRY & NEUROLOGY

## 2019-07-09 PROCEDURE — 25000132 ZZH RX MED GY IP 250 OP 250 PS 637: Performed by: CLINICAL NURSE SPECIALIST

## 2019-07-09 PROCEDURE — 12400001 ZZH R&B MH UMMC

## 2019-07-09 RX ADMIN — HYDROCHLOROTHIAZIDE 25 MG: 12.5 CAPSULE ORAL at 08:36

## 2019-07-09 RX ADMIN — AMLODIPINE BESYLATE 10 MG: 5 TABLET ORAL at 08:36

## 2019-07-09 RX ADMIN — LISINOPRIL 40 MG: 40 TABLET ORAL at 08:36

## 2019-07-09 RX ADMIN — TRAZODONE HYDROCHLORIDE 100 MG: 100 TABLET ORAL at 21:00

## 2019-07-09 RX ADMIN — ATORVASTATIN CALCIUM 20 MG: 20 TABLET, FILM COATED ORAL at 08:36

## 2019-07-09 RX ADMIN — DILTIAZEM HYDROCHLORIDE 360 MG: 120 CAPSULE, COATED, EXTENDED RELEASE ORAL at 08:37

## 2019-07-09 RX ADMIN — BUPROPION 450 MG: 150 TABLET, EXTENDED RELEASE ORAL at 08:36

## 2019-07-09 RX ADMIN — HYDROXYZINE HYDROCHLORIDE 50 MG: 25 TABLET ORAL at 11:12

## 2019-07-09 ASSESSMENT — ACTIVITIES OF DAILY LIVING (ADL)
LAUNDRY: WITH SUPERVISION
HYGIENE/GROOMING: INDEPENDENT
DRESS: SCRUBS (BEHAVIORAL HEALTH)
HYGIENE/GROOMING: INDEPENDENT
ORAL_HYGIENE: INDEPENDENT
DRESS: INDEPENDENT
ORAL_HYGIENE: INDEPENDENT

## 2019-07-09 ASSESSMENT — MIFFLIN-ST. JEOR: SCORE: 1958.8

## 2019-07-09 NOTE — PROGRESS NOTES
"   07/09/19 1300   Behavioral Health   Hallucinations denies / not responding to hallucinations   Thinking distractable;poor concentration   Orientation person: oriented;place: oriented;date: oriented;time: oriented   Memory baseline memory   Insight insight appropriate to events;insight appropriate to situation   Judgement impaired   Eye Contact at examiner   Affect blunted, flat;other (see comments)  (hopeless)   Mood hopeless;depressed;other (see comments)  (\"when am I going to be back to normal?\")   Physical Appearance/Attire appears stated age;attire appropriate to age and situation   Hygiene well groomed   1. Wish to be Dead No   2. Non-Specific Active Suicidal Thoughts  No   Self Injury other (see comment)  (denies)   Activity isolative;other (see comment)  (participant in groups)   Speech clear;coherent   Medication Sensitivity no stated side effects;no observed side effects   Psychomotor / Gait slow;balanced;steady   Group Therapy Session   Group Attendance attended group session   Activities of Daily Living   Hygiene/Grooming independent   Oral Hygiene independent   Dress independent   Room Organization independent   Activity   Activity Assistance Provided independent     Pt was active during the day and participated in the groups. Pt did not appear to engage with other patients unless prompted to by groups.  When discussing Pt's care pt appeared hopeless stating \"I just don't know, when will I get back to normal?  I'm 59 years old and I go through the same cycle, a relationship ends badly and I get down.\"  Pt appeared upset by this and then stated \"I just wanna lay down but that won't help.\"  Pt then attempted to nap and spend some time alone in his room.\"    "

## 2019-07-09 NOTE — PROGRESS NOTES
Name  Phone Update   Abiodun Dumont  [phone] 7/8 -  Referral Sent   [place] [facility]  [phone] 7/8 -  Referral Sent   [facility]  [phone] 7/8 -  Referral Sent   [facility]  [phone] 7/8 -  Referral Sent   [facility]  [phone] 7/8 -  Referral Sent   [place] [phone] 7/8 -  Referral Sent    7/9 - VM from Debbie requesting return call.     7/9 - Returned call to Debbie. Writer left VM for return call.    [facility]  [phone] or [place] [phone] 7/8 -  Referral Sent   [facility]  [phone] 7/8 -  Referral Sent   [facility] [place] [phone] 7/8 -  Referral Sent   [facility]  [phone] 7/8 -  Referral Sent   [facility]  [phone] 7/8 -  Referral Sent   [facility]  [phone] 7/8 -  Referral Sent   [facility] [phone] 7/8 -  Referral Sent.    7/9 - VM from Kee. Would like to know if the patient has any providers and see how he is doing.     7/9 - Lft VM for Kee. Explained that pt still presents as flat and endorses depressive symptoms. Attending would like more treatments. Also stated will need otpt providers.    [facility]   7/8 - Referral Sent.    7/9 - Spoke with Ju. Will not have opening until 8/5. Pt on wait-list.

## 2019-07-09 NOTE — PROGRESS NOTES
07/09/19 1400   Occupational Therapy   Type of Intervention structured groups   Response Initiates, socially acceptable   Hours 1       Wellness and coping skill based game: Movement Frontback. Education was provided on the sensory system, mind-body connection, and the use of movement and sensory strategies for self regulation. Pt Response: Full participation with congruent affect.

## 2019-07-09 NOTE — PLAN OF CARE
Patient was visible in the milieu socially engaging with peers able to verbalize need, was seen reading a book, attended groups, appeared calm and had a brighter affect, stated he was feeling much better today than past days, denies SI/SIB, no hallucinations reported or observed, is optimistic about future goals, ate dinner well, asked for a prn Trazodone 100 mg and went to sleep, no other concerns will continue to monitor.

## 2019-07-09 NOTE — PROGRESS NOTES
"   07/08/19 2000   Group Therapy Session   Group Attendance attended group session   Total Time (minutes) 50   Group Type psychotherapeutic   Group Topic Covered other (see comments)   Patient Participation/Contribution cooperative with task;discussed personal experience with topic;listened actively;offered helpful suggestions to peers   Patient participated in psychotherapy group which included a mindfulness activity focusing on the 5 senses and then processing as a group.  Ranjeet was actively engaged in the activity and processing with the group. He reports feeling his mood lifting since starting ECT.  He was able to identify senses that he enjoys (sights, sounds, smells, etc) whereas in a previous group he struggled. He did ask why it felt difficult to think of things he enjoys while here in the hospital. He reported that the favorite sensory experiences he identified were all attached to memories with other people in his life.  He stated, \"that's why I don't like being lonely.\"    The group processed how depression, anxiety, etc can make it hard for us to remember the positives.    Ranjeet presented as calm, thoughtful, and smiled several times. Other group members noticed the difference in his affect.   "

## 2019-07-10 ENCOUNTER — ANESTHESIA EVENT (OUTPATIENT)
Dept: BEHAVIORAL HEALTH | Facility: CLINIC | Age: 59
End: 2019-07-10

## 2019-07-10 ENCOUNTER — ANESTHESIA (OUTPATIENT)
Dept: BEHAVIORAL HEALTH | Facility: CLINIC | Age: 59
End: 2019-07-10

## 2019-07-10 PROCEDURE — 25000128 H RX IP 250 OP 636: Performed by: PSYCHIATRY & NEUROLOGY

## 2019-07-10 PROCEDURE — 25000132 ZZH RX MED GY IP 250 OP 250 PS 637: Performed by: PHYSICIAN ASSISTANT

## 2019-07-10 PROCEDURE — H2032 ACTIVITY THERAPY, PER 15 MIN: HCPCS

## 2019-07-10 PROCEDURE — 25000125 ZZHC RX 250: Performed by: ANESTHESIOLOGY

## 2019-07-10 PROCEDURE — 25000132 ZZH RX MED GY IP 250 OP 250 PS 637: Performed by: CLINICAL NURSE SPECIALIST

## 2019-07-10 PROCEDURE — 12400001 ZZH R&B MH UMMC

## 2019-07-10 PROCEDURE — 99231 SBSQ HOSP IP/OBS SF/LOW 25: CPT | Mod: 25 | Performed by: PSYCHIATRY & NEUROLOGY

## 2019-07-10 PROCEDURE — 25000125 ZZHC RX 250: Performed by: PSYCHIATRY & NEUROLOGY

## 2019-07-10 PROCEDURE — 25000128 H RX IP 250 OP 636: Performed by: ANESTHESIOLOGY

## 2019-07-10 PROCEDURE — G0177 OPPS/PHP; TRAIN & EDUC SERV: HCPCS

## 2019-07-10 PROCEDURE — 25000132 ZZH RX MED GY IP 250 OP 250 PS 637: Performed by: PSYCHIATRY & NEUROLOGY

## 2019-07-10 PROCEDURE — 90870 ELECTROCONVULSIVE THERAPY: CPT

## 2019-07-10 RX ORDER — KETOROLAC TROMETHAMINE 30 MG/ML
30 INJECTION, SOLUTION INTRAMUSCULAR; INTRAVENOUS
Status: COMPLETED | OUTPATIENT
Start: 2019-07-10 | End: 2019-07-10

## 2019-07-10 RX ORDER — ETOMIDATE 2 MG/ML
INJECTION INTRAVENOUS PRN
Status: DISCONTINUED | OUTPATIENT
Start: 2019-07-10 | End: 2019-07-10

## 2019-07-10 RX ORDER — LIDOCAINE HYDROCHLORIDE 20 MG/ML
40 INJECTION, SOLUTION EPIDURAL; INFILTRATION; INTRACAUDAL; PERINEURAL
Status: COMPLETED | OUTPATIENT
Start: 2019-07-10 | End: 2019-07-10

## 2019-07-10 RX ORDER — LABETALOL HYDROCHLORIDE 5 MG/ML
INJECTION, SOLUTION INTRAVENOUS PRN
Status: DISCONTINUED | OUTPATIENT
Start: 2019-07-10 | End: 2019-07-10

## 2019-07-10 RX ORDER — CAFFEINE AND SODIUM BENZOATE 125 MG/ML
250 INJECTION, SOLUTION INTRAMUSCULAR; INTRAVENOUS
Status: COMPLETED | OUTPATIENT
Start: 2019-07-10 | End: 2019-07-10

## 2019-07-10 RX ADMIN — LABETALOL HYDROCHLORIDE 10 MG: 5 INJECTION INTRAVENOUS at 10:39

## 2019-07-10 RX ADMIN — LIDOCAINE HYDROCHLORIDE 40 MG: 20 INJECTION, SOLUTION EPIDURAL; INFILTRATION; INTRACAUDAL; PERINEURAL at 10:32

## 2019-07-10 RX ADMIN — AMLODIPINE BESYLATE 10 MG: 5 TABLET ORAL at 07:50

## 2019-07-10 RX ADMIN — MIDAZOLAM 2 MG: 1 INJECTION INTRAMUSCULAR; INTRAVENOUS at 10:33

## 2019-07-10 RX ADMIN — Medication 120 MG: at 10:29

## 2019-07-10 RX ADMIN — ETOMIDATE 18 MG: 2 INJECTION INTRAVENOUS at 10:29

## 2019-07-10 RX ADMIN — TRAZODONE HYDROCHLORIDE 100 MG: 100 TABLET ORAL at 21:42

## 2019-07-10 RX ADMIN — BUPROPION 450 MG: 150 TABLET, EXTENDED RELEASE ORAL at 11:30

## 2019-07-10 RX ADMIN — CAFFEINE AND SODIUM BENZOATE 250 MG: 125 INJECTION, SOLUTION INTRAMUSCULAR; INTRAVENOUS at 10:33

## 2019-07-10 RX ADMIN — DILTIAZEM HYDROCHLORIDE 360 MG: 120 CAPSULE, COATED, EXTENDED RELEASE ORAL at 07:50

## 2019-07-10 RX ADMIN — HYDROCHLOROTHIAZIDE 25 MG: 12.5 CAPSULE ORAL at 11:30

## 2019-07-10 RX ADMIN — LISINOPRIL 40 MG: 40 TABLET ORAL at 07:50

## 2019-07-10 RX ADMIN — ATORVASTATIN CALCIUM 20 MG: 20 TABLET, FILM COATED ORAL at 11:30

## 2019-07-10 RX ADMIN — KETOROLAC TROMETHAMINE 30 MG: 30 INJECTION, SOLUTION INTRAMUSCULAR; INTRAVENOUS at 10:23

## 2019-07-10 ASSESSMENT — ACTIVITIES OF DAILY LIVING (ADL)
ORAL_HYGIENE: INDEPENDENT
HYGIENE/GROOMING: INDEPENDENT
DRESS: SCRUBS (BEHAVIORAL HEALTH);INDEPENDENT
LAUNDRY: WITH SUPERVISION

## 2019-07-10 NOTE — PROGRESS NOTES
"[facility], [place]   Psychiatric Progress Note        Interim History:     The patient's care was discussed with the treatment team during the daily team meeting and/or staff's chart notes were reviewed.  Staff report patient spends a lot of time outside of his room, goes to groups, appears to be slightly more social. Today he had ECT#5. Said during today's visit with me that, overall, he felt better, mood wise. Denied presence of Suicidal ideation. Reported still ruminating, especially, early morning or night about his life: \"I need to figure out this depression\". Overall, he is cooperative, goes to groups, although, not always after ECT. Denied having any recent side effects after ECT. I informed him that tomorrow he would have an interview with BRET MARQUEZ. He was glad to hear about it.          Medications:       amLODIPine  10 mg Oral Daily     atorvastatin  20 mg Oral Daily     buPROPion  450 mg Oral QAM     diltiazem ER COATED BEADS  360 mg Oral Daily     hydrochlorothiazide  25 mg Oral Daily     lisinopril  40 mg Oral Daily          Allergies:     Allergies   Allergen Reactions     Contrast Dye Difficulty breathing          Labs:     No results found for this or any previous visit (from the past 24 hour(s)).       Psychiatric Examination:     /74   Pulse 69   Temp 97.3  F (36.3  C) (Temporal)   Resp 14   Ht 1.829 m (6')   Wt 110.1 kg (242 lb 10.9 oz)   SpO2 95%   BMI 32.91 kg/m    Weight is 242 lbs 10.92 oz  Body mass index is 32.91 kg/m .     Orthostatic Vitals       Most Recent      Sitting Orthostatic /92 07/10 0745    Sitting Orthostatic Pulse (bpm) 96 07/10 0745    Standing Orthostatic /82 07/10 0745    Standing Orthostatic Pulse (bpm) 99 07/10 0745         Appearance: dressed in hospital garbs  Attitude: cooperative  Eye Contact:  Partial, better  Mood: less depressed  Affect: constricted,   Speech: less monotone  Psychomotor Behavior: psychomotor " retardation less pronounced  Throught Process:  Slow, but logical  Associations:  tight  Thought Content: denies Suicidal ideation, psychosis  Insight: partial  Judgement:  Mildly impaired, but improving   Oriented to:  Self, time and place  Attention Span and Concentration: mildly impaired  Recent and Remote Memory: fair    Clinical Global Impressions  First:  Considering your total clinical experience with this particular patient population, how severe are the patient's symptoms at this time?: 7 (06/21/19 1701)  Compared to the patient's condition at the START of treatment, this patient's condition is:: 4 (06/21/19 1701)  Most recent:  Considering your total clinical experience with this particular patient population, how severe are the patient's symptoms at this time?: 4 (7/8/2019)  Compared to the patient's condition at the START of treatment, this patient's condition is:: 3 (7/8/2019)         Precautions:     Behavioral Orders   Procedures     Code 1 - Restrict to Unit     Code 2     Electroconvulsive therapy     ECT every Monday, Wednesday, and Friday     Electroconvulsive therapy     Series of up to 12 treatments. Begin Date: 6/28/19     Treating Psychiatrist providing ECT: Alexa    Notified on:  6/26/19     Routine Programming     As clinically indicated     Status 15     Every 15 minutes.     Suicide precautions     Patients on Suicide Precautions should have a Combination Diet ordered that includes a Diet selection(s) AND a Behavioral Tray selection for Safe Tray - with utensils, or Safe Tray - NO utensils            DIagnoses:     Major depressive disorder, current, severe.  Rule out generalized anxiety disorder.  The patient was also diagnosed in the past with dependent personality disorder.          Plan:     ECT #5 today and next ECT on Friday. No medication changes today. Will continue to provide support and structure. Interview with [place] [facility] tomorrow.

## 2019-07-10 NOTE — PLAN OF CARE
Pt denies SI/SIB or hallucinations. Rated his dep at 4/10 and denied having any anxiety. Pt had ECT #5 today. Pt stated that he feels less depressed than when he first came in here. Stated goal was to get through ECT. Pt also stated that it's hard for him to concentrate and that his attention span is short. Pt ate lunch after ECT, then spent the rest of the shift sleeping in his room. Pt also has an interview scheduled for tomorrow with OALACEY MARQUEZ.

## 2019-07-10 NOTE — PROGRESS NOTES
Name  Phone Update   Abiodun Dumont  [phone] 7/8 -  Referral Sent   [facility]  [phone] 7/8 -  Referral Sent   [facility]  [phone] 7/8 -  Referral Sent   Jose Luis Barros  [phone] 7/8 -  Referral Sent   [facility]  [phone] 7/8 -  Referral Sent   [facility] [phone] 7/8 -  Referral Sent    7/9 - VM from Debbie requesting return call.     7/9 - Returned call to Debbie. Writer left VM for return call.    7/10 - VM from Debbie. Debbie stated that he would be able to meet with the pt today for an intrvw.    7/10 - Spoke with Debbie. Will cme to intervw the pt tomorrow at 3:30pm on the unit.     [facility]  [phone] or [place] [phone] 7/8 -  Referral Sent   [facility]  [phone] 7/8 -  Referral Sent   [facility] [place] [phone] 7/8 -  Referral Sent   [facility]  [phone] 7/8 -  Referral Sent   [facility]  [phone] 7/8 -  Referral Sent   [facility]  [phone] 7/8 -  Referral Sent   [facility] [phone] 7/8 -  Referral Sent.    7/9 - VM from Kee. Would like to know if the patient has any providers and see how he is doing.     7/9 - Lft VM for Kee. Explained that pt still presents as flat and endorses depressive symptoms. Attending would like more treatments. Also stated will need otpt providers.    [facility]   7/8 - Referral Sent.    7/9 - Spoke with Ju. Will not have opening until 8/5. Pt on wait-list.

## 2019-07-10 NOTE — ANESTHESIA PREPROCEDURE EVALUATION
Anesthesia Pre-Procedure Evaluation    Patient: Flakito Hilliard   MRN:     [id number] Gender:   male   Age:    59 year old :      1960        Preoperative Diagnosis: * No surgery found *        Past Medical History:   Diagnosis Date     Depressive disorder      Hypertension      Sleep apnea       Past Surgical History:   Procedure Laterality Date     BLADDER SURGERY       CYSTOSTOMY, INSERT TUBE SUPRAPUBIC, COMBINED N/A 2014    Procedure: COMBINED CYSTOSTOMY, INSERT TUBE SUPRAPUBIC;  Surgeon: Min Prasad MD;  [facility]: [facility]     GENITOURINARY SURGERY      prostate surgery for stones     LASER HOLMIUM CYSTOSCOPY, INTERNAL URETHROTOMY, COMBINED N/A 10/24/2014    Procedure: COMBINED LASER HOLMIUM CYSTOSCOPY, INTERNAL URETHROTOMY;  Surgeon: Valentin Villatoro MD;  Location: [facility] OR     URETHROPLASTY N/A 12/10/2014    Procedure: URETHROPLASTY;  Surgeon: Niraj Burger MD;  Location: [facility] OR     URETHROPLASTY WITH BUCCAL GRAFT N/A 2016    Procedure: URETHROPLASTY WITH BUCCAL GRAFT;  Surgeon: Niraj Burger MD;  [facility] OR          Anesthesia Evaluation     .             ROS/MED HX    ENT/Pulmonary:     (+)sleep apnea, , . .    Neurologic:       Cardiovascular:     (+) hypertension----. : . . . :. .       METS/Exercise Tolerance:     Hematologic:         Musculoskeletal:         GI/Hepatic:         Renal/Genitourinary:     (+) Nephrolithiasis ,       Endo:         Psychiatric:     (+) psychiatric history depression      Infectious Disease:         Malignancy:         Other:                         PHYSICAL EXAM:   Mental Status/Neuro: A/A/O   Airway: Facies: Feasible  Mallampati: II  Mouth/Opening: Full  TM distance: > 6 cm  Neck ROM: Full   Respiratory: Auscultation: CTAB     Resp. Rate: Normal     Resp. Effort: Normal      CV: Rhythm: Regular  Rate: Age appropriate  Heart: Normal Sounds   Comments:      Dental: Normal                  Lab Results   Component Value Date    WBC 8.1  05/19/2019    HGB 16.5 06/28/2019    HCT 49.1 05/19/2019     05/19/2019     06/27/2019    POTASSIUM 3.5 06/27/2019    CHLORIDE 104 06/27/2019    CO2 29 06/27/2019    BUN 20 06/27/2019    CR 1.19 06/27/2019     (H) 06/27/2019    AMILCAR 9.1 06/27/2019    ALBUMIN 3.8 05/19/2019    PROTTOTAL 7.5 05/19/2019    ALT 26 05/19/2019    AST 20 05/19/2019    ALKPHOS 77 05/19/2019    BILITOTAL 1.1 05/19/2019    TSH 2.01 05/19/2019    T4 1.19 05/25/2016       Preop Vitals  BP Readings from Last 3 Encounters:   07/10/19 131/87   06/20/19 136/88   06/14/19 140/88    Pulse Readings from Last 3 Encounters:   07/10/19 94   06/20/19 115   06/14/19 88      Resp Readings from Last 3 Encounters:   07/10/19 16   06/20/19 24   06/14/19 18    SpO2 Readings from Last 3 Encounters:   07/10/19 95%   06/20/19 96%   05/19/19 97%      Temp Readings from Last 1 Encounters:   07/10/19 36.6  C (97.8  F) (Oral)    Ht Readings from Last 1 Encounters:   06/20/19 1.829 m (6')      Wt Readings from Last 1 Encounters:   07/09/19 110.1 kg (242 lb 10.9 oz)    Estimated body mass index is 32.91 kg/m  as calculated from the following:    Height as of 6/20/19: 1.829 m (6').    Weight as of 7/9/19: 110.1 kg (242 lb 10.9 oz).     LDA:  Peripheral IV 07/10/19 Left Hand (Active)   Number of days: 0            Assessment:   ASA SCORE: 2    NPO Status: > 6 hours since completed [employer]   Documentation: H&P complete; Preop Testing complete; Consents complete   Proceeding: Proceed without further delay  Tobacco Use:  NO Active use of Tobacco/UNKNOWN Tobacco use status     Plan:   Anes. Type:  General      Induction:  IV (Standard)   Airway: Native Airway   Access/Monitoring: PIV   Maintenance: Balanced   Emergence: Procedure Site   Logistics: Same Day Surgery     Postop Pain/Sedation Strategy:  Standard-Options: Opioids PRN     PONV Management:  Adult Risk Factors:, Non-Smoker, Postop Opioids     CONSENT: Direct conversation   Plan and risks  discussed with: Patient              Anesthesia Pre-Procedure Evaluation    Patient: Flakito Hilliard   MRN:     [id number] Gender:   male   Age:    59 year old :      1960        Preoperative Diagnosis: * No surgery found *        Past Medical History:   Diagnosis Date     Depressive disorder      Hypertension      Sleep apnea       Past Surgical History:   Procedure Laterality Date     BLADDER SURGERY       CYSTOSTOMY, INSERT TUBE SUPRAPUBIC, COMBINED N/A 2014    Procedure: COMBINED CYSTOSTOMY, INSERT TUBE SUPRAPUBIC;  Surgeon: Min Prasad MD;  [facility]     GENITOURINARY SURGERY      prostate surgery for stones     LASER HOLMIUM CYSTOSCOPY, INTERNAL URETHROTOMY, COMBINED N/A 10/24/2014    Procedure: COMBINED LASER HOLMIUM CYSTOSCOPY, INTERNAL URETHROTOMY;  Surgeon: Valentin Villatoro MD;  [facility] OR     URETHROPLASTY N/A 12/10/2014    Procedure: URETHROPLASTY;  Surgeon: Niraj Burger MD;  [facility]     URETHROPLASTY WITH BUCCAL GRAFT N/A 2016    Procedure: URETHROPLASTY WITH BUCCAL GRAFT;  Surgeon: Niraj Burger MD;  Location:  OR               [facility] AN PHYSICAL EXAM    Lab Results   Component Value Date    WBC 8.1 2019    HGB 16.5 2019    HCT 49.1 2019     2019     2019    POTASSIUM 3.5 2019    CHLORIDE 104 2019    CO2 29 2019    BUN 20 2019    CR 1.19 2019     (H) 2019    AMILCAR 9.1 2019    ALBUMIN 3.8 2019    PROTTOTAL 7.5 2019    ALT 26 2019    AST 20 2019    ALKPHOS 77 2019    BILITOTAL 1.1 2019    TSH 2.01 2019    T4 1.19 2016       Preop Vitals  BP Readings from Last 3 Encounters:   07/10/19 131/87   19 136/88   19 140/88    Pulse Readings from Last 3 Encounters:   07/10/19 94   19 115   19 88      Resp Readings from Last 3 Encounters:   07/10/19 16   19 24   19 18    SpO2  Readings from Last 3 Encounters:   07/10/19 95%   06/20/19 96%   05/19/19 97%      Temp Readings from Last 1 Encounters:   07/10/19 36.6  C (97.8  F) (Oral)    Ht Readings from Last 1 Encounters:   06/20/19 1.829 m (6')      Wt Readings from Last 1 Encounters:   07/09/19 110.1 kg (242 lb 10.9 oz)    Estimated body mass index is 32.91 kg/m  as calculated from the following:    Height as of 6/20/19: 1.829 m (6').    Weight as of 7/9/19: 110.1 kg (242 lb 10.9 oz).     LDA:  Peripheral IV 07/10/19 Left Hand (Active)   Number of days: 0            Assessment:   ASA SCORE: 2       Documentation: H&P complete; Preop Testing complete; Consents complete   Proceeding: Proceed without further delay  Tobacco Use:  NO Active use of Tobacco/UNKNOWN Tobacco use status     Plan:   Anes. Type:  General   Pre-Induction: Midazolam IV; Acetaminophen PO   Induction:  IV (Standard)   Airway: Oral ETT   Access/Monitoring: PIV   Maintenance: Balanced   Emergence: Procedure Site   Logistics: Same Day Surgery     Postop Pain/Sedation Strategy:  Standard-Options: Opioids PRN     PONV Management:  Adult Risk Factors:, Non-Smoker, Postop Opioids     CONSENT: Direct conversation   Plan and risks discussed with: Patient                            MD Irina Bone MD

## 2019-07-10 NOTE — ANESTHESIA POSTPROCEDURE EVALUATION
Anesthesia POST Procedure Evaluation    Patient: Flakito Hilliard   MRN:     [id number] Gender:   male   Age:    59 year old :      1960        Preoperative Diagnosis: * No pre-op diagnosis entered *   * No procedures listed *   Postop Comments: No value filed.       Anesthesia Type:  General  No value filed.    Reportable Event: NO     PAIN: Uncomplicated   Sign Out status: Comfortable, Well controlled pain     PONV: No PONV   Sign Out status:  No Nausea or Vomiting     Neuro/Psych: Uneventful perioperative course   Sign Out Status: Preoperative baseline; Age appropriate mentation     Airway/Resp.: Uneventful perioperative course   Sign Out Status: Non labored breathing, age appropriate RR; Resp. Status within EXPECTED Parameters     CV: Uneventful perioperative course   Sign Out status: Appropriate BP and perfusion indices; Appropriate HR/Rhythm     Disposition:   Sign Out in:  PACU  Disposition:  Floor  Recovery Course: Uneventful  Follow-Up: Not required           Last Anesthesia Record Vitals:  CRNA VITALS  7/10/2019 1011 - 7/10/2019 1111      7/10/2019             Resp Rate (set):  8          Last PACU Vitals:  Vitals Value Taken Time   /74 7/10/2019 11:27 AM   Temp     Pulse     Resp     SpO2     Temp src     NIBP     Pulse     SpO2     Resp     Temp     Ht Rate     Temp 2           Electronically Signed By: Irina Bhatt MD, July 10, 2019, 11:33 AM

## 2019-07-10 NOTE — PLAN OF CARE
Problem: OT General Care Plan  Goal: OT Frequency  Description  Pt will demonstrate consistent engagement in OT group activities that support recovery as evidenced by participating in >1 scheduled OT group/day for 5 days.     Attended 1/2 occupational therapy groups offered this date. Overall congruent-bright affect and full engagement.    Leisure exploration and participation group offered for increased intrinsic motivation to engage in social, non-obligatory occupations via a group game. Structured group was used to promote positive milieu interaction. Pt Response: Full participation with fair sustained attention and acceptance of game rules. Demonstrated difficulty with categorical word recall.     Outcome: Improving

## 2019-07-10 NOTE — PROGRESS NOTES
Pt spent time in his lying in bed. He appears sleeping and reported having anxiety and some confusion from treatmentof not remembering things. He wondering what is going to happen after treatment especially housing. He showerd and washed his clothes. Having ECT treatment tomorrow. He was observed watching TV with others this shift. He denies suicdal ideations or hearing voices.      07/09/19 2200   Behavioral Health   Hallucinations denies / not responding to hallucinations   Thinking poor concentration;distractable;confused   Orientation time: oriented;date: oriented;place: oriented;person: oriented   Memory baseline memory   Insight insight appropriate to situation   Judgement impaired   Eye Contact at examiner   Affect blunted, flat;tense   Mood depressed;hopeless   Physical Appearance/Attire appears stated age   Hygiene well groomed   1. Wish to be Dead No   2. Non-Specific Active Suicidal Thoughts  No   Self Injury other (see comment)  (Denies)   Activity other (see comment)  (Social with approach but confusion)   Speech clear;coherent   Medication Sensitivity no stated side effects   Psychomotor / Gait slow;balanced;steady   Psycho Education   Type of Intervention 1:1 intervention   Response participates with encouragement   Hours 0.5   Activities of Daily Living   Hygiene/Grooming independent   Oral Hygiene independent   Dress scrubs (behavioral health)   Laundry with supervision   Room Organization independent   Activity   Activity Assistance Provided independent

## 2019-07-10 NOTE — PROCEDURES
"Procedure/Surgery Information   [facility], [place]    Bedside Procedure Note  Date of Service (when I performed the procedure): 07/10/2019    Flakito Hilliard is a 59 year old male patient.  1. Suicidal ideation    2. Current episode of major depressive disorder without prior episode, unspecified depression episode severity    3. Severe recurrent major depression without psychotic features (H)      Past Medical History:   Diagnosis Date     Depressive disorder      Hypertension      Sleep apnea      Temp: 97.8  F (36.6  C) Temp src: Oral BP: 131/87 Pulse: 94     SpO2: 95 % O2 Device: None (Room air)      Procedures     Wilmer Liu     [facility], [place]   ECT Procedure Note   07/10/2019    Flakito Hilliard [id number]   59 year old 1960     Patient Status: Inpatient    Is this the first in a series of 12 treatments?  No    History and Physical: Reviewed in medical record    Consent: Informed consent was signed by: patient    Date Consent Signed: 6/27/19      Allergies   Allergen Reactions     Contrast Dye Difficulty breathing       Weight:  242 lbs 10.92 oz              Indications for ECT:   Medications ineffective and History of good ECT response in one or more previous episodes of illness         Clinical Narrative:   Patient was admitted with worsening depression and suicidal ideation.  He's had positive response to ECT in the past.  He's continuing ECT for depression.  NPO after 2400.  Alert and Oriented x 3.    Mood is getting better.   Depression is less \"deep.\"  He had no problems with his last ECT.          Diagnosis:   Major depression         Pause for the Cause:     Right patient Yes   Right procedure/laterality settings: Yes          Intra-Procedure Documentation:     ECT #: 5   Treatment number this series: 5   Total treatment number: 5 + 2 previous series      Type of ECT:  Right, unilateral ultrabrief    ECT Medications:    Toradol:  30mg "   Lidocaine: 40mg  Etomidate 18mg  Caffeine: 250mg (in IV room)  Succinyl Choline: 100mg   Versed: 2mg immediately after seizure (as he has the sense that he can't breathe and is paralyzed but he is breathing and not paralyzed).  Today:  Labetalol:  10mg post ECT for high bp     ECT Strip Summary:   Energy Level: 55 percent  Motor Seizure Duration:  47 seconds  EEG Seizure Duration:   87 seconds    Complications: No    Plan:   Next ECT on 7/12/19    Wilmer Liu MD

## 2019-07-11 PROCEDURE — 25000132 ZZH RX MED GY IP 250 OP 250 PS 637: Performed by: PHYSICIAN ASSISTANT

## 2019-07-11 PROCEDURE — G0177 OPPS/PHP; TRAIN & EDUC SERV: HCPCS

## 2019-07-11 PROCEDURE — 12400001 ZZH R&B MH UMMC

## 2019-07-11 PROCEDURE — 99231 SBSQ HOSP IP/OBS SF/LOW 25: CPT | Performed by: PSYCHIATRY & NEUROLOGY

## 2019-07-11 PROCEDURE — 25000132 ZZH RX MED GY IP 250 OP 250 PS 637: Performed by: CLINICAL NURSE SPECIALIST

## 2019-07-11 PROCEDURE — 25000132 ZZH RX MED GY IP 250 OP 250 PS 637: Performed by: PSYCHIATRY & NEUROLOGY

## 2019-07-11 RX ADMIN — DILTIAZEM HYDROCHLORIDE 360 MG: 120 CAPSULE, COATED, EXTENDED RELEASE ORAL at 08:34

## 2019-07-11 RX ADMIN — BUPROPION 450 MG: 150 TABLET, EXTENDED RELEASE ORAL at 08:34

## 2019-07-11 RX ADMIN — ATORVASTATIN CALCIUM 20 MG: 20 TABLET, FILM COATED ORAL at 08:34

## 2019-07-11 RX ADMIN — HYDROCHLOROTHIAZIDE 25 MG: 12.5 CAPSULE ORAL at 08:34

## 2019-07-11 RX ADMIN — TRAZODONE HYDROCHLORIDE 100 MG: 100 TABLET ORAL at 21:35

## 2019-07-11 RX ADMIN — AMLODIPINE BESYLATE 10 MG: 5 TABLET ORAL at 08:35

## 2019-07-11 RX ADMIN — LISINOPRIL 40 MG: 40 TABLET ORAL at 08:34

## 2019-07-11 ASSESSMENT — MIFFLIN-ST. JEOR: SCORE: 1939.83

## 2019-07-11 NOTE — PROGRESS NOTES
Behavioral Health  Note   Behavioral Health  Spirituality Group Note     Unit 20    Name: Flakito Hilliard    YOB: 1960   MRN: [id number]    Age: 59 year old     Patient attended -led group, which included discussion of spirituality, coping with illness and building resilience.   Patient attended group for 1.0 hrs.   patient minimally participated, but was respectful to the group process.Pt mentioned that ECT treatment helped him. Today he is better than last week but he was in and out during the group.     [facility]  Staff    Page [phone]

## 2019-07-11 NOTE — PLAN OF CARE
BEHAVIORAL TEAM DISCUSSION    Participants: Dr. Edwards, Dell Lee (TRICIA), Suzanne Brown (RN), Lilian Smith (OT)  Progress: Some Progress; staff report that the patient has a slight improvement with his mood.   Continued Stay Criteria/Rationale: Continued stabilization and ECT treatments.   Medical/Physical: No acute medical issues.   Precautions:   Behavioral Orders   Procedures     Code 1 - Restrict to Unit     Code 2     Electroconvulsive therapy     ECT every Monday, Wednesday, and Friday     Electroconvulsive therapy     Series of up to 12 treatments. Begin Date: 6/28/19     Treating Psychiatrist providing ECT: Alexa    Notified on:  6/26/19     Routine Programming     As clinically indicated     Status 15     Every 15 minutes.     Suicide precautions     Patients on Suicide Precautions should have a Combination Diet ordered that includes a Diet selection(s) AND a Behavioral Tray selection for Safe Tray - with utensils, or Safe Tray - NO utensils       Plan: The patient will continue his course of treatment with ECT. He will have ECT #6 on Friday, July 12th. The dispostion for the patient will be an IRTs placement. [facility] will make those referrals and coordinate an admission for the patient.     Rationale for change in precautions or plan: None.

## 2019-07-11 NOTE — PROGRESS NOTES
"[facility], [place]   Psychiatric Progress Note        Interim History:     The patient's care was discussed with the treatment team during the daily team meeting and/or staff's chart notes were reviewed.  Staff report patient spends a lot of time outside of his room, goes to groups, appears to be slightly more social. He appears to have word finding difficulties per OT on the day of ECT treatments. Overall, he reports that he is doing somewhat better and is not so: \"rock bottom\". Denies feeling suicidal, he appears to be confused and very indecisive, unsure if he wants to go to IRTS, then, agrees that he has no other supportive place he can go to. He needs a lot of reassurances. Still complains of poor appetite.          Medications:       amLODIPine  10 mg Oral Daily     atorvastatin  20 mg Oral Daily     buPROPion  450 mg Oral QAM     diltiazem ER COATED BEADS  360 mg Oral Daily     hydrochlorothiazide  25 mg Oral Daily     lisinopril  40 mg Oral Daily          Allergies:     Allergies   Allergen Reactions     Contrast Dye Difficulty breathing          Labs:     No results found for this or any previous visit (from the past 24 hour(s)).       Psychiatric Examination:     /82   Pulse 76   Temp 97.9  F (36.6  C) (Oral)   Resp 18   Ht 1.829 m (6')   Wt 108.2 kg (238 lb 8 oz)   SpO2 95%   BMI 32.35 kg/m    Weight is 238 lbs 8 oz  Body mass index is 32.35 kg/m .     Orthostatic Vitals       Most Recent      Sitting Orthostatic /87 07/11 0814    Sitting Orthostatic Pulse (bpm) 103 07/11 0814    Standing Orthostatic /84 07/11 0814    Standing Orthostatic Pulse (bpm) 111 07/11 0814         Appearance: dressed in hospital garbs  Attitude: cooperative  Eye Contact:  Partial, better  Mood: less depressed  Affect: constricted,   Speech: less monotone  Psychomotor Behavior: psychomotor retardation less pronounced  Throught Process:  Slow, but logical  Associations:  " tight  Thought Content: denies Suicidal ideation, psychosis  Insight: partial  Judgement:  Mildly impaired, but improving   Oriented to:  Self, time and place  Attention Span and Concentration: mildly impaired  Recent and Remote Memory: fair, problems with short term memory after ECT    Clinical Global Impressions  First:  Considering your total clinical experience with this particular patient population, how severe are the patient's symptoms at this time?: 7 (06/21/19 1701)  Compared to the patient's condition at the START of treatment, this patient's condition is:: 4 (06/21/19 1701)  Most recent:  Considering your total clinical experience with this particular patient population, how severe are the patient's symptoms at this time?: 4 (7/8/2019)  Compared to the patient's condition at the START of treatment, this patient's condition is:: 3 (7/8/2019)         Precautions:     Behavioral Orders   Procedures     Code 1 - Restrict to Unit     Code 2     Electroconvulsive therapy     ECT every Monday, Wednesday, and Friday     Electroconvulsive therapy     Series of up to 12 treatments. Begin Date: 6/28/19     Treating Psychiatrist providing ECT: Alexa    Notified on:  6/26/19     Routine Programming     As clinically indicated     Status 15     Every 15 minutes.     Suicide precautions     Patients on Suicide Precautions should have a Combination Diet ordered that includes a Diet selection(s) AND a Behavioral Tray selection for Safe Tray - with utensils, or Safe Tray - NO utensils            DIagnoses:     Major depressive disorder, current, severe.  Rule out generalized anxiety disorder.  The patient was also diagnosed in the past with dependent personality disorder.          Plan:     ECT #5 yesterday and next ECT on Friday. No medication changes today. Will continue to provide support and structure. Interview with [facility] today.

## 2019-07-11 NOTE — PROGRESS NOTES
had a lengthy conversation with the patient this morning. The patient reported that he feels the ECT has helped to improve his mood and that he does not feel like he is completely at rock bottom right now. The patient reported that he is still struggling to understand people and things on the unit. He stated that he still feels like he is out of place and confused. The reported that he has lost 30lbs since admission. He stated that he still does not taste anything and really only eats for nutrition at this point. Writer and the patient spoke about him going to an IRTs facility. The patient reported that he does not know if he wants to go to an IRTs but reports that he would have no where to go otherwise. He requested that I cancel the IRTs interview that was scheduled for today at 3:30pm. The patient reported to gregorior that he really enjoyed working with the psychotherapist one-on-one and inquired about when she would be returning. Writer stated that she would and will reach out to the psychotherapist regarding one-on-one therapy.

## 2019-07-11 NOTE — PLAN OF CARE
Problem: OT General Care Plan  Goal: OT Frequency  Description  Pt will demonstrate consistent engagement in OT group activities that support recovery as evidenced by participating in >1 scheduled OT group/day for 5 days.     Attended 1/2 occupational therapy groups offered this date. Overall congruent affect and full engagement.    Occupational therapy clinic to complete a detailed coloring poster and initiate a new one. Pt Response: Demonstrated consistent performance skills as observed on previous dates. Progressively more conversive as the week progresses.     Outcome: Improving

## 2019-07-11 NOTE — PROGRESS NOTES
Name  Phone Update   Abiodun Dumont  [phone] 7/8 -  Referral Sent   [facility]  [phone] 7/8 -  Referral Sent   [facility]  [phone] 7/8 -  Referral Sent   Jose Luis Bar[facility]  [phone] 7/8 -  Referral Sent   [facility]  [phone] 7/8 -  Referral Sent   [facility] [phone] 7/8 -  Referral Sent    7/9 - VM from Debbie requesting return call.     7/9 - Returned call to Debbie. Writer left VM for return call.    7/10 - VM from Debbie. Debbie stated that he would be able to meet with the pt today for an intrvw.    7/10 - Spoke with Debbie. Will cme to intervw the pt tomorrow at 3:30pm on the unit.     [facility]  [phone] or Trisha [phone] 7/8 -  Referral Sent    7/11 - VM from Gavin /[facility]. Wnts to know how the pt is doing and set-up an interview.     7/11 - Returned a call to Gavin. Lft VM to set-up an interview. Requested return call.    [facility]  [phone] 7/8 -  Referral Sent   [facility] [place] [phone] 7/8 -  Referral Sent   [facility]  [phone] 7/8 -  Referral Sent   [facility]  [phone] 7/8 -  Referral Sent   [facility]  [phone] 7/8 -  Referral Sent   [facility] [phone] 7/8 -  Referral Sent.    7/9 - VM from Kee. Would like to know if the patient has any providers and see how he is doing.     7/9 - Lft VM for Kee. Explained that pt still presents as flat and endorses depressive symptoms. Attending would like more treatments. Also stated will need otpt providers.    [facility]   7/8 - Referral Sent.    7/9 - Spoke with Ju. Will not have opening until 8/5. Pt on wait-list.

## 2019-07-12 ENCOUNTER — APPOINTMENT (OUTPATIENT)
Dept: BEHAVIORAL HEALTH | Facility: CLINIC | Age: 59
End: 2019-07-12
Payer: COMMERCIAL

## 2019-07-12 ENCOUNTER — ANESTHESIA (OUTPATIENT)
Dept: BEHAVIORAL HEALTH | Facility: CLINIC | Age: 59
End: 2019-07-12

## 2019-07-12 ENCOUNTER — ANESTHESIA EVENT (OUTPATIENT)
Dept: BEHAVIORAL HEALTH | Facility: CLINIC | Age: 59
End: 2019-07-12

## 2019-07-12 LAB
ALBUMIN UR-MCNC: 30 MG/DL
APPEARANCE UR: ABNORMAL
BACTERIA #/AREA URNS HPF: ABNORMAL /HPF
BILIRUB UR QL STRIP: NEGATIVE
COLOR UR AUTO: YELLOW
GLUCOSE UR STRIP-MCNC: NEGATIVE MG/DL
HGB UR QL STRIP: ABNORMAL
KETONES UR STRIP-MCNC: NEGATIVE MG/DL
LEUKOCYTE ESTERASE UR QL STRIP: ABNORMAL
MUCOUS THREADS #/AREA URNS LPF: PRESENT /LPF
NITRATE UR QL: POSITIVE
PH UR STRIP: 6 PH (ref 5–7)
RBC #/AREA URNS AUTO: 8 /HPF (ref 0–2)
SOURCE: ABNORMAL
SP GR UR STRIP: 1.02 (ref 1–1.03)
SQUAMOUS #/AREA URNS AUTO: <1 /HPF (ref 0–1)
UROBILINOGEN UR STRIP-MCNC: NORMAL MG/DL (ref 0–2)
WBC #/AREA URNS AUTO: 57 /HPF (ref 0–5)

## 2019-07-12 PROCEDURE — 25000128 H RX IP 250 OP 636: Performed by: PSYCHIATRY & NEUROLOGY

## 2019-07-12 PROCEDURE — 90837 PSYTX W PT 60 MINUTES: CPT

## 2019-07-12 PROCEDURE — 25000132 ZZH RX MED GY IP 250 OP 250 PS 637: Performed by: PHYSICIAN ASSISTANT

## 2019-07-12 PROCEDURE — 90870 ELECTROCONVULSIVE THERAPY: CPT

## 2019-07-12 PROCEDURE — 81001 URINALYSIS AUTO W/SCOPE: CPT | Performed by: INTERNAL MEDICINE

## 2019-07-12 PROCEDURE — 25000128 H RX IP 250 OP 636: Performed by: ANESTHESIOLOGY

## 2019-07-12 PROCEDURE — 12400001 ZZH R&B MH UMMC

## 2019-07-12 PROCEDURE — 99231 SBSQ HOSP IP/OBS SF/LOW 25: CPT | Performed by: INTERNAL MEDICINE

## 2019-07-12 PROCEDURE — 25000132 ZZH RX MED GY IP 250 OP 250 PS 637: Performed by: PSYCHIATRY & NEUROLOGY

## 2019-07-12 PROCEDURE — 25000125 ZZHC RX 250: Performed by: ANESTHESIOLOGY

## 2019-07-12 PROCEDURE — 99232 SBSQ HOSP IP/OBS MODERATE 35: CPT | Mod: 25 | Performed by: PSYCHIATRY & NEUROLOGY

## 2019-07-12 PROCEDURE — 87086 URINE CULTURE/COLONY COUNT: CPT | Performed by: INTERNAL MEDICINE

## 2019-07-12 PROCEDURE — 37000008 ZZH ANESTHESIA TECHNICAL FEE, 1ST 30 MIN

## 2019-07-12 PROCEDURE — 25000132 ZZH RX MED GY IP 250 OP 250 PS 637: Performed by: CLINICAL NURSE SPECIALIST

## 2019-07-12 PROCEDURE — 25000125 ZZHC RX 250: Performed by: PSYCHIATRY & NEUROLOGY

## 2019-07-12 RX ORDER — KETOROLAC TROMETHAMINE 30 MG/ML
30 INJECTION, SOLUTION INTRAMUSCULAR; INTRAVENOUS
Status: COMPLETED | OUTPATIENT
Start: 2019-07-12 | End: 2019-07-12

## 2019-07-12 RX ORDER — DOCUSATE SODIUM 100 MG/1
100 CAPSULE, LIQUID FILLED ORAL 2 TIMES DAILY
Status: DISCONTINUED | OUTPATIENT
Start: 2019-07-12 | End: 2019-07-25 | Stop reason: HOSPADM

## 2019-07-12 RX ORDER — LIDOCAINE HYDROCHLORIDE 20 MG/ML
40 INJECTION, SOLUTION EPIDURAL; INFILTRATION; INTRACAUDAL; PERINEURAL
Status: COMPLETED | OUTPATIENT
Start: 2019-07-12 | End: 2019-07-12

## 2019-07-12 RX ORDER — LABETALOL HYDROCHLORIDE 5 MG/ML
INJECTION, SOLUTION INTRAVENOUS PRN
Status: DISCONTINUED | OUTPATIENT
Start: 2019-07-12 | End: 2019-07-12

## 2019-07-12 RX ORDER — CAFFEINE AND SODIUM BENZOATE 125 MG/ML
250 INJECTION, SOLUTION INTRAMUSCULAR; INTRAVENOUS
Status: COMPLETED | OUTPATIENT
Start: 2019-07-12 | End: 2019-07-12

## 2019-07-12 RX ORDER — ETOMIDATE 2 MG/ML
INJECTION INTRAVENOUS PRN
Status: DISCONTINUED | OUTPATIENT
Start: 2019-07-12 | End: 2019-07-12

## 2019-07-12 RX ADMIN — TRAZODONE HYDROCHLORIDE 100 MG: 100 TABLET ORAL at 21:10

## 2019-07-12 RX ADMIN — AMLODIPINE BESYLATE 10 MG: 5 TABLET ORAL at 08:26

## 2019-07-12 RX ADMIN — ETOMIDATE 18 MG: 2 INJECTION INTRAVENOUS at 09:29

## 2019-07-12 RX ADMIN — BUPROPION 450 MG: 150 TABLET, EXTENDED RELEASE ORAL at 10:21

## 2019-07-12 RX ADMIN — LISINOPRIL 40 MG: 40 TABLET ORAL at 08:26

## 2019-07-12 RX ADMIN — ATORVASTATIN CALCIUM 20 MG: 20 TABLET, FILM COATED ORAL at 10:21

## 2019-07-12 RX ADMIN — DILTIAZEM HYDROCHLORIDE 360 MG: 120 CAPSULE, COATED, EXTENDED RELEASE ORAL at 08:26

## 2019-07-12 RX ADMIN — KETOROLAC TROMETHAMINE 30 MG: 30 INJECTION, SOLUTION INTRAMUSCULAR at 09:14

## 2019-07-12 RX ADMIN — MIDAZOLAM 2 MG: 1 INJECTION INTRAMUSCULAR; INTRAVENOUS at 09:33

## 2019-07-12 RX ADMIN — HYDROCHLOROTHIAZIDE 25 MG: 12.5 CAPSULE ORAL at 10:22

## 2019-07-12 RX ADMIN — CAFFEINE AND SODIUM BENZOATE 250 MG: 125 INJECTION, SOLUTION INTRAMUSCULAR; INTRAVENOUS at 09:13

## 2019-07-12 RX ADMIN — DOCUSATE SODIUM 100 MG: 100 CAPSULE, LIQUID FILLED ORAL at 21:10

## 2019-07-12 RX ADMIN — Medication 100 MG: at 09:29

## 2019-07-12 RX ADMIN — LIDOCAINE HYDROCHLORIDE 40 MG: 20 INJECTION, SOLUTION EPIDURAL; INFILTRATION; INTRACAUDAL; PERINEURAL at 09:32

## 2019-07-12 RX ADMIN — LABETALOL HYDROCHLORIDE 10 MG: 5 INJECTION INTRAVENOUS at 09:34

## 2019-07-12 ASSESSMENT — ACTIVITIES OF DAILY LIVING (ADL)
DRESS: INDEPENDENT
HYGIENE/GROOMING: INDEPENDENT
ORAL_HYGIENE: INDEPENDENT
HYGIENE/GROOMING: INDEPENDENT
DRESS: SCRUBS (BEHAVIORAL HEALTH);INDEPENDENT
ORAL_HYGIENE: INDEPENDENT

## 2019-07-12 NOTE — ANESTHESIA POSTPROCEDURE EVALUATION
Anesthesia POST Procedure Evaluation    Patient: Flakito Hilliard   MRN:     [id number] Gender:   male   Age:    59 year old :      1960        Preoperative Diagnosis: * No pre-op diagnosis entered *   * No procedures listed *   Postop Comments: No value filed.       Anesthesia Type:  General  No value filed.    Reportable Event: NO     PAIN: Uncomplicated   Sign Out status: Comfortable, Well controlled pain     PONV: No PONV   Sign Out status:  No Nausea or Vomiting     Neuro/Psych: Uneventful perioperative course   Sign Out Status: Preoperative baseline; Age appropriate mentation     Airway/Resp.: Uneventful perioperative course   Sign Out Status: Non labored breathing, age appropriate RR; Resp. Status within EXPECTED Parameters     CV: Uneventful perioperative course   Sign Out status: Appropriate BP and perfusion indices; Appropriate HR/Rhythm     Disposition:   Sign Out in:  PACU  Disposition:  Phase II; Home  Recovery Course: Uneventful  Follow-Up: Not required           Last Anesthesia Record Vitals:  [facility] VITALS  2019 0910 - 2019 0959      2019             Resp Rate (set):  8          Last PACU Vitals:  Vitals Value Taken Time   /78 2019  9:55 AM   Temp     Pulse 84 2019  9:55 AM   Resp 14 2019  9:55 AM   SpO2 95 % 2019  9:55 AM   Temp src     NIBP     Pulse     SpO2     Resp     Temp     Ht Rate     Temp 2           Electronically Signed By: Deonte Westfall DO, 2019, 9:59 AM

## 2019-07-12 NOTE — PROGRESS NOTES
"[facility], [place]   Psychiatric Progress Note        Interim History:     The patient's care was discussed with the treatment team during the daily team meeting and/or staff's chart notes were reviewed.  Staff report patient spends more time outside of his room. He is slightly more animated, denies Suicidal ideation. Yesterday complained of difficulties voiding \"dribbling\", urine output was checked by RN, it was 200 ml. Overall, reported some improvement in his mood. Remains indecisive, needs a lot of reassurance. Yesterday he had phone interview with [facility], he is ambivalent about going there: \"I have never been to [facility]\". Admitted during today's visit that he had no support at his home place and had no serious reason to be afraid of IRTS. Complained of constipation. Told me that he had what sounded like kidney stones, needed to go through reconstructive surgery. Asked me to review notes of his visit with IM re: his abnormal UA. I did so, per IM's note from 7/8 UC was negative, UTI unlikely. I will discuss it with the patient.          Medications:       amLODIPine  10 mg Oral Daily     atorvastatin  20 mg Oral Daily     buPROPion  450 mg Oral QAM     diltiazem ER COATED BEADS  360 mg Oral Daily     docusate sodium  100 mg Oral BID     hydrochlorothiazide  25 mg Oral Daily     lisinopril  40 mg Oral Daily          Allergies:     Allergies   Allergen Reactions     Contrast Dye Difficulty breathing          Labs:     No results found for this or any previous visit (from the past 24 hour(s)).       Psychiatric Examination:     /82   Pulse 83   Temp 98.3  F (36.8  C) (Oral)   Resp 16   Ht 1.829 m (6')   Wt 108.2 kg (238 lb 8 oz)   SpO2 96%   BMI 32.35 kg/m    Weight is 238 lbs 8 oz  Body mass index is 32.35 kg/m .     Orthostatic Vitals       Most Recent      Sitting Orthostatic /84 07/12 0700    Sitting Orthostatic Pulse (bpm) 99 07/12 0700    Standing " Orthostatic /86 07/12 0700    Standing Orthostatic Pulse (bpm) 108 07/12 0700         Appearance: dressed in hospital garbs  Attitude: cooperative  Eye Contact:  Partial, better  Mood: less depressed  Affect: constricted,   Speech: less monotone  Psychomotor Behavior: psychomotor retardation less pronounced  Throught Process:  Slow, but logical  Associations:  tight  Thought Content: denies Suicidal ideation, psychosis, Homicidal thoughts   Insight: partial  Judgement:  Mildly impaired, but improving   Oriented to:  Self, time and place  Attention Span and Concentration: mildly impaired  Recent and Remote Memory: fair, problems with short term memory after ECT    Clinical Global Impressions  First:  Considering your total clinical experience with this particular patient population, how severe are the patient's symptoms at this time?: 7 (06/21/19 1701)  Compared to the patient's condition at the START of treatment, this patient's condition is:: 4 (06/21/19 1701)  Most recent:  Considering your total clinical experience with this particular patient population, how severe are the patient's symptoms at this time?: 4 (7/8/2019)  Compared to the patient's condition at the START of treatment, this patient's condition is:: 3 (7/8/2019)         Precautions:     Behavioral Orders   Procedures     Code 1 - Restrict to Unit     Code 2     Electroconvulsive therapy     ECT every Monday, Wednesday, and Friday     Electroconvulsive therapy     Series of up to 12 treatments. Begin Date: 6/28/19     Treating Psychiatrist providing ECT: Alexa    Notified on:  6/26/19     Routine Programming     As clinically indicated     Status 15     Every 15 minutes.     Suicide precautions     Patients on Suicide Precautions should have a Combination Diet ordered that includes a Diet selection(s) AND a Behavioral Tray selection for Safe Tray - with utensils, or Safe Tray - NO utensils            DIagnoses:     Major depressive disorder,  current, severe.  Rule out generalized anxiety disorder.  The patient was also diagnosed in the past with dependent personality disorder.          Plan:     ECT #6 today. Will start on Colace for constipation. Will continue to provide support and structure. CTC will follow up with [facility] for their decision re: Flakito's placement there.

## 2019-07-12 NOTE — PROGRESS NOTES
Returned from ECT. Alert and orientated x 4. Denies headache or body aches. Reports feeling groggy.  Tolerated breakfast, then returned to room to rest.

## 2019-07-12 NOTE — PROGRESS NOTES
"Pt welcomed an individual psychotherapy session despite completing a 6th ECT treatment this morning.  Pt clearly remembered this therapist and our previous individual and group therapy with a multi-sensory memory of those experiences.  Due to pt's previous need to get a lot off his mind, pt was given as much time as he needed today and set the length by his own energy level and desire to engage.      He began the session stating he is feeling himself lift a bit, adding a slight hand gesture moving from his hip level to his belly.  He remained self-derogatory, though expressed some insight, as well as significant curiosity and humility.  Pt repeatedly expresses a strong sense that he \"should\" be able to \"understand more\" than he does about his feelings with a strong desire to \"make sense\" out of what he feels.  Pt again revisited some significant life stories and relationships, noting he may be grieving loss.  He also shared clear visceral experiences about those past experiences (places and relationships) that he no longer wishes to return to- even as he grieves the loss of them.  Pt was aware of conflicting feelings, but expressed confusion about them despite being able to articulate what they are.      Pt was open to exploring an IRTS placement with some trepidation and many questions.        07/12/19 1530   Dance Movement Therapy   Response participates, initiates socially appropriate   Hours 1.5     "

## 2019-07-12 NOTE — PROGRESS NOTES
CLINICAL NUTRITION SERVICES - REASSESSMENT NOTE     Nutrition Prescription    RECOMMENDATIONS FOR MDs/PROVIDERS TO ORDER:  None at this time    Malnutrition Status:    Patient does not meet two of the above criteria necessary for diagnosing malnutrition but is at risk for malnutrition    Recommendations already ordered by Registered Dietitian (RD):  None at this time    Future/Additional Recommendations:  - Continue to monitor wt trends and PO intake  - Continue to offer scheduled snacks and supplements      EVALUATION OF THE PROGRESS TOWARD GOALS   Diet: Regular  Intake: Pt continues to have no appetite and no taste for food but eats three meals per day because he know he should. Ranjeet also enjoys an HS snack of osman crackers. He is not interested in supplements at this time.     NEW FINDINGS     Weight history: Pt has lost 2.7 kg (2%) since last evaluated on 7/3/19. Per pt - he has definitely notice the weight loss recently but is unsure why it is happening because he has been eating the same amount.   Wt Readings from Last 10 Encounters:   07/11/19 108.2 kg (238 lb 8 oz)   07/09/19  110.1 kg (242 lb 10.9 oz)   07/07/19  109.9 kg (242 lb 3.2 oz)   07/04/19  110.9 kg (244 lb 8 oz)   06/20/19 110.9 kg (244 lb 6.4 oz)   06/14/19 112 kg (247 lb)   05/19/19 117.9 kg (260 lb)   07/18/18 118.8 kg (262 lb)   04/09/18 122.1 kg (269 lb 1.6 oz)   08/18/17 120.2 kg (265 lb)   05/09/17 118.8 kg (262 lb)   04/26/17 117.1 kg (258 lb 3.2 oz)   03/27/17 120.2 kg (265 lb)       MALNUTRITION  % Intake: No decreased intake noted  % Weight Loss: Up to 1-2% in 1 week (non-severe)  Subcutaneous Fat Loss: None observed  Muscle Loss: None observed  Fluid Accumulation/Edema: None noted  Malnutrition Diagnosis: Patient does not meet two of the above criteria necessary for diagnosing malnutrition but is at risk for malnutrition    Previous Goals   Patient to consume % of nutritionally adequate meal trays TID, or the equivalent with  supplements/snacks.  Evaluation: Met    Previous Nutrition Diagnosis  Predicted inadequate nutrient intake (energy/protein) related to decreased appetite but still eating 3 meals a day as evidenced by potential for decline.   Evaluation: No change    CURRENT NUTRITION DIAGNOSIS  Predicted inadequate nutrient intake (energy/protein) related to decreased appetite but still eating 3 meals a day as evidenced by potential for decline.     INTERVENTIONS  Implementation  - Continued to encourage the pt to eat three meals per day  - Discussed the option for snacks and supplements but the pt is fine with the snacks that are offered on the unit. He continues to eat osman crackers as an HS snack and is satisfied.      Goals  Patient to consume % of nutritionally adequate meal trays TID, or the equivalent with supplements/snacks.    Monitoring/Evaluation  Progress toward goals will be monitored and evaluated per protocol.        eRny Esquivel RD

## 2019-07-12 NOTE — PROGRESS NOTES
Patient was calm and selectively social in the milieu this evening. He was visible in the lounge as he was watching TV for most of the shift. Patient denies SI/SIB.        07/11/19 2200   Behavioral Health   Hallucinations denies / not responding to hallucinations   Thinking intact   Orientation person: oriented;date: oriented;place: oriented;time: oriented   Memory baseline memory   Insight admits / accepts   Judgement intact   Affect full range affect   Mood mood is calm   Physical Appearance/Attire neat;attire appropriate to age and situation   Hygiene well groomed   Suicidality other (see comments)  (Denies)   1. Wish to be Dead No   2. Non-Specific Active Suicidal Thoughts  No

## 2019-07-12 NOTE — PROGRESS NOTES
The patient's IRTs interview with [facility] has been rescheduled to a phone interview still to talk place Monday at 1:00pm. Writer will inform the patient.

## 2019-07-12 NOTE — ANESTHESIA PREPROCEDURE EVALUATION
Anesthesia Pre-Procedure Evaluation    Patient: Flakito Hilliard   MRN:     [id number] Gender:   male   Age:    59 year old :      1960        Preoperative Diagnosis: * No surgery found *        Past Medical History:   Diagnosis Date     Depressive disorder      Hypertension      Sleep apnea       Past Surgical History:   Procedure Laterality Date     BLADDER SURGERY       CYSTOSTOMY, INSERT TUBE SUPRAPUBIC, COMBINED N/A 2014    Procedure: COMBINED CYSTOSTOMY, INSERT TUBE SUPRAPUBIC;  Surgeon: Min Prasad MD;  [facility]: [facility]     GENITOURINARY SURGERY      prostate surgery for stones     LASER HOLMIUM CYSTOSCOPY, INTERNAL URETHROTOMY, COMBINED N/A 10/24/2014    Procedure: COMBINED LASER HOLMIUM CYSTOSCOPY, INTERNAL URETHROTOMY;  Surgeon: Valentin Villatoro MD;  [facility]     URETHROPLASTY N/A 12/10/2014    Procedure: URETHROPLASTY;  Surgeon: Niarj Burger MD;  [facility]     URETHROPLASTY WITH BUCCAL GRAFT N/A 2016    Procedure: URETHROPLASTY WITH BUCCAL GRAFT;  Surgeon: Niraj Burger MD;  [facility]          Anesthesia Evaluation     .             ROS/MED HX    ENT/Pulmonary:     (+)sleep apnea, , . .    Neurologic:       Cardiovascular:     (+) hypertension----. : . . . :. .       METS/Exercise Tolerance:     Hematologic:         Musculoskeletal:         GI/Hepatic:         Renal/Genitourinary:     (+) Nephrolithiasis ,       Endo:         Psychiatric:     (+) psychiatric history depression      Infectious Disease:         Malignancy:         Other:                         PHYSICAL EXAM:   Mental Status/Neuro: A/A/O   Airway: Facies: Feasible  Mallampati: II  Mouth/Opening: Full  TM distance: > 6 cm  Neck ROM: Full   Respiratory: Auscultation: CTAB     Resp. Rate: Normal     Resp. Effort: Normal      CV: Rhythm: Regular  Rate: Age appropriate  Heart: Normal Sounds   Comments:      Dental: Normal                  Lab Results   Component Value Date    WBC 8.1  05/19/2019    HGB 16.5 06/28/2019    HCT 49.1 05/19/2019     05/19/2019     06/27/2019    POTASSIUM 3.5 06/27/2019    CHLORIDE 104 06/27/2019    CO2 29 06/27/2019    BUN 20 06/27/2019    CR 1.19 06/27/2019     (H) 06/27/2019    AMILCAR 9.1 06/27/2019    ALBUMIN 3.8 05/19/2019    PROTTOTAL 7.5 05/19/2019    ALT 26 05/19/2019    AST 20 05/19/2019    ALKPHOS 77 05/19/2019    BILITOTAL 1.1 05/19/2019    TSH 2.01 05/19/2019    T4 1.19 05/25/2016       Preop Vitals  BP Readings from Last 3 Encounters:   07/10/19 111/82   06/20/19 136/88   06/14/19 140/88    Pulse Readings from Last 3 Encounters:   07/10/19 76   06/20/19 115   06/14/19 88      Resp Readings from Last 3 Encounters:   07/12/19 16   06/20/19 24   06/14/19 18    SpO2 Readings from Last 3 Encounters:   07/12/19 95%   06/20/19 96%   05/19/19 97%      Temp Readings from Last 1 Encounters:   07/12/19 36.1  C (96.9  F) (Tympanic)    Ht Readings from Last 1 Encounters:   06/20/19 1.829 m (6')      Wt Readings from Last 1 Encounters:   07/11/19 108.2 kg (238 lb 8 oz)    Estimated body mass index is 32.35 kg/m  as calculated from the following:    Height as of 6/20/19: 1.829 m (6').    Weight as of 7/11/19: 108.2 kg (238 lb 8 oz).     LDA:  Peripheral IV 07/12/19 Right Hand (Active)   Site Assessment WDL 7/12/2019  9:00 AM   Line Status Saline locked 7/12/2019  9:00 AM   Number of days: 0            Assessment:   ASA SCORE: 2    NPO Status: > 6 hours since completed Solid [employer]   Documentation: H&P complete; Preop Testing complete; Consents complete   Proceeding: Proceed without further delay  Tobacco Use:  NO Active use of Tobacco/UNKNOWN Tobacco use status     Plan:   Anes. Type:  General      Induction:  IV (Standard)   Airway: Native Airway   Access/Monitoring: PIV   Maintenance: Balanced   Emergence: Procedure Site   Logistics: Same Day Surgery     Postop Pain/Sedation Strategy:  Standard-Options: Opioids PRN     PONV Management:  Adult Risk  Factors:, Non-Smoker, Postop Opioids     CONSENT: Direct conversation   Plan and risks discussed with: Patient              Anesthesia Pre-Procedure Evaluation    Patient: Flakito Hilliard   MRN:     [id number] Gender:   male   Age:    59 year old :      1960        Preoperative Diagnosis: * No surgery found *        Past Medical History:   Diagnosis Date     Depressive disorder      Hypertension      Sleep apnea       Past Surgical History:   Procedure Laterality Date     BLADDER SURGERY       CYSTOSTOMY, INSERT TUBE SUPRAPUBIC, COMBINED N/A 2014    Procedure: COMBINED CYSTOSTOMY, INSERT TUBE SUPRAPUBIC;  Surgeon: Min Prasad MD;  [facility]     GENITOURINARY SURGERY      prostate surgery for stones     LASER HOLMIUM CYSTOSCOPY, INTERNAL URETHROTOMY, COMBINED N/A 10/24/2014    Procedure: COMBINED LASER HOLMIUM CYSTOSCOPY, INTERNAL URETHROTOMY;  Surgeon: Valentin Villatoro MD;  [facility]     URETHROPLASTY N/A 12/10/2014    Procedure: URETHROPLASTY;  Surgeon: Niraj Burger MD;  [facility]     URETHROPLASTY WITH BUCCAL GRAFT N/A 2016    Procedure: URETHROPLASTY WITH BUCCAL GRAFT;  Surgeon: Niraj Burger MD;  Location:  OR               [facility] AN PHYSICAL EXAM    Lab Results   Component Value Date    WBC 8.1 2019    HGB 16.5 2019    HCT 49.1 2019     2019     2019    POTASSIUM 3.5 2019    CHLORIDE 104 2019    CO2 29 2019    BUN 20 2019    CR 1.19 2019     (H) 2019    AMILCAR 9.1 2019    ALBUMIN 3.8 2019    PROTTOTAL 7.5 2019    ALT 26 2019    AST 20 2019    ALKPHOS 77 2019    BILITOTAL 1.1 2019    TSH 2.01 2019    T4 1.19 2016       Preop Vitals  BP Readings from Last 3 Encounters:   07/10/19 111/82   19 136/88   19 140/88    Pulse Readings from Last 3 Encounters:   07/10/19 76   19 115   19 88      Resp  Readings from Last 3 Encounters:   07/12/19 16   06/20/19 24   06/14/19 18    SpO2 Readings from Last 3 Encounters:   07/12/19 95%   06/20/19 96%   05/19/19 97%      Temp Readings from Last 1 Encounters:   07/12/19 36.1  C (96.9  F) (Tympanic)    Ht Readings from Last 1 Encounters:   06/20/19 1.829 m (6')      Wt Readings from Last 1 Encounters:   07/11/19 108.2 kg (238 lb 8 oz)    Estimated body mass index is 32.35 kg/m  as calculated from the following:    Height as of 6/20/19: 1.829 m (6').    Weight as of 7/11/19: 108.2 kg (238 lb 8 oz).     LDA:  Peripheral IV 07/12/19 Right Hand (Active)   Site Assessment WDL 7/12/2019  9:00 AM   Line Status Saline locked 7/12/2019  9:00 AM   Number of days: 0            Assessment:   ASA SCORE: 2       Documentation: H&P complete; Preop Testing complete; Consents complete   Proceeding: Proceed without further delay  Tobacco Use:  NO Active use of Tobacco/UNKNOWN Tobacco use status     Plan:   Anes. Type:  General   Pre-Induction: Midazolam IV; Acetaminophen PO   Induction:  IV (Standard)   Airway: Oral ETT   Access/Monitoring: PIV   Maintenance: Balanced   Emergence: Procedure Site   Logistics: Same Day Surgery     Postop Pain/Sedation Strategy:  Standard-Options: Opioids PRN     PONV Management:  Adult Risk Factors:, Non-Smoker, Postop Opioids     CONSENT: Direct conversation   Plan and risks discussed with: Patient                            DO Deonte Child DO

## 2019-07-12 NOTE — PROGRESS NOTES
Reports that he is having some difficulty with urination.  States last night he noted that when he attempted to void, his urinary stream was only a drip.  States he put on the the adult attends and noted that the attends were wet from a constant urinary drip. Reports that this morning he has a weak urinary stream but no longer constant urinary drip.  Hat pan placed in toilet to monitor urinary output. Was able to void 200 ml at 07:50 am. Also c/o constipation, states had a hard stool yesterday.

## 2019-07-12 NOTE — PROGRESS NOTES
I was called because patient complained of pain during urination, and difficulty in voiding  Per chart review and note from Dr. Edwards it has been going on for last couple of days  I will order for UA, Renal US, and advised for bladder scan  I requested RN to page me with other concerns.       Ector Stephens  Internal Medicine  Hospitalist  Pager no: [phone]

## 2019-07-12 NOTE — PROGRESS NOTES
Name  Phone Update   Abiodun Dumont  [phone] 7/8 -  Referral Sent   Jacobson IR  [phone] 7/8 -  Referral Sent   [facility]  [phone] 7/8 -  Referral Sent   Amaya Papo  [phone] 7/8 -  Referral Sent   [facility]  [phone] 7/8 -  Referral Sent   [place] [phone] 7/8 -  Referral Sent    7/9 - VM from Debbie requesting return call.     7/9 - Returned call to Debbie. Writer left VM for return call.    7/10 - VM from Debbie. Debbie stated that he would be able to meet with the pt today for an intrvw.    7/10 - Spoke with Debbie. Will cme to intervw the pt tomorrow at 3:30pm on the unit.     [facility]  [phone] or Trisha [phone] 7/8 -  Referral Sent    7/11 - VM from [facility]. Wnts to know how the pt is doing and set-up an interview.     7/11 - Returned a call to [facility]. Lft VM to set-up an interview. Requested return call.     7/12 - VM from [facility]. Wanting to know [place], case management, and MA. Wants to schedule phone interview for Monday (2 or 3) or Tuesday (10 or 3).    7/12- Returned VM to [facility]. Answered questions and scheduled phone interview for Tuesday at 3pm. Requested return call.      7/12 - Andy left a VM. Confirmed Tuesday interview at 3pm. Requested that pt be referred to  through [facility].     7/12 - Gavin lft VM stating they would lke to interview in person on Monday at 1 or 2pm. Requested return call with confirmation.     7/12 - Lft VM w/Gavin and confirmed intevw on Monday at 1pm or 2pm.     [facility]  [phone] 7/8 -  Referral Sent   [facility] [place] [phone] 7/8 -  Referral Sent   [facility]  [phone] 7/8 -  Referral Sent    7/12 - VM frm Coby. Stated would lke to interview pt Monday the 15th in the AM. Provided her number.     7/12 - Returned call to Coby. Will stop by unit on Monday 15th at 9:45am after another interview in the hospital. If pt is not on unit will coordinate jennifer interview  time/day.    [facility]  [phone] 7/8 -  Referral Sent   [facility]  [phone] 7/8 -  Referral Sent   [facility] [phone] 7/8 -  Referral Sent.    7/9 - VM from [facility]. Would like to know if the patient has any providers and see how he is doing.     7/9 - Lft VM for [facility]. Explained that pt still presents as flat and endorses depressive symptoms. Attending would like more treatments. Also stated will need otpt providers.    [facility]   7/8 - Referral Sent.    7/9 - Spoke with Ju. Will not have opening until 8/5. Pt on wait-list.

## 2019-07-12 NOTE — PLAN OF CARE
"48 hour assessment:  reported that he feels that his mood \"is lifting\".  C/o feeling groggy today after ECT,  rested in bed most of the shift. Visited with therapist this afternoon. Denies suicidal thoughts   "

## 2019-07-12 NOTE — PROCEDURES
"Procedure/Surgery Information   [facility], [place]    Bedside Procedure Note  Date of Service (when I performed the procedure): 07/12/2019    Flakito Hilliard is a 59 year old male patient.  1. Suicidal ideation    2. Current episode of major depressive disorder without prior episode, unspecified depression episode severity    3. Severe recurrent major depression without psychotic features (H)      Past Medical History:   Diagnosis Date     Depressive disorder      Hypertension      Sleep apnea      Temp: 96.9  F (36.1  C) Temp src: Tympanic       Resp: 16 SpO2: 95 % O2 Device: None (Room air)      Procedures     Wilmer Liu     [facility], [place]   ECT Procedure Note   07/12/2019    Flakito Hilliard [id number]   59 year old 1960     Patient Status: Inpatient    Is this the first in a series of 12 treatments?  No    History and Physical: Reviewed in medical record    Consent: Informed consent was signed by: patient    Date Consent Signed: 6/27/19      Allergies   Allergen Reactions     Contrast Dye Difficulty breathing       Weight:  238 lbs 8 oz              Indications for ECT:   Medications ineffective and History of good ECT response in one or more previous episodes of illness         Clinical Narrative:   Patient was admitted with worsening depression and suicidal ideation.  He's had positive response to ECT in the past.  He's continuing ECT for depression.  NPO after 2400.  Alert and Oriented x 3.    Mood is getting better.   Depression is less \"deep.\"  He had no problems with his last ECT.          Diagnosis:   Major depression         Pause for the Cause:     Right patient Yes   Right procedure/laterality settings: Yes          Intra-Procedure Documentation:     ECT #: 6   Treatment number this series: 6   Total treatment number: 6 + 2 previous series      Type of ECT:  Right, unilateral ultrabrief    ECT Medications:    Toradol:  30mg "   Lidocaine: 40mg  Etomidate 18mg  Caffeine: 250mg (in IV room)  Succinyl Choline: 100mg   Versed: 2mg immediately after seizure (as he has the sense that he can't breathe and is paralyzed but he is breathing and not paralyzed).  Today:  Labetalol:  10mg post ECT for high bp     ECT Strip Summary:   Energy Level: 55 percent  Motor Seizure Duration:  34 seconds  EEG Seizure Duration:   34 seconds    Complications: No    Plan:   Next ECT on 7/15/19    Wilmer Liu MD

## 2019-07-13 ENCOUNTER — APPOINTMENT (OUTPATIENT)
Dept: ULTRASOUND IMAGING | Facility: CLINIC | Age: 59
End: 2019-07-13
Attending: INTERNAL MEDICINE
Payer: COMMERCIAL

## 2019-07-13 PROCEDURE — 25000132 ZZH RX MED GY IP 250 OP 250 PS 637: Performed by: PSYCHIATRY & NEUROLOGY

## 2019-07-13 PROCEDURE — 12400001 ZZH R&B MH UMMC

## 2019-07-13 PROCEDURE — 25000132 ZZH RX MED GY IP 250 OP 250 PS 637: Performed by: CLINICAL NURSE SPECIALIST

## 2019-07-13 PROCEDURE — 25000132 ZZH RX MED GY IP 250 OP 250 PS 637: Performed by: PHYSICIAN ASSISTANT

## 2019-07-13 PROCEDURE — 76770 US EXAM ABDO BACK WALL COMP: CPT

## 2019-07-13 RX ADMIN — LISINOPRIL 40 MG: 40 TABLET ORAL at 09:01

## 2019-07-13 RX ADMIN — TRAZODONE HYDROCHLORIDE 100 MG: 100 TABLET ORAL at 21:19

## 2019-07-13 RX ADMIN — DOCUSATE SODIUM 100 MG: 100 CAPSULE, LIQUID FILLED ORAL at 09:01

## 2019-07-13 RX ADMIN — BUPROPION 450 MG: 150 TABLET, EXTENDED RELEASE ORAL at 11:01

## 2019-07-13 RX ADMIN — HYDROCHLOROTHIAZIDE 25 MG: 12.5 CAPSULE ORAL at 09:01

## 2019-07-13 RX ADMIN — AMLODIPINE BESYLATE 10 MG: 5 TABLET ORAL at 09:01

## 2019-07-13 RX ADMIN — ATORVASTATIN CALCIUM 20 MG: 20 TABLET, FILM COATED ORAL at 09:01

## 2019-07-13 RX ADMIN — DOCUSATE SODIUM 100 MG: 100 CAPSULE, LIQUID FILLED ORAL at 21:19

## 2019-07-13 RX ADMIN — DILTIAZEM HYDROCHLORIDE 360 MG: 120 CAPSULE, COATED, EXTENDED RELEASE ORAL at 09:02

## 2019-07-13 ASSESSMENT — ACTIVITIES OF DAILY LIVING (ADL)
DRESS: INDEPENDENT
DRESS: INDEPENDENT
HYGIENE/GROOMING: PROMPTS
LAUNDRY: WITH SUPERVISION
HYGIENE/GROOMING: INDEPENDENT;SHOWER
ORAL_HYGIENE: INDEPENDENT
ORAL_HYGIENE: INDEPENDENT

## 2019-07-13 NOTE — PROGRESS NOTES
07/12/19 2200   Significant Event   Significant Event Other (see comments)  (shift summary)     Pt was visible in the milieu, reading, watched TV, participated in community meeting, and ate dinner.  Calm, cooperative, appeared brighter today.  No stated SI, SIB or hallucinations..

## 2019-07-13 NOTE — PROGRESS NOTES
07/13/19 1426   Behavioral Health   Hallucinations denies / not responding to hallucinations   Thinking poor concentration   Orientation person: oriented;place: oriented;date: oriented   Memory baseline memory   Insight poor   Judgement impaired   Eye Contact at examiner   Affect blunted, flat;sad   Mood depressed;hopeless   Physical Appearance/Attire attire appropriate to age and situation   Hygiene well groomed   Suicidality other (see comments)  (denies)   1. Wish to be Dead No   2. Non-Specific Active Suicidal Thoughts  No   Self Injury other (see comment)  (denies)   Elopement   (nothing to report)   Activity isolative;withdrawn   Speech clear;coherent   Medication Sensitivity no observed side effects   Psychomotor / Gait balanced;steady   Psycho Education   Type of Intervention 1:1 intervention   Response participates, initiates socially appropriate   Hours 0.5   Treatment Detail   (check in)   Activities of Daily Living   Hygiene/Grooming independent;shower   Oral Hygiene independent   Dress independent   Room Organization independent   The patient was in his room most of the shift, but did sit in the lounge area a few times.  The patient is having issues with urination and was having a bladder scan and other tests done.  The patient was feeling hopes about his medical issues and is concerned he is being told nothing is wrong.  The patient remain flat on approach and reports feeling depressed and hopeless about his situation. The patient was withdrawn from peers when out of his room.  The patient reports feeling stuck and like he is never going to get better.  The patient was encouraged to engage with his treatment here and give his ECT treatment time to have an effect.  The patient reports a low appetite, but did force himself to come to both meals this shift.  The patient reports being hopeless, but did deny SI and SiB.

## 2019-07-14 PROCEDURE — 25000132 ZZH RX MED GY IP 250 OP 250 PS 637: Performed by: CLINICAL NURSE SPECIALIST

## 2019-07-14 PROCEDURE — 25000132 ZZH RX MED GY IP 250 OP 250 PS 637: Performed by: PSYCHIATRY & NEUROLOGY

## 2019-07-14 PROCEDURE — 12400001 ZZH R&B MH UMMC

## 2019-07-14 PROCEDURE — 25000132 ZZH RX MED GY IP 250 OP 250 PS 637: Performed by: PHYSICIAN ASSISTANT

## 2019-07-14 RX ADMIN — HYDROCHLOROTHIAZIDE 25 MG: 12.5 CAPSULE ORAL at 08:46

## 2019-07-14 RX ADMIN — AMLODIPINE BESYLATE 10 MG: 5 TABLET ORAL at 08:47

## 2019-07-14 RX ADMIN — LISINOPRIL 40 MG: 40 TABLET ORAL at 08:47

## 2019-07-14 RX ADMIN — BUPROPION 450 MG: 150 TABLET, EXTENDED RELEASE ORAL at 08:46

## 2019-07-14 RX ADMIN — TRAZODONE HYDROCHLORIDE 100 MG: 100 TABLET ORAL at 20:01

## 2019-07-14 RX ADMIN — ATORVASTATIN CALCIUM 20 MG: 20 TABLET, FILM COATED ORAL at 08:47

## 2019-07-14 RX ADMIN — DOCUSATE SODIUM 100 MG: 100 CAPSULE, LIQUID FILLED ORAL at 08:48

## 2019-07-14 RX ADMIN — DOCUSATE SODIUM 100 MG: 100 CAPSULE, LIQUID FILLED ORAL at 20:01

## 2019-07-14 RX ADMIN — DILTIAZEM HYDROCHLORIDE 360 MG: 120 CAPSULE, COATED, EXTENDED RELEASE ORAL at 08:47

## 2019-07-14 ASSESSMENT — ACTIVITIES OF DAILY LIVING (ADL)
ORAL_HYGIENE: INDEPENDENT
HYGIENE/GROOMING: INDEPENDENT
LAUNDRY: WITH SUPERVISION
DRESS: INDEPENDENT

## 2019-07-14 ASSESSMENT — MIFFLIN-ST. JEOR: SCORE: 1952.53

## 2019-07-14 NOTE — PLAN OF CARE
Pt denies SI/SIB or hallucinations. Pt rated anxiety and dep at 4/10. Pt states that he is still experiencing dribbling of the urine while voiding and that it is erratic at times when the flow of the urine is coming out. Pt is anxious on the upcoming results of the culture which will come in tonight. Pt spent most of the shift in the milieu watching T.V and socializing with peers. Pt also attended community meeting this morning.

## 2019-07-14 NOTE — PROGRESS NOTES
"Pt denies SI/SIB and hallucinations. Pt states he is doing \"so-so\". Pt still expresses concern about his urinary issues. Pt states his depression has not improved and that he is still feeling kind of hopeless. Pt was out in the milieu watching TV throughout the shift.           07/13/19 2142   Behavioral Health   Hallucinations denies / not responding to hallucinations   Orientation person: oriented;place: oriented;date: oriented;time: oriented   Memory baseline memory   Insight poor   Judgement impaired   Eye Contact at examiner   Affect blunted, flat   Mood depressed;hopeless   Physical Appearance/Attire attire appropriate to age and situation   Hygiene well groomed   Suicidality other (see comments)   1. Wish to be Dead No   2. Non-Specific Active Suicidal Thoughts  No   Self Injury other (see comment)  (denies)   Elopement   (none observed)   Activity other (see comment)  (visible in milieu)   Speech clear;coherent   Medication Sensitivity no observed side effects   Psychomotor / Gait balanced;steady   Activities of Daily Living   Hygiene/Grooming prompts   Oral Hygiene independent   Dress independent   Laundry with supervision   Room Organization independent     "

## 2019-07-15 ENCOUNTER — ANESTHESIA (OUTPATIENT)
Dept: BEHAVIORAL HEALTH | Facility: CLINIC | Age: 59
End: 2019-07-15

## 2019-07-15 ENCOUNTER — ANESTHESIA EVENT (OUTPATIENT)
Dept: BEHAVIORAL HEALTH | Facility: CLINIC | Age: 59
End: 2019-07-15

## 2019-07-15 LAB
ALBUMIN UR-MCNC: 300 MG/DL
AMORPH CRY #/AREA URNS HPF: ABNORMAL /HPF
ANION GAP SERPL CALCULATED.3IONS-SCNC: 9 MMOL/L (ref 3–14)
APPEARANCE UR: ABNORMAL
BACTERIA #/AREA URNS HPF: ABNORMAL /HPF
BACTERIA SPEC CULT: NORMAL
BILIRUB UR QL STRIP: NEGATIVE
BUN SERPL-MCNC: 26 MG/DL (ref 7–30)
CALCIUM SERPL-MCNC: 9.5 MG/DL (ref 8.5–10.1)
CHLORIDE SERPL-SCNC: 106 MMOL/L (ref 94–109)
CO2 SERPL-SCNC: 26 MMOL/L (ref 20–32)
COLOR UR AUTO: YELLOW
CREAT SERPL-MCNC: 1.38 MG/DL (ref 0.66–1.25)
ERYTHROCYTE [DISTWIDTH] IN BLOOD BY AUTOMATED COUNT: 13 % (ref 10–15)
GFR SERPL CREATININE-BSD FRML MDRD: 56 ML/MIN/{1.73_M2}
GLUCOSE SERPL-MCNC: 127 MG/DL (ref 70–99)
GLUCOSE UR STRIP-MCNC: NEGATIVE MG/DL
GRAN CASTS #/AREA URNS LPF: 3 /LPF
HCT VFR BLD AUTO: 48.1 % (ref 40–53)
HGB BLD-MCNC: 16.8 G/DL (ref 13.3–17.7)
HGB UR QL STRIP: ABNORMAL
HYALINE CASTS #/AREA URNS LPF: 8 /LPF (ref 0–2)
KETONES UR STRIP-MCNC: NEGATIVE MG/DL
LEUKOCYTE ESTERASE UR QL STRIP: ABNORMAL
Lab: NORMAL
MCH RBC QN AUTO: 29.6 PG (ref 26.5–33)
MCHC RBC AUTO-ENTMCNC: 34.9 G/DL (ref 31.5–36.5)
MCV RBC AUTO: 85 FL (ref 78–100)
MUCOUS THREADS #/AREA URNS LPF: PRESENT /LPF
NITRATE UR QL: NEGATIVE
PH UR STRIP: 7 PH (ref 5–7)
PLATELET # BLD AUTO: 335 10E9/L (ref 150–450)
POTASSIUM SERPL-SCNC: 4 MMOL/L (ref 3.4–5.3)
RBC # BLD AUTO: 5.68 10E12/L (ref 4.4–5.9)
RBC #/AREA URNS AUTO: 29 /HPF (ref 0–2)
SODIUM SERPL-SCNC: 141 MMOL/L (ref 133–144)
SOURCE: ABNORMAL
SP GR UR STRIP: 1.02 (ref 1–1.03)
SPECIMEN SOURCE: NORMAL
SQUAMOUS #/AREA URNS AUTO: 3 /HPF (ref 0–1)
UROBILINOGEN UR STRIP-MCNC: NORMAL MG/DL (ref 0–2)
WBC # BLD AUTO: 12.1 10E9/L (ref 4–11)
WBC #/AREA URNS AUTO: >182 /HPF (ref 0–5)
WBC CLUMPS #/AREA URNS HPF: PRESENT /HPF

## 2019-07-15 PROCEDURE — 25000128 H RX IP 250 OP 636: Performed by: ANESTHESIOLOGY

## 2019-07-15 PROCEDURE — 81001 URINALYSIS AUTO W/SCOPE: CPT | Performed by: PHYSICIAN ASSISTANT

## 2019-07-15 PROCEDURE — 85027 COMPLETE CBC AUTOMATED: CPT | Performed by: PHYSICIAN ASSISTANT

## 2019-07-15 PROCEDURE — 25000132 ZZH RX MED GY IP 250 OP 250 PS 637: Performed by: PSYCHIATRY & NEUROLOGY

## 2019-07-15 PROCEDURE — 87086 URINE CULTURE/COLONY COUNT: CPT | Performed by: PSYCHIATRY & NEUROLOGY

## 2019-07-15 PROCEDURE — 90870 ELECTROCONVULSIVE THERAPY: CPT

## 2019-07-15 PROCEDURE — 99232 SBSQ HOSP IP/OBS MODERATE 35: CPT | Performed by: PHYSICIAN ASSISTANT

## 2019-07-15 PROCEDURE — 25000128 H RX IP 250 OP 636: Performed by: PSYCHIATRY & NEUROLOGY

## 2019-07-15 PROCEDURE — 25000132 ZZH RX MED GY IP 250 OP 250 PS 637: Performed by: PHYSICIAN ASSISTANT

## 2019-07-15 PROCEDURE — 25000125 ZZHC RX 250: Performed by: PSYCHIATRY & NEUROLOGY

## 2019-07-15 PROCEDURE — 99207 ZZC CDG-MDM COMPONENT: MEETS MODERATE - UP CODED: CPT | Performed by: PHYSICIAN ASSISTANT

## 2019-07-15 PROCEDURE — 12400001 ZZH R&B MH UMMC

## 2019-07-15 PROCEDURE — G0177 OPPS/PHP; TRAIN & EDUC SERV: HCPCS

## 2019-07-15 PROCEDURE — 36415 COLL VENOUS BLD VENIPUNCTURE: CPT | Performed by: PHYSICIAN ASSISTANT

## 2019-07-15 PROCEDURE — 25000125 ZZHC RX 250: Performed by: ANESTHESIOLOGY

## 2019-07-15 PROCEDURE — 99231 SBSQ HOSP IP/OBS SF/LOW 25: CPT | Mod: 25 | Performed by: PSYCHIATRY & NEUROLOGY

## 2019-07-15 PROCEDURE — 80048 BASIC METABOLIC PNL TOTAL CA: CPT | Performed by: PHYSICIAN ASSISTANT

## 2019-07-15 PROCEDURE — 25000132 ZZH RX MED GY IP 250 OP 250 PS 637: Performed by: CLINICAL NURSE SPECIALIST

## 2019-07-15 RX ORDER — ETOMIDATE 2 MG/ML
INJECTION INTRAVENOUS PRN
Status: DISCONTINUED | OUTPATIENT
Start: 2019-07-15 | End: 2019-07-15

## 2019-07-15 RX ORDER — NITROFURANTOIN 25; 75 MG/1; MG/1
100 CAPSULE ORAL EVERY 12 HOURS SCHEDULED
Status: DISCONTINUED | OUTPATIENT
Start: 2019-07-15 | End: 2019-07-15

## 2019-07-15 RX ORDER — KETOROLAC TROMETHAMINE 30 MG/ML
30 INJECTION, SOLUTION INTRAMUSCULAR; INTRAVENOUS
Status: COMPLETED | OUTPATIENT
Start: 2019-07-15 | End: 2019-07-15

## 2019-07-15 RX ORDER — CAFFEINE AND SODIUM BENZOATE 125 MG/ML
250 INJECTION, SOLUTION INTRAMUSCULAR; INTRAVENOUS
Status: COMPLETED | OUTPATIENT
Start: 2019-07-15 | End: 2019-07-15

## 2019-07-15 RX ORDER — LIDOCAINE HYDROCHLORIDE 20 MG/ML
40 INJECTION, SOLUTION EPIDURAL; INFILTRATION; INTRACAUDAL; PERINEURAL
Status: COMPLETED | OUTPATIENT
Start: 2019-07-15 | End: 2019-07-15

## 2019-07-15 RX ORDER — CIPROFLOXACIN 500 MG/1
500 TABLET, FILM COATED ORAL EVERY 12 HOURS SCHEDULED
Status: DISCONTINUED | OUTPATIENT
Start: 2019-07-15 | End: 2019-07-25 | Stop reason: HOSPADM

## 2019-07-15 RX ORDER — LABETALOL 20 MG/4 ML (5 MG/ML) INTRAVENOUS SYRINGE
PRN
Status: DISCONTINUED | OUTPATIENT
Start: 2019-07-15 | End: 2019-07-15

## 2019-07-15 RX ADMIN — LABETALOL 20 MG/4 ML (5 MG/ML) INTRAVENOUS SYRINGE 10 MG: at 10:25

## 2019-07-15 RX ADMIN — ATORVASTATIN CALCIUM 20 MG: 20 TABLET, FILM COATED ORAL at 11:14

## 2019-07-15 RX ADMIN — LISINOPRIL 40 MG: 40 TABLET ORAL at 08:11

## 2019-07-15 RX ADMIN — HYDROCHLOROTHIAZIDE 25 MG: 12.5 CAPSULE ORAL at 11:13

## 2019-07-15 RX ADMIN — CAFFEINE AND SODIUM BENZOATE 250 MG: 125 INJECTION, SOLUTION INTRAMUSCULAR; INTRAVENOUS at 10:21

## 2019-07-15 RX ADMIN — KETOROLAC TROMETHAMINE 30 MG: 30 INJECTION, SOLUTION INTRAMUSCULAR; INTRAVENOUS at 10:14

## 2019-07-15 RX ADMIN — Medication 120 MG: at 10:18

## 2019-07-15 RX ADMIN — BUPROPION 450 MG: 150 TABLET, EXTENDED RELEASE ORAL at 11:14

## 2019-07-15 RX ADMIN — AMLODIPINE BESYLATE 10 MG: 5 TABLET ORAL at 08:11

## 2019-07-15 RX ADMIN — CIPROFLOXACIN HYDROCHLORIDE 500 MG: 500 TABLET, FILM COATED ORAL at 21:13

## 2019-07-15 RX ADMIN — ETOMIDATE 18 MG: 2 INJECTION INTRAVENOUS at 10:18

## 2019-07-15 RX ADMIN — LIDOCAINE HYDROCHLORIDE 40 MG: 20 INJECTION, SOLUTION EPIDURAL; INFILTRATION; INTRACAUDAL; PERINEURAL at 10:21

## 2019-07-15 RX ADMIN — TRAZODONE HYDROCHLORIDE 100 MG: 100 TABLET ORAL at 21:13

## 2019-07-15 RX ADMIN — DOCUSATE SODIUM 100 MG: 100 CAPSULE, LIQUID FILLED ORAL at 11:14

## 2019-07-15 RX ADMIN — DOCUSATE SODIUM 100 MG: 100 CAPSULE, LIQUID FILLED ORAL at 21:13

## 2019-07-15 RX ADMIN — MIDAZOLAM 2 MG: 1 INJECTION INTRAMUSCULAR; INTRAVENOUS at 10:23

## 2019-07-15 RX ADMIN — DILTIAZEM HYDROCHLORIDE 360 MG: 120 CAPSULE, COATED, EXTENDED RELEASE ORAL at 08:11

## 2019-07-15 ASSESSMENT — ACTIVITIES OF DAILY LIVING (ADL)
DRESS: SCRUBS (BEHAVIORAL HEALTH)
ORAL_HYGIENE: INDEPENDENT
DRESS: SCRUBS (BEHAVIORAL HEALTH)
LAUNDRY: WITH SUPERVISION
HYGIENE/GROOMING: INDEPENDENT
HYGIENE/GROOMING: INDEPENDENT
ORAL_HYGIENE: INDEPENDENT

## 2019-07-15 NOTE — PROCEDURES
Procedure/Surgery Information   [facility], [place]    Bedside Procedure Note  Date of Service (when I performed the procedure): 07/15/2019    Flakito Hilliard is a 59 year old male patient.  1. Suicidal ideation    2. Current episode of major depressive disorder without prior episode, unspecified depression episode severity    3. Severe recurrent major depression without psychotic features (H)      Past Medical History:   Diagnosis Date     Depressive disorder      Hypertension      Sleep apnea      Temp: 97.8  F (36.6  C) Temp src: Oral BP: (!) 129/92 Pulse: 76   Resp: 16 SpO2: 95 % O2 Device: None (Room air)      Procedures     Wilmer Liu     [facility], [place]   ECT Procedure Note   07/15/2019    Flakito Hilliard [id number]   59 year old 1960     Patient Status: Inpatient    Is this the first in a series of 12 treatments?  No    History and Physical: Reviewed in medical record    Consent: Informed consent was signed by: patient    Date Consent Signed: 6/27/19      Allergies   Allergen Reactions     Contrast Dye Difficulty breathing       Weight:  241 lbs 4.8 oz              Indications for ECT:   Medications ineffective and History of good ECT response in one or more previous episodes of illness         Clinical Narrative:   Patient was admitted with worsening depression and suicidal ideation.  He's had positive response to ECT in the past.  He's continuing ECT for depression.  NPO after 2400.  Alert and Oriented x 3.    Mood is getting better.   Mood is getting better.   He had no problems with his last ECT.  He says he'll be going to [place] or [place] after discharge.            Diagnosis:   Major depression         Pause for the Cause:     Right patient Yes   Right procedure/laterality settings: Yes          Intra-Procedure Documentation:     ECT #: 7   Treatment number this series: 7   Total treatment number: 7 + 2 previous series       Type of ECT:  Right, unilateral ultrabrief    ECT Medications:    Toradol:  30mg   Lidocaine: 40mg  Etomidate 18mg  Caffeine: 250mg (in IV room)  Succinyl Choline: 100mg   Versed: 2mg immediately after seizure (as he has the sense that he can't breathe and is paralyzed but he is breathing and not paralyzed).  Today:  Labetalol:  10mg post ECT for high bp (gets every time for systolic reading xxx)    ECT Strip Summary:   Energy Level: 55 percent  Motor Seizure Duration:  30 seconds  EEG Seizure Duration:   49 seconds    Complications: No    Plan:   Next ECT on 7/17/19    Wilmer Liu MD

## 2019-07-15 NOTE — PROGRESS NOTES
sent a referral to [facility] in [facility].  will plan to follow up with the program later in the day.     spoke with Coby with [facility] in [place].  answered questions related to the patient's hospitalization. Following this meeting, Coby stated that she feels that the patient would be a good fit for the program as their current milieu  has older, more mature adults. Coby stated that she will planning to call the patient later this afternoon. Coby inquired about when the patient would be ready for discharge.  informed her that the patient will be ready for discharge by mid-week next week. Coby stated that she will plan to be contact with  regarding the patient.

## 2019-07-15 NOTE — PROGRESS NOTES
Patient adequate for discharge. Report called to inpatient RN Juwan. ZOARN, A/O, IV removed. Discharged with staff at this time.

## 2019-07-15 NOTE — PROGRESS NOTES
"[facility], [place]   Psychiatric Progress Note        Interim History:     The patient's care was discussed with the treatment team during the daily team meeting and/or staff's chart notes were reviewed.  Staff report patient spends more time outside of his room. He is slightly more animated, denies Suicidal ideation. Today he had ECT #7. Denied having any side effects. Still somewhat ambivalent about going to IRTS: \"I have never been to places like this\". He agrees, though, that he will have minimal support if returns home. His main concern during today's visit was about problems with irritative voiding symptoms. UC revealed 100 000 colonies per ml of urogenital luis fernando. Was seen by IM who started Ciprofloxacin for complicated UTI, recommended daily PVR. For more details, please, see Internal Medicine note. Appreciate medical team's input.          Medications:       amLODIPine  10 mg Oral Daily     atorvastatin  20 mg Oral Daily     buPROPion  450 mg Oral QAM     ciprofloxacin  500 mg Oral Q12H CHAD     diltiazem ER COATED BEADS  360 mg Oral Daily     docusate sodium  100 mg Oral BID          Allergies:     Allergies   Allergen Reactions     Contrast Dye Difficulty breathing          Labs:     Recent Results (from the past 24 hour(s))   UA with Microscopic reflex to Culture    Collection Time: 07/15/19  1:58 PM   Result Value Ref Range    Color Urine Yellow     Appearance Urine Cloudy     Glucose Urine Negative NEG^Negative mg/dL    Bilirubin Urine Negative NEG^Negative    Ketones Urine Negative NEG^Negative mg/dL    Specific Gravity Urine 1.018 1.003 - 1.035    Blood Urine Small (A) NEG^Negative    pH Urine 7.0 5.0 - 7.0 pH    Protein Albumin Urine 300 (A) NEG^Negative mg/dL    Urobilinogen mg/dL Normal 0.0 - 2.0 mg/dL    Nitrite Urine Negative NEG^Negative    Leukocyte Esterase Urine Large (A) NEG^Negative    Source Midstream Urine     WBC Urine >182 (H) 0 - 5 /HPF    RBC Urine 29 (H) " 0 - 2 /HPF    WBC Clumps Present (A) NEG^Negative /HPF    Bacteria Urine Moderate (A) NEG^Negative /HPF    Squamous Epithelial /HPF Urine 3 (H) 0 - 1 /HPF    Mucous Urine Present (A) NEG^Negative /LPF    Hyaline Casts 8 (H) 0 - 2 /LPF    Granular Casts 3 (A) NEG^Negative /LPF    Amorphous Crystals Few (A) NEG^Negative /HPF   Basic metabolic panel    Collection Time: 07/15/19  2:14 PM   Result Value Ref Range    Sodium 141 133 - 144 mmol/L    Potassium 4.0 3.4 - 5.3 mmol/L    Chloride 106 94 - 109 mmol/L    Carbon Dioxide 26 20 - 32 mmol/L    Anion Gap 9 3 - 14 mmol/L    Glucose 127 (H) 70 - 99 mg/dL    Urea Nitrogen 26 7 - 30 mg/dL    Creatinine 1.38 (H) 0.66 - 1.25 mg/dL    GFR Estimate 56 (L) >60 mL/min/[1.73_m2]    GFR Estimate If Black 65 >60 mL/min/[1.73_m2]    Calcium 9.5 8.5 - 10.1 mg/dL   CBC with platelets    Collection Time: 07/15/19  2:14 PM   Result Value Ref Range    WBC 12.1 (H) 4.0 - 11.0 10e9/L    RBC Count 5.68 4.4 - 5.9 10e12/L    Hemoglobin 16.8 13.3 - 17.7 g/dL    Hematocrit 48.1 40.0 - 53.0 %    MCV 85 78 - 100 fl    MCH 29.6 26.5 - 33.0 pg    MCHC 34.9 31.5 - 36.5 g/dL    RDW 13.0 10.0 - 15.0 %    Platelet Count 335 150 - 450 10e9/L          Psychiatric Examination:     /86   Pulse 87   Temp 97.7  F (36.5  C) (Oral)   Resp 16   Ht 1.829 m (6')   Wt 109.5 kg (241 lb 4.8 oz)   SpO2 93%   BMI 32.73 kg/m    Weight is 241 lbs 4.8 oz  Body mass index is 32.73 kg/m .     Orthostatic Vitals       Most Recent      Sitting Orthostatic /91 07/15 0804    Sitting Orthostatic Pulse (bpm) 107 07/15 0804    Standing Orthostatic /87 07/15 0804    Standing Orthostatic Pulse (bpm) 100 07/15 0804         Appearance: dressed in hospital garbs  Attitude: cooperative  Eye Contact:  Partial, better  Mood: less depressed  Affect: constricted,   Speech: less monotone  Psychomotor Behavior: psychomotor retardation less pronounced  Throught Process:  Slow, but logical  Associations:   tight  Thought Content: denies Suicidal ideation, psychosis, Homicidal thoughts   Insight: partial  Judgement:  Mildly impaired, but improving   Oriented to:  Self, time and place  Attention Span and Concentration: mildly impaired  Recent and Remote Memory: fair, problems with short term memory after ECT    Clinical Global Impressions  First:  Considering your total clinical experience with this particular patient population, how severe are the patient's symptoms at this time?: 7 (06/21/19 1701)  Compared to the patient's condition at the START of treatment, this patient's condition is:: 4 (06/21/19 1701)  Most recent:  Considering your total clinical experience with this particular patient population, how severe are the patient's symptoms at this time?: 5 (7/15/2019)  Compared to the patient's condition at the START of treatment, this patient's condition is:: 3 (7/15/2019)         Precautions:     Behavioral Orders   Procedures     Code 1 - Restrict to Unit     Code 2     Electroconvulsive therapy     ECT every Monday, Wednesday, and Friday     Electroconvulsive therapy     Series of up to 12 treatments. Begin Date: 6/28/19     Treating Psychiatrist providing ECT: Alexa    Notified on:  6/26/19     Routine Programming     As clinically indicated     Status 15     Every 15 minutes.     Suicide precautions     Patients on Suicide Precautions should have a Combination Diet ordered that includes a Diet selection(s) AND a Behavioral Tray selection for Safe Tray - with utensils, or Safe Tray - NO utensils            DIagnoses:     Major depressive disorder, current, severe.  Rule out generalized anxiety disorder.  The patient was also diagnosed in the past with dependent personality disorder.          Plan:     ECT #7 today. Will have at least 2 more ECTs. UTI treatment per 's team. Will continue to work on placement.

## 2019-07-15 NOTE — PROGRESS NOTES
"   07/15/19 1400   Behavioral Health   Hallucinations denies / not responding to hallucinations   Thinking poor concentration   Orientation person: oriented;place: oriented;date: oriented;time: oriented   Memory baseline memory   Insight poor   Judgement impaired   Eye Contact cultural specific   Affect tense   Mood   (\"Frustrated\")   Physical Appearance/Attire disheveled   Hygiene well groomed   Suicidality   (Pt denies)   Wish to be Dead Description   (Pt denies)   Non-Specific Active Suicidal Thought Description Pt denies   Activities of Daily Living   Hygiene/Grooming independent   Oral Hygiene independent   Dress scrubs (behavioral health)   Laundry with supervision   Room Organization independent     Pt went down for ECT this morning and spent most of day in room sleeping, while ending the shift hanging out in the lounge.  Pt reports anxiety a 1-2 and depression a 1-2.  Pt denied SI, SIB, HI, and psychotic symptoms.  Pt feels frustrated about recent incontinence.    "

## 2019-07-15 NOTE — PLAN OF CARE
"Patient was out in the milieu watching tv, didn't appear to engage with any peers was into self,had flat depressed affect when asked how he is doing replies so so with head nodding, talked about his girlfriend leaving him for an ex- and said \"its been hard to get out of the basement\" referencing his depression not improving much even with the ECT treatments, denies SI/SIB no hallucinations reported or observed, the urine cultures are still pending so no noted results, VS WDL, currently stable at baseline will continue to monitor for changes.   "

## 2019-07-15 NOTE — PROGRESS NOTES
Brief Medicine Note    Contacted by nursing regarding patient's urine culture results. Urine culture with >100,000 urogenital luis fernando, negative for UTI. RN discussed that patient is continuing to have ongoing irritative voiding symptoms.     He has a history of ureteral stricture s/p dorsal only with recurrence s/p buccal graft ventral onlay ureteroplasty in 2017. Follows with Dr. Cano, Urology as OP. Last seen in 4/9/2019 where he underwent a ureteroscopy which was negative for stricture. He was instructed to follow-up in 12 months with with uroflowmetry, post-void residual urine volume measurement, Urethroplasty PROM, SEJAL, MSHQ, and CLSS and post-op questionnaires. However, patient has not yet followed up.     Per chart review, patient was seen by Medicine on 7/6/19 for irritative voiding symptoms that started on 7/5/19. PVR was performed at 40cc. UA appeared dirty, however there was note that patient has had abnormal UAs dating back to 2014  UCx with no growth, therefore was not treated. Recommended OP f/u with Urology for irritative voiding symptoms.    Again was seen by Medicine on 7/12/19 for irritative voiding symptoms including pain during urination, and difficulty in voiding and pain in suprapubic area/penis with increased frequency. Patient was able to urinate during that visit. UCx with >100,000 mixed urogenital luis fernando today. Reviewed Renal US that was performed 7/12 which showed no sonographic evidence of renal calculi. Multiple renal cortical cysts and left lower pole mild caliectasis versus small renal sinus cyst.  No jeremy hydronephrosis. There was a distended bladder.     Today, patient states that he has ongoing hesitancy, decreased force of stream, frequency, urgency and burning. No hematuria. He is wearing a depends. He did not dark appearing urine. He reports a hx of enlarged prostate and his ureteral stricture. He notes pain in penile shaft when he urinates. No fever, flank pain, abdominal pain,  nausea or vomiting.    Today's vital signs, medications, and nursing notes were reviewed. .     /74   Pulse 83   Temp 98.7  F (37.1  C) (Temporal)   Resp 16   Ht 1.829 m (6')   Wt 109.5 kg (241 lb 4.8 oz)   SpO2 93%   BMI 32.73 kg/m    GENERAL: Alert and oriented x 3. Appears comfortable. Answering questions appropriately.   HEENT: Anicteric sclera. Mucous membranes moist.   CV: RRR.   RESPIRATORY: Effort normal on room air.   GI: Abdomen soft and non distended, bowel sounds present. No tenderness, rebound, guarding. No CVA tenderness. There is mild suprapubic tenderness. There is a healed surgical scar in suprapubic region.   : No testicular pain or swelling or erythema. Urethral w/o discharge. No tenderness to penile shaft. No inguinal LAD. Prostate exam deferred.     A/P:  Irritative voiding symptoms  Complicated UTI:  Patient has had dysuria, hesitancy, dribbling since 7/6/19. UA with worsening pyuria with >182 WBC today with LE. Also with proteinuria. Has WBC of 12.1. No fever. No flank pain. Not septic appearing. Had Renal US which was negative for hydronephrosis on 7/12/19. No urinary retention with PVR 38ml today. Concern for UTI vs prostatitis vs pyelonephritis.   -Obtain PVR every shift. Please notify internal medicine with PVR values.   -Discussed with Urology Fellow Reny Castillo On-Call, agreed with PVR as above. If PVR >200cc, recommended placing whittaker catheter. Urology also recommended antibiotic treatment for UTI given UA results and UCx from 7/12/2019 for >100,000 urogenital luis fernando given symptomatic.   -Will start Macrobid 100mg BID x5 days to cover for UTI  Addendum: CrCl 56. Given elevated creatinine, will discontinue Macrobid. Will start Ciprofloxacin 500mg BID x14 days to cover for complicated UTI vs prostatitis vs ascending UTI.   -Repeat UA/UCx  -Add on Chlamydia/Gonorrhea   -Obtain CBC and BMP to assess renal function and/or leukocytosis   -Will need follow-up with Dr. Cano,  Urology as outpatient for follow-up of irritative symptoms as this could be recurrence of stricture vs other.   ADDENDUM: PVR 38mL negative for acute urinary retention. UA sent to lab. Continue plan as above.     Elevated Creatinine  Proteinuria: Creatinine 1.38 today, which is increased from 1.19 on 6/27/19. Likely JIGNA on CKD. Baseline Creatinine ~1.00. Has BUN to Creatinine ratio >20 suggestive of prerenal etiology due to poor oral intake vs medication induced-On hydrochlorothiazide 25mg daily and losartan 40mg daily. Received Ketorolac 30mg IM once in ECT today. Had PVR of 38 today negative for obstruction. Renal US from 7/12 negative for hydronephrosis. Has pyruia on UA, therefore could be secondary to UTI, however UCx have been negative. Will treat with Ciprofloxacin as discussed above.   -Please avoid further NSAID therapy  -Obtain repeat UA as above  -Encourage PO fluid intake   -Hold hydrochlorothiazide 25mg daily and losartan 40mg daily for now   -Start Hydralazine 12.5mg q6hrs prn for SBP >175 or DBP >110  -Will monitor BP closely, may need to make dose adjustments on PTA BP medications if uncontrolled with above.   -Repeat BMP in AM. If continues to increase, will add on Urine studies including FeNa, Urine Osm, and urine NA.     Leukocytosis: CBC with WBC of 12.1. No other acute infections signs or symptoms other than irritative voiding symptoms. No fever. No flank pain.   -Start Ciprofloxacin 500mg BID x7days  -Repeat CBC in AM     Medicine will continue to follow-up on bladder scan and UA/UC results as well as CBC and BMP for renal function. Please page medicine with any other questions or concerns.     Marnie Grayson PA-C  Hospitalist Service  Pager [phone]

## 2019-07-15 NOTE — ANESTHESIA POSTPROCEDURE EVALUATION
Anesthesia POST Procedure Evaluation    Patient: Flakito Hilliard   MRN:     [id number] Gender:   male   Age:    59 year old :      1960        Preoperative Diagnosis: * No pre-op diagnosis entered *   * No procedures listed *   Postop Comments: No value filed.       Anesthesia Type:  General  No value filed.    Reportable Event: NO     PAIN: Uncomplicated   Sign Out status: Comfortable, Well controlled pain     PONV: No PONV   Sign Out status:  No Nausea or Vomiting     Neuro/Psych: Uneventful perioperative course   Sign Out Status: Preoperative baseline; Age appropriate mentation     Airway/Resp.: Uneventful perioperative course   Sign Out Status: Non labored breathing, age appropriate RR; Resp. Status within EXPECTED Parameters     CV: Uneventful perioperative course   Sign Out status: Appropriate BP and perfusion indices; Appropriate HR/Rhythm     Disposition:   Sign Out in:  PACU  Disposition:  Phase II; Home  Recovery Course: Uneventful  Follow-Up: Not required           Last Anesthesia Record Vitals:  CRNA VITALS  7/15/2019 0959 - 7/15/2019 1050      7/15/2019             Resp Rate (set):  8          Last PACU Vitals:  No vitals data found for the desired time range.        Electronically Signed By: Jesus Butterfield MD, July 15, 2019, 10:50 AM

## 2019-07-15 NOTE — PROGRESS NOTES
spoke with the patient to inquire about his thoughts about [facility] facility. The patient stated that he feels that he has no other choice but to go to IRTs. He reported that the Debbie, the admissions coordinator with [facility] answered all of his questions. The patient seems to still be experiencing some ambivalence about going to an IRTs facility. He reported wanting to be out of the city but not having anywhere to return to.  informed the patient about a 1:00pm phone interview with Serenity Mosher.      received a call from Cameron with [facility]. He stated that the patient seems appropriate but was told by the patient that he needs to think about it. Cameron stated that he wanted to check-in and see if the patient had made a decision about possibly coming to the facility. Cameron requested a return call.      returned a call to Cameron with [facility] ([phone]).  informed Cameron that she spoke with the patient and that he is open to coming to the [facility]. Cameron stated that he does have a concern about [place] and the location of [facility]. Writer informed Cameron that she is aware of the patient still wanting to be up [place] but that he would have no where to go if he did return. Sandrar informed Cameron that she is planning to make a case management referral and psychiatry follow-up for the patient. Cameron also inquired about the patient continuing with ECT on an outpatient basis and when the patient would be ready for discharge.  stated that the plan as of now is for the patient to continue ECT this week inpatient.  stated that the patient could be ready for discharge at the end of next week. Cameron stated that he would follow-up with writer about the patient at the end of this week. He stated that he could plan for an admission on 7/25, but will speak with writer at then end of this week to finalize the date/time.

## 2019-07-15 NOTE — PROGRESS NOTES
Writer spoke with the patient to inquire about making additional IRTs referrals to places that are outside of the city. The patient was interested in writer sending a referral to an IRTs program in [place], [place]. The patient then began to speak about how confused he is and reported that he has never felt like this in the past when he was depressed. The patient asked if he was psychotic because he just not feel like he is understanding anything. Writer educated the patient on what psychosis would look like for a person and explained that he is not psychotic. The patient asked if there was any testing that he could get to determine his level of functioning/understanding/IQ. Writer stated that she will bring this up team meeting to the attending provider.

## 2019-07-15 NOTE — ANESTHESIA PREPROCEDURE EVALUATION
Anesthesia Pre-Procedure Evaluation    Patient: Flakito Hilliard   MRN:     [id number] Gender:   male   Age:    59 year old :      1960        Preoperative Diagnosis: * No surgery found *        Past Medical History:   Diagnosis Date     Depressive disorder      Hypertension      Sleep apnea       Past Surgical History:   Procedure Laterality Date     BLADDER SURGERY       CYSTOSTOMY, INSERT TUBE SUPRAPUBIC, COMBINED N/A 2014    Procedure: COMBINED CYSTOSTOMY, INSERT TUBE SUPRAPUBIC;  Surgeon: Min Prasad MD;  [facility]     GENITOURINARY SURGERY      prostate surgery for stones     LASER HOLMIUM CYSTOSCOPY, INTERNAL URETHROTOMY, COMBINED N/A 10/24/2014    Procedure: COMBINED LASER HOLMIUM CYSTOSCOPY, INTERNAL URETHROTOMY;  Surgeon: Valentin Villatoro MD;  Location: [facility] OR     URETHROPLASTY N/A 12/10/2014    Procedure: URETHROPLASTY;  Surgeon: Niraj Burger MD;  Location: [facility] OR     URETHROPLASTY WITH BUCCAL GRAFT N/A 2016    Procedure: URETHROPLASTY WITH BUCCAL GRAFT;  Surgeon: Niraj Burger MD;  [facility]          Anesthesia Evaluation     .             ROS/MED HX    ENT/Pulmonary:     (+)sleep apnea, , . .    Neurologic:       Cardiovascular:     (+) hypertension----. : . . . :. .       METS/Exercise Tolerance:     Hematologic:         Musculoskeletal:         GI/Hepatic:         Renal/Genitourinary:     (+) Nephrolithiasis ,       Endo:         Psychiatric:     (+) psychiatric history depression      Infectious Disease:         Malignancy:         Other:                         PHYSICAL EXAM:   Mental Status/Neuro: A/A/O   Airway: Facies: Feasible  Mallampati: II  Mouth/Opening: Full  TM distance: > 6 cm  Neck ROM: Full   Respiratory: Auscultation: CTAB     Resp. Rate: Normal     Resp. Effort: Normal      CV: Rhythm: Regular  Rate: Age appropriate  Heart: Normal Sounds   Comments:      Dental: Normal                  Lab Results   Component Value Date    WBC 8.1  05/19/2019    HGB 16.5 06/28/2019    HCT 49.1 05/19/2019     05/19/2019     06/27/2019    POTASSIUM 3.5 06/27/2019    CHLORIDE 104 06/27/2019    CO2 29 06/27/2019    BUN 20 06/27/2019    CR 1.19 06/27/2019     (H) 06/27/2019    AMILCAR 9.1 06/27/2019    ALBUMIN 3.8 05/19/2019    PROTTOTAL 7.5 05/19/2019    ALT 26 05/19/2019    AST 20 05/19/2019    ALKPHOS 77 05/19/2019    BILITOTAL 1.1 05/19/2019    TSH 2.01 05/19/2019    T4 1.19 05/25/2016       Preop Vitals  BP Readings from Last 3 Encounters:   07/15/19 (!) 129/92   06/20/19 136/88   06/14/19 140/88    Pulse Readings from Last 3 Encounters:   07/15/19 76   06/20/19 115   06/14/19 88      Resp Readings from Last 3 Encounters:   07/15/19 16   06/20/19 24   06/14/19 18    SpO2 Readings from Last 3 Encounters:   07/15/19 95%   06/20/19 96%   05/19/19 97%      Temp Readings from Last 1 Encounters:   07/15/19 36.6  C (97.8  F) (Oral)    Ht Readings from Last 1 Encounters:   06/20/19 1.829 m (6')      Wt Readings from Last 1 Encounters:   07/14/19 109.5 kg (241 lb 4.8 oz)    Estimated body mass index is 32.73 kg/m  as calculated from the following:    Height as of 6/20/19: 1.829 m (6').    Weight as of 7/14/19: 109.5 kg (241 lb 4.8 oz).     LDA:            Assessment:   ASA SCORE: 2    NPO Status: > 6 hours since completed [employer]   Documentation: H&P complete; Preop Testing complete; Consents complete   Proceeding: Proceed without further delay  Tobacco Use:  NO Active use of Tobacco/UNKNOWN Tobacco use status     Plan:   Anes. Type:  General      Induction:  IV (Standard)   Airway: Native Airway   Access/Monitoring: PIV   Maintenance: Balanced   Emergence: Procedure Site   Logistics: Same Day Surgery     Postop Pain/Sedation Strategy:  Standard-Options: Opioids PRN     PONV Management:  Adult Risk Factors:, Non-Smoker, Postop Opioids     CONSENT: Direct conversation   Plan and risks discussed with: Patient              Anesthesia Pre-Procedure  Evaluation    Patient: Flakito Hilliard   MRN:     [id number] Gender:   male   Age:    59 year old :      1960        Preoperative Diagnosis: * No surgery found *        Past Medical History:   Diagnosis Date     Depressive disorder      Hypertension      Sleep apnea       Past Surgical History:   Procedure Laterality Date     BLADDER SURGERY       CYSTOSTOMY, INSERT TUBE SUPRAPUBIC, COMBINED N/A 2014    Procedure: COMBINED CYSTOSTOMY, INSERT TUBE SUPRAPUBIC;  Surgeon: Min Prasad MD;  [facility]     GENITOURINARY SURGERY      prostate surgery for stones     LASER HOLMIUM CYSTOSCOPY, INTERNAL URETHROTOMY, COMBINED N/A 10/24/2014    Procedure: COMBINED LASER HOLMIUM CYSTOSCOPY, INTERNAL URETHROTOMY;  Surgeon: Valentin Villatoro MD;  [facility]     URETHROPLASTY N/A 12/10/2014    Procedure: URETHROPLASTY;  Surgeon: Niraj Burger MD;  [facility]: [facility]     URETHROPLASTY WITH BUCCAL GRAFT N/A 2016    Procedure: URETHROPLASTY WITH BUCCAL GRAFT;  Surgeon: Niraj Burger MD;  Location:  OR               [facility] AN PHYSICAL EXAM    Lab Results   Component Value Date    WBC 8.1 2019    HGB 16.5 2019    HCT 49.1 2019     2019     2019    POTASSIUM 3.5 2019    CHLORIDE 104 2019    CO2 29 2019    BUN 20 2019    CR 1.19 2019     (H) 2019    AMILCAR 9.1 2019    ALBUMIN 3.8 2019    PROTTOTAL 7.5 2019    ALT 26 2019    AST 20 2019    ALKPHOS 77 2019    BILITOTAL 1.1 2019    TSH 2.01 2019    T4 1.19 2016       Preop Vitals  BP Readings from Last 3 Encounters:   07/15/19 (!) 129/92   19 136/88   19 140/88    Pulse Readings from Last 3 Encounters:   07/15/19 76   19 115   19 88      Resp Readings from Last 3 Encounters:   07/15/19 16   19 24   19 18    SpO2 Readings from Last 3 Encounters:   07/15/19 95%   19 96%    05/19/19 97%      Temp Readings from Last 1 Encounters:   07/15/19 36.6  C (97.8  F) (Oral)    Ht Readings from Last 1 Encounters:   06/20/19 1.829 m (6')      Wt Readings from Last 1 Encounters:   07/14/19 109.5 kg (241 lb 4.8 oz)    Estimated body mass index is 32.73 kg/m  as calculated from the following:    Height as of 6/20/19: 1.829 m (6').    Weight as of 7/14/19: 109.5 kg (241 lb 4.8 oz).     LDA:            Assessment:   ASA SCORE: 2       Documentation: H&P complete; Preop Testing complete; Consents complete   Proceeding: Proceed without further delay  Tobacco Use:  NO Active use of Tobacco/UNKNOWN Tobacco use status     Plan:   Anes. Type:  General   Pre-Induction: Midazolam IV; Acetaminophen PO   Induction:  IV (Standard)   Airway: Oral ETT   Access/Monitoring: PIV   Maintenance: Balanced   Emergence: Procedure Site   Logistics: Same Day Surgery     Postop Pain/Sedation Strategy:  Standard-Options: Opioids PRN     PONV Management:  Adult Risk Factors:, Non-Smoker, Postop Opioids     CONSENT: Direct conversation   Plan and risks discussed with: Patient                            MD Jesus Daley MD

## 2019-07-15 NOTE — CONSULTS
"Occupational Therapy Encounter: Cognitive Screen     Pt reported difficulties with recent cognitive functioning, specifically sustained attention and following logical progression of story lines as stated : \"TV shows and movies seem foreign to me.. I don't understand how they jump from one thing to he next\". Reported experiencing these symptoms before and throughout ECT.         Patient education provided on reason for OT evaluation and results of testing today.    Kee Cognitive Assessment (MoCA)  The MoCA is a rapid screening instrument for mild cognitive dysfunction. It assesses different cognitive domains: attention and concentration, executive functions, memory, language, visuoconstructional skills, conceptual thinking, calculations, and orientation. Memory Index Score (MIS)- able to recall words either spontaneously, with category cue, or multiple choice cues. Higher score reflective of spontaneous recall.      Pt completed the MoCA Version 8.1    Results  Visualspacial/Executive: 4/5  Naming: 3/3  Attention: 6/6  Language: 0/3  Abstraction: 1/2  Delayed Recall: 5/5  Orientation: 4/6    Education differential: NA/1    Total Score: 23/30      Interpretation:  Pt's score of 23/30 indicates mild cognitive impairment, specifically in the domain of language and abstraction.     Normal Mild Cognitive Impairment Alzheimer's Disease   26 and above 25-19 18-11       Pt would benefit from opportunities to maintain cognitive functioning in to context of functional tasks. Consider further cognitive testing to assess functional ability in preparation for appropriate discharge disposition and supportive services.     Nayeli Smith, OT on 7/15/2019 at 2:12 PM    "

## 2019-07-16 LAB
ANION GAP SERPL CALCULATED.3IONS-SCNC: 6 MMOL/L (ref 3–14)
BUN SERPL-MCNC: 28 MG/DL (ref 7–30)
CALCIUM SERPL-MCNC: 9.2 MG/DL (ref 8.5–10.1)
CHLORIDE SERPL-SCNC: 107 MMOL/L (ref 94–109)
CO2 SERPL-SCNC: 28 MMOL/L (ref 20–32)
CREAT SERPL-MCNC: 1.29 MG/DL (ref 0.66–1.25)
ERYTHROCYTE [DISTWIDTH] IN BLOOD BY AUTOMATED COUNT: 13 % (ref 10–15)
GFR SERPL CREATININE-BSD FRML MDRD: 60 ML/MIN/{1.73_M2}
GLUCOSE SERPL-MCNC: 98 MG/DL (ref 70–99)
HCT VFR BLD AUTO: 46.6 % (ref 40–53)
HGB BLD-MCNC: 16.1 G/DL (ref 13.3–17.7)
MCH RBC QN AUTO: 29.3 PG (ref 26.5–33)
MCHC RBC AUTO-ENTMCNC: 34.5 G/DL (ref 31.5–36.5)
MCV RBC AUTO: 85 FL (ref 78–100)
PLATELET # BLD AUTO: 277 10E9/L (ref 150–450)
POTASSIUM SERPL-SCNC: 3.6 MMOL/L (ref 3.4–5.3)
RBC # BLD AUTO: 5.5 10E12/L (ref 4.4–5.9)
SODIUM SERPL-SCNC: 141 MMOL/L (ref 133–144)
WBC # BLD AUTO: 9.4 10E9/L (ref 4–11)

## 2019-07-16 PROCEDURE — G0177 OPPS/PHP; TRAIN & EDUC SERV: HCPCS

## 2019-07-16 PROCEDURE — 12400001 ZZH R&B MH UMMC

## 2019-07-16 PROCEDURE — 87591 N.GONORRHOEAE DNA AMP PROB: CPT | Performed by: PHYSICIAN ASSISTANT

## 2019-07-16 PROCEDURE — 25000132 ZZH RX MED GY IP 250 OP 250 PS 637: Performed by: PSYCHIATRY & NEUROLOGY

## 2019-07-16 PROCEDURE — 25000132 ZZH RX MED GY IP 250 OP 250 PS 637: Performed by: PHYSICIAN ASSISTANT

## 2019-07-16 PROCEDURE — 80048 BASIC METABOLIC PNL TOTAL CA: CPT | Performed by: PHYSICIAN ASSISTANT

## 2019-07-16 PROCEDURE — 85027 COMPLETE CBC AUTOMATED: CPT | Performed by: PHYSICIAN ASSISTANT

## 2019-07-16 PROCEDURE — 25000132 ZZH RX MED GY IP 250 OP 250 PS 637: Performed by: CLINICAL NURSE SPECIALIST

## 2019-07-16 PROCEDURE — 36415 COLL VENOUS BLD VENIPUNCTURE: CPT | Performed by: PHYSICIAN ASSISTANT

## 2019-07-16 PROCEDURE — 90837 PSYTX W PT 60 MINUTES: CPT

## 2019-07-16 PROCEDURE — 87491 CHLMYD TRACH DNA AMP PROBE: CPT | Performed by: PHYSICIAN ASSISTANT

## 2019-07-16 RX ORDER — TAMSULOSIN HYDROCHLORIDE 0.4 MG/1
0.4 CAPSULE ORAL DAILY
Status: DISCONTINUED | OUTPATIENT
Start: 2019-07-16 | End: 2019-07-25 | Stop reason: HOSPADM

## 2019-07-16 RX ADMIN — CIPROFLOXACIN HYDROCHLORIDE 500 MG: 500 TABLET, FILM COATED ORAL at 22:03

## 2019-07-16 RX ADMIN — AMLODIPINE BESYLATE 10 MG: 5 TABLET ORAL at 09:05

## 2019-07-16 RX ADMIN — BUPROPION 450 MG: 150 TABLET, EXTENDED RELEASE ORAL at 09:05

## 2019-07-16 RX ADMIN — TAMSULOSIN HYDROCHLORIDE 0.4 MG: 0.4 CAPSULE ORAL at 22:03

## 2019-07-16 RX ADMIN — DOCUSATE SODIUM 100 MG: 100 CAPSULE, LIQUID FILLED ORAL at 09:05

## 2019-07-16 RX ADMIN — DOCUSATE SODIUM 100 MG: 100 CAPSULE, LIQUID FILLED ORAL at 22:03

## 2019-07-16 RX ADMIN — ATORVASTATIN CALCIUM 20 MG: 20 TABLET, FILM COATED ORAL at 09:05

## 2019-07-16 RX ADMIN — CIPROFLOXACIN HYDROCHLORIDE 500 MG: 500 TABLET, FILM COATED ORAL at 09:05

## 2019-07-16 RX ADMIN — DILTIAZEM HYDROCHLORIDE 360 MG: 120 CAPSULE, COATED, EXTENDED RELEASE ORAL at 09:05

## 2019-07-16 ASSESSMENT — ACTIVITIES OF DAILY LIVING (ADL)
HYGIENE/GROOMING: INDEPENDENT
ORAL_HYGIENE: INDEPENDENT
DRESS: INDEPENDENT

## 2019-07-16 ASSESSMENT — MIFFLIN-ST. JEOR: SCORE: 1945.27

## 2019-07-16 NOTE — PLAN OF CARE
"  Problem: OT General Care Plan  Goal: OT Frequency  Description  Pt will demonstrate consistent engagement in OT group activities that support recovery as evidenced by participating in >1 scheduled OT group/day for 5 days.     Attended 1/2 occupational therapy groups offered this date. Overall content and intermittently cooperative.  Leisure exploration and participation group offered for increased intrinsic motivation to engage in social, non-obligatory occupations via 'open recreation' game. Structured group was used to promote positive milieu interaction. Pt Response: Appeared to have fair sustained attention and acceptance of game rules of a familiar card game. However, reported difficulty concentrating as he expressed disappointment in current cognitive functioning as stated \"I feel like I should know how to play [this game]\".     Continue to monitor executive functioning - specifically memory, sequencing and concentration throughout further interventions.     Outcome: Improving     "

## 2019-07-16 NOTE — PLAN OF CARE
"48 hour assessment:  spent approximately 50% of the day in the lounge, the other 50% in his room resting.  Denies suicidal thoughts , thoughts of self harm and hallucinations.  Has a brighter affect today , interacts with peers approprietly. Reports mood is \"okay\" . Continues to report some difficulty with understanding and how to respond in groups. \"other people know how to answer but I just have nothing , I just don't understand. I think I am missing something\".   "

## 2019-07-16 NOTE — PROGRESS NOTES
07/15/19 2200   Significant Event   Significant Event Other (see comments)  (shift summary)     Pt was visible in the milieu, calm, cooperative, flat/blunted affect, irritable.  Watched TV in the milieu, social, no stated SI, SIB or hallucinations..

## 2019-07-16 NOTE — PROGRESS NOTES
Continues to c/o weak urinary  stream and mild burning with urination.  Voided 150 ml with post void residual of 237 ml.  Was able to go back into the restroom and void 125 ml more.  Instructed to continue to use urinal for output measuring. Urine is clear and light yellow.  Encouraged to continue to drink fluids    1244:  Voided 60 ml,  PVR =228. 2nd void after bladder scan = 100.

## 2019-07-16 NOTE — PROGRESS NOTES
Brief Medicine Note    Discussed with RN staff today. Patient continues to have c/o weak urinary streak with mild burning.     Per review of nursing note:   10AM: Voided 150 ml with post void residual of 237 ml.  Was able to go back into the restroom and void 125 ml more for a PVR of 112ml.   1244:  Voided 60 ml,  PVR =228. 2nd void after bladder scan = 100ml.    Reviewed Creatinine which improved to 1.29 (from 1.38). Will plan to repeat Creatinine on 7/18/19. Continue to hold PTA Lisinopril and hydrochlorothiazide.     Reviewed CBC. WBC resolved to 9.4 today (from 12.1).   Continue Ciprofloxacin 500mg BID for total of 14 days.   Continue PVR every shift.     Please call internal medicine if patient has any new concerns or if any questions. Medicine will continue to follow peripherally.     Addendum: Added Flomax 0.4mg daily. Please assess for any signs or symptoms of orthostatic hypotension.     Marnie Grayson PA-C  Hospitalist Service  Pager [phone]

## 2019-07-16 NOTE — PROGRESS NOTES
"Sandrar spoke with the patient to check-in after he came to the ending of the case management led group. The patient inquired about if he would be able to leave for the IRTs facility in [place] sooner than the end of next week. Writer informed the patient that she would have to discuss that with the clinical team and the facility to see if they are able to accept him this week.  also spoke with the patient about making a case management referral for him through [facility]. The patient was agreeable to this. Writer and the patient then went to look at pictures of the I[facility] facilities to which he has been referred. Sandrar then spent an additional 30 minutes speaking with the patient about some of his symptomology and concerns. The patient is still reporting much confusion about what is going on. He stated that he is still experiencing a \"nothingness\" and \"lack of understanding\" when he is in groups or watching television. The patient is very insightful and vivid with his explanations of feelings and symptoms.      received another call from Geri with [facility]. She requested the patient's H&P and Med list. Sandrar stated that she will send both of the items today.  faxed over this documentation to Geri.   "

## 2019-07-16 NOTE — PROGRESS NOTES
The patient completed a phone interview for [facility] yesterday.  will plan to check-in with the patient about this interview later today.  did receive a voicemail from Trisha ([phone]) with [facility] (7/15/19 at 4:07pm) stating that she would like to coordinate with  about setting up an admission date for the patient. She reported that they would be able to take the patient next week. Trisha stated that  could speak with her or Gavin about scheduling an admission and provided her contact information.      called [facility] in [facility] ([phone]) to speak with the admissions coordinator.  left a voicemail for Geri, the , inquiring about the status of the patient's referral.  provided her contact information and requested a return call.      received a voicemail from [facility] with [facility]. Geri stated that the patient's referral has not yet been reviewed, but will be today. She reported that she will not have any immediate openings for male and that there is an extensive wait-list currently. She provided her contact information for a return call.      received a voicemail from Reny with [facility] ([phone]). Reny inquired about an update for the patient.      attempted to return a call to [place] with [facility] but the call would not go through.  will attempt again tomorrow.                                                                                                                                                     received a [facility]ic[facility] ([phone]). She stated that she had some questions regarding the patient's [facility] referral and requested a return call.      returned a call to [facility] ([phone]).  left a voicemail for a return call.

## 2019-07-17 ENCOUNTER — ANESTHESIA (OUTPATIENT)
Dept: BEHAVIORAL HEALTH | Facility: CLINIC | Age: 59
End: 2019-07-17

## 2019-07-17 ENCOUNTER — ANESTHESIA EVENT (OUTPATIENT)
Dept: BEHAVIORAL HEALTH | Facility: CLINIC | Age: 59
End: 2019-07-17

## 2019-07-17 LAB
BACTERIA SPEC CULT: NORMAL
C TRACH DNA SPEC QL NAA+PROBE: NEGATIVE
Lab: NORMAL
N GONORRHOEA DNA SPEC QL NAA+PROBE: NEGATIVE
SPECIMEN SOURCE: NORMAL

## 2019-07-17 PROCEDURE — H2032 ACTIVITY THERAPY, PER 15 MIN: HCPCS

## 2019-07-17 PROCEDURE — 12400001 ZZH R&B MH UMMC

## 2019-07-17 PROCEDURE — 25000132 ZZH RX MED GY IP 250 OP 250 PS 637: Performed by: CLINICAL NURSE SPECIALIST

## 2019-07-17 PROCEDURE — 99231 SBSQ HOSP IP/OBS SF/LOW 25: CPT | Mod: 25 | Performed by: PSYCHIATRY & NEUROLOGY

## 2019-07-17 PROCEDURE — 25000132 ZZH RX MED GY IP 250 OP 250 PS 637: Performed by: PSYCHIATRY & NEUROLOGY

## 2019-07-17 PROCEDURE — G0177 OPPS/PHP; TRAIN & EDUC SERV: HCPCS

## 2019-07-17 PROCEDURE — 25000125 ZZHC RX 250: Performed by: PSYCHIATRY & NEUROLOGY

## 2019-07-17 PROCEDURE — 25000128 H RX IP 250 OP 636: Performed by: ANESTHESIOLOGY

## 2019-07-17 PROCEDURE — 25000132 ZZH RX MED GY IP 250 OP 250 PS 637: Performed by: PHYSICIAN ASSISTANT

## 2019-07-17 PROCEDURE — 25000125 ZZHC RX 250: Performed by: ANESTHESIOLOGY

## 2019-07-17 PROCEDURE — 90870 ELECTROCONVULSIVE THERAPY: CPT

## 2019-07-17 PROCEDURE — 25000128 H RX IP 250 OP 636: Performed by: PSYCHIATRY & NEUROLOGY

## 2019-07-17 RX ORDER — ETOMIDATE 2 MG/ML
INJECTION INTRAVENOUS PRN
Status: DISCONTINUED | OUTPATIENT
Start: 2019-07-17 | End: 2019-07-17

## 2019-07-17 RX ORDER — KETOROLAC TROMETHAMINE 30 MG/ML
30 INJECTION, SOLUTION INTRAMUSCULAR; INTRAVENOUS
Status: COMPLETED | OUTPATIENT
Start: 2019-07-17 | End: 2019-07-17

## 2019-07-17 RX ORDER — CAFFEINE AND SODIUM BENZOATE 125 MG/ML
250 INJECTION, SOLUTION INTRAMUSCULAR; INTRAVENOUS
Status: COMPLETED | OUTPATIENT
Start: 2019-07-17 | End: 2019-07-17

## 2019-07-17 RX ORDER — LIDOCAINE HYDROCHLORIDE 20 MG/ML
40 INJECTION, SOLUTION EPIDURAL; INFILTRATION; INTRACAUDAL; PERINEURAL
Status: COMPLETED | OUTPATIENT
Start: 2019-07-17 | End: 2019-07-17

## 2019-07-17 RX ADMIN — CIPROFLOXACIN HYDROCHLORIDE 500 MG: 500 TABLET, FILM COATED ORAL at 20:41

## 2019-07-17 RX ADMIN — TAMSULOSIN HYDROCHLORIDE 0.4 MG: 0.4 CAPSULE ORAL at 11:56

## 2019-07-17 RX ADMIN — Medication 120 MG: at 10:46

## 2019-07-17 RX ADMIN — CIPROFLOXACIN HYDROCHLORIDE 500 MG: 500 TABLET, FILM COATED ORAL at 11:57

## 2019-07-17 RX ADMIN — ETOMIDATE 18 MG: 2 INJECTION INTRAVENOUS at 10:46

## 2019-07-17 RX ADMIN — ATORVASTATIN CALCIUM 20 MG: 20 TABLET, FILM COATED ORAL at 11:56

## 2019-07-17 RX ADMIN — LIDOCAINE HYDROCHLORIDE 40 MG: 20 INJECTION, SOLUTION EPIDURAL; INFILTRATION; INTRACAUDAL; PERINEURAL at 10:50

## 2019-07-17 RX ADMIN — BUPROPION 450 MG: 150 TABLET, EXTENDED RELEASE ORAL at 11:57

## 2019-07-17 RX ADMIN — DOCUSATE SODIUM 100 MG: 100 CAPSULE, LIQUID FILLED ORAL at 20:41

## 2019-07-17 RX ADMIN — CAFFEINE AND SODIUM BENZOATE 250 MG: 125 INJECTION, SOLUTION INTRAMUSCULAR; INTRAVENOUS at 10:51

## 2019-07-17 RX ADMIN — DOCUSATE SODIUM 100 MG: 100 CAPSULE, LIQUID FILLED ORAL at 11:57

## 2019-07-17 RX ADMIN — KETOROLAC TROMETHAMINE 30 MG: 30 INJECTION, SOLUTION INTRAMUSCULAR at 10:39

## 2019-07-17 RX ADMIN — TRAZODONE HYDROCHLORIDE 100 MG: 100 TABLET ORAL at 20:41

## 2019-07-17 RX ADMIN — AMLODIPINE BESYLATE 10 MG: 5 TABLET ORAL at 07:56

## 2019-07-17 RX ADMIN — MIDAZOLAM 2 MG: 1 INJECTION INTRAMUSCULAR; INTRAVENOUS at 10:52

## 2019-07-17 RX ADMIN — DILTIAZEM HYDROCHLORIDE 360 MG: 120 CAPSULE, COATED, EXTENDED RELEASE ORAL at 08:02

## 2019-07-17 NOTE — PROCEDURES
Procedure/Surgery Information   [facility], [place]    Bedside Procedure Note  Date of Service (when I performed the procedure): 07/17/2019    Flakito Hilliard is a 59 year old male patient.  1. Suicidal ideation    2. Current episode of major depressive disorder without prior episode, unspecified depression episode severity    3. Severe recurrent major depression without psychotic features (H)      Past Medical History:   Diagnosis Date     Depressive disorder      Hypertension      Sleep apnea      Temp: 97.8  F (36.6  C) Temp src: Oral BP: (!) 143/95 Pulse: 98   Resp: 16 SpO2: 97 % O2 Device: None (Room air)      Procedures     Wilmer Liu     [facility], [place]   ECT Procedure Note   07/17/2019    Flakito Hilliard [id number]   59 year old 1960     Patient Status: Inpatient    Is this the first in a series of 12 treatments?  No    History and Physical: Reviewed in medical record    Consent: Informed consent was signed by: patient    Date Consent Signed: 6/27/19      Allergies   Allergen Reactions     Contrast Dye Difficulty breathing       Weight:  239 lbs 11.2 oz              Indications for ECT:   Medications ineffective and History of good ECT response in one or more previous episodes of illness         Clinical Narrative:   Patient was admitted with worsening depression and suicidal ideation.  He's had positive response to ECT in the past.  He's continuing ECT for depression.  NPO after 2400.  Alert and Oriented x 3.    Mood is getting better.   He had no problems with his last ECT.  He says he'll likely be going to [place] after discharge.            Diagnosis:   Major depression         Pause for the Cause:     Right patient Yes   Right procedure/laterality settings: Yes          Intra-Procedure Documentation:     ECT #: 8   Treatment number this series: 8   Total treatment number: 8 + 2 previous series      Type of ECT:  Right, unilateral  ultrabrief    ECT Medications:    Toradol:  30mg   Lidocaine: 40mg  Etomidate 18mg  Caffeine: 250mg (in IV room)  Succinyl Choline: 100mg   Versed: 2mg immediately after seizure (as he has the sense that he can't breathe and is paralyzed but he is breathing and not paralyzed).  Today:  Labetalol:  10mg pre-ECT for high bp (gets every time for systolic reading +++)  Today:  Hydralazine: 7.5mg pre-ECT for high bp     ECT Strip Summary:   Energy Level: 55 percent  Motor Seizure Duration:  31 seconds  EEG Seizure Duration:   48 seconds    Complications: No    Plan:   Next ECT on 7/19/19    Wilmer Liu MD

## 2019-07-17 NOTE — ANESTHESIA POSTPROCEDURE EVALUATION
Anesthesia POST Procedure Evaluation    Patient: Flakito Hilliard   MRN:     [id number] Gender:   male   Age:    59 year old :      1960        Preoperative Diagnosis: * No pre-op diagnosis entered *   * No procedures listed *   Postop Comments: No value filed.       Anesthesia Type:  General  No value filed.    Reportable Event: NO     PAIN: Uncomplicated   Sign Out status: Comfortable, Well controlled pain     PONV: No PONV   Sign Out status:  No Nausea or Vomiting     Neuro/Psych: Uneventful perioperative course   Sign Out Status: Preoperative baseline; Age appropriate mentation     Airway/Resp.: Uneventful perioperative course   Sign Out Status: Non labored breathing, age appropriate RR; Resp. Status within EXPECTED Parameters     CV: Uneventful perioperative course   Sign Out status: Appropriate BP and perfusion indices; Appropriate HR/Rhythm     Disposition:   Sign Out in:  PACU  Disposition:  Phase II; Home  Recovery Course: Uneventful  Follow-Up: Not required           Last Anesthesia Record Vitals:  [facility] VITALS  2019 1029 - 2019 1105      2019             Resp Rate (set):  8          Last PACU Vitals:  Vitals Value Taken Time   /110 2019 11:00 AM   Temp 36.8  C (98.3  F) 2019 11:00 AM   Pulse     Resp 16 2019 11:00 AM   SpO2 97 % 2019 11:00 AM   Temp src     NIBP     Pulse 94 2019 10:58 AM   SpO2 98 % 2019 10:58 AM   Resp     Temp     Ht Rate 94 2019 10:57 AM   Temp 2           Electronically Signed By: Cholo Estrada MD, 2019, 11:05 AM

## 2019-07-17 NOTE — PROGRESS NOTES
"[facility], [place]   Psychiatric Progress Note        Interim History:     The patient's care was discussed with the treatment team during the daily team meeting and/or staff's chart notes were reviewed.  Staff report patient spends more time outside of his room. He is slightly more animated, denies Suicidal ideation. Today he had ECT #8. He was seen shortly before ECT. He stated that he felt better, although, could not put it in words. He complained of short term memory loss, stated that he felt even embarrassed as he could not play card games he used to play well before. He still reported some ambivalence about going to [facility]: \"I have never gone to places like that before, but, I guess, you know what you are offering me and know better\".      Per CTC: \" Writer received a voicemail from Trisha inquiring about an update for the patient wanting to come to their facility. Trisha stated that the patient did indicate a lot of confusion about going to an IRTs facility and the entire process. She stated that she still felt like the patient would be appropriate for their facility. She requested a return call.      Writer returned a call to admissions coordinator, Gavin, with [facility]. Writer informed her that the patient has decided to go to another facility due to the location. Gavin stated that she will update Trisha.\"         Medications:       amLODIPine  10 mg Oral Daily     atorvastatin  20 mg Oral Daily     buPROPion  450 mg Oral QAM     ciprofloxacin  500 mg Oral Q12H CHAD     diltiazem ER COATED BEADS  360 mg Oral Daily     docusate sodium  100 mg Oral BID     tamsulosin  0.4 mg Oral Daily          Allergies:     Allergies   Allergen Reactions     Contrast Dye Difficulty breathing          Labs:     No results found for this or any previous visit (from the past 24 hour(s)).       Psychiatric Examination:     /69   Pulse 86   Temp 98.1  F (36.7  C)   Resp 16   Ht 1.829 m (6')   " Wt 108.7 kg (239 lb 11.2 oz)   SpO2 98%   BMI 32.51 kg/m    Weight is 239 lbs 11.2 oz  Body mass index is 32.51 kg/m .     Orthostatic Vitals       Most Recent      Sitting Orthostatic /87 07/17 0757    Sitting Orthostatic Pulse (bpm) 106 07/17 0757    Standing Orthostatic /82 07/17 0757    Standing Orthostatic Pulse (bpm) 111 07/17 0757         Appearance: dressed in hospital garbs  Attitude: cooperative  Eye Contact:  Partial, better  Mood: less depressed  Affect: constricted,   Speech: less monotone  Psychomotor Behavior: psychomotor retardation less pronounced  Throught Process:  Slow, but logical  Associations:  tight  Thought Content: denies Suicidal ideation, psychosis, Homicidal thoughts   Insight: partial  Judgement:  Mildly impaired, but improving   Oriented to:  Self, time and place  Attention Span and Concentration: mildly impaired  Recent and Remote Memory: fair, problems with short term memory after ECT. See discussion above.     Clinical Global Impressions  First:  Considering your total clinical experience with this particular patient population, how severe are the patient's symptoms at this time?: 7 (06/21/19 1701)  Compared to the patient's condition at the START of treatment, this patient's condition is:: 4 (06/21/19 1701)  Most recent:  Considering your total clinical experience with this particular patient population, how severe are the patient's symptoms at this time?: 5 (7/15/2019)  Compared to the patient's condition at the START of treatment, this patient's condition is:: 3 (7/15/2019)         Precautions:     Behavioral Orders   Procedures     Code 1 - Restrict to Unit     Code 2     Electroconvulsive therapy     ECT every Monday, Wednesday, and Friday     Electroconvulsive therapy     Series of up to 12 treatments. Begin Date: 6/28/19     Treating Psychiatrist providing ECT: Alexa    Notified on:  6/26/19     Routine Programming     As clinically indicated     Status 15      Every 15 minutes.     Suicide precautions     Patients on Suicide Precautions should have a Combination Diet ordered that includes a Diet selection(s) AND a Behavioral Tray selection for Safe Tray - with utensils, or Safe Tray - NO utensils            DIagnoses:     Major depressive disorder, current, severe.  Rule out generalized anxiety disorder.  The patient was also diagnosed in the past with dependent personality disorder.          Plan:     ECT #8 today. Will have at least 1-2 more ECTs. UTI treatment per IM's team. Will continue to work on placement.

## 2019-07-17 NOTE — PROGRESS NOTES
Patient adequate for discharge. Report called to inpatient RN Flor on station 20. VSS, A/O, IV removed. Discharged with staff at this time.

## 2019-07-17 NOTE — PROGRESS NOTES
Patient attended psycho-education group on addiction.  He was attentive throughout however stated that he does not have a h/o addiction.

## 2019-07-17 NOTE — ANESTHESIA PREPROCEDURE EVALUATION
Anesthesia Pre-Procedure Evaluation    Patient: Flakito Hilliard   MRN:     [id number] Gender:   male   Age:    59 year old :      1960        Preoperative Diagnosis: * No surgery found *        Past Medical History:   Diagnosis Date     Depressive disorder      Hypertension      Sleep apnea       Past Surgical History:   Procedure Laterality Date     BLADDER SURGERY       CYSTOSTOMY, INSERT TUBE SUPRAPUBIC, COMBINED N/A 2014    Procedure: COMBINED CYSTOSTOMY, INSERT TUBE SUPRAPUBIC;  Surgeon: Min Prasad MD;  Location: [facility]     GENITOURINARY SURGERY      prostate surgery for stones     LASER HOLMIUM CYSTOSCOPY, INTERNAL URETHROTOMY, COMBINED N/A 10/24/2014    Procedure: COMBINED LASER HOLMIUM CYSTOSCOPY, INTERNAL URETHROTOMY;  Surgeon: Valentin Villatoro MD;  Location: [facility] OR     URETHROPLASTY N/A 12/10/2014    Procedure: URETHROPLASTY;  Surgeon: Niraj Burger MD;  Location: [facility]     URETHROPLASTY WITH BUCCAL GRAFT N/A 2016    Procedure: URETHROPLASTY WITH BUCCAL GRAFT;  Surgeon: Niraj Burger MD;  [facility]          Anesthesia Evaluation     .             ROS/MED HX    ENT/Pulmonary:     (+)sleep apnea, , . .    Neurologic:       Cardiovascular:     (+) hypertension----. : . . . :. .       METS/Exercise Tolerance:     Hematologic:         Musculoskeletal:         GI/Hepatic:         Renal/Genitourinary:     (+) Nephrolithiasis ,       Endo:         Psychiatric:     (+) psychiatric history depression      Infectious Disease:         Malignancy:         Other:                         PHYSICAL EXAM:   Mental Status/Neuro: A/A/O   Airway: Facies: Feasible  Mallampati: II  Mouth/Opening: Full  TM distance: > 6 cm  Neck ROM: Full   Respiratory: Auscultation: CTAB     Resp. Rate: Normal     Resp. Effort: Normal      CV: Rhythm: Regular  Rate: Age appropriate  Heart: Normal Sounds   Comments:      Dental: Normal                  Lab Results   Component Value Date    WBC 9.4  07/16/2019    HGB 16.1 07/16/2019    HCT 46.6 07/16/2019     07/16/2019     07/16/2019    POTASSIUM 3.6 07/16/2019    CHLORIDE 107 07/16/2019    CO2 28 07/16/2019    BUN 28 07/16/2019    CR 1.29 (H) 07/16/2019    GLC 98 07/16/2019    AMILCAR 9.2 07/16/2019    ALBUMIN 3.8 05/19/2019    PROTTOTAL 7.5 05/19/2019    ALT 26 05/19/2019    AST 20 05/19/2019    ALKPHOS 77 05/19/2019    BILITOTAL 1.1 05/19/2019    TSH 2.01 05/19/2019    T4 1.19 05/25/2016       Preop Vitals  BP Readings from Last 3 Encounters:   07/16/19 (!) 143/95   06/20/19 136/88   06/14/19 140/88    Pulse Readings from Last 3 Encounters:   07/16/19 98   06/20/19 115   06/14/19 88      Resp Readings from Last 3 Encounters:   07/17/19 16   06/20/19 24   06/14/19 18    SpO2 Readings from Last 3 Encounters:   07/17/19 97%   06/20/19 96%   05/19/19 97%      Temp Readings from Last 1 Encounters:   07/17/19 36.6  C (97.8  F) (Oral)    Ht Readings from Last 1 Encounters:   06/20/19 1.829 m (6')      Wt Readings from Last 1 Encounters:   07/16/19 108.7 kg (239 lb 11.2 oz)    Estimated body mass index is 32.51 kg/m  as calculated from the following:    Height as of 6/20/19: 1.829 m (6').    Weight as of 7/16/19: 108.7 kg (239 lb 11.2 oz).     LDA:            Assessment:   ASA SCORE: 2    NPO Status: > 6 hours since completed [employer]   Documentation: H&P complete; Preop Testing complete; Consents complete   Proceeding: Proceed without further delay  Tobacco Use:  NO Active use of Tobacco/UNKNOWN Tobacco use status     Plan:   Anes. Type:  General      Induction:  IV (Standard)   Airway: Native Airway   Access/Monitoring: PIV   Maintenance: Balanced   Emergence: Procedure Site   Logistics: Same Day Surgery     Postop Pain/Sedation Strategy:  Standard-Options: Opioids PRN     PONV Management:  Adult Risk Factors:, Non-Smoker, Postop Opioids     CONSENT: Direct conversation   Plan and risks discussed with: Patient              Anesthesia Pre-Procedure  Evaluation    Patient: Flakito Hilliard   MRN:     [id number] Gender:   male   Age:    59 year old :      1960        Preoperative Diagnosis: * No surgery found *        Past Medical History:   Diagnosis Date     Depressive disorder      Hypertension      Sleep apnea       Past Surgical History:   Procedure Laterality Date     BLADDER SURGERY       CYSTOSTOMY, INSERT TUBE SUPRAPUBIC, COMBINED N/A 2014    Procedure: COMBINED CYSTOSTOMY, INSERT TUBE SUPRAPUBIC;  Surgeon: Min Prasad MD;  Location: [facility]     GENITOURINARY SURGERY      prostate surgery for stones     LASER HOLMIUM CYSTOSCOPY, INTERNAL URETHROTOMY, COMBINED N/A 10/24/2014    Procedure: COMBINED LASER HOLMIUM CYSTOSCOPY, INTERNAL URETHROTOMY;  Surgeon: Valentin Villatoro MD;  Location: [facility]     URETHROPLASTY N/A 12/10/2014    Procedure: URETHROPLASTY;  Surgeon: Niraj Burger MD;  Location: [facility]     URETHROPLASTY WITH BUCCAL GRAFT N/A 2016    Procedure: URETHROPLASTY WITH BUCCAL GRAFT;  Surgeon: Niraj Burger MD;  Location:  OR               [facility] AN PHYSICAL EXAM    Lab Results   Component Value Date    WBC 9.4 2019    HGB 16.1 2019    HCT 46.6 2019     2019     2019    POTASSIUM 3.6 2019    CHLORIDE 107 2019    CO2 28 2019    BUN 28 2019    CR 1.29 (H) 2019    GLC 98 2019    AMILCAR 9.2 2019    ALBUMIN 3.8 2019    PROTTOTAL 7.5 2019    ALT 26 2019    AST 20 2019    ALKPHOS 77 2019    BILITOTAL 1.1 2019    TSH 2.01 2019    T4 1.19 2016       Preop Vitals  BP Readings from Last 3 Encounters:   19 (!) 143/95   19 136/88   19 140/88    Pulse Readings from Last 3 Encounters:   19 98   19 115   19 88      Resp Readings from Last 3 Encounters:   19 16   19 24   19 18    SpO2 Readings from Last 3 Encounters:   19 97%   19 96%    05/19/19 97%      Temp Readings from Last 1 Encounters:   07/17/19 36.6  C (97.8  F) (Oral)    Ht Readings from Last 1 Encounters:   06/20/19 1.829 m (6')      Wt Readings from Last 1 Encounters:   07/16/19 108.7 kg (239 lb 11.2 oz)    Estimated body mass index is 32.51 kg/m  as calculated from the following:    Height as of 6/20/19: 1.829 m (6').    Weight as of 7/16/19: 108.7 kg (239 lb 11.2 oz).     LDA:            Assessment:   ASA SCORE: 2       Documentation: H&P complete; Preop Testing complete; Consents complete   Proceeding: Proceed without further delay  Tobacco Use:  NO Active use of Tobacco/UNKNOWN Tobacco use status     Plan:   Anes. Type:  General   Pre-Induction: Midazolam IV; Acetaminophen PO   Induction:  IV (Standard)   Airway: Oral ETT   Access/Monitoring: PIV   Maintenance: Balanced   Emergence: Procedure Site   Logistics: Same Day Surgery     Postop Pain/Sedation Strategy:  Standard-Options: Opioids PRN     PONV Management:  Adult Risk Factors:, Non-Smoker, Postop Opioids     CONSENT: Direct conversation   Plan and risks discussed with: Patient                            MD Cholo Hopkins MD

## 2019-07-17 NOTE — PROGRESS NOTES
Pt said he felt his bladder issues have improved a bit overall with improving dysuria. He does have hesitation, and needed two attempts at micturation to void his bladder to the best of his ability. PRV revealed 155cc on scan. Pt continuing with fluids and no sequelae from starting flomax this evening.  Metal health wise, he notes some improvement having received seven ECT tx thus far. He noted some recall issues as a side effect. He was otherwise visible, calm and somewhat bright.

## 2019-07-17 NOTE — PROGRESS NOTES
Sandra called [facility] to call in the patient's referral for case management services.  spoke with the Behavioral Health  who is going to email sandrar the intake paperwork that the patient has to complete.  will also plan to fax over the H&P along with the DVF.

## 2019-07-17 NOTE — PROGRESS NOTES
received a voicemail from [facility] inquiring about an update for the patient wanting to come to their facility. Trisha stated that the patient did indicate a lot of confusion about going to an IRTs facility and the entire process. She stated that she still felt like the patient would be appropriate for their facility. She requested a return call.      returned a call to admissions coordinator, Gavin, with [facility].  informed her that the patient has decided to go to another facility due to the location. Gavin stated that she will update Trisha.

## 2019-07-17 NOTE — PROGRESS NOTES
"Pt again welcomed individual psychotherapy using body-based interventions, but not movement.  Pt demonstrated both curiosity and cognitive insight, though expressed significant doubt that he is understanding what's going on inside his brain, often expressing that he feels like he is missing something that other people are getting.  He was able to clearly articulate visceral experiences and sensory memories, but dismissed this as seeming \"too scientific\" for him to truly understand.      Pt was able to explain his exploration of IRTS facilities and prioritize this as what he needs to focus on today. Pt was offered another 1:1 session this week in the event he does discharge soon, and he is seeking external affirmation and \"explanations,\" but will likely focus on redirecting pt to an internal locus of control and trusting his own gut-level understanding.  Could provide a body-based meditation and some mind-body connecting movements to provide greater practice to solidify trust in his own strong, alternative ways of knowing.         07/16/19 0930   Dance Movement Therapy   Type of Intervention 1:1 intervention   Response participates, initiates socially appropriate   Hours 1       "

## 2019-07-18 LAB
ANION GAP SERPL CALCULATED.3IONS-SCNC: 9 MMOL/L (ref 3–14)
BUN SERPL-MCNC: 22 MG/DL (ref 7–30)
CALCIUM SERPL-MCNC: 9 MG/DL (ref 8.5–10.1)
CHLORIDE SERPL-SCNC: 106 MMOL/L (ref 94–109)
CO2 SERPL-SCNC: 24 MMOL/L (ref 20–32)
CREAT SERPL-MCNC: 1.12 MG/DL (ref 0.66–1.25)
GFR SERPL CREATININE-BSD FRML MDRD: 72 ML/MIN/{1.73_M2}
GLUCOSE SERPL-MCNC: 109 MG/DL (ref 70–99)
POTASSIUM SERPL-SCNC: 3.5 MMOL/L (ref 3.4–5.3)
SODIUM SERPL-SCNC: 139 MMOL/L (ref 133–144)

## 2019-07-18 PROCEDURE — G0177 OPPS/PHP; TRAIN & EDUC SERV: HCPCS

## 2019-07-18 PROCEDURE — 25000132 ZZH RX MED GY IP 250 OP 250 PS 637: Performed by: PSYCHIATRY & NEUROLOGY

## 2019-07-18 PROCEDURE — 99231 SBSQ HOSP IP/OBS SF/LOW 25: CPT | Performed by: PHYSICIAN ASSISTANT

## 2019-07-18 PROCEDURE — 36415 COLL VENOUS BLD VENIPUNCTURE: CPT | Performed by: PHYSICIAN ASSISTANT

## 2019-07-18 PROCEDURE — 80048 BASIC METABOLIC PNL TOTAL CA: CPT | Performed by: PHYSICIAN ASSISTANT

## 2019-07-18 PROCEDURE — 25000132 ZZH RX MED GY IP 250 OP 250 PS 637: Performed by: CLINICAL NURSE SPECIALIST

## 2019-07-18 PROCEDURE — 25000132 ZZH RX MED GY IP 250 OP 250 PS 637: Performed by: PHYSICIAN ASSISTANT

## 2019-07-18 PROCEDURE — 90837 PSYTX W PT 60 MINUTES: CPT

## 2019-07-18 PROCEDURE — 12400001 ZZH R&B MH UMMC

## 2019-07-18 PROCEDURE — 99232 SBSQ HOSP IP/OBS MODERATE 35: CPT | Performed by: PSYCHIATRY & NEUROLOGY

## 2019-07-18 RX ADMIN — TRAZODONE HYDROCHLORIDE 100 MG: 100 TABLET ORAL at 20:19

## 2019-07-18 RX ADMIN — DOCUSATE SODIUM 100 MG: 100 CAPSULE, LIQUID FILLED ORAL at 20:19

## 2019-07-18 RX ADMIN — CIPROFLOXACIN HYDROCHLORIDE 500 MG: 500 TABLET, FILM COATED ORAL at 08:42

## 2019-07-18 RX ADMIN — BUPROPION 450 MG: 150 TABLET, EXTENDED RELEASE ORAL at 08:41

## 2019-07-18 RX ADMIN — TAMSULOSIN HYDROCHLORIDE 0.4 MG: 0.4 CAPSULE ORAL at 08:41

## 2019-07-18 RX ADMIN — DOCUSATE SODIUM 100 MG: 100 CAPSULE, LIQUID FILLED ORAL at 08:41

## 2019-07-18 RX ADMIN — CIPROFLOXACIN HYDROCHLORIDE 500 MG: 500 TABLET, FILM COATED ORAL at 20:19

## 2019-07-18 RX ADMIN — ATORVASTATIN CALCIUM 20 MG: 20 TABLET, FILM COATED ORAL at 08:42

## 2019-07-18 RX ADMIN — ACETAMINOPHEN 650 MG: 325 TABLET, FILM COATED ORAL at 11:08

## 2019-07-18 RX ADMIN — AMLODIPINE BESYLATE 10 MG: 5 TABLET ORAL at 08:41

## 2019-07-18 RX ADMIN — DILTIAZEM HYDROCHLORIDE 360 MG: 120 CAPSULE, COATED, EXTENDED RELEASE ORAL at 08:42

## 2019-07-18 ASSESSMENT — ACTIVITIES OF DAILY LIVING (ADL)
DRESS: INDEPENDENT
ORAL_HYGIENE: INDEPENDENT
HYGIENE/GROOMING: INDEPENDENT

## 2019-07-18 ASSESSMENT — MIFFLIN-ST. JEOR: SCORE: 1950.72

## 2019-07-18 NOTE — PROGRESS NOTES
[facility], [place]   Psychiatric Progress Note        Interim History:     The patient's care was discussed with the treatment team during the daily team meeting and/or staff's chart notes were reviewed.  Staff report patient spends more time outside of his room. He is slightly more animated, denies Suicidal ideation. He had 8 ECT treatments, so far. He shows a lot of ambivalence about how he feels. Reports significant anxiety about his future, needs frequent reassurance that he does right things and will get better. He seems to be more open to going to IRTS, today he asked a lot of questions about memory loss after ECT. I assured him that typically memory loss is short lasting. He was receptive to my explanations. He is peripherally followed by IM for urinary problems, continued on Cipro. For more details, please, see Internal Medicine note. Appreciate medical team's input.          Medications:       amLODIPine  10 mg Oral Daily     atorvastatin  20 mg Oral Daily     buPROPion  450 mg Oral QAM     ciprofloxacin  500 mg Oral Q12H CHAD     diltiazem ER COATED BEADS  360 mg Oral Daily     docusate sodium  100 mg Oral BID     tamsulosin  0.4 mg Oral Daily          Allergies:     Allergies   Allergen Reactions     Contrast Dye Difficulty breathing          Labs:     Recent Results (from the past 24 hour(s))   Basic metabolic panel    Collection Time: 07/18/19  7:58 AM   Result Value Ref Range    Sodium 139 133 - 144 mmol/L    Potassium 3.5 3.4 - 5.3 mmol/L    Chloride 106 94 - 109 mmol/L    Carbon Dioxide 24 20 - 32 mmol/L    Anion Gap 9 3 - 14 mmol/L    Glucose 109 (H) 70 - 99 mg/dL    Urea Nitrogen 22 7 - 30 mg/dL    Creatinine 1.12 0.66 - 1.25 mg/dL    GFR Estimate 72 >60 mL/min/[1.73_m2]    GFR Estimate If Black 83 >60 mL/min/[1.73_m2]    Calcium 9.0 8.5 - 10.1 mg/dL          Psychiatric Examination:     /83   Pulse 106   Temp 97.7  F (36.5  C) (Oral)   Resp 16   Ht 1.829 m  (6')   Wt 109.3 kg (240 lb 14.4 oz)   SpO2 98%   BMI 32.67 kg/m    Weight is 240 lbs 14.4 oz  Body mass index is 32.67 kg/m .     Orthostatic Vitals       Most Recent      Sitting Orthostatic /83 07/18 0817    Sitting Orthostatic Pulse (bpm) 106 07/18 0817    Standing Orthostatic /85 07/18 0817    Standing Orthostatic Pulse (bpm) 112 07/18 0817         Appearance: dressed in hospital garbs  Attitude: cooperative  Eye Contact:  Partial, better  Mood: less depressed  Affect: constricted,   Speech: less monotone  Psychomotor Behavior: psychomotor retardation less pronounced  Throught Process:  Slow, but logical  Associations:  tight  Thought Content: denies Suicidal ideation, psychosis, Homicidal thoughts   Insight: partial  Judgement:  Mildly impaired, but improving   Oriented to:  Self, time and place  Attention Span and Concentration: mildly impaired  Recent and Remote Memory: fair, problems with short term memory after ECT. See discussion above.     Clinical Global Impressions  First:  Considering your total clinical experience with this particular patient population, how severe are the patient's symptoms at this time?: 7 (06/21/19 1701)  Compared to the patient's condition at the START of treatment, this patient's condition is:: 4 (06/21/19 1701)  Most recent:  Considering your total clinical experience with this particular patient population, how severe are the patient's symptoms at this time?: 5 (7/15/2019)  Compared to the patient's condition at the START of treatment, this patient's condition is:: 3 (7/15/2019)         Precautions:     Behavioral Orders   Procedures     Code 1 - Restrict to Unit     Code 2     Electroconvulsive therapy     ECT every Monday, Wednesday, and Friday     Electroconvulsive therapy     Series of up to 12 treatments. Begin Date: 6/28/19     Treating Psychiatrist providing ECT: Alexa    Notified on:  6/26/19     Routine Programming     As clinically indicated     Status  15     Every 15 minutes.     Suicide precautions     Patients on Suicide Precautions should have a Combination Diet ordered that includes a Diet selection(s) AND a Behavioral Tray selection for Safe Tray - with utensils, or Safe Tray - NO utensils            DIagnoses:     Major depressive disorder, current, severe.  Rule out generalized anxiety disorder.  The patient was also diagnosed in the past with dependent personality disorder.          Plan:     ECT #8 yesterday. Will have at least 1 more ECTs. UTI treatment per IM's team. Will continue to work on placement. Per CTC, he will, likely, go to [facility] in the middle of next week.

## 2019-07-18 NOTE — PROGRESS NOTES
CLINICAL NUTRITION SERVICES - REASSESSMENT NOTE     Nutrition Prescription    RECOMMENDATIONS FOR MDs/PROVIDERS TO ORDER:  None at this time    Malnutrition Status:    Patient does not meet two of the above criteria necessary for diagnosing malnutrition    Recommendations already ordered by Registered Dietitian (RD):  None at this time    Future/Additional Recommendations:  - Continue to monitor wt trends and PO intake  - Continue to offer scheduled snacks and supplements      EVALUATION OF THE PROGRESS TOWARD GOALS   Diet: Regular  Intake: Per pt - Ranjeet is eating about 75% of three meals per day and no longer snacks on osman crackers at night. He says his appetite continues to be poor but he continues to work on eating 75% of meals TID.       NEW FINDINGS     Weight history: Since last evaluated on 7/11, the pt's wt has remained stable.  Wt Readings from Last 10 Encounters:   07/18/19 109.3 kg (240 lb 14.4 oz)   07/14/19  109.5 kg (241 lb 4.8 oz)   07/11/19  108.2 kg (238 lb 8 oz)   07/09/19  110.1 kg (242 lb 10.9 oz)   07/04/19  110.9 kg (244 lb 8 oz)   06/30/19  112.3 kg (247 lb 8 oz)   06/20/19 110.9 kg (244 lb 6.4 oz)   06/14/19 112 kg (247 lb)   05/19/19 117.9 kg (260 lb)   07/18/18 118.8 kg (262 lb)   04/09/18 122.1 kg (269 lb 1.6 oz)   08/18/17 120.2 kg (265 lb)   05/09/17 118.8 kg (262 lb)   04/26/17 117.1 kg (258 lb 3.2 oz)   03/27/17 120.2 kg (265 lb)       MALNUTRITION  % Intake: No decreased intake noted  % Weight Loss: None noted  Subcutaneous Fat Loss: None observed  Muscle Loss: None observed  Fluid Accumulation/Edema: None noted  Malnutrition Diagnosis: Patient does not meet two of the above criteria necessary for diagnosing malnutrition    Previous Goals   Patient to consume % of nutritionally adequate meal trays TID, or the equivalent with supplements/snacks.  Evaluation: Met    Previous Nutrition Diagnosis  Predicted inadequate nutrient intake (energy/protein) related to decreased  appetite but still eating 3 meals a day as evidenced by potential for decline  Evaluation: No change    CURRENT NUTRITION DIAGNOSIS  Predicted inadequate nutrient intake (energy/protein) related to decreased appetite but still eating 3 meals a day as evidenced by potential for decline    INTERVENTIONS  Implementation  - Nutrition education - provided education on the importance of adequate protein/calorie intake. Encouraged pt to eat an HS snack if he eats <75% of meals.    Goals  Patient to consume % of nutritionally adequate meal trays TID, or the equivalent with supplements/snacks.    Monitoring/Evaluation  Progress toward goals will be monitored and evaluated per protocol.        Reny Esquivel RD

## 2019-07-18 NOTE — PROGRESS NOTES
"Pt was enthusiastic about having an individual psychotherapy session.  This therapist led pt through a body-based meditation to practice being able to shift mental focus, unite mind with body, and relax.  Pt was able to participate in a 45-minute meditation with some redirection from anxious thoughts, and ultimately expressed feeling very relaxed in his legs, but also became confused about what he was \"supposed to feel\" or what he was \"supposed to learn\" from this.  He wondered if I was \"hypnotizing\" him or if this was \"some kind of Mormonism.\"  Encouraged pt to relax or even sleep, as he did not sleep much the night before due to \"racing thoughts.\"       07/18/19 9770   Dance Movement Therapy   Type of Intervention 1:1 intervention   Response participates, initiates socially appropriate   Hours 1     "

## 2019-07-18 NOTE — PROGRESS NOTES
Brief Medicine Note    Medicine following for follow-up of BMP to assess renal function.     Creatinine improved to 1.12. BP normotensive today.     Patient continues to have some mild burning in penis with spraying. No fevers, chills or abdominal pain. No flank pain. No nausea/vomiting.     Today's vital signs, medications, and nursing notes were reviewed.     /83   Pulse 106   Temp 97.7  F (36.5  C) (Oral)   Resp 16   Ht 1.829 m (6')   Wt 109.3 kg (240 lb 14.4 oz)   SpO2 98%   BMI 32.67 kg/m    General: A&O. NAD.   CARINA: There was mild tenderness to the prostate. No warm or boggy prostate. There was mild stool in rectal vault. No fecal impaction. No hemorrhoids.   GI: Abdomen soft non-tender.     A/P:  Irritative voiding symptoms concerning for complicated UTI or prostatitis:   Discussed with YVON Elliott with Urology. She will send a staff message to Dr. Cano to hopefully set up appointment in 2 weeks for follow-up. Recommended continuing antibiotic and flomax. Recommended rectal examination to assess for prostate tenderness as she expressed concern for prostatitis and if this were the case, patient would need longer duration of antibiotic therapy. CARINA was performed today with male nurse chaperone. There was tenderness noted to the prostate. Therefore, will treat with Ciprofloxacin for 28 days in duration.   -Continue Ciprofloxacin 500mg BID x 28 days   -Continue flomax  -Will need follow-up with Dr. Cano in 2 weeks in Urology for cystoscopy and follow-up    Elevated creatinine, improved: Creatinine 1.12 today with improving trend. Lisinopril and hydrochlorothiazide are being held. Will monitor in 2 days and consider resuming Lisinopril at lower dose if BP increases.     Medicine will continue to follow. Please page with any additional questions or concerns.     Marnie Grayson PA-C  Hospitalist Service  Pager [phone]

## 2019-07-18 NOTE — PROGRESS NOTES
07/18/19 1500   Occupational Therapy   Type of Intervention structured groups   Response Initiates, socially acceptable   Hours 1       Collaborative multi-step hands-on meal preparation group. 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 Pt Response: Content and cooperative. 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 Elaborated on previous enjoyment of hunting and appeared proud of this activity and sharing with others. Demonstrated good process, performance, and collaborative social interaction skills, specifically initiation of task and attention to detail.

## 2019-07-18 NOTE — PROGRESS NOTES
faxed over the referral for adult mental health case management services for the patient.      called Cameron with [facility] to confirm an admissions date for the patient.  left a voicemail for a return call.      spoke with Cameron from [facility]. Cameron stated that he is unable to accept the patient into the facility before Thursday. He stated that he has other admissions on the other days next week and would not be able to fit the patient in. Cameron is planning to send  the paperwork to be completed by the attending for the patient's admission.

## 2019-07-18 NOTE — PLAN OF CARE
BEHAVIORAL TEAM DISCUSSION    Participants: Dr. Edwards, Dell Lee (CTC), Gerardo (RN)  Progress: Some progress; staff report that the patient attends some groups but is also isolative at times. Reported that he does not believe the ECT treatments are working.   Continued Stay Criteria/Rationale: Continued stabilization.   Medical/Physical: Urinary issues.   Precautions:   Behavioral Orders   Procedures     Code 1 - Restrict to Unit     Code 2     Electroconvulsive therapy     ECT every Monday, Wednesday, and Friday     Electroconvulsive therapy     Series of up to 12 treatments. Begin Date: 6/28/19     Treating Psychiatrist providing ECT: Alexa    Notified on:  6/26/19     Routine Programming     As clinically indicated     Status 15     Every 15 minutes.     Suicide precautions     Patients on Suicide Precautions should have a Combination Diet ordered that includes a Diet selection(s) AND a Behavioral Tray selection for Safe Tray - with utensils, or Safe Tray - NO utensils       Plan: [facility] will schedule outpatient appointments. The patient is set to discharge to [facility] facility between mid to end of next week.     Rationale for change in precautions or plan: None.

## 2019-07-18 NOTE — PROGRESS NOTES
Brief Medicine Note    Internal Medicine following peripherally for irritative voiding symptoms and for follow-up of UC results and renal function.     UCx grew 50,000-100,000 mixed urogenital luis fernando. Continued on ciprofloxacin 500mg BID and was started on Tamsulosin on 7/16.  Addendum: C/G PCR negative.      Discussed with RN and no acute concerns today regarding the patient. VSS.     Will plan to check BMP in AM to assess renal function. BP remains stable off BP medications.      Marnie Grayson PA-C  Hospitalist Service  Pager [phone]

## 2019-07-18 NOTE — PLAN OF CARE
"  Pt presents with distractible thinking and poor concentration. Affect blunted. Mood calm and depressed. Activity isolative and withdrawn. Pt seen to spend majority of his time in his room. Pt has not been attending groups.    In reference to appetite, Pt states \"I don't have much of any appetite but I ate all of my meals.\" In reference to sleep hygiene, Pt explains \"last night was terrible. I had racing thoughts. Sleep was getting kind of good before that. It's sporadic actually.\"     Pt denies any adverse/side effects to medications. Pt complained of a headache during this shift. Pt stated \"I have a headache. It feels fuzzy too.\" PRN medication given (see MAR).     Pt rates his anxiety a 3-4/10 and his depression a 3/10. Pt states \"My anxiety is higher than my depression. Depression is not bad at all.\" Pt denies wish to be dead. Pt denies SI/SIB/HI. Pt denies AH/VH.    Pt's 12:05 pm PVR: 131 mL  "

## 2019-07-19 ENCOUNTER — ANESTHESIA EVENT (OUTPATIENT)
Dept: BEHAVIORAL HEALTH | Facility: CLINIC | Age: 59
End: 2019-07-19

## 2019-07-19 ENCOUNTER — ANESTHESIA (OUTPATIENT)
Dept: BEHAVIORAL HEALTH | Facility: CLINIC | Age: 59
End: 2019-07-19

## 2019-07-19 PROCEDURE — 25000125 ZZHC RX 250: Performed by: STUDENT IN AN ORGANIZED HEALTH CARE EDUCATION/TRAINING PROGRAM

## 2019-07-19 PROCEDURE — 25000132 ZZH RX MED GY IP 250 OP 250 PS 637: Performed by: PSYCHIATRY & NEUROLOGY

## 2019-07-19 PROCEDURE — 25000128 H RX IP 250 OP 636: Performed by: PSYCHIATRY & NEUROLOGY

## 2019-07-19 PROCEDURE — 99231 SBSQ HOSP IP/OBS SF/LOW 25: CPT | Mod: 25 | Performed by: PSYCHIATRY & NEUROLOGY

## 2019-07-19 PROCEDURE — H2032 ACTIVITY THERAPY, PER 15 MIN: HCPCS

## 2019-07-19 PROCEDURE — 12400001 ZZH R&B MH UMMC

## 2019-07-19 PROCEDURE — 25000128 H RX IP 250 OP 636: Performed by: STUDENT IN AN ORGANIZED HEALTH CARE EDUCATION/TRAINING PROGRAM

## 2019-07-19 PROCEDURE — 25000132 ZZH RX MED GY IP 250 OP 250 PS 637: Performed by: PHYSICIAN ASSISTANT

## 2019-07-19 PROCEDURE — 25000125 ZZHC RX 250: Performed by: PSYCHIATRY & NEUROLOGY

## 2019-07-19 PROCEDURE — 25000132 ZZH RX MED GY IP 250 OP 250 PS 637: Performed by: CLINICAL NURSE SPECIALIST

## 2019-07-19 PROCEDURE — 90870 ELECTROCONVULSIVE THERAPY: CPT

## 2019-07-19 PROCEDURE — G0177 OPPS/PHP; TRAIN & EDUC SERV: HCPCS

## 2019-07-19 RX ORDER — LIDOCAINE HYDROCHLORIDE 20 MG/ML
40 INJECTION, SOLUTION EPIDURAL; INFILTRATION; INTRACAUDAL; PERINEURAL
Status: ACTIVE | OUTPATIENT
Start: 2019-07-19 | End: 2019-07-19

## 2019-07-19 RX ORDER — LABETALOL HYDROCHLORIDE 5 MG/ML
INJECTION, SOLUTION INTRAVENOUS PRN
Status: DISCONTINUED | OUTPATIENT
Start: 2019-07-19 | End: 2019-07-19

## 2019-07-19 RX ORDER — CAFFEINE AND SODIUM BENZOATE 125 MG/ML
250 INJECTION, SOLUTION INTRAMUSCULAR; INTRAVENOUS
Status: COMPLETED | OUTPATIENT
Start: 2019-07-19 | End: 2019-07-19

## 2019-07-19 RX ORDER — ETOMIDATE 2 MG/ML
INJECTION INTRAVENOUS PRN
Status: DISCONTINUED | OUTPATIENT
Start: 2019-07-19 | End: 2019-07-19

## 2019-07-19 RX ORDER — HYDRALAZINE HYDROCHLORIDE 20 MG/ML
INJECTION INTRAMUSCULAR; INTRAVENOUS PRN
Status: DISCONTINUED | OUTPATIENT
Start: 2019-07-19 | End: 2019-07-19

## 2019-07-19 RX ORDER — KETOROLAC TROMETHAMINE 30 MG/ML
30 INJECTION, SOLUTION INTRAMUSCULAR; INTRAVENOUS
Status: COMPLETED | OUTPATIENT
Start: 2019-07-19 | End: 2019-07-19

## 2019-07-19 RX ADMIN — CAFFEINE AND SODIUM BENZOATE 250 MG: 125 INJECTION, SOLUTION INTRAMUSCULAR; INTRAVENOUS at 08:56

## 2019-07-19 RX ADMIN — AMLODIPINE BESYLATE 10 MG: 5 TABLET ORAL at 08:00

## 2019-07-19 RX ADMIN — KETOROLAC TROMETHAMINE 30 MG: 30 INJECTION, SOLUTION INTRAMUSCULAR; INTRAVENOUS at 08:53

## 2019-07-19 RX ADMIN — CIPROFLOXACIN HYDROCHLORIDE 500 MG: 500 TABLET, FILM COATED ORAL at 22:03

## 2019-07-19 RX ADMIN — DILTIAZEM HYDROCHLORIDE 360 MG: 120 CAPSULE, COATED, EXTENDED RELEASE ORAL at 08:00

## 2019-07-19 RX ADMIN — DOCUSATE SODIUM 100 MG: 100 CAPSULE, LIQUID FILLED ORAL at 10:20

## 2019-07-19 RX ADMIN — Medication 120 MG: at 08:56

## 2019-07-19 RX ADMIN — TAMSULOSIN HYDROCHLORIDE 0.4 MG: 0.4 CAPSULE ORAL at 10:19

## 2019-07-19 RX ADMIN — BUPROPION 450 MG: 150 TABLET, EXTENDED RELEASE ORAL at 10:19

## 2019-07-19 RX ADMIN — MIDAZOLAM 2 MG: 1 INJECTION INTRAMUSCULAR; INTRAVENOUS at 09:10

## 2019-07-19 RX ADMIN — DOCUSATE SODIUM 100 MG: 100 CAPSULE, LIQUID FILLED ORAL at 22:03

## 2019-07-19 RX ADMIN — TRAZODONE HYDROCHLORIDE 100 MG: 100 TABLET ORAL at 22:03

## 2019-07-19 RX ADMIN — LABETALOL HYDROCHLORIDE 10 MG: 5 INJECTION INTRAVENOUS at 09:08

## 2019-07-19 RX ADMIN — CIPROFLOXACIN HYDROCHLORIDE 500 MG: 500 TABLET, FILM COATED ORAL at 10:20

## 2019-07-19 RX ADMIN — LABETALOL HYDROCHLORIDE 10 MG: 5 INJECTION INTRAVENOUS at 08:54

## 2019-07-19 RX ADMIN — ATORVASTATIN CALCIUM 20 MG: 20 TABLET, FILM COATED ORAL at 10:19

## 2019-07-19 RX ADMIN — ETOMIDATE 18 MG: 2 INJECTION INTRAVENOUS at 08:56

## 2019-07-19 RX ADMIN — HYDRALAZINE HYDROCHLORIDE 7.5 MG: 20 INJECTION INTRAMUSCULAR; INTRAVENOUS at 08:54

## 2019-07-19 ASSESSMENT — ACTIVITIES OF DAILY LIVING (ADL)
HYGIENE/GROOMING: INDEPENDENT
HYGIENE/GROOMING: INDEPENDENT
DRESS: INDEPENDENT
ORAL_HYGIENE: INDEPENDENT
DRESS: INDEPENDENT
ORAL_HYGIENE: INDEPENDENT

## 2019-07-19 NOTE — PROGRESS NOTES
[facility], [place]   Psychiatric Progress Note        Interim History:     The patient's care was discussed with the treatment team during the daily team meeting and/or staff's chart notes were reviewed.  Staff report patient spends more time outside of his room. He is slightly more animated, denies Suicidal ideation. He had 9 ECT treatments, reports better mood and appears to be more future focused and optimistic. Said that he was happy that his urological problems were also addressed during this hospitalization. We discussed that [place] would take him next Thursday. I suggested to do one more ECT treatment on Monday. He agreed with this plan. He denied having any further questions or concerns. Denied med side effects.         Medications:       amLODIPine  10 mg Oral Daily     atorvastatin  20 mg Oral Daily     buPROPion  450 mg Oral QAM     ciprofloxacin  500 mg Oral Q12H CHAD     diltiazem ER COATED BEADS  360 mg Oral Daily     docusate sodium  100 mg Oral BID     lidocaine (PF)  40 mg Intravenous Once in ECT     tamsulosin  0.4 mg Oral Daily          Allergies:     Allergies   Allergen Reactions     Contrast Dye Difficulty breathing          Labs:     No results found for this or any previous visit (from the past 24 hour(s)).       Psychiatric Examination:     BP (!) 153/96   Pulse 85   Temp 98.3  F (36.8  C) (Temporal)   Resp 16   Ht 1.829 m (6')   Wt 109.3 kg (240 lb 14.4 oz)   SpO2 95%   BMI 32.67 kg/m    Weight is 240 lbs 14.4 oz  Body mass index is 32.67 kg/m .     Orthostatic Vitals       Most Recent      Sitting Orthostatic /73 07/19 0954    Sitting Orthostatic Pulse (bpm) 82 07/19 0954    Standing Orthostatic /77 07/19 0700    Standing Orthostatic Pulse (bpm) 104 07/19 0700         Appearance: dressed in hospital garbs  Attitude: cooperative  Eye Contact:  good  Mood: less depressed  Affect: constricted,   Speech: less monotone  Psychomotor Behavior: no  psychomotor agitation or retardation.  Throught Process:  Slow, but logical  Associations:  tight  Thought Content: denies Suicidal ideation, psychosis, Homicidal thoughts   Insight: partial  Judgement:  Mildly impaired, but improving   Oriented to:  Self, time and place  Attention Span and Concentration: mildly impaired  Recent and Remote Memory: fair, problems with short term memory after ECT. See discussion above.     Clinical Global Impressions  First:  Considering your total clinical experience with this particular patient population, how severe are the patient's symptoms at this time?: 7 (06/21/19 1701)  Compared to the patient's condition at the START of treatment, this patient's condition is:: 4 (06/21/19 1701)  Most recent:  Considering your total clinical experience with this particular patient population, how severe are the patient's symptoms at this time?: 5 (7/15/2019)  Compared to the patient's condition at the START of treatment, this patient's condition is:: 3 (7/15/2019)         Precautions:     Behavioral Orders   Procedures     Code 1 - Restrict to Unit     Code 2     Electroconvulsive therapy     ECT every Monday, Wednesday, and Friday     Electroconvulsive therapy     Series of up to 12 treatments. Begin Date: 6/28/19     Treating Psychiatrist providing ECT: Alexa    Notified on:  6/26/19     Routine Programming     As clinically indicated     Status 15     Every 15 minutes.     Suicide precautions     Patients on Suicide Precautions should have a Combination Diet ordered that includes a Diet selection(s) AND a Behavioral Tray selection for Safe Tray - with utensils, or Safe Tray - NO utensils            DIagnoses:     Major depressive disorder, current, severe.  Rule out generalized anxiety disorder.  The patient was also diagnosed in the past with dependent personality disorder.          Plan:     ECT #9 today. Will have at least 1 more ECTs on Monday. UTI treatment per IM's team. Will continue  to work on placement. Per CTC, he will, likely, go to [facility] in the middle of next week.

## 2019-07-19 NOTE — PROGRESS NOTES
07/19/19 1422   Behavioral Health   Hallucinations denies / not responding to hallucinations   Thinking poor concentration   Orientation person: oriented;place: oriented;date: oriented   Memory baseline memory   Insight poor   Judgement impaired   Eye Contact at examiner   Affect blunted, flat   Mood depressed   Physical Appearance/Attire attire appropriate to age and situation   Hygiene well groomed   Suicidality other (see comments)  (denies)   1. Wish to be Dead No   2. Non-Specific Active Suicidal Thoughts  No   Self Injury other (see comment)  (denies)   Elopement   (nothing to report)   Activity withdrawn   Speech clear;coherent   Medication Sensitivity no observed side effects   Psychomotor / Gait balanced;steady   Psycho Education   Type of Intervention 1:1 intervention   Response participates, initiates socially appropriate   Hours 0.5   Treatment Detail   (check in)   Activities of Daily Living   Hygiene/Grooming independent   Oral Hygiene independent   Dress independent   Room Organization independent   The patient was out of his room and attending groups this shift.  The patient reports he is starting to feel better day by day, but still concerned about being disconnected from his feeling and emotions.  The patient reports this current plan is for him to be in the hopsital until later next week and he report he is struggling trying to stay engaged here.  The patient was minimally social with his peers, but was in the milieu observing most of the shift.  The patient appeared to brighten up during the groups he attended and reports his goal was to go to the groups and try to engage.  The patient denies SI and SiB.

## 2019-07-19 NOTE — ANESTHESIA PREPROCEDURE EVALUATION
Anesthesia Pre-Procedure Evaluation    Patient: Flakito Hilliard   MRN:     [id number] Gender:   male   Age:    59 year old :      1960        Preoperative Diagnosis: * No surgery found *        Past Medical History:   Diagnosis Date     Depressive disorder      Hypertension      Sleep apnea       Past Surgical History:   Procedure Laterality Date     BLADDER SURGERY       CYSTOSTOMY, INSERT TUBE SUPRAPUBIC, COMBINED N/A 2014    Procedure: COMBINED CYSTOSTOMY, INSERT TUBE SUPRAPUBIC;  Surgeon: Min Prasad MD;  Location: [facility]     GENITOURINARY SURGERY      prostate surgery for stones     LASER HOLMIUM CYSTOSCOPY, INTERNAL URETHROTOMY, COMBINED N/A 10/24/2014    Procedure: COMBINED LASER HOLMIUM CYSTOSCOPY, INTERNAL URETHROTOMY;  Surgeon: Valentin Villatoro MD;  Location: [facility] OR     URETHROPLASTY N/A 12/10/2014    Procedure: URETHROPLASTY;  Surgeon: Niraj Burger MD;  Location: [facility] OR     URETHROPLASTY WITH BUCCAL GRAFT N/A 2016    Procedure: URETHROPLASTY WITH BUCCAL GRAFT;  Surgeon: Niraj Burger MD;  Location: [facility] OR          Anesthesia Evaluation     .             ROS/MED HX    ENT/Pulmonary:     (+)sleep apnea, , . .    Neurologic:       Cardiovascular:     (+) hypertension----. : . . . :. .       METS/Exercise Tolerance:     Hematologic:         Musculoskeletal:         GI/Hepatic:         Renal/Genitourinary:     (+) Nephrolithiasis ,       Endo:         Psychiatric:     (+) psychiatric history depression      Infectious Disease:         Malignancy:         Other:                         PHYSICAL EXAM:   Mental Status/Neuro: A/A/O   Airway: Facies: Feasible  Mallampati: II  Mouth/Opening: Full  TM distance: > 6 cm  Neck ROM: Full   Respiratory: Auscultation: CTAB     Resp. Rate: Normal     Resp. Effort: Normal      CV: Rhythm: Regular  Rate: Age appropriate  Heart: Normal Sounds   Comments:      Dental: Normal                  Lab Results   Component Value Date    WBC 9.4  07/16/2019    HGB 16.1 07/16/2019    HCT 46.6 07/16/2019     07/16/2019     07/18/2019    POTASSIUM 3.5 07/18/2019    CHLORIDE 106 07/18/2019    CO2 24 07/18/2019    BUN 22 07/18/2019    CR 1.12 07/18/2019     (H) 07/18/2019    AMILCAR 9.0 07/18/2019    ALBUMIN 3.8 05/19/2019    PROTTOTAL 7.5 05/19/2019    ALT 26 05/19/2019    AST 20 05/19/2019    ALKPHOS 77 05/19/2019    BILITOTAL 1.1 05/19/2019    TSH 2.01 05/19/2019    T4 1.19 05/25/2016       Preop Vitals  BP Readings from Last 3 Encounters:   07/19/19 127/79   06/20/19 136/88   06/14/19 140/88    Pulse Readings from Last 3 Encounters:   07/19/19 100   06/20/19 115   06/14/19 88      Resp Readings from Last 3 Encounters:   07/19/19 16   06/20/19 24   06/14/19 18    SpO2 Readings from Last 3 Encounters:   07/19/19 96%   06/20/19 96%   05/19/19 97%      Temp Readings from Last 1 Encounters:   07/19/19 36.7  C (98.1  F) (Oral)    Ht Readings from Last 1 Encounters:   06/20/19 1.829 m (6')      Wt Readings from Last 1 Encounters:   07/18/19 109.3 kg (240 lb 14.4 oz)    Estimated body mass index is 32.67 kg/m  as calculated from the following:    Height as of 6/20/19: 1.829 m (6').    Weight as of 7/18/19: 109.3 kg (240 lb 14.4 oz).     LDA:            Assessment:   ASA SCORE: 2    NPO Status: > 6 hours since completed [employer]   Documentation: H&P complete; Preop Testing complete; Consents complete   Proceeding: Proceed without further delay  Tobacco Use:  NO Active use of Tobacco/UNKNOWN Tobacco use status     Plan:   Anes. Type:  General      Induction:  IV (Standard)   Airway: Native Airway   Access/Monitoring: PIV   Maintenance: Balanced   Emergence: Procedure Site   Logistics: Same Day Surgery     Postop Pain/Sedation Strategy:  Standard-Options: Opioids PRN     PONV Management:  Adult Risk Factors:, Non-Smoker, Postop Opioids     CONSENT: Direct conversation   Plan and risks discussed with: Patient              Anesthesia Pre-Procedure  Evaluation    Patient: Flakito Hilliard   MRN:     [id number] Gender:   male   Age:    59 year old :      1960        Preoperative Diagnosis: * No surgery found *        Past Medical History:   Diagnosis Date     Depressive disorder      Hypertension      Sleep apnea       Past Surgical History:   Procedure Laterality Date     BLADDER SURGERY       CYSTOSTOMY, INSERT TUBE SUPRAPUBIC, COMBINED N/A 2014    Procedure: COMBINED CYSTOSTOMY, INSERT TUBE SUPRAPUBIC;  Surgeon: Min Prasad MD;  Location: [facility]     GENITOURINARY SURGERY      prostate surgery for stones     LASER HOLMIUM CYSTOSCOPY, INTERNAL URETHROTOMY, COMBINED N/A 10/24/2014    Procedure: COMBINED LASER HOLMIUM CYSTOSCOPY, INTERNAL URETHROTOMY;  Surgeon: Valentin Villatoro MD;  Location: [facility]     URETHROPLASTY N/A 12/10/2014    Procedure: URETHROPLASTY;  Surgeon: Niraj Burger MD;  Location: [facility] OR     URETHROPLASTY WITH BUCCAL GRAFT N/A 2016    Procedure: URETHROPLASTY WITH BUCCAL GRAFT;  Surgeon: Niraj Burger MD;  Location:  OR               [facility] AN PHYSICAL EXAM    Lab Results   Component Value Date    WBC 9.4 2019    HGB 16.1 2019    HCT 46.6 2019     2019     2019    POTASSIUM 3.5 2019    CHLORIDE 106 2019    CO2 24 2019    BUN 22 2019    CR 1.12 2019     (H) 2019    AMILCAR 9.0 2019    ALBUMIN 3.8 2019    PROTTOTAL 7.5 2019    ALT 26 2019    AST 20 2019    ALKPHOS 77 2019    BILITOTAL 1.1 2019    TSH 2.01 2019    T4 1.19 2016       Preop Vitals  BP Readings from Last 3 Encounters:   19 127/79   19 136/88   19 140/88    Pulse Readings from Last 3 Encounters:   19 100   19 115   19 88      Resp Readings from Last 3 Encounters:   19 16   19 24   19 18    SpO2 Readings from Last 3 Encounters:   19 96%   19 96%    05/19/19 97%      Temp Readings from Last 1 Encounters:   07/19/19 36.7  C (98.1  F) (Oral)    Ht Readings from Last 1 Encounters:   06/20/19 1.829 m (6')      Wt Readings from Last 1 Encounters:   07/18/19 109.3 kg (240 lb 14.4 oz)    Estimated body mass index is 32.67 kg/m  as calculated from the following:    Height as of 6/20/19: 1.829 m (6').    Weight as of 7/18/19: 109.3 kg (240 lb 14.4 oz).     LDA:            Assessment:   ASA SCORE: 2       Documentation: H&P complete; Preop Testing complete; Consents complete   Proceeding: Proceed without further delay  Tobacco Use:  NO Active use of Tobacco/UNKNOWN Tobacco use status     Plan:   Anes. Type:  General   Pre-Induction: Midazolam IV; Acetaminophen PO   Induction:  IV (Standard)   Airway: Oral ETT   Access/Monitoring: PIV   Maintenance: Balanced   Emergence: Procedure Site   Logistics: Same Day Surgery     Postop Pain/Sedation Strategy:  Standard-Options: Opioids PRN     PONV Management:  Adult Risk Factors:, Non-Smoker, Postop Opioids     CONSENT: Direct conversation   Plan and risks discussed with: Patient                            MD Keith Sauceda MD

## 2019-07-19 NOTE — OR NURSING
Patient adequate for discharge. Report called to inpatient [facility]. VSS, A/O, IV removed. Discharged with staff at this time.

## 2019-07-19 NOTE — PROCEDURES
Procedure/Surgery Information   [facility], [place]    Bedside Procedure Note  Date of Service (when I performed the procedure): 07/19/2019    Flakito Hilliard is a 59 year old male patient.  1. Suicidal ideation    2. Current episode of major depressive disorder without prior episode, unspecified depression episode severity    3. Severe recurrent major depression without psychotic features (H)      Past Medical History:   Diagnosis Date     Depressive disorder      Hypertension      Sleep apnea      Temp: 98.1  F (36.7  C) Temp src: Oral BP: 127/79 Pulse: 100   Resp: 16 SpO2: 96 % O2 Device: None (Room air)      Procedures     Wilmer Liu     [facility] [place]   ECT Procedure Note   07/19/2019    Flakito Hilliard [id number]   59 year old 1960     Patient Status: Inpatient    Is this the first in a series of 12 treatments?  No    History and Physical: Reviewed in medical record    Consent: Informed consent was signed by: patient    Date Consent Signed: 6/27/19      Allergies   Allergen Reactions     Contrast Dye Difficulty breathing       Weight:  240 lbs 14.4 oz              Indications for ECT:   Medications ineffective and History of good ECT response in one or more previous episodes of illness         Clinical Narrative:   Patient was admitted with worsening depression and suicidal ideation.  He's had positive response to ECT in the past.  He's continuing ECT for depression.  NPO after 2400.  Alert and Oriented x 3.    Mood is getting better.   He had no problems with his last ECT.  He says he'll likely be going to [place] after discharge.            Diagnosis:   Major depression         Pause for the Cause:     Right patient Yes   Right procedure/laterality settings: Yes          Intra-Procedure Documentation:     ECT #: 9   Treatment number this series: 9   Total treatment number: 9 + 2 previous series      Type of ECT:  Right, unilateral  ultrabrief    ECT Medications:    Toradol:  30mg   Lidocaine: 40mg  Etomidate 18mg  Caffeine: 250mg (in IV room)  Succinyl Choline: 100mg   Versed: 2mg immediately after seizure (as he has the sense that he can't breathe and is paralyzed but he is breathing and not paralyzed).  Today:  Labetalol:  10mg pre-ECT for high bp (gets every time for systolic reading +++)  Today:  Hydralazine: 7.5mg pre-ECT for high bp     ECT Strip Summary:   Energy Level: 55 percent  Motor Seizure Duration:  40 seconds  EEG Seizure Duration:   80 seconds    Complications: No    Plan:   This is his final ECT.      Wilmer Liu MD

## 2019-07-19 NOTE — PROGRESS NOTES
Brief Medicine Note     Medicine following for follow-up of BMP to assess renal function.     BP review with 123/77, had elevated BP of 153/96, but on orthostatic noted to be 103/73.     Medicine will follow for repeat BMP in AM. If renal function stable, will consider resuming Lisinopril at lower dose.    Please call with any additional questions or concerns.     Marnie Grayson PA-C  Hospitalist Service  Pager [phone]

## 2019-07-19 NOTE — PLAN OF CARE
"  Problem: OT General Care Plan  Goal: OT Frequency  Description  Pt will demonstrate consistent engagement in OT group activities that support recovery as evidenced by participating in >1 scheduled OT group/day for 5 days.      Note:   Attended the afternoon OT group. He participated in an activity focused on the Process of Recovery, identifying healthy perspectives and ways in managing one's positive growth. Answers were in context though on a couple occasions, started to add a comment to the discussion, was interrupted and when returning to finish, stated having forgotten what he was going to say. He stated feeling more clear thinking than earlier after ECT. Affect appeared flat. He talked about wanting to find new support and people to connect with as he explained all his past friends are drinkers\".      "

## 2019-07-19 NOTE — ANESTHESIA POSTPROCEDURE EVALUATION
Anesthesia POST Procedure Evaluation    Patient: Flakito Hilliard   MRN:     [id number] Gender:   male   Age:    59 year old :      1960        Preoperative Diagnosis: * No pre-op diagnosis entered *   * No procedures listed *   Postop Comments: No value filed.       Anesthesia Type:  General  No value filed.    Reportable Event: NO     PAIN: Uncomplicated   Sign Out status: Comfortable, Well controlled pain     PONV: No PONV   Sign Out status:  No Nausea or Vomiting     Neuro/Psych: Uneventful perioperative course   Sign Out Status: Preoperative baseline; Age appropriate mentation     Airway/Resp.: Uneventful perioperative course   Sign Out Status: Non labored breathing, age appropriate RR; Resp. Status within EXPECTED Parameters     CV: Uneventful perioperative course   Sign Out status: Appropriate BP and perfusion indices; Appropriate HR/Rhythm     Disposition:   Sign Out in:  PACU  Disposition:  Floor  Recovery Course: Uneventful  Follow-Up: Not required           Last Anesthesia Record Vitals:  CRNA VITALS  2019 0839 - 2019 0928      2019             Resp Rate (set):  8          Last PACU Vitals:  Vitals Value Taken Time   /77 2019  9:23 AM   Temp     Pulse     Resp 16 2019  9:23 AM   SpO2 91 % 2019  9:23 AM   Temp src     NIBP     Pulse     SpO2     Resp     Temp     Ht Rate     Temp 2           Electronically Signed By: Keith Evans MD, 2019, 9:28 AM

## 2019-07-20 LAB
ANION GAP SERPL CALCULATED.3IONS-SCNC: 4 MMOL/L (ref 3–14)
BUN SERPL-MCNC: 19 MG/DL (ref 7–30)
CALCIUM SERPL-MCNC: 8.6 MG/DL (ref 8.5–10.1)
CHLORIDE SERPL-SCNC: 109 MMOL/L (ref 94–109)
CO2 SERPL-SCNC: 27 MMOL/L (ref 20–32)
CREAT SERPL-MCNC: 1.1 MG/DL (ref 0.66–1.25)
GFR SERPL CREATININE-BSD FRML MDRD: 73 ML/MIN/{1.73_M2}
GLUCOSE SERPL-MCNC: 97 MG/DL (ref 70–99)
POTASSIUM SERPL-SCNC: 3.8 MMOL/L (ref 3.4–5.3)
SODIUM SERPL-SCNC: 140 MMOL/L (ref 133–144)

## 2019-07-20 PROCEDURE — 12400001 ZZH R&B MH UMMC

## 2019-07-20 PROCEDURE — 80048 BASIC METABOLIC PNL TOTAL CA: CPT | Performed by: PHYSICIAN ASSISTANT

## 2019-07-20 PROCEDURE — 36415 COLL VENOUS BLD VENIPUNCTURE: CPT | Performed by: PHYSICIAN ASSISTANT

## 2019-07-20 PROCEDURE — 25000132 ZZH RX MED GY IP 250 OP 250 PS 637: Performed by: CLINICAL NURSE SPECIALIST

## 2019-07-20 PROCEDURE — 25000132 ZZH RX MED GY IP 250 OP 250 PS 637: Performed by: PSYCHIATRY & NEUROLOGY

## 2019-07-20 PROCEDURE — 25000132 ZZH RX MED GY IP 250 OP 250 PS 637: Performed by: PHYSICIAN ASSISTANT

## 2019-07-20 RX ORDER — LISINOPRIL 20 MG/1
20 TABLET ORAL DAILY
Status: DISCONTINUED | OUTPATIENT
Start: 2019-07-20 | End: 2019-07-25 | Stop reason: HOSPADM

## 2019-07-20 RX ADMIN — CIPROFLOXACIN HYDROCHLORIDE 500 MG: 500 TABLET, FILM COATED ORAL at 08:48

## 2019-07-20 RX ADMIN — DOCUSATE SODIUM 100 MG: 100 CAPSULE, LIQUID FILLED ORAL at 21:12

## 2019-07-20 RX ADMIN — DOCUSATE SODIUM 100 MG: 100 CAPSULE, LIQUID FILLED ORAL at 08:49

## 2019-07-20 RX ADMIN — CIPROFLOXACIN HYDROCHLORIDE 500 MG: 500 TABLET, FILM COATED ORAL at 21:12

## 2019-07-20 RX ADMIN — DILTIAZEM HYDROCHLORIDE 360 MG: 120 CAPSULE, COATED, EXTENDED RELEASE ORAL at 08:48

## 2019-07-20 RX ADMIN — LISINOPRIL 20 MG: 20 TABLET ORAL at 17:12

## 2019-07-20 RX ADMIN — ATORVASTATIN CALCIUM 20 MG: 20 TABLET, FILM COATED ORAL at 08:49

## 2019-07-20 RX ADMIN — AMLODIPINE BESYLATE 10 MG: 5 TABLET ORAL at 08:49

## 2019-07-20 RX ADMIN — TAMSULOSIN HYDROCHLORIDE 0.4 MG: 0.4 CAPSULE ORAL at 08:49

## 2019-07-20 RX ADMIN — TRAZODONE HYDROCHLORIDE 100 MG: 100 TABLET ORAL at 21:12

## 2019-07-20 RX ADMIN — BUPROPION 450 MG: 150 TABLET, EXTENDED RELEASE ORAL at 08:48

## 2019-07-20 ASSESSMENT — ACTIVITIES OF DAILY LIVING (ADL)
ORAL_HYGIENE: INDEPENDENT
HYGIENE/GROOMING: HANDWASHING;INDEPENDENT
DRESS: SCRUBS (BEHAVIORAL HEALTH);INDEPENDENT

## 2019-07-20 NOTE — PROGRESS NOTES
07/19/19 2100   Therapeutic Recreation   Type of Intervention structured groups   Activity game   Response Participates, initiates socially appropriate   Hours 1     Pt participated in Therapeutic Recreation group with focus on leisure participation, social engagement, and strategic cognitive reasoning.  Engaged and cooperative in group recreational intervention via a group game.  Pt was a full participant throughout the entire duration of group. Pt would occasionally give hints and help explain the directions to help peers succeed in the activity. Showed progress in session goals. Pt mood was calm.  Pt was a positive participant.

## 2019-07-20 NOTE — PLAN OF CARE
"Pt is calm w bland affect, attending grp and is in the milieu, napping a bit in btwn. He denies si; rates depression and anxiety both 4. His mood is \"ok.\" Pt's  goal for the day is \"to take a shower.\"  "

## 2019-07-20 NOTE — PROGRESS NOTES
Patient reports improving mood, denies current SI/SIB. Patient was visible in the milieu but mostly withdrawn with flat affect. Patient participated in all groups and was pleasant and communicative upon approach.     07/19/19 2200   Behavioral Health   Hallucinations denies / not responding to hallucinations   Thinking intact   Orientation person: oriented;place: oriented;date: oriented;time: oriented   Memory baseline memory   Insight admits / accepts   Judgement impaired   Eye Contact at examiner   Affect blunted, flat   Mood mood is calm   Physical Appearance/Attire appears stated age;attire appropriate to age and situation   Hygiene well groomed   Suicidality other (see comments)  (Denies)   1. Wish to be Dead No   2. Non-Specific Active Suicidal Thoughts  No   Self Injury other (see comment)  (Denies)   Activity withdrawn   Speech clear;coherent   Medication Sensitivity no stated side effects;no observed side effects   Psychomotor / Gait balanced;steady   Activities of Daily Living   Hygiene/Grooming independent   Oral Hygiene independent   Dress independent   Room Organization independent

## 2019-07-20 NOTE — PROGRESS NOTES
Brief Medicine Note    Medicine following peripherally for follow-up of BMP results. BMP with stable Cr at 1.10. BP reviewed and patient has had intermittent episodes of hypertension.     HTN:   -Will restart Lisinopril 20mg daily  -Continue Amlodipine 10mg daily w/ hold parameters.    -Will hold hydrochlorothiazide 12.5mg daily.   -Patient to follow-up within 7 days of discharge with PCP for repeat BMP and BP monitoring. Please notify IM if patient is still in hospital in 7 days for monitoring. Please page IM if BP persistently >160/90 or if any hypotension.     Prostatitis  Irritative voiding symptoms  Hx of urethral stricture  -Patient to continue Ciprofloxacin 500mg BID for total of 28 days (start date 7/15).   -Patient to follow-up with Urology as OP within 2 weeks of discharge (Dr. Cano) for follow-up of prostatitis and for cystoscopy  -Continue Flomax daily      No further medical intervention is required at this time. Medicine signing off. Please feel free to call with any questions.       Marnie Grayson PA-C  Hospitalist Service  Pager [phone]

## 2019-07-21 PROCEDURE — 25000132 ZZH RX MED GY IP 250 OP 250 PS 637: Performed by: CLINICAL NURSE SPECIALIST

## 2019-07-21 PROCEDURE — 25000132 ZZH RX MED GY IP 250 OP 250 PS 637: Performed by: PHYSICIAN ASSISTANT

## 2019-07-21 PROCEDURE — 12400001 ZZH R&B MH UMMC

## 2019-07-21 PROCEDURE — 25000132 ZZH RX MED GY IP 250 OP 250 PS 637: Performed by: PSYCHIATRY & NEUROLOGY

## 2019-07-21 RX ADMIN — BUPROPION 450 MG: 150 TABLET, EXTENDED RELEASE ORAL at 08:42

## 2019-07-21 RX ADMIN — DOCUSATE SODIUM 100 MG: 100 CAPSULE, LIQUID FILLED ORAL at 21:51

## 2019-07-21 RX ADMIN — ATORVASTATIN CALCIUM 20 MG: 20 TABLET, FILM COATED ORAL at 08:42

## 2019-07-21 RX ADMIN — DOCUSATE SODIUM 100 MG: 100 CAPSULE, LIQUID FILLED ORAL at 08:43

## 2019-07-21 RX ADMIN — DILTIAZEM HYDROCHLORIDE 360 MG: 120 CAPSULE, COATED, EXTENDED RELEASE ORAL at 08:42

## 2019-07-21 RX ADMIN — CIPROFLOXACIN HYDROCHLORIDE 500 MG: 500 TABLET, FILM COATED ORAL at 21:51

## 2019-07-21 RX ADMIN — TAMSULOSIN HYDROCHLORIDE 0.4 MG: 0.4 CAPSULE ORAL at 08:42

## 2019-07-21 RX ADMIN — TRAZODONE HYDROCHLORIDE 100 MG: 100 TABLET ORAL at 21:51

## 2019-07-21 RX ADMIN — AMLODIPINE BESYLATE 10 MG: 5 TABLET ORAL at 08:42

## 2019-07-21 RX ADMIN — LISINOPRIL 20 MG: 20 TABLET ORAL at 08:43

## 2019-07-21 RX ADMIN — CIPROFLOXACIN HYDROCHLORIDE 500 MG: 500 TABLET, FILM COATED ORAL at 08:43

## 2019-07-21 ASSESSMENT — ACTIVITIES OF DAILY LIVING (ADL)
DRESS: INDEPENDENT
HYGIENE/GROOMING: INDEPENDENT
HYGIENE/GROOMING: INDEPENDENT
LAUNDRY: WITH SUPERVISION
ORAL_HYGIENE: INDEPENDENT
ORAL_HYGIENE: INDEPENDENT
DRESS: SCRUBS (BEHAVIORAL HEALTH)
LAUNDRY: WITH SUPERVISION

## 2019-07-21 ASSESSMENT — MIFFLIN-ST. JEOR: SCORE: 1948.9

## 2019-07-21 NOTE — PROGRESS NOTES
Pt spent most of his time in the lounge, watching TV and attended groups. He denies suicidal ideations or hallucinations. He is worrying about placement and having therapy. He has been cooperative and pleasant. He needs a shower before tomorrow.     07/21/19 1359   Behavioral Health   Hallucinations denies / not responding to hallucinations   Thinking intact   Orientation time: oriented;date: oriented;place: oriented;person: oriented   Memory baseline memory   Insight insight appropriate to situation   Judgement impaired   Eye Contact at examiner   Affect blunted, flat   Mood depressed;mood is calm   Physical Appearance/Attire appears stated age   Hygiene body odor   Suicidality other (see comments)  (Denies)   1. Wish to be Dead No   2. Non-Specific Active Suicidal Thoughts  No   Activity withdrawn   Speech clear;coherent   Psychomotor / Gait balanced;steady   Psycho Education   Type of Intervention 1:1 intervention   Response participates, initiates socially appropriate   Hours 0.5   Activities of Daily Living   Hygiene/Grooming independent   Oral Hygiene independent   Dress scrubs (behavioral health)   Laundry with supervision   Room Organization independent   Activity   Activity Assistance Provided independent

## 2019-07-22 ENCOUNTER — ANESTHESIA (OUTPATIENT)
Dept: BEHAVIORAL HEALTH | Facility: CLINIC | Age: 59
End: 2019-07-22

## 2019-07-22 ENCOUNTER — ANESTHESIA EVENT (OUTPATIENT)
Dept: BEHAVIORAL HEALTH | Facility: CLINIC | Age: 59
End: 2019-07-22

## 2019-07-22 PROCEDURE — H2032 ACTIVITY THERAPY, PER 15 MIN: HCPCS

## 2019-07-22 PROCEDURE — 25000128 H RX IP 250 OP 636: Performed by: PSYCHIATRY & NEUROLOGY

## 2019-07-22 PROCEDURE — 25000125 ZZHC RX 250: Performed by: PSYCHIATRY & NEUROLOGY

## 2019-07-22 PROCEDURE — 25000132 ZZH RX MED GY IP 250 OP 250 PS 637: Performed by: PHYSICIAN ASSISTANT

## 2019-07-22 PROCEDURE — 25000125 ZZHC RX 250: Performed by: ANESTHESIOLOGY

## 2019-07-22 PROCEDURE — 25000132 ZZH RX MED GY IP 250 OP 250 PS 637: Performed by: PSYCHIATRY & NEUROLOGY

## 2019-07-22 PROCEDURE — 99207 ZZC CONSULT E&M CHANGED TO SUBSEQUENT LEVEL: CPT | Performed by: PHYSICIAN ASSISTANT

## 2019-07-22 PROCEDURE — 25000128 H RX IP 250 OP 636: Performed by: ANESTHESIOLOGY

## 2019-07-22 PROCEDURE — 90870 ELECTROCONVULSIVE THERAPY: CPT

## 2019-07-22 PROCEDURE — 12400001 ZZH R&B MH UMMC

## 2019-07-22 PROCEDURE — GZB0ZZZ ELECTROCONVULSIVE THERAPY, UNILATERAL-SINGLE SEIZURE: ICD-10-PCS | Performed by: PSYCHIATRY & NEUROLOGY

## 2019-07-22 PROCEDURE — 99232 SBSQ HOSP IP/OBS MODERATE 35: CPT | Performed by: PHYSICIAN ASSISTANT

## 2019-07-22 PROCEDURE — 25000132 ZZH RX MED GY IP 250 OP 250 PS 637: Performed by: CLINICAL NURSE SPECIALIST

## 2019-07-22 PROCEDURE — 99232 SBSQ HOSP IP/OBS MODERATE 35: CPT | Mod: 25 | Performed by: PSYCHIATRY & NEUROLOGY

## 2019-07-22 RX ORDER — LIDOCAINE HYDROCHLORIDE 20 MG/ML
40 INJECTION, SOLUTION EPIDURAL; INFILTRATION; INTRACAUDAL; PERINEURAL
Status: CANCELLED
Start: 2019-07-22

## 2019-07-22 RX ORDER — HYDRALAZINE HYDROCHLORIDE 20 MG/ML
INJECTION INTRAMUSCULAR; INTRAVENOUS PRN
Status: DISCONTINUED | OUTPATIENT
Start: 2019-07-22 | End: 2019-07-22

## 2019-07-22 RX ORDER — CAFFEINE AND SODIUM BENZOATE 125 MG/ML
250 INJECTION, SOLUTION INTRAMUSCULAR; INTRAVENOUS
Status: COMPLETED | OUTPATIENT
Start: 2019-07-22 | End: 2019-07-22

## 2019-07-22 RX ORDER — ETOMIDATE 2 MG/ML
INJECTION INTRAVENOUS PRN
Status: DISCONTINUED | OUTPATIENT
Start: 2019-07-22 | End: 2019-07-22

## 2019-07-22 RX ORDER — CAFFEINE AND SODIUM BENZOATE 125 MG/ML
250 INJECTION, SOLUTION INTRAMUSCULAR; INTRAVENOUS
Status: CANCELLED
Start: 2019-07-22

## 2019-07-22 RX ORDER — LIDOCAINE HYDROCHLORIDE 20 MG/ML
40 INJECTION, SOLUTION EPIDURAL; INFILTRATION; INTRACAUDAL; PERINEURAL
Status: COMPLETED | OUTPATIENT
Start: 2019-07-22 | End: 2019-07-22

## 2019-07-22 RX ORDER — KETOROLAC TROMETHAMINE 30 MG/ML
30 INJECTION, SOLUTION INTRAMUSCULAR; INTRAVENOUS
Status: CANCELLED
Start: 2019-07-22

## 2019-07-22 RX ORDER — LABETALOL HYDROCHLORIDE 5 MG/ML
INJECTION, SOLUTION INTRAVENOUS PRN
Status: DISCONTINUED | OUTPATIENT
Start: 2019-07-22 | End: 2019-07-22

## 2019-07-22 RX ORDER — KETOROLAC TROMETHAMINE 30 MG/ML
30 INJECTION, SOLUTION INTRAMUSCULAR; INTRAVENOUS
Status: COMPLETED | OUTPATIENT
Start: 2019-07-22 | End: 2019-07-22

## 2019-07-22 RX ADMIN — CIPROFLOXACIN HYDROCHLORIDE 500 MG: 500 TABLET, FILM COATED ORAL at 21:02

## 2019-07-22 RX ADMIN — Medication 120 MG: at 09:04

## 2019-07-22 RX ADMIN — LIDOCAINE HYDROCHLORIDE 40 MG: 20 INJECTION, SOLUTION EPIDURAL; INFILTRATION; INTRACAUDAL; PERINEURAL at 09:01

## 2019-07-22 RX ADMIN — TRAZODONE HYDROCHLORIDE 100 MG: 100 TABLET ORAL at 21:02

## 2019-07-22 RX ADMIN — MIDAZOLAM 2 MG: 1 INJECTION INTRAMUSCULAR; INTRAVENOUS at 09:09

## 2019-07-22 RX ADMIN — DOCUSATE SODIUM 100 MG: 100 CAPSULE, LIQUID FILLED ORAL at 21:01

## 2019-07-22 RX ADMIN — LABETALOL HYDROCHLORIDE 10 MG: 5 INJECTION INTRAVENOUS at 09:02

## 2019-07-22 RX ADMIN — TAMSULOSIN HYDROCHLORIDE 0.4 MG: 0.4 CAPSULE ORAL at 10:28

## 2019-07-22 RX ADMIN — CAFFEINE AND SODIUM BENZOATE 250 MG: 125 INJECTION, SOLUTION INTRAMUSCULAR; INTRAVENOUS at 08:58

## 2019-07-22 RX ADMIN — HYDRALAZINE HYDROCHLORIDE 7.5 MG: 20 INJECTION INTRAMUSCULAR; INTRAVENOUS at 09:02

## 2019-07-22 RX ADMIN — ETOMIDATE 18 MG: 2 INJECTION INTRAVENOUS at 09:03

## 2019-07-22 RX ADMIN — LISINOPRIL 20 MG: 20 TABLET ORAL at 10:27

## 2019-07-22 RX ADMIN — ATORVASTATIN CALCIUM 20 MG: 20 TABLET, FILM COATED ORAL at 10:28

## 2019-07-22 RX ADMIN — BUPROPION 450 MG: 150 TABLET, EXTENDED RELEASE ORAL at 10:27

## 2019-07-22 RX ADMIN — KETOROLAC TROMETHAMINE 30 MG: 30 INJECTION, SOLUTION INTRAMUSCULAR; INTRAVENOUS at 08:58

## 2019-07-22 RX ADMIN — DOCUSATE SODIUM 100 MG: 100 CAPSULE, LIQUID FILLED ORAL at 10:28

## 2019-07-22 RX ADMIN — CIPROFLOXACIN HYDROCHLORIDE 500 MG: 500 TABLET, FILM COATED ORAL at 10:27

## 2019-07-22 RX ADMIN — DILTIAZEM HYDROCHLORIDE 360 MG: 120 CAPSULE, COATED, EXTENDED RELEASE ORAL at 08:17

## 2019-07-22 RX ADMIN — AMLODIPINE BESYLATE 10 MG: 5 TABLET ORAL at 08:17

## 2019-07-22 ASSESSMENT — ACTIVITIES OF DAILY LIVING (ADL)
HYGIENE/GROOMING: INDEPENDENT
ORAL_HYGIENE: INDEPENDENT
HYGIENE/GROOMING: INDEPENDENT
ORAL_HYGIENE: INDEPENDENT
DRESS: INDEPENDENT
DRESS: INDEPENDENT

## 2019-07-22 NOTE — PROCEDURES
Procedure/Surgery Information   [facility], [place]    Bedside Procedure Note  Date of Service (when I performed the procedure): 07/22/2019    Flakito Hilliard is a 59 year old male patient.  1. Suicidal ideation    2. Current episode of major depressive disorder without prior episode, unspecified depression episode severity    3. Severe recurrent major depression without psychotic features (H)      Past Medical History:   Diagnosis Date     Depressive disorder      Hypertension      Sleep apnea      Temp: 98  F (36.7  C) Temp src: Oral BP: 114/85 Pulse: 94   Resp: 16          Procedures     Wilmer Liu     [facility], [place]   ECT Procedure Note   07/22/2019    Flakito Hilliard [id number]   59 year old 1960     Patient Status: Inpatient    Is this the first in a series of 12 treatments?  No    History and Physical: Reviewed in medical record    Consent: Informed consent was signed by: patient    Date Consent Signed: 6/27/19      Allergies   Allergen Reactions     Contrast Dye Difficulty breathing       Weight:  240 lbs 8 oz              Indications for ECT:   Medications ineffective and History of good ECT response in one or more previous episodes of illness         Clinical Narrative:   Patient was admitted with worsening depression and suicidal ideation.  He's had positive response to ECT in the past.  He's continuing ECT for depression.  NPO after 2400.  Alert and Oriented x 3.    Mood is getting better.   He had no problems with his last ECT.  He says he'll likely be going to [place] after discharge.   He was to end ECT on Friday but asks for one more treatment before discharge.           Diagnosis:   Major depression         Pause for the Cause:     Right patient Yes   Right procedure/laterality settings: Yes          Intra-Procedure Documentation:     ECT #: 10   Treatment number this series: 10   Total treatment number: 10 + 2 previous series       Type of ECT:  Right, unilateral ultrabrief    ECT Medications:    Toradol:  30mg   Lidocaine: 40mg  Etomidate 18mg  Caffeine: 250mg (in IV room)  Succinyl Choline: 100mg   Versed: 2mg immediately after seizure (as he has the sense that he can't breathe and is paralyzed but he is breathing and not paralyzed).  Today:  Labetalol:  10mg pre-ECT for high bp (gets every time for systolic reading +++)  Today:  Hydralazine: 7.5mg pre-ECT for high bp     ECT Strip Summary:   Energy Level: 55 percent  Motor Seizure Duration:   35 seconds  EEG Seizure Duration:   105 seconds    Complications: No    Plan:   This is his final ECT.      Wilmer Liu MD

## 2019-07-22 NOTE — ANESTHESIA PREPROCEDURE EVALUATION
Anesthesia Pre-Procedure Evaluation    Patient: Flakito Hilliard   MRN:     [id number] Gender:   male   Age:    59 year old :      1960        Preoperative Diagnosis: * No surgery found *        Past Medical History:   Diagnosis Date     Depressive disorder      Hypertension      Sleep apnea       Past Surgical History:   Procedure Laterality Date     BLADDER SURGERY       CYSTOSTOMY, INSERT TUBE SUPRAPUBIC, COMBINED N/A 2014    Procedure: COMBINED CYSTOSTOMY, INSERT TUBE SUPRAPUBIC;  Surgeon: Min Prasad MD;  [facility]     GENITOURINARY SURGERY      prostate surgery for stones     LASER HOLMIUM CYSTOSCOPY, INTERNAL URETHROTOMY, COMBINED N/A 10/24/2014    Procedure: COMBINED LASER HOLMIUM CYSTOSCOPY, INTERNAL URETHROTOMY;  Surgeon: Valentin Villatoro MD;  Location: [facility] OR     URETHROPLASTY N/A 12/10/2014    Procedure: URETHROPLASTY;  Surgeon: Niraj Burger MD;  Location: [facility] OR     URETHROPLASTY WITH BUCCAL GRAFT N/A 2016    Procedure: URETHROPLASTY WITH BUCCAL GRAFT;  Surgeon: Niraj Burger MD;  [facility]: [facility] OR          Anesthesia Evaluation     . Pt has had prior anesthetic. Type: General    No history of anesthetic complications          ROS/MED HX    ENT/Pulmonary:     (+)sleep apnea, , . .    Neurologic:       Cardiovascular:     (+) hypertension----. : . . . :. . Previous cardiac testing date:results:date: results:ECG reviewed date: results:SR with 1 degree AV block date: results:          METS/Exercise Tolerance:  >4 METS   Hematologic:         Musculoskeletal:         GI/Hepatic:         Renal/Genitourinary:     (+) Nephrolithiasis ,       Endo:         Psychiatric:     (+) psychiatric history depression      Infectious Disease:         Malignancy:         Other:                         PHYSICAL EXAM:   Mental Status/Neuro: A/A/O   Airway: Facies: Feasible  Mallampati: II  Mouth/Opening: Full  TM distance: > 6 cm  Neck ROM: Full   Respiratory: Auscultation: CTAB      Resp. Rate: Normal     Resp. Effort: Normal      CV: Rhythm: Regular  Rate: Age appropriate  Heart: Normal Sounds   Comments:      Dental: Normal                  Lab Results   Component Value Date    WBC 9.4 07/16/2019    HGB 16.1 07/16/2019    HCT 46.6 07/16/2019     07/16/2019     07/20/2019    POTASSIUM 3.8 07/20/2019    CHLORIDE 109 07/20/2019    CO2 27 07/20/2019    BUN 19 07/20/2019    CR 1.10 07/20/2019    GLC 97 07/20/2019    AMILCAR 8.6 07/20/2019    ALBUMIN 3.8 05/19/2019    PROTTOTAL 7.5 05/19/2019    ALT 26 05/19/2019    AST 20 05/19/2019    ALKPHOS 77 05/19/2019    BILITOTAL 1.1 05/19/2019    TSH 2.01 05/19/2019    T4 1.19 05/25/2016       Preop Vitals  BP Readings from Last 3 Encounters:   07/22/19 139/89   06/20/19 136/88   06/14/19 140/88    Pulse Readings from Last 3 Encounters:   07/22/19 107   06/20/19 115   06/14/19 88      Resp Readings from Last 3 Encounters:   07/22/19 16   06/20/19 24   06/14/19 18    SpO2 Readings from Last 3 Encounters:   07/22/19 96%   06/20/19 96%   05/19/19 97%      Temp Readings from Last 1 Encounters:   07/22/19 36.6  C (97.8  F) (Oral)    Ht Readings from Last 1 Encounters:   06/20/19 1.829 m (6')      Wt Readings from Last 1 Encounters:   07/21/19 109.1 kg (240 lb 8 oz)    Estimated body mass index is 32.62 kg/m  as calculated from the following:    Height as of 6/20/19: 1.829 m (6').    Weight as of 7/21/19: 109.1 kg (240 lb 8 oz).     LDA:            Assessment:   ASA SCORE: 2    NPO Status: > 6 hours since completed [employer]   Documentation: H&P complete; Preop Testing complete; Consents complete   Proceeding: Proceed without further delay  Tobacco Use:  NO Active use of Tobacco/UNKNOWN Tobacco use status     Plan:   Anes. Type:  General      Induction:  IV (Standard); Etomidate; Sux   Airway: Native Airway   Access/Monitoring: PIV   Maintenance: Balanced   Emergence: Procedure Site   Logistics: Same Day Surgery     Postop Pain/Sedation  Strategy:  Standard-Options: Opioids PRN     PONV Management:  Adult Risk Factors:, Non-Smoker, Postop Opioids     CONSENT: Direct conversation   Plan and risks discussed with: Patient   Blood Products: N/a           Anesthesia Pre-Procedure Evaluation    Patient: Flakito Hilliard   MRN:     [id number] Gender:   male   Age:    59 year old :      1960        Preoperative Diagnosis: * No surgery found *        Past Medical History:   Diagnosis Date     Depressive disorder      Hypertension      Sleep apnea       Past Surgical History:   Procedure Laterality Date     BLADDER SURGERY       CYSTOSTOMY, INSERT TUBE SUPRAPUBIC, COMBINED N/A 2014    Procedure: COMBINED CYSTOSTOMY, INSERT TUBE SUPRAPUBIC;  Surgeon: Min Prasad MD;  [facility]     GENITOURINARY SURGERY      prostate surgery for stones     LASER HOLMIUM CYSTOSCOPY, INTERNAL URETHROTOMY, COMBINED N/A 10/24/2014    Procedure: COMBINED LASER HOLMIUM CYSTOSCOPY, INTERNAL URETHROTOMY;  Surgeon: Valentin Villatoro MD;  [facility]     URETHROPLASTY N/A 12/10/2014    Procedure: URETHROPLASTY;  Surgeon: Niraj Burger MD;  [facility]     URETHROPLASTY WITH BUCCAL GRAFT N/A 2016    Procedure: URETHROPLASTY WITH BUCCAL GRAFT;  Surgeon: Niraj Burger MD;  Location:  OR               JG FV AN PHYSICAL EXAM    Lab Results   Component Value Date    WBC 9.4 2019    HGB 16.1 2019    HCT 46.6 2019     2019     2019    POTASSIUM 3.8 2019    CHLORIDE 109 2019    CO2 27 2019    BUN 19 2019    CR 1.10 2019    GLC 97 2019    AMILCAR 8.6 2019    ALBUMIN 3.8 2019    PROTTOTAL 7.5 2019    ALT 26 2019    AST 20 2019    ALKPHOS 77 2019    BILITOTAL 1.1 2019    TSH 2.01 2019    T4 1.19 2016       Preop Vitals  BP Readings from Last 3 Encounters:   19 139/89   19 136/88   19 140/88    Pulse  Readings from Last 3 Encounters:   07/22/19 107   06/20/19 115   06/14/19 88      Resp Readings from Last 3 Encounters:   07/22/19 16   06/20/19 24   06/14/19 18    SpO2 Readings from Last 3 Encounters:   07/22/19 96%   06/20/19 96%   05/19/19 97%      Temp Readings from Last 1 Encounters:   07/22/19 36.6  C (97.8  F) (Oral)    Ht Readings from Last 1 Encounters:   06/20/19 1.829 m (6')      Wt Readings from Last 1 Encounters:   07/21/19 109.1 kg (240 lb 8 oz)    Estimated body mass index is 32.62 kg/m  as calculated from the following:    Height as of 6/20/19: 1.829 m (6').    Weight as of 7/21/19: 109.1 kg (240 lb 8 oz).     LDA:            Assessment:   ASA SCORE: 2       Documentation: H&P complete; Preop Testing complete; Consents complete   Proceeding: Proceed without further delay  Tobacco Use:  NO Active use of Tobacco/UNKNOWN Tobacco use status     Plan:   Anes. Type:  General   Pre-Induction: Midazolam IV; Acetaminophen PO   Induction:  IV (Standard)   Airway: Oral ETT   Access/Monitoring: PIV   Maintenance: Balanced   Emergence: Procedure Site   Logistics: Same Day Surgery     Postop Pain/Sedation Strategy:  Standard-Options: Opioids PRN     PONV Management:  Adult Risk Factors:, Non-Smoker, Postop Opioids     CONSENT: Direct conversation   Plan and risks discussed with: Patient                            DO Deonte Child DO

## 2019-07-22 NOTE — PROGRESS NOTES
[facility], [place]   Psychiatric Progress Note        Interim History:     The patient's care was discussed with the treatment team during the daily team meeting and/or staff's chart notes were reviewed.  Staff report patient spends more time outside of his room. He is slightly more animated, denies Suicidal ideation. During today visit he complained of feeling more tired than usual. Reported feeling overall, less depressed, but still worried about his future. He appears to be doing better cognitively. Per OT, no word finding difficulties, better able to focus and concentrate. He denied having any further questions or concerns. Denied med side effects.         Medications:       amLODIPine  10 mg Oral Daily     atorvastatin  20 mg Oral Daily     buPROPion  450 mg Oral QAM     ciprofloxacin  500 mg Oral Q12H CHAD     diltiazem ER COATED BEADS  360 mg Oral Daily     docusate sodium  100 mg Oral BID     lisinopril  20 mg Oral Daily     tamsulosin  0.4 mg Oral Daily          Allergies:     Allergies   Allergen Reactions     Contrast Dye Difficulty breathing          Labs:     No results found for this or any previous visit (from the past 24 hour(s)).       Psychiatric Examination:     /77   Pulse 85   Temp 97.7  F (36.5  C) (Oral)   Resp 16   Ht 1.829 m (6')   Wt 109.1 kg (240 lb 8 oz)   SpO2 94%   BMI 32.62 kg/m    Weight is 240 lbs 8 oz  Body mass index is 32.62 kg/m .     Orthostatic Vitals       Most Recent      Sitting Orthostatic /77 07/22 1023    Sitting Orthostatic Pulse (bpm) 85 07/22 1023    Standing Orthostatic /83 07/22 0700    Standing Orthostatic Pulse (bpm) 168 07/22 0700         Appearance: dressed in hospital garbs  Attitude: cooperative  Eye Contact:  good  Mood: less depressed  Affect: constricted,   Speech: less monotone  Psychomotor Behavior: no psychomotor agitation or retardation.  Throught Process:  Slow, but logical  Associations:   tight  Thought Content: denies Suicidal ideation, psychosis, Homicidal thoughts   Insight: partial  Judgement:  Mildly impaired, but improving   Oriented to:  Self, time and place  Attention Span and Concentration: mildly impaired  Recent and Remote Memory: fair, problems with short term memory after ECT, but less significant. See discussion above.     Clinical Global Impressions  First:  Considering your total clinical experience with this particular patient population, how severe are the patient's symptoms at this time?: 7 (06/21/19 1701)  Compared to the patient's condition at the START of treatment, this patient's condition is:: 4 (06/21/19 1701)  Most recent:  Considering your total clinical experience with this particular patient population, how severe are the patient's symptoms at this time?: 4 (7/22/2019)  Compared to the patient's condition at the START of treatment, this patient's condition is:: 2 (7/22/2019)         Precautions:     Behavioral Orders   Procedures     Code 1 - Restrict to Unit     Code 2     Electroconvulsive therapy     ECT every Monday, Wednesday, and Friday     Electroconvulsive therapy     Series of up to 12 treatments. Begin Date: 6/28/19     Treating Psychiatrist providing ECT: Alexa    Notified on:  6/26/19     Routine Programming     As clinically indicated     Status 15     Every 15 minutes.     Suicide precautions     Patients on Suicide Precautions should have a Combination Diet ordered that includes a Diet selection(s) AND a Behavioral Tray selection for Safe Tray - with utensils, or Safe Tray - NO utensils            DIagnoses:     Major depressive disorder, current, severe.  Rule out generalized anxiety disorder.  The patient was also diagnosed in the past with dependent personality disorder.          Plan:     ECT #10 today. It is a final ECT. He is better, overall, though, still reports quite a lot of anxiety, See discussion above.  Will be transferred to [place] [facility] this  Thursday.

## 2019-07-22 NOTE — ANESTHESIA POSTPROCEDURE EVALUATION
Anesthesia POST Procedure Evaluation    Patient: Flakito Hilliard   MRN:     [id number] Gender:   male   Age:    59 year old :      1960        Preoperative Diagnosis: * No pre-op diagnosis entered *   * No procedures listed *   Postop Comments: No value filed.       Anesthesia Type:  General  No value filed.    Reportable Event: NO     PAIN: Uncomplicated   Sign Out status: Comfortable, Well controlled pain     PONV: No PONV   Sign Out status:  No Nausea or Vomiting     Neuro/Psych: Uneventful perioperative course   Sign Out Status: Preoperative baseline; Age appropriate mentation     Airway/Resp.: Uneventful perioperative course   Sign Out Status: Non labored breathing, age appropriate RR; Resp. Status within EXPECTED Parameters     CV: Uneventful perioperative course   Sign Out status: Appropriate BP and perfusion indices; Appropriate HR/Rhythm     Disposition:   Sign Out in:  PACU  Disposition:  Phase II; Home  Recovery Course: Uneventful  Follow-Up: Not required           Last Anesthesia Record Vitals:  CRNA VITALS  2019 0849 - 2019 0949      2019             Resp Rate (observed):  16    Resp Rate (set):  8          Last PACU Vitals:  No vitals data found for the desired time range.        Electronically Signed By: Deonte Westfall DO, 2019, 9:50 AM

## 2019-07-22 NOTE — PROGRESS NOTES
Pt had no complaints of urinary retention or urgency during the shift. Pt was bladder scanned at 1500 per order and was scanned at 27 ml.

## 2019-07-22 NOTE — PROGRESS NOTES
"   07/22/19 1411   Behavioral Health   Hallucinations denies / not responding to hallucinations   Thinking poor concentration   Orientation person: oriented;place: oriented;date: oriented   Memory baseline memory   Insight poor   Judgement impaired   Eye Contact at examiner   Affect blunted, flat   Mood depressed   Physical Appearance/Attire attire appropriate to age and situation   Hygiene well groomed   Suicidality other (see comments)  (denies)   1. Wish to be Dead No   2. Non-Specific Active Suicidal Thoughts  No   Self Injury other (see comment)  (denies)   Elopement   (nothing to erport)   Activity withdrawn;isolative   Speech clear;coherent   Medication Sensitivity no observed side effects   Psychomotor / Gait balanced;steady   Psycho Education   Type of Intervention 1:1 intervention   Response participates, initiates socially appropriate   Hours 0.5   Treatment Detail   (check in)   Activities of Daily Living   Hygiene/Grooming independent   Oral Hygiene independent   Dress independent   Room Organization independent   The patient had ECT treatment in the morning and was mostly in his room resting after the treatment.  The patient was out in the milieu for about 30 minutes before lunch and eat in the dinning area.  The patient reported feeling a bit more tired than normal after the ECT, but was still feeling \"okay.\"  The patient did express that he is feeling better and is glad to be done with ECT treatment.  The patient is hopeful but guarded about the future.  The patient denies SI and SiB.    "

## 2019-07-22 NOTE — CONSULTS
[facility], [place]  Consult Note - Hospitalist Service     Date of Admission:  6/20/2019  Consult Requested by: psychiatry  Reason for Consult: routine H&P needed for placement    Assessment & Plan   Flakito Hilliard is a 59 year old male with a hx of HTN, PATRICE, depression, urethral stricture s/p dorsal onlay with recurrence s/p buccal graft ventral onlay urethroplasty in 2016 who was admitted to behavioral health on 6/20/19 for worsening depression. Medicine consulted 7/22 for routine H&P needed for placement.     Major depressive disorder. Defer to psych, primary team.     Prostatitis  Urethral stricture s/p urethroplasty. Extensive urologic hx, followed by Dr Cano. Prostatitis was dx this admission and patient was started on cipro for prolonged course per d/w urology.  - Cont ciprofloxacin 500mg BID x 28d (thru 8/12/19)  - Cont flomax daily  - Follow up with Dr Cano (Urology) within 2 weeks after discharge for cystoscopy and follow up    HTN. BPs normotensive. Cont norvasc 10mg daily, lisinopril 20mg daily, and diltiazem 360mg daily.     PATRICE. Has not been using mask here as only tolerates his mask at home. Encourage using of CPAP while sleeping, care coordinator or social work to help arrange having his CPAP continued at [facility] on discharge.    HLD. Cont statin.       Manda Izaguirre PA-C  [facility], [place]  __________________________________________________________________    Chief Complaint   Placement H&P    History of Present Illness   Flakito Hilliard is a 59 year old male with a hx of HTN, PATRICE, depression, urethral stricture s/p dorsal onlay with recurrence s/p buccal graft ventral onlay urethroplasty in 2016 who was admitted to behavioral health on 6/20/19 for worsening depression. Medicine consulted 7/22 for routine H&P needed for placement.     Currently, patient is doing quite well. Had ECT this morning so feels a little groggy. Denies  any headache. Denies any chest pain, sob, or dyspnea. Reports that he hasnt used CPAP since being here as he doesn't tolerate the mask option they give him. He is eating well, getting sick of the food choices here. No abdominal pain, nausea or emesis. No fevers or chills. Voiding well without issue. Denies any bladder pain. No other acute concerns.     Review of Systems   The 10 point Review of Systems is negative other than noted in the HPI or here.     Past Medical History    I have reviewed this patient's medical history and updated it with pertinent information if needed.   Past Medical History:   Diagnosis Date     Depressive disorder      H/O urethral stricture      Hypertension      Sleep apnea        Past Surgical History   I have reviewed this patient's surgical history and updated it with pertinent information if needed.  Past Surgical History:   Procedure Laterality Date     BLADDER SURGERY       CYSTOSTOMY, INSERT TUBE SUPRAPUBIC, COMBINED N/A 9/8/2014    Procedure: COMBINED CYSTOSTOMY, INSERT TUBE SUPRAPUBIC;  Surgeon: Min Prasad MD;  Location: [facility] OR     GENITOURINARY SURGERY      prostate surgery for stones     LASER HOLMIUM CYSTOSCOPY, INTERNAL URETHROTOMY, COMBINED N/A 10/24/2014    Procedure: COMBINED LASER HOLMIUM CYSTOSCOPY, INTERNAL URETHROTOMY;  Surgeon: Valentin Villatoro MD;  [facility]     URETHROPLASTY N/A 12/10/2014    Procedure: URETHROPLASTY;  Surgeon: Niraj Burger MD;  [facility]: [facility]     URETHROPLASTY WITH BUCCAL GRAFT N/A 12/9/2016    Procedure: URETHROPLASTY WITH BUCCAL GRAFT;  Surgeon: Niraj Burger MD;  [facility]       Social History   I have reviewed this patient's social history and updated it with pertinent information if needed.  Social History     Tobacco Use     Smoking status: Never Smoker     Smokeless tobacco: Never Used   Substance Use Topics     Alcohol use: Yes     Comment: No drinking for 2 weeks     Drug use: No       Family History    I have reviewed this patient's family history and updated it with pertinent information if needed.   Family History   Problem Relation Age of Onset     Hypertension Mother      Depression Father      Hypertension Father      Mental Illness Sister        Medications   Current Facility-Administered Medications   Medication     acetaminophen (TYLENOL) tablet 650 mg     alum & mag hydroxide-simethicone (MYLANTA ES/MAALOX  ES) suspension 30 mL     amLODIPine (NORVASC) tablet 10 mg     atorvastatin (LIPITOR) tablet 20 mg     benzocaine-menthol (CEPACOL) 15-3.6 MG lozenge 1 lozenge     bisacodyl (DULCOLAX) Suppository 10 mg     buPROPion (WELLBUTRIN XL) 24 hr tablet 450 mg     ciprofloxacin (CIPRO) tablet 500 mg     diltiazem ER COATED BEADS (CARDIZEM CD/CARTIA XT) 24 hr capsule 360 mg     docusate sodium (COLACE) capsule 100 mg     hydrALAZINE (APRESOLINE) half-tab 12.5 mg     hydrOXYzine (ATARAX) tablet 25-50 mg     lisinopril (PRINIVIL/ZESTRIL) tablet 20 mg     magnesium hydroxide (MILK OF MAGNESIA) suspension 30 mL     OLANZapine (zyPREXA) tablet 5-10 mg    Or     OLANZapine (zyPREXA) injection 5-10 mg     tamsulosin (FLOMAX) capsule 0.4 mg     traZODone (DESYREL) tablet 100 mg       Allergies   Allergies   Allergen Reactions     Contrast Dye Difficulty breathing       Physical Exam   Vital Signs: Temp: 97.7  F (36.5  C) Temp src: Oral BP: 110/77 Pulse: 85 Heart Rate: 98 Resp: 16 SpO2: 94 % O2 Device: None (Room air)    Weight: 240 lbs 8 oz  GENERAL: Alert and oriented x 3. NAD. WDWN, Resting in bed.   HEENT: MMM  CV: RRR.  RESPIRATORY: Effort normal on RA. Lungs CTAB.  GI: Abdomen soft, nd/nt.+BS.   NEUROLOGICAL: No focal deficits. Moves all extremities.    EXTREMITIES: No peripheral edema. Intact bilateral pedal pulses.   SKIN: No jaundice or rashes on exposed areas of skin.

## 2019-07-22 NOTE — PLAN OF CARE
Pt was visible in the milieu. Pt was at the lounge watching tv with others most of the time but minimally socialized with others. Pt has a flat affect. Denies SI/SIB. Endorses still feeling depressed but states his mood is slightly better than when he was admitted. Pt denies AH/VH. Pt hopes that seeing therapist at the new place after he discharges will help him. Pt took shower this evening.

## 2019-07-23 PROCEDURE — 25000132 ZZH RX MED GY IP 250 OP 250 PS 637: Performed by: CLINICAL NURSE SPECIALIST

## 2019-07-23 PROCEDURE — 25000132 ZZH RX MED GY IP 250 OP 250 PS 637: Performed by: PHYSICIAN ASSISTANT

## 2019-07-23 PROCEDURE — G0177 OPPS/PHP; TRAIN & EDUC SERV: HCPCS

## 2019-07-23 PROCEDURE — 12400001 ZZH R&B MH UMMC

## 2019-07-23 PROCEDURE — 25000132 ZZH RX MED GY IP 250 OP 250 PS 637: Performed by: PSYCHIATRY & NEUROLOGY

## 2019-07-23 RX ADMIN — DOCUSATE SODIUM 100 MG: 100 CAPSULE, LIQUID FILLED ORAL at 08:50

## 2019-07-23 RX ADMIN — TAMSULOSIN HYDROCHLORIDE 0.4 MG: 0.4 CAPSULE ORAL at 08:49

## 2019-07-23 RX ADMIN — ACETAMINOPHEN 650 MG: 325 TABLET, FILM COATED ORAL at 11:29

## 2019-07-23 RX ADMIN — DILTIAZEM HYDROCHLORIDE 360 MG: 120 CAPSULE, COATED, EXTENDED RELEASE ORAL at 08:49

## 2019-07-23 RX ADMIN — LISINOPRIL 20 MG: 20 TABLET ORAL at 08:49

## 2019-07-23 RX ADMIN — DOCUSATE SODIUM 100 MG: 100 CAPSULE, LIQUID FILLED ORAL at 21:05

## 2019-07-23 RX ADMIN — CIPROFLOXACIN HYDROCHLORIDE 500 MG: 500 TABLET, FILM COATED ORAL at 21:04

## 2019-07-23 RX ADMIN — TRAZODONE HYDROCHLORIDE 100 MG: 100 TABLET ORAL at 21:05

## 2019-07-23 RX ADMIN — CIPROFLOXACIN HYDROCHLORIDE 500 MG: 500 TABLET, FILM COATED ORAL at 08:50

## 2019-07-23 RX ADMIN — BUPROPION 450 MG: 150 TABLET, EXTENDED RELEASE ORAL at 08:49

## 2019-07-23 RX ADMIN — AMLODIPINE BESYLATE 10 MG: 5 TABLET ORAL at 08:49

## 2019-07-23 RX ADMIN — ATORVASTATIN CALCIUM 20 MG: 20 TABLET, FILM COATED ORAL at 08:49

## 2019-07-23 ASSESSMENT — ACTIVITIES OF DAILY LIVING (ADL)
DRESS: INDEPENDENT
HYGIENE/GROOMING: INDEPENDENT
ORAL_HYGIENE: INDEPENDENT

## 2019-07-23 NOTE — PLAN OF CARE
Pt has been isolative to his room for most of the shift. Pt came out to eat meals and did attend some groups. Pt states that he is no longer depressed and denies SI/SIB. Pt is waiting to go to programming on Thursday. Pt expresses being somewhat nervous about going to this program because he has never been before. Pt also expresses hopefulness that this new move will help him begin a new life for himself.

## 2019-07-23 NOTE — PROGRESS NOTES
" Note  The patient participated in an hour long case management led group. The focus of the group was on Identifying Values to aid in mental health recovery. At the beginning of the group, the patient reported that he was feeling \"Confused.\" The patient had a flat affect throughout the group appeared to struggle with participating in the group activity. However, the patient was able to meaningfully contribute to the group discussion and was appropriate with both writer and peers in the group.     Following the group, the patient requested to speak with writer about the group to help him understand what happened. Writer spoke with the patient in detail about the activity and the topic of the group. The patient was able to process with writer and identify values that he has by processing through various emotions and experiences he has had. The patient did become teary eyed during this meeting and stated, \" I just want people to like me.\" The meeting ended with the patient agreeing to try different relaxation techniques given to him by the hospital psychotherapist and staff on the unit.     "

## 2019-07-23 NOTE — PROGRESS NOTES
07/22/19 2200   Significant Event   Significant Event Other (see comments)  (shift summary)     Pt was visible in the milieu, withdrawn in the milieu, watched TV, calm, quiet and cooperative.  Depressed, flat/blunted affect, no stated SI, SIB or hallucinations..

## 2019-07-23 NOTE — PROGRESS NOTES
"Writer spoke with the patient in length today about his thoughts and inability to escape them. The patient stated that he has been speaking with staff on the unit who suggested that he use meditation techniques (I.e. deep breathing) to help relax him. The patient reported much confusion around this but stated that he is willing to try. The patient then began to talk about how he is unsure if he will ever get better, if he is doing the right thing by going to an IRTs etc. Writer sat with the patient and helped the patient to process. The patient stated that he has a goal to try and make it to most groups today. He feels that he is in his room too much and that is when he begins to have the racing thoughts. There were moments in this conversation where the patient would ask if he was \"crazy\" or \"supposed to know\" things about himself or other things.   "

## 2019-07-23 NOTE — PROGRESS NOTES
07/22/19 2100   Therapeutic Recreation   Type of Intervention structured groups   Activity game   Response Participates, initiates socially appropriate   Hours 1     Pt participated in Therapeutic Recreation group with focus on stress reduction, creative expression, and leisure participation. Engaged and cooperative in group recreational group via a group drawing game. Pt participated throughout entire duration of the group.  Showed progress in session goals. Pt mood was calm and quiet throughout the group.

## 2019-07-24 PROCEDURE — 25000132 ZZH RX MED GY IP 250 OP 250 PS 637: Performed by: PSYCHIATRY & NEUROLOGY

## 2019-07-24 PROCEDURE — 25000132 ZZH RX MED GY IP 250 OP 250 PS 637: Performed by: PHYSICIAN ASSISTANT

## 2019-07-24 PROCEDURE — 12400001 ZZH R&B MH UMMC

## 2019-07-24 PROCEDURE — 99232 SBSQ HOSP IP/OBS MODERATE 35: CPT | Performed by: PSYCHIATRY & NEUROLOGY

## 2019-07-24 PROCEDURE — 25000132 ZZH RX MED GY IP 250 OP 250 PS 637: Performed by: CLINICAL NURSE SPECIALIST

## 2019-07-24 RX ORDER — ATORVASTATIN CALCIUM 20 MG/1
TABLET, FILM COATED ORAL
Qty: 90 TABLET | Refills: 3 | Status: SHIPPED | OUTPATIENT
Start: 2019-07-24

## 2019-07-24 RX ORDER — LISINOPRIL 20 MG/1
20 TABLET ORAL DAILY
Qty: 30 TABLET | Refills: 1 | Status: SHIPPED | OUTPATIENT
Start: 2019-07-25 | End: 2019-10-17

## 2019-07-24 RX ORDER — DILTIAZEM HYDROCHLORIDE 360 MG/1
360 CAPSULE, EXTENDED RELEASE ORAL DAILY
Qty: 30 CAPSULE | Refills: 1 | Status: SHIPPED | OUTPATIENT
Start: 2019-07-25 | End: 2019-10-17

## 2019-07-24 RX ORDER — TRAZODONE HYDROCHLORIDE 100 MG/1
100 TABLET ORAL
Qty: 30 TABLET | Refills: 1 | Status: SHIPPED | OUTPATIENT
Start: 2019-07-24

## 2019-07-24 RX ORDER — AMLODIPINE BESYLATE 10 MG/1
10 TABLET ORAL DAILY
Qty: 30 TABLET | Refills: 1 | Status: SHIPPED | OUTPATIENT
Start: 2019-07-24 | End: 2019-10-17

## 2019-07-24 RX ORDER — HYDRALAZINE HYDROCHLORIDE 25 MG/1
12.5 TABLET, FILM COATED ORAL EVERY 6 HOURS PRN
Qty: 30 TABLET | Refills: 0 | Status: SHIPPED | OUTPATIENT
Start: 2019-07-24

## 2019-07-24 RX ORDER — CIPROFLOXACIN 500 MG/1
500 TABLET, FILM COATED ORAL EVERY 12 HOURS
Qty: 38 TABLET | Refills: 0 | Status: SHIPPED | OUTPATIENT
Start: 2019-07-24 | End: 2019-09-09

## 2019-07-24 RX ORDER — TAMSULOSIN HYDROCHLORIDE 0.4 MG/1
0.4 CAPSULE ORAL DAILY
Qty: 30 CAPSULE | Refills: 1 | Status: SHIPPED | OUTPATIENT
Start: 2019-07-25 | End: 2019-10-17

## 2019-07-24 RX ORDER — DOCUSATE SODIUM 100 MG/1
100 CAPSULE, LIQUID FILLED ORAL 2 TIMES DAILY
Qty: 60 CAPSULE | Refills: 0 | Status: SHIPPED | OUTPATIENT
Start: 2019-07-24 | End: 2019-08-20

## 2019-07-24 RX ORDER — BUPROPION HYDROCHLORIDE 450 MG/1
450 TABLET, FILM COATED, EXTENDED RELEASE ORAL EVERY MORNING
Qty: 30 TABLET | Refills: 1 | Status: SHIPPED | OUTPATIENT
Start: 2019-07-25

## 2019-07-24 RX ADMIN — DILTIAZEM HYDROCHLORIDE 360 MG: 120 CAPSULE, COATED, EXTENDED RELEASE ORAL at 08:21

## 2019-07-24 RX ADMIN — ATORVASTATIN CALCIUM 20 MG: 20 TABLET, FILM COATED ORAL at 08:22

## 2019-07-24 RX ADMIN — AMLODIPINE BESYLATE 10 MG: 5 TABLET ORAL at 08:22

## 2019-07-24 RX ADMIN — DOCUSATE SODIUM 100 MG: 100 CAPSULE, LIQUID FILLED ORAL at 08:22

## 2019-07-24 RX ADMIN — TAMSULOSIN HYDROCHLORIDE 0.4 MG: 0.4 CAPSULE ORAL at 08:21

## 2019-07-24 RX ADMIN — CIPROFLOXACIN HYDROCHLORIDE 500 MG: 500 TABLET, FILM COATED ORAL at 08:22

## 2019-07-24 RX ADMIN — BUPROPION 450 MG: 150 TABLET, EXTENDED RELEASE ORAL at 08:22

## 2019-07-24 RX ADMIN — TRAZODONE HYDROCHLORIDE 100 MG: 100 TABLET ORAL at 20:48

## 2019-07-24 RX ADMIN — LISINOPRIL 20 MG: 20 TABLET ORAL at 08:22

## 2019-07-24 RX ADMIN — DOCUSATE SODIUM 100 MG: 100 CAPSULE, LIQUID FILLED ORAL at 20:48

## 2019-07-24 RX ADMIN — CIPROFLOXACIN HYDROCHLORIDE 500 MG: 500 TABLET, FILM COATED ORAL at 20:48

## 2019-07-24 ASSESSMENT — ACTIVITIES OF DAILY LIVING (ADL)
DRESS: SCRUBS (BEHAVIORAL HEALTH);INDEPENDENT
ORAL_HYGIENE: INDEPENDENT
HYGIENE/GROOMING: HANDWASHING;INDEPENDENT

## 2019-07-24 NOTE — PLAN OF CARE
"Pt has a rather bland affect, with slightly delayed processing. He continues to ask for ?'s to be repeated or re worded. Pt reports his mood is \"neutral.\" He said he is looking forward to discharge, tomorrow. He thinks the ect tx have helped him some-\"to get out of the dumps.\" Pt denies si; rates depression 2-3, anxiety 4. He said he would \"probebly\" attend all groups, and would likely take a nap, today. He is in the milieu; keeps mainly to self. Pt's goal for the day is \"to get ready for discharge to [place], tomorrow.\"  "

## 2019-07-24 NOTE — PLAN OF CARE
"  Problem: OT General Care Plan  Goal: OT Frequency  Description  Pt will demonstrate consistent engagement in OT group activities that support recovery as evidenced by participating in >1 scheduled OT group/day for 5 days.     Attended 2/2 occupational therapy groups offered this date. Overall content and intermittently cooperative.    Pt attended general health and coping group focused on the '8 Dimensions of Wellness'. Discussion-based group was used to facilitate insight development on holistic whole-person wellness related to occupational performance and satisfaction. Pt Response: Quietly listened to group discussion with minimal contribution. Required additional 1:1 processing to apply topics to personal life. After group - reported concerns to writer as stated \"I would probably have this filled out three months ago\", and frustration with recent lack of understanding of concepts. Expressed hope to receive more services at [facility] facility and recover cognitive functioning.       Outcome: Improving     "

## 2019-07-24 NOTE — PROGRESS NOTES
[facility], [place]   Psychiatric Progress Note        Interim History:     The patient's care was discussed with the treatment team during the daily team meeting and/or staff's chart notes were reviewed.  Staff report patient spends more time outside of his room. He is slightly more animated, denies Suicidal ideation. Reported feeling overall, less depressed, but still worried about his future. He appears to be doing better cognitively. Per OT, no word finding difficulties, better able to focus and concentrate. He does have difficulties about making life decisions. He denied having any further questions or concerns. Denied med side effects.         Medications:       amLODIPine  10 mg Oral Daily     atorvastatin  20 mg Oral Daily     buPROPion  450 mg Oral QAM     ciprofloxacin  500 mg Oral Q12H CHAD     diltiazem ER COATED BEADS  360 mg Oral Daily     docusate sodium  100 mg Oral BID     lisinopril  20 mg Oral Daily     tamsulosin  0.4 mg Oral Daily          Allergies:     Allergies   Allergen Reactions     Contrast Dye Difficulty breathing          Labs:     No results found for this or any previous visit (from the past 24 hour(s)).       Psychiatric Examination:     BP (!) 127/91 (BP Location: Right arm)   Pulse 84   Temp 97.6  F (36.4  C) (Oral)   Resp 16   Ht 1.829 m (6')   Wt 109.1 kg (240 lb 8 oz)   SpO2 94%   BMI 32.62 kg/m    Weight is 240 lbs 8 oz  Body mass index is 32.62 kg/m .     Orthostatic Vitals       Most Recent      Sitting Orthostatic /96 07/23 0900    Sitting Orthostatic Pulse (bpm) 105 07/23 0829    Standing Orthostatic /92 07/23 0900    Standing Orthostatic Pulse (bpm) 106 07/23 0900         Appearance: dressed in hospital garbs  Attitude: cooperative  Eye Contact:  good  Mood: less depressed  Affect: constricted,   Speech: less monotone  Psychomotor Behavior: no psychomotor agitation or retardation.  Throught Process:  Slow, but  logical  Associations:  tight  Thought Content: denies Suicidal ideation, psychosis, Homicidal thoughts   Insight: partial  Judgement:  Mildly impaired, but improving   Oriented to:  Self, time and place  Attention Span and Concentration: mildly impaired  Recent and Remote Memory: fair, problems with short term memory after ECT, but less significant. See discussion above.     Clinical Global Impressions  First:  Considering your total clinical experience with this particular patient population, how severe are the patient's symptoms at this time?: 7 (06/21/19 1701)  Compared to the patient's condition at the START of treatment, this patient's condition is:: 4 (06/21/19 1701)  Most recent:  Considering your total clinical experience with this particular patient population, how severe are the patient's symptoms at this time?: 4 (7/22/2019)  Compared to the patient's condition at the START of treatment, this patient's condition is:: 2 (7/22/2019)         Precautions:     Behavioral Orders   Procedures     Code 1 - Restrict to Unit     Code 2     Electroconvulsive therapy     ECT every Monday, Wednesday, and Friday     Electroconvulsive therapy     Series of up to 12 treatments. Begin Date: 6/28/19     Treating Psychiatrist providing ECT: Alexa    Notified on:  6/26/19     Routine Programming     As clinically indicated     Status 15     Every 15 minutes.     Suicide precautions     Patients on Suicide Precautions should have a Combination Diet ordered that includes a Diet selection(s) AND a Behavioral Tray selection for Safe Tray - with utensils, or Safe Tray - NO utensils            DIagnoses:     Major depressive disorder, current, severe.  Rule out generalized anxiety disorder.  The patient was also diagnosed in the past with dependent personality disorder.          Plan:     Patient reports having, overall, better mood. He appears to be perplexed by even small things. was offered case management and was referred for  CM by CTC. Will be transferred to [place] [facility] tomorrow.

## 2019-07-24 NOTE — PLAN OF CARE
Problem: OT General Care Plan  Goal: OT Frequency  Description  Pt will demonstrate consistent engagement in OT group activities that support recovery as evidenced by participating in >1 scheduled OT group/day for 5 days.     Attended 1/2 occupational therapy groups offered this date. Overall content and participatory.   Occupational therapy clinic. Pt Response: Demonstrated consistent performance and social skills as observed on previous dates.     7/24/2019 1425 by Nayeli Smith, ALLYSSA  Outcome: Improving

## 2019-07-24 NOTE — PROGRESS NOTES
Writer faxed a referral for case management services with [facility]. Pt signed a REYNOLD. [facility] was directed to contact pt at [place].

## 2019-07-25 VITALS
WEIGHT: 240.7 LBS | HEIGHT: 72 IN | OXYGEN SATURATION: 94 % | HEART RATE: 96 BPM | SYSTOLIC BLOOD PRESSURE: 113 MMHG | TEMPERATURE: 98.1 F | DIASTOLIC BLOOD PRESSURE: 81 MMHG | RESPIRATION RATE: 18 BRPM | BODY MASS INDEX: 32.6 KG/M2

## 2019-07-25 PROCEDURE — 25000132 ZZH RX MED GY IP 250 OP 250 PS 637: Performed by: PHYSICIAN ASSISTANT

## 2019-07-25 PROCEDURE — 99239 HOSP IP/OBS DSCHRG MGMT >30: CPT | Performed by: PSYCHIATRY & NEUROLOGY

## 2019-07-25 PROCEDURE — 25000132 ZZH RX MED GY IP 250 OP 250 PS 637: Performed by: CLINICAL NURSE SPECIALIST

## 2019-07-25 PROCEDURE — 25000132 ZZH RX MED GY IP 250 OP 250 PS 637: Performed by: PSYCHIATRY & NEUROLOGY

## 2019-07-25 RX ADMIN — DILTIAZEM HYDROCHLORIDE 360 MG: 120 CAPSULE, COATED, EXTENDED RELEASE ORAL at 08:31

## 2019-07-25 RX ADMIN — BUPROPION 450 MG: 150 TABLET, EXTENDED RELEASE ORAL at 08:30

## 2019-07-25 RX ADMIN — TAMSULOSIN HYDROCHLORIDE 0.4 MG: 0.4 CAPSULE ORAL at 08:31

## 2019-07-25 RX ADMIN — ATORVASTATIN CALCIUM 20 MG: 20 TABLET, FILM COATED ORAL at 08:31

## 2019-07-25 RX ADMIN — AMLODIPINE BESYLATE 10 MG: 5 TABLET ORAL at 08:31

## 2019-07-25 RX ADMIN — DOCUSATE SODIUM 100 MG: 100 CAPSULE, LIQUID FILLED ORAL at 08:31

## 2019-07-25 RX ADMIN — CIPROFLOXACIN HYDROCHLORIDE 500 MG: 500 TABLET, FILM COATED ORAL at 08:31

## 2019-07-25 RX ADMIN — LISINOPRIL 20 MG: 20 TABLET ORAL at 08:31

## 2019-07-25 ASSESSMENT — MIFFLIN-ST. JEOR: SCORE: 1949.81

## 2019-07-25 ASSESSMENT — ACTIVITIES OF DAILY LIVING (ADL)
DRESS: STREET CLOTHES;INDEPENDENT
HYGIENE/GROOMING: INDEPENDENT;SHOWER
ORAL_HYGIENE: INDEPENDENT

## 2019-07-25 NOTE — DISCHARGE INSTRUCTIONS
Behavioral Discharge Planning and Instructions    Summary:  You were admitted on 6/20/2019  due to [place] Depression and Suicidal Ideation.  You were treated by Dr. Sarkis Edwards MD and discharged on 07/25/2019 from [facility] to [facility] [address], [place] [address].     Principal Diagnosis:   Major depressive disorder, current, severe  Rule out generalized anxiety disorder    Health Care Follow-up Appointments:   Intake Appointment   Thursday, August 1st at 10:00am with Itz Garcia EdD, LP  [facility] ([facility])  [address], [address]  [place] [address]  Phone: [phone]  This appointment is an intake for outpatient therapy and psychiatry. The agency asks that you arrive between 15-20 minutes prior to the start of your appointment to complete new patient paperwork. Pleas also be sure to present both your insurance card and ID to the reception staff. If you need to reschedule this appointment, please do so by calling the number listed above at least 24 hours prior to the start of your appointment.     Primary Care   Monday, August 5th at 1:40pm with Susan Hand  [address] [address]  Phone: [phone]  If you need to reschedule this appointment for any reason, please do so at least 24 hours prior to the start of your appointment by calling the number listed above.    Case Management   During your hospitalization, a referral was made for you to participate in Critical access hospital case management services. Your assigned  will reach out to you directly to set up a time to meet. If you would like to check on the status of this referral, please do so by calling [facility] Intake at [phone].     Transportation  Please utilize the transportation offered through your medical insurer for rides to appointments. You can make an appointment by calling [phone]. Please be aware that you  will need your insurance ID number, the pick-up address, and address of the facility to make the appointment.     Attend all scheduled appointments with your outpatient providers. Call at least 24 hours in advance if you need to reschedule an appointment to ensure continued access to your outpatient providers.   Major Treatments, Procedures and Findings:  You were provided with: a psychiatric assessment, medication evaluation and/or management, group therapy and milieu management    Symptoms to Report: feeling more aggressive, increased confusion, losing more sleep, mood getting worse or thoughts of suicide    Early warning signs can include: increased depression or anxiety sleep disturbances increased thoughts or behaviors of suicide or self-harm  increased unusual thinking, such as paranoia or hearing voices    Safety and Wellness:  Take all medicines as directed.  Make no changes unless your doctor suggests them. Follow treatment recommendations. Refrain from alcohol and non-prescribed drugs.  Ask your support system to help you reduce your access to items that could harm yourself or others. Items could include:   - Firearms  - Medicines (both prescribed and over-the-counter)  - Knives and other sharp objects  - Ropes and like materials  - Car keys  If there is a concern for safety, call 911. If there is a concern for safety, call 911.    Resources:   [facility] Crisis: 1-383-842-8379   [facility] Crisis (COPE) Response - Adult 548-420-6707  Crisis Intervention: 409.934.9362 or 840-013-9246 (TTY: 680.556.6187).  Call anytime for help.  National [place] on Mental Illness (www.[place].filomena.org): 860.849.7870 or 827-184-4552.  Suicide Awareness Voices of Education (SAVE) (www.save.org): 714-756-WBLQ (2483)  National Suicide Prevention Line (www.mentalhealthmn.org): 642-055-ZGYL (7487)  Mental Health Consumer/Survivor Network of [place] (www.mhcsn.net): 875.626.6453 or 122-855-8258  Mental Health  "Association of [place] (www.mentalhealth.org): 276.444.1599 or 588-047-8022  Self- Management and Recovery Training., SMART-- Toll free: 522.693.7972  www.[employer]  Text 4 Life: txt \"LIFE\" to 10477 for immediate support and crisis intervention  Crisis text line: Text \"[place]\" to 307632. Free, confidential, 24/7.  Crisis Intervention: 779.312.8149 or 590-411-2011. Call anytime for help.      The treatment team has appreciated the opportunity to work with you.     If you have any questions or concerns our unit number is [phone].   You may be receiving a follow-up phone call within the next three days from a representative from behavioral health.          "

## 2019-07-25 NOTE — PLAN OF CARE
"Discharge:  alert and orientated x 4 , some poor concentration,  states \" I just don't understand why I don't follow as well \"   Denies suicidal thoughts, thoughts of self harm and hallucinations.  Report increased anxiety regarding discharge, states \"I just don't know what to expect\".  Received written discharge instructions with verbal explanation.  All questions answered, verbalizes understanding.  Received two copies of instructions, one for IRTS facility and one to keep for himself.  All belongings returned to patient and received filled prescriptions.  Discharge to I[facility]S facility via cab at 1005  "

## 2019-07-26 ENCOUNTER — PATIENT OUTREACH (OUTPATIENT)
Dept: CARE COORDINATION | Facility: CLINIC | Age: 59
End: 2019-07-26

## 2019-07-26 ENCOUNTER — TELEPHONE (OUTPATIENT)
Dept: FAMILY MEDICINE | Facility: CLINIC | Age: 59
End: 2019-07-26

## 2019-07-26 NOTE — TELEPHONE ENCOUNTER
ED/UC/IP follow up phone call: [facility] discharge 7/25/19  Suicidal ideation    RN please call to follow up.    Number of ED visits in past 12 months = 1    Care Coordination is contacting this patient.

## 2019-07-26 NOTE — PROGRESS NOTES
Clinic Care Coordination Contact  Care Coordination Transition Communication    Referral Source: IP Report    Clinical Data: Patient was hospitalized at [facility] from 6/20/19 to 7/25/19 with diagnosis of Suicidal Ideation.     Transition to Facility:              Facility Name: [facility]              Contact name and phone number/fax: [phone]    Message left with facility counselor and warm hand off sent via fax       Plan: RN/SW Care Coordinator will await notification from facility staff informing RN/SW Care Coordinator of patient's discharge plans/needs. RN/SW Care Coordinator will review chart and outreach to facility staff every 4 weeks and as needed.     NATALY Deleon, [facility] - Care Coordinator   [facility]  7/26/2019   9:13 AM  [phone]

## 2019-07-26 NOTE — LETTER
Hand-off  for Care Coordination  What is Care Coordination?  [facility] Care Coordination Services are available to people in complex situations,   for example medical, social or financial. The Care Coordinator, a SW or an RN, works with the   patient and their doctor to determine health goals, obtain resources, achieve outcomes,   and develop plans to coordinate care across settings.      o Patient Name:   Flakito Hilliard  o Patient :     1960  o Patient PCP:     Susan Hand PA-C    o Patient Primary Clinic:   [address] [place] [address]  o D/C Facility: [facility]   o IRTs Contact Info for questions: ___________________________  o D/C Date:  ______________________________________________  o Follow-up Apt with PCP after IRTs D/C:   ____________________  o Other Follow-Up Apt s: ____________________________________  Additional information (concerns, and Home Care, ect ):   __________________________________________________________________  __________________________________________________________________        __________________________________________________________________    Care Coordinator to Contact  **Please fax this sheet back to me when pt is ready to discharge**  NATALY Deleon  Clinic Care Coordinator  Fax: [phone]  Phone: [phone]

## 2019-07-30 NOTE — DISCHARGE SUMMARY
"Admit Date:     06/20/2019   Discharge Date:     07/25/2019      On the day of discharge, I spent with the patient 40 minutes, greater than 50% of the time was spent on counseling and coordinating care, clarifying diagnostic/prognostic issues.  We discussed his medications.  I went over his discharge planning.      CHIEF COMPLAINT AND REASON FOR ADMISSION:  The patient is a 59-year-old   male who presented with worsening of depression, suicidal thoughts, presented as a reasonably reliable historian, although he was hard of hearing and initially not so easy to get information from.  He tended to answer in general phrases such as, \"I need to get out of life craziness.\"  Eventually he told me that he was in a 2-year long relationship that split up.  However, this female is still his friend.  He went to [place] where his daughter lives in hopes to be employed there and be closer to daughter, but it did not work out.  He had returned to the place where he rents at [place], [place].  He reported no source of income, poor sleep, significant weight loss, increased anxiety, waking up in the middle of night.  He said that he lost about 20 pounds over the last couple of weeks.  Started having thoughts that life was not worth living and eventually put a knife to his abdomen; was thinking of suicide.  He reports that he was poorly compliant with his Wellbutrin because he was running out of it and tried to stretch it for a longer period of time.  For more details about the patient's presentation, past psychiatric history, please refer to Dr. Edwards's note from 06/21/2019.      DISCHARGE DIAGNOSES:   1.  Major depressive disorder, recurrent, severe.   2.  Generalized anxiety disorder.   3.  Historical diagnosis of dependent personality disorder.      CONSULTATIONS:  The patient was seen by Dr. Liu for ECT workup and also by Internal Medicine.  He was seen a number of times by Internal Medicine because of " his urinary retention and UTI.  I appreciate Internal Medicine's help with this patient.  The patient also had occupational therapy cognitive screen MoCA; he scored 23/30.  Main problems were with abstraction, language and orientation.  Patient had presented as pretty depressed and spent most of the first days in the hospital in his room, although later started getting outside, participating in groups.  He agreed to try ECT.  Showed little progress after a couple of his stay; however, significantly better as time went on and he had more ECT.  He had a somewhat scary experience in the first 2 ECTs after he woke up feeling paralyzed; however, this problem was resolved.  Altogether, he had 10 ECTs, showed significant improvement of his depression.  He, however, appears to have a lot of difficulties making his own decisions and we basically had to advise him to go to [facility], intensive residential treatment center, because the patient could clearly have difficulties functioning in the community, making his own decisions.  He agreed with our recommendations.  He was accepted at [facility] program called [place] and went there.  I met with the patient on the day of discharge.  He showed some ambivalence about going there, about the place, if he will like it, if they will like him, but overall reported less depression.  Repeatedly denied presence of suicidal or homicidal thoughts.  He was continued on Cipro for urinary tract infection.      DISCHARGE MEDICATIONS:   1.  Norvasc 10 mg daily.   2.  Atorvastatin 20 mg daily.   3.  Bupropion  mg in the morning.   4.  Ciprofloxacin 500 mg every 12 hours for 19 days.   5.  Diltiazem extended release 360 mg daily.   6.  Colace 100 mg 2 times a day.   7.  Hydralazine 25 mg, take 12.5 mg by mouth every 6 hours as needed for systolic blood pressure higher than 175, diastolic blood pressure higher than 110.   8.  Lisinopril 20 mg daily.   9.  Viagra 100 mg daily as needed 30 minutes to 4  hours before sex.  Not to use with nitroglycerin, doxazosin, or terazosin.   10.  Flomax 0.4 mg daily.   11.  Trazodone 100 mg at bedtime p.r.n. for sleep.        FOLLOWUP:  Was scheduled appointment with Mr. Garcia at  for outpatient therapy and psychiatry appointments scheduled, [facility] in [place], [place] on  at 10:00 in the morning, for primary care appointment on  at 1:40 p.m. with Susan Hand PA-C in [place].  Patient was referred for case management.         CHONG CHAMPAGNE MD             D: 2019   T: 2019   MT: HAYLEY      Name:     XAVIER GAMBLE   MRN:      [id number]        Account:        [id number]   :      1960           Admit Date:     2019                                  Discharge Date: 2019      Document: [id number]       cc: Susan Hand PA-C

## 2019-08-01 ENCOUNTER — TRANSFERRED RECORDS (OUTPATIENT)
Dept: HEALTH INFORMATION MANAGEMENT | Facility: CLINIC | Age: 59
End: 2019-08-01

## 2019-08-05 ENCOUNTER — OFFICE VISIT (OUTPATIENT)
Dept: FAMILY MEDICINE | Facility: CLINIC | Age: 59
End: 2019-08-05
Payer: COMMERCIAL

## 2019-08-05 VITALS
BODY MASS INDEX: 32.91 KG/M2 | SYSTOLIC BLOOD PRESSURE: 122 MMHG | HEART RATE: 93 BPM | HEIGHT: 72 IN | TEMPERATURE: 98 F | OXYGEN SATURATION: 97 % | DIASTOLIC BLOOD PRESSURE: 78 MMHG | WEIGHT: 243 LBS | RESPIRATION RATE: 20 BRPM

## 2019-08-05 DIAGNOSIS — F33.2 SEVERE RECURRENT MAJOR DEPRESSION WITHOUT PSYCHOTIC FEATURES (H): Primary | ICD-10-CM

## 2019-08-05 DIAGNOSIS — I10 ESSENTIAL HYPERTENSION: ICD-10-CM

## 2019-08-05 DIAGNOSIS — G47.00 INSOMNIA, UNSPECIFIED TYPE: ICD-10-CM

## 2019-08-05 DIAGNOSIS — R33.9 URINARY RETENTION: ICD-10-CM

## 2019-08-05 PROCEDURE — 99495 TRANSJ CARE MGMT MOD F2F 14D: CPT | Performed by: PHYSICIAN ASSISTANT

## 2019-08-05 ASSESSMENT — ANXIETY QUESTIONNAIRES
7. FEELING AFRAID AS IF SOMETHING AWFUL MIGHT HAPPEN: SEVERAL DAYS
7. FEELING AFRAID AS IF SOMETHING AWFUL MIGHT HAPPEN: SEVERAL DAYS
GAD7 TOTAL SCORE: 14
2. NOT BEING ABLE TO STOP OR CONTROL WORRYING: MORE THAN HALF THE DAYS
3. WORRYING TOO MUCH ABOUT DIFFERENT THINGS: MORE THAN HALF THE DAYS
5. BEING SO RESTLESS THAT IT IS HARD TO SIT STILL: MORE THAN HALF THE DAYS
1. FEELING NERVOUS, ANXIOUS, OR ON EDGE: NEARLY EVERY DAY
6. BECOMING EASILY ANNOYED OR IRRITABLE: MORE THAN HALF THE DAYS
4. TROUBLE RELAXING: MORE THAN HALF THE DAYS
GAD7 TOTAL SCORE: 14
GAD7 TOTAL SCORE: 14

## 2019-08-05 ASSESSMENT — MIFFLIN-ST. JEOR: SCORE: 1960.24

## 2019-08-05 ASSESSMENT — PATIENT HEALTH QUESTIONNAIRE - PHQ9
10. IF YOU CHECKED OFF ANY PROBLEMS, HOW DIFFICULT HAVE THESE PROBLEMS MADE IT FOR YOU TO DO YOUR WORK, TAKE CARE OF THINGS AT HOME, OR GET ALONG WITH OTHER PEOPLE: VERY DIFFICULT
SUM OF ALL RESPONSES TO PHQ QUESTIONS 1-9: 21
SUM OF ALL RESPONSES TO PHQ QUESTIONS 1-9: 21

## 2019-08-05 NOTE — NURSING NOTE
Chief Complaint   Patient presents with     Hospital F/U       Initial /78 (BP Location: Right arm, Patient Position: Sitting, Cuff Size: Adult Large)   Pulse 93   Temp 98  F (36.7  C) (Tympanic)   Resp 20   Ht 1.829 m (6')   Wt 110.2 kg (243 lb)   SpO2 97%   BMI 32.96 kg/m   Estimated body mass index is 32.96 kg/m  as calculated from the following:    Height as of this encounter: 1.829 m (6').    Weight as of this encounter: 110.2 kg (243 lb).    Patient presents to the clinic using No DME    Health Maintenance that is potentially due pending provider review:  Colonoscopy/FIT    Pt declines to have colon cancer screening at this time.    Is there anyone who you would like to be able to receive your results? No  If yes have patient fill out REYNOLD Vu CMA

## 2019-08-05 NOTE — PATIENT INSTRUCTIONS
Mental health referral for counseling and psychiatry.  They will call you to schedule.  Trying to get much sooner than current 9/6 [facility] appt.    Should be set up with [facility] worker.  [place] to help with this.    Can trial adding melatonin 10 mg at bedtime to see if helps sleep through the night.  If does not work, we could try buspar for anxiety.      Set up with urology    If not seeing psychiatry, see me in 2 weeks.

## 2019-08-05 NOTE — PROGRESS NOTES
Subjective     Flakito Hilliard is a 59 year old male who presents to clinic today for the following health issues:    HPI     PHQ-9 ([employer]) 8/5/2019   1.  Little interest or pleasure in doing things 3   2.  Feeling down, depressed, or hopeless 3   3.  Trouble falling or staying asleep, or sleeping too much 3   4.  Feeling tired or having little energy 2   5.  Poor appetite or overeating 3   6.  Feeling bad about yourself 2   7.  Trouble concentrating 2   8.  Moving slowly or restless 2   9.  Suicidal or self-harm thoughts 1   PHQ-9 Total Score 21     RIO-7   [employer], 2002; Used with Permission) 8/5/2019   1. Feeling nervous, anxious, or on edge 3   2. Not being able to stop or control worrying 2   3. Worrying too much about different things 2   4. Trouble relaxing 2   5. Being so restless that it is hard to sit still 2   6. Becoming easily annoyed or irritable 2   7. Feeling afraid, as if something awful might happen 1   RIO-7 Total Score 14       Hospital Follow-up Visit:    Hospital/Nursing Home/ Rehab Facility: [facility]  Date of Admission: 6/20/2019  Date of Discharge: 7/25/2019  Reason(s) for Admission: Suicidal Ideation; Severe recurrent major depression without psychotic features            Problems taking medications regularly:  None       Medication changes since discharge: None       Problems adhering to non-medication therapy:  None    Summary of hospitalization:  [facility] discharge summary reviewed  Diagnostic Tests/Treatments reviewed.  Follow up needed: psychiatry  Other Healthcare Providers Involved in Patient s Care:         residential treatment center  Update since discharge: stable.     Post Discharge Medication Reconciliation: discharge medications reconciled and changed, per note/orders (see AVS).  Plan of care communicated with patient     Coding guidelines for this visit:  Type of Medical   Decision Making Face-to-Face Visit       within 7 Days of discharge Face-to-Face Visit      "   within 14 days of discharge   Moderate Complexity [id number]   High Complexity [id number]        Treated for depression with suicidality, RIO.  Eventual improvement with ECT - 10 treatments.  Kital having difficulties functioning and making decisions, therefore placed in intensive residential treatment for duration of 90 days.  He dislikes it there, feels it is very \"hard core\", ethnically diverse and mostly filled with people struggling with addiction and recent incarcerations.  He expresses no clear plan going forward, is not yet seeing counselor again and psych follow up is scheduled for Sept.  He has no current thoughts of harm.  He has some trouble sleeping at night, will wake at 4 am thinking and worrying.  Also had troubles with urinary retention again during his hospital stay, discharged on cipro.  HTN addressed as well.    BP Readings from Last 3 Encounters:   08/05/19 122/78   07/24/19 113/81   06/20/19 136/88    Wt Readings from Last 3 Encounters:   08/05/19 110.2 kg (243 lb)   07/25/19 109.2 kg (240 lb 11.2 oz)   06/20/19 110.9 kg (244 lb 6.4 oz)         Reviewed and updated as needed this visit by Provider  Tobacco  Allergies  Meds  Problems  Med Hx  Surg Hx  Fam Hx         Review of Systems   ROS COMP: Constitutional, and psych systems are negative, except as otherwise noted.      Objective    /78 (BP Location: Right arm, Patient Position: Sitting, Cuff Size: Adult Large)   Pulse 93   Temp 98  F (36.7  C) (Tympanic)   Resp 20   Ht 1.829 m (6')   Wt 110.2 kg (243 lb)   SpO2 97%   BMI 32.96 kg/m    Body mass index is 32.96 kg/m .  Physical Exam   GENERAL: healthy, alert and no distress  RESP: lungs clear to auscultation - no rales, rhonchi or wheezes  CV: regular rate and rhythm, normal S1 S2, no S3 or S4, no murmur, click or rub  PSYCH: mentation appears grossly normal, affect calm but seems without clear direction, wonders about future, repeatedly asks what is wrong with him, " has negative self comments           Assessment & Plan       ICD-10-CM    1. Severe recurrent major depression without psychotic features (H) F33.2 MENTAL HEALTH REFERRAL  - Adult; Outpatient Treatment, Psychiatry and Medication Management; Individual/Couples/Family/Group Therapy/Health Psychology; Other: Behavioral Healthcare Providers [phone]; We will contact you to schedule the appo...   2. Urinary retention R33.9 UROLOGY ADULT REFERRAL   3. Insomnia, unspecified type G47.00 Melatonin 10 MG TABS tablet   4. Essential hypertension I10 stable   insomnia, some reluctance on my part to alter any psychiatric medications given his multiple recent hospitalizations this spring/summer.  ASAP psych and counseling referral.  Consider buspar, then ambien if still unable to sleep.  Patient Instructions   Mental health referral for counseling and psychiatry.  They will call you to schedule.  Trying to get much sooner than current 9/6 [facility] appt.    Should be set up with [facility] worker.  [place] to help with this.    Can trial adding melatonin 10 mg at bedtime to see if helps sleep through the night.  If does not work, we could try buspar for anxiety.      Set up with urology    If not seeing psychiatry, see me in 2 weeks.      Return in about 2 weeks (around 8/19/2019) for depression, anxiety, sleep.    Susan Hand PA-C  [facility]

## 2019-08-06 RX ORDER — PHENOL 1.4 %
10 AEROSOL, SPRAY (ML) MUCOUS MEMBRANE
Qty: 30 TABLET | Refills: 1 | Status: SHIPPED | OUTPATIENT
Start: 2019-08-06 | End: 2019-08-16

## 2019-08-06 ASSESSMENT — ANXIETY QUESTIONNAIRES: GAD7 TOTAL SCORE: 14

## 2019-08-06 ASSESSMENT — PATIENT HEALTH QUESTIONNAIRE - PHQ9: SUM OF ALL RESPONSES TO PHQ QUESTIONS 1-9: 21

## 2019-08-13 ENCOUNTER — TELEPHONE (OUTPATIENT)
Dept: FAMILY MEDICINE | Facility: CLINIC | Age: 59
End: 2019-08-13

## 2019-08-13 DIAGNOSIS — G47.33 OSA (OBSTRUCTIVE SLEEP APNEA): Primary | ICD-10-CM

## 2019-08-13 NOTE — TELEPHONE ENCOUNTER
FYI- Appointment August 16 with Susan Hand PA-C    **CPAP Machine & Supplies**    Arely from [facility] is requesting a order for a CPAP Machine and Supplies for pt.  Pt feels if his Machine works better he might feel better with his Depression. Pt feels the machine is about 10 yrs old. Arely feels there possible looks like missing pieces etc.  ( Sleep Split-Night Polysomnogram Interpretation received from [facility] / per pt he saw Jarrett Palumbo [facility] [phone])  placed on Susan Hand PA-C desk for review.    Pt is currently in Patient Program at [facility] and will be in until possible October.  Pt does come up to the  [facility] to see Susan Hand PA-C.    If Order approved please send to [facility] Tele [phone]  Fax [phone]   Pressure is set for 7/ 3.0 Humidity Level & Pt uses Nasal Cannula. Pt does not have a Filter of any kind on the machine for air or water.

## 2019-08-16 ENCOUNTER — OFFICE VISIT (OUTPATIENT)
Dept: FAMILY MEDICINE | Facility: CLINIC | Age: 59
End: 2019-08-16
Payer: COMMERCIAL

## 2019-08-16 VITALS
BODY MASS INDEX: 32.32 KG/M2 | TEMPERATURE: 97.3 F | DIASTOLIC BLOOD PRESSURE: 64 MMHG | SYSTOLIC BLOOD PRESSURE: 142 MMHG | WEIGHT: 238.6 LBS | HEIGHT: 72 IN | RESPIRATION RATE: 18 BRPM | HEART RATE: 91 BPM | OXYGEN SATURATION: 97 %

## 2019-08-16 DIAGNOSIS — F41.9 ANXIETY: ICD-10-CM

## 2019-08-16 DIAGNOSIS — F33.1 MAJOR DEPRESSIVE DISORDER, RECURRENT EPISODE, MODERATE (H): Primary | ICD-10-CM

## 2019-08-16 DIAGNOSIS — G47.00 INSOMNIA, UNSPECIFIED TYPE: ICD-10-CM

## 2019-08-16 DIAGNOSIS — G47.33 OSA (OBSTRUCTIVE SLEEP APNEA): ICD-10-CM

## 2019-08-16 PROCEDURE — 99214 OFFICE O/P EST MOD 30 MIN: CPT | Performed by: PHYSICIAN ASSISTANT

## 2019-08-16 RX ORDER — BUSPIRONE HYDROCHLORIDE 15 MG/1
TABLET ORAL
Qty: 90 TABLET | Refills: 0 | Status: SHIPPED | OUTPATIENT
Start: 2019-08-16

## 2019-08-16 ASSESSMENT — ANXIETY QUESTIONNAIRES
1. FEELING NERVOUS, ANXIOUS, OR ON EDGE: NEARLY EVERY DAY
6. BECOMING EASILY ANNOYED OR IRRITABLE: NEARLY EVERY DAY
3. WORRYING TOO MUCH ABOUT DIFFERENT THINGS: NEARLY EVERY DAY
7. FEELING AFRAID AS IF SOMETHING AWFUL MIGHT HAPPEN: NEARLY EVERY DAY
GAD7 TOTAL SCORE: 21
2. NOT BEING ABLE TO STOP OR CONTROL WORRYING: NEARLY EVERY DAY
5. BEING SO RESTLESS THAT IT IS HARD TO SIT STILL: NEARLY EVERY DAY

## 2019-08-16 ASSESSMENT — PATIENT HEALTH QUESTIONNAIRE - PHQ9
SUM OF ALL RESPONSES TO PHQ QUESTIONS 1-9: 23
5. POOR APPETITE OR OVEREATING: NEARLY EVERY DAY

## 2019-08-16 ASSESSMENT — MIFFLIN-ST. JEOR: SCORE: 1934.79

## 2019-08-16 NOTE — Clinical Note
RN - please call Ranjeet Wed morning to see how he is doing with anxiety and sleep.  I would consider prn seroquel or ambien if needed while we titrate buspar

## 2019-08-16 NOTE — PATIENT INSTRUCTIONS
Set up with sleep doctor - location of your choice.  Call if needing location options.    Decided to try adding buspar for anxiety.  If still not sleeping well, we can try ambien.      Tried placing other type of psychiatry referral - this would be for short term/interim psychiatrist until you start with your new psychiatrist on 9/6.    Recheck with me Thursday, Aug 29th at 11:40.  Can cancel appt if doing much better or starting with interim psychiatry before then        For emergency/crisis: Call 911, call police, be seen in emergency room, or consider below options:  -[facility] crisis line - [phone] - can help you find same service in your county  Both of the above will also get you in touch with a mobile crisis team which can visit you wherever you are.  This is 24/7 service.   -Other 24hr Crisis Intervention: [phone] or [phone] (TTY: [phone]).

## 2019-08-16 NOTE — PROGRESS NOTES
Subjective     Flakito Hilliard is a 59 year old male who presents to clinic today for the following health issues:    HPI   Depression and Anxiety Follow-Up    How are you doing with your depression since your last visit? No change    How are you doing with your anxiety since your last visit?  Worsened    Are you having other symptoms that might be associated with depression or anxiety? Yes:  shaking    Have you had a significant life event? Relationship Concerns, Job Concerns, Financial Concerns and Housing Concerns Now living in [place] in a group home.    Do you have any concerns with your use of alcohol or other drugs? No    Social History     Tobacco Use     Smoking status: Never Smoker     Smokeless tobacco: Never Used   Substance Use Topics     Alcohol use: Yes     Comment: No drinking for 2 weeks     Drug use: No     PHQ 6/20/2019 6/20/2019 8/5/2019   PHQ-9 Total Score 25 25 21   Q9: Thoughts of better off dead/self-harm past 2 weeks More than half the days More than half the days Several days   F/U: Thoughts of suicide or self-harm Yes Yes No   F/U: Self harm-plan Yes Yes -   F/U: Self-harm action No No -   F/U: Safety concerns Yes Yes No     RIO-7 SCORE 6/20/2019 6/20/2019 8/5/2019   Total Score - - -   Total Score - 19 (severe anxiety) 14 (moderate anxiety)   Total Score 19 19 14     Suicide Assessment Five-step Evaluation and Treatment (SAFE-T)      How many servings of fruits and vegetables do you eat daily?  2-3    On average, how many sweetened beverages do you drink each day (soda, juice, sweet tea, etc)?   0    How many days per week do you miss taking your medication? 0    Feels far away, feels cannot connect with people, feels numb, going thru motions.  Wakes several times a night with thoughts and worries.  Did not end up trying melatonin as insurance does not cover OTC, and he has no money.  Feels very anxious all the time.  Continues to have financial concerns, thinks about previous  "relationship a lot, hates residential treatment facility.  Did start with new counselor, on 3rd visit now, likes her fine but notes it is still just intake.  He feels very hopeless, questions what is wrong with him, feels he can't make any decisions, wonders if he even has depression.  States he is \"trying, trying so hard\".    Does not have intention of hurting himself or concern that he will hurt self.    Severe PATRICE - still using CPAP but noting it is dirty appearing and has been missing a filter for a long time.  He thinks his living facility has idea for sleep doctor nearby.    BP Readings from Last 3 Encounters:   08/16/19 (!) 142/64   08/05/19 122/78   07/24/19 113/81    Wt Readings from Last 3 Encounters:   08/16/19 108.2 kg (238 lb 9.6 oz)   08/05/19 110.2 kg (243 lb)   07/25/19 109.2 kg (240 lb 11.2 oz)         Reviewed and updated as needed this visit by Provider         Review of Systems   ROS COMP: Constitutional, psych systems are negative, except as otherwise noted.      Objective    There were no vitals taken for this visit.  There is no height or weight on file to calculate BMI.  Physical Exam   GENERAL: healthy, alert and no distress  PSYCH: concentration poor, anxious, fatigued and appearance well groomed          Assessment & Plan       ICD-10-CM    1. Major depressive disorder, recurrent episode, moderate (H) F33.1 MENTAL HEALTH REFERRAL  - Adult; Psychiatry and Medication Management; Psychiatry; [facility]: [facility] [phone].  Medication management & future refills will be returned to [facility] PCP upon completion of evaluation; We dre...   2. Anxiety F41.9 busPIRone (BUSPAR) 15 MG tablet     MENTAL HEALTH REFERRAL  - Adult; Psychiatry and Medication Management; Psychiatry; [facility]: [facility] [phone].  Medication management & future refills will be returned to [facility] PCP upon completion of evaluation; We dre...   3. Insomnia, unspecified type " G47.00    4. PATRICE (obstructive sleep apnea) G47.33 SLEEP EVALUATION & MANAGEMENT REFERRAL - ADULT -Other (Respond in commments)   still reluctance on my part to change current meds given his history severe depression and recent hospitalizations  He feels groggy with any increased trazodone and wants to keep current dose  wellbutrin worked well for him in past  He has been on multiple anti depressants in past  Has been on abilify, zyprexa in past  Has not been on any mood stabilizers  Would consider seroquel prn if needed  Will have nurse call patient next Wed to check on how he is doing with anxiety    Patient Instructions   Set up with sleep doctor - location of your choice.  Call if needing location options.    Decided to try adding buspar for anxiety.  If still not sleeping well, we can try ambien.      Tried placing other type of psychiatry referral - this would be for short term/interim psychiatrist until you start with your new psychiatrist on 9/6.    Recheck with me Thursday, Aug 29th at 11:40.  Can cancel appt if doing much better or starting with interim psychiatry before then        For emergency/crisis: Call 911, call police, be seen in emergency room, or consider below options:  -[facility] crisis line - [phone] - can help you find same service in your county  Both of the above will also get you in touch with a mobile crisis team which can visit you wherever you are.  This is 24/7 service.   -Other 24hr Crisis Intervention: [phone] or [phone] (TTY: [phone]).                   Return in about 13 days (around 8/29/2019).    Susan Hand PA-C  [facility]

## 2019-08-17 ASSESSMENT — ANXIETY QUESTIONNAIRES: GAD7 TOTAL SCORE: 21

## 2019-08-19 ENCOUNTER — PATIENT OUTREACH (OUTPATIENT)
Dept: CARE COORDINATION | Facility: CLINIC | Age: 59
End: 2019-08-19

## 2019-08-19 NOTE — PROGRESS NOTES
Clinic Care Coordination Contact    Situation: Patient chart reviewed by care coordinator.    Background: Patient admitted to [facility] on 7-25-19.    Assessment: Patient has been to primary care provider and per these records will be at the [facility] for the duration of his 90-day program.  He is attending counseling with a new counselor and will be going to sleep medicine soon.    Plan/Recommendations: Check chart and follow-up in 2 months which should coincide closely with the end of his 90-day program.     NATALY Deleon, [facility] - Care Coordinator   [facility]  8/19/2019   10:55 AM  [phone]

## 2019-08-20 DIAGNOSIS — K59.00 CONSTIPATION, UNSPECIFIED CONSTIPATION TYPE: ICD-10-CM

## 2019-08-20 RX ORDER — DOCUSATE SODIUM 100 MG/1
100 CAPSULE, LIQUID FILLED ORAL 2 TIMES DAILY
Qty: 60 CAPSULE | Refills: 3 | Status: SHIPPED | OUTPATIENT
Start: 2019-08-20 | End: 2019-12-09

## 2019-08-20 NOTE — TELEPHONE ENCOUNTER
"Arely from [facility] (in-patient mental health) says Ranjeet is needing refill of his Colase sent to [facility]. He will be there for a couple months so would like #60 with at least one refill.    Requested Prescriptions   Pending Prescriptions Disp Refills     docusate sodium (COLACE) 100 MG capsule 60 capsule 0     Sig: Take 1 capsule (100 mg) by mouth 2 times daily       Laxatives Protocol Passed - 8/20/2019 11:18 AM        Passed - Patient is age 6 or older        Passed - Recent (12 mo) or future (30 days) visit within the authorizing provider's specialty     Patient had office visit in the last 12 months or has a visit in the next 30 days with authorizing provider or within the authorizing provider's specialty.  See \"Patient Info\" tab in inbasket, or \"Choose Columns\" in Meds & Orders section of the refill encounter.              Passed - Medication is active on med list        Last Written Prescription Date:  7/24/19  Last Fill Quantity: 60,  # refills: 0   Last office visit: 8/16/2019 with prescribing provider:  Susan Hand   Future Office Visit:   Next 5 appointments (look out 90 days)    Aug 29, 2019 11:40 AM CDT  Office Visit with Susan Hand PA-C  [facility] ([facility]) [address] [place] [id number] [phone]           "

## 2019-08-22 ENCOUNTER — TELEPHONE (OUTPATIENT)
Dept: FAMILY MEDICINE | Facility: CLINIC | Age: 59
End: 2019-08-22

## 2019-08-22 NOTE — TELEPHONE ENCOUNTER
Susan is wondering how he is doing?  Is the anxiety any better?    She would consider prn seroquel or ambien if needed while we titrate buspar.    Left message for patient to call back.  Jesica Kenny CMA

## 2019-08-24 ENCOUNTER — OFFICE VISIT (OUTPATIENT)
Dept: URGENT CARE | Facility: URGENT CARE | Age: 59
End: 2019-08-24
Payer: COMMERCIAL

## 2019-08-24 VITALS
HEART RATE: 107 BPM | SYSTOLIC BLOOD PRESSURE: 117 MMHG | WEIGHT: 241.5 LBS | DIASTOLIC BLOOD PRESSURE: 75 MMHG | OXYGEN SATURATION: 98 % | TEMPERATURE: 98.3 F | BODY MASS INDEX: 33.07 KG/M2

## 2019-08-24 DIAGNOSIS — N30.00 ACUTE CYSTITIS WITHOUT HEMATURIA: Primary | ICD-10-CM

## 2019-08-24 DIAGNOSIS — N39.41 URGENCY INCONTINENCE: ICD-10-CM

## 2019-08-24 DIAGNOSIS — R82.90 NONSPECIFIC FINDING ON EXAMINATION OF URINE: ICD-10-CM

## 2019-08-24 LAB
ALBUMIN UR-MCNC: NEGATIVE MG/DL
APPEARANCE UR: CLEAR
BACTERIA #/AREA URNS HPF: ABNORMAL /HPF
BILIRUB UR QL STRIP: NEGATIVE
COLOR UR AUTO: YELLOW
GLUCOSE UR STRIP-MCNC: NEGATIVE MG/DL
HGB UR QL STRIP: NEGATIVE
KETONES UR STRIP-MCNC: NEGATIVE MG/DL
LEUKOCYTE ESTERASE UR QL STRIP: ABNORMAL
MUCOUS THREADS #/AREA URNS LPF: PRESENT /LPF
NITRATE UR QL: NEGATIVE
NON-SQ EPI CELLS #/AREA URNS LPF: ABNORMAL /LPF
PH UR STRIP: 6 PH (ref 5–7)
RBC #/AREA URNS AUTO: ABNORMAL /HPF
SOURCE: ABNORMAL
SP GR UR STRIP: 1.02 (ref 1–1.03)
URNS CMNT MICRO: ABNORMAL
UROBILINOGEN UR STRIP-ACNC: 0.2 EU/DL (ref 0.2–1)
WBC #/AREA URNS AUTO: ABNORMAL /HPF

## 2019-08-24 PROCEDURE — 99213 OFFICE O/P EST LOW 20 MIN: CPT | Performed by: NURSE PRACTITIONER

## 2019-08-24 PROCEDURE — 81001 URINALYSIS AUTO W/SCOPE: CPT | Performed by: NURSE PRACTITIONER

## 2019-08-24 PROCEDURE — 87086 URINE CULTURE/COLONY COUNT: CPT | Performed by: NURSE PRACTITIONER

## 2019-08-24 RX ORDER — SULFAMETHOXAZOLE/TRIMETHOPRIM 800-160 MG
1 TABLET ORAL 2 TIMES DAILY
Qty: 14 TABLET | Refills: 0 | Status: SHIPPED | OUTPATIENT
Start: 2019-08-24 | End: 2019-09-09

## 2019-08-24 ASSESSMENT — ENCOUNTER SYMPTOMS
FREQUENCY: 1
NAUSEA: 1

## 2019-08-24 NOTE — PATIENT INSTRUCTIONS
Patient Education     Bladder Infection, Male (Adult)    You have a bladder infection.  Urine is normally free of bacteria. But bacteria can get into the urinary tract from the skin around the rectum or it may travel in the blood from elsewhere in the body.  This is called a urinary tract infection (UTI). An infection can occur anywhere in the urinary tract. It could be in a kidney (pyelonephritis)or in the bladder (cystitis) and urethra (urethritis). The urethra is the tube that drains the urine from the bladder through the tip of the penis.  The most common place for a UTI is in the bladder. This is called a bladder infection. Most bladder infections are easily treated. They are not serious unless the infection spreads up to the kidney.  The terms bladder infection, UTI, and cystitis are often used to describe the same thing, but they aren t always the same. Cystitis is an inflammation of the bladder. The most common cause of cystitis is an infection.   Keep in mind:    Infections in the urine are called UTIs.    Cystitis is usually caused by a UTI.    Not all UTIs and cases of cystitis are bladder infections.    Bladder infections are the most common type of cystitis.  Symptoms of a bladder infection  The infection causes inflammation in the urethra and bladder. This inflammation causes many of the symptoms. The most common symptoms of a bladder infection are:    Pain or burning when urinating    Having to go more often than usual    Feeling like you need to go right away    Only a small amount comes out    Blood in urine    Discomfort in your belly (abdomen), usually in the lower abdomen, above the pubic bone    Cloudy, strong, or bad smelling urine    Unable to urinate (retention)    Urinary incontinence    Fever    Loss of appetite  Older adults may also feel confused.  Causes of a bladder infection  Bladder infections are not contagious. You can't get one from someone else, from a toilet seat, or from  sharing a bath.  The most common cause of bladder infections is bacteria from the bowels. The bacteria get onto the skin around the opening of the urethra. From there they can get into the urine and travel up to the bladder. This causes inflammation and an infection. This usually happens because of:    An enlarged prostate    Poor cleaning of the genitals    Procedures that put a tube in your bladder, like a Anderson catheter    Bowel incontinence    Older age    Not emptying your bladder (The urine stays there, giving the bacteria a chance to grow.)    Dehydration (This allows urine to stay in the bladder longer.)    Constipation (This can cause the bowels to push on the bladder or urethra and keep the bladder from emptying.)  Treatment  Bladder infections are treated with antibiotics. They usually clear up quickly without complications. Treatment helps prevent a more serious kidney infection.  Medicines  Medicines can help in the treatment of a bladder infection:    You may have been given phenazopyridine to ease burning when you urinate. It will cause your urine to be bright orange. It can stain clothing.    You may have been prescribed antibiotics. Take this medicine until you have finished it, even if you feel better. Taking all of the medicine will make sure the infection has cleared.  You can use acetaminophen or ibuprofen for pain, fever, or discomfort, unless another medicine was prescribed. You can also alternate them, or use both together. They work differently and are a different class of medicines, so taking them together is not an overdose. If you have chronic liver or kidney disease, talk with your healthcare provider before using these medicines. Also talk with your provider if you ve had a stomach ulcer or GI bleeding or are taking blood thinner medicines.  Home care  Here are some guidelines to help you care for yourself at home:    Drink plenty of fluids, unless your healthcare provider told you not  to. Fluids will prevent dehydration and flush out your bladder.    Use good personal hygiene. Wipe from front to back after using the toilet, and clean your penis regularly. If you aren t circumcised, retract the foreskin when cleaning.    Urinate more frequently, and don t try to hold it in for long periods of time, if possible.    Wear loose-fitting clothes and cotton underwear. Avoid tight-fitting pants. This helps keep you clean and dry.    Change your diet to prevent constipation. This means eating more fresh foods and more fiber, and less junk and fatty foods.    Avoid sex until your symptoms are gone.    Avoid caffeine, alcohol, and spicy foods. These can irritate the bladder.  Follow-up care  Follow up with your healthcare provider, or as advised if all symptoms have not cleared up within 5 days. It is important to keep your follow-up appointment. You can talk with your provider to see if you need more tests of the urinary tract. This is especially important if you have infections that keep coming back.  If a culture was done, you will be told if your treatment needs to be changed. If directed, you can call to find out the results.  If X-rays were taken, you will be told of any findings that may affect your care.  Call 911  Call 911 if any of these occur:    Trouble breathing    Difficulty waking up    Feeling confused    Fainting or loss of consciousness    Rapid heart rate  When to seek medical advice  Call your healthcare provider right away if any of these occur:    Fever of 100.4 F (38 C) or higher, or as directed by your healthcare provider    Your symptoms don t improve after 2 days of treatment    Back or abdominal pain that gets worse    Repeated vomiting, or you aren t able to keep medicine down    Weakness or dizziness  Date Last Reviewed: 10/1/2016    [id number] [employer]. [address] [place] [address], [place] [address]. All rights reserved. This information is not intended as a  substitute for professional medical care. Always follow your healthcare professional's instructions.

## 2019-08-24 NOTE — PROGRESS NOTES
SUBJECTIVE:   Flakito Hilliard is a 59 year old male presenting with a chief complaint of   Chief Complaint   Patient presents with     UTI     Patient complains of having accidents in bed at night       He is an established patient of [facility].    UTI    Onset of symptoms was 4 days.  Course of illness is worsening incontinence  Current and associated symptoms frequency, urgency, nausea and odor  Treatment and measures tried None  Predisposing factors include history of frequent UTI's and history of prostatitis  Patient denies rigors, flank pain, temperature > 101 degrees F. and vomiting    Reports baseline incontinence due to previous prostate surgery     Review of Systems   Gastrointestinal: Positive for nausea.   Genitourinary: Positive for enuresis, frequency and urgency.   All other systems reviewed and are negative.      Past Medical History:   Diagnosis Date     Depressive disorder      H/O urethral stricture      Hypertension      Sleep apnea      Family History   Problem Relation Age of Onset     Hypertension Mother      Depression Father      Hypertension Father      Mental Illness Sister      Current Outpatient Medications   Medication Sig Dispense Refill     amLODIPine (NORVASC) 10 MG tablet Take 1 tablet (10 mg) by mouth daily 30 tablet 1     atorvastatin (LIPITOR) 20 MG tablet TAKE ONE TABLET BY MOUTH EVERY DAY. 90 tablet 3     buPROPion 450 MG TB24 Take 450 mg by mouth every morning 30 tablet 1     busPIRone (BUSPAR) 15 MG tablet Increase if anxiety not doing well: 0.5 tab 2x/day x 3 days, then 1 tab 2x/day x 3 days, then 1.5 tab 2x/day x 3 days, then 2 tab 2x/day. 90 tablet 0     diltiazem ER COATED BEADS (CARDIZEM CD) 360 MG 24 hr capsule Take 1 capsule (360 mg) by mouth daily 30 capsule 1     hydrALAZINE (APRESOLINE) 25 MG tablet Take 0.5 tablets (12.5 mg) by mouth every 6 hours as needed (for SBP >175 or DBP >110) 30 tablet 0     lisinopril (PRINIVIL/ZESTRIL) 20 MG tablet Take 1 tablet (20 mg) by  mouth daily 30 tablet 1     sildenafil (VIAGRA) 100 MG tablet Take 1 tablet (100 mg) by mouth daily as needed 30 min to 4 hrs before sex. Do not use with nitroglycerin, terazosin or doxazosin. 12 tablet 11     sulfamethoxazole-trimethoprim (BACTRIM DS/SEPTRA DS) 800-160 MG tablet Take 1 tablet by mouth 2 times daily for 7 days 14 tablet 0     tamsulosin (FLOMAX) 0.4 MG capsule Take 1 capsule (0.4 mg) by mouth daily 30 capsule 1     traZODone (DESYREL) 100 MG tablet Take 1 tablet (100 mg) by mouth nightly as needed for sleep 30 tablet 1     docusate sodium (COLACE) 100 MG capsule Take 1 capsule (100 mg) by mouth 2 times daily (Patient not taking: Reported on 8/24/2019) 60 capsule 3     Social History     Tobacco Use     Smoking status: Never Smoker     Smokeless tobacco: Never Used   Substance Use Topics     Alcohol use: Yes     Comment: No drinking for 2 weeks       OBJECTIVE  /75 (BP Location: Left arm, Patient Position: Chair, Cuff Size: Adult Regular)   Pulse 107   Temp 98.3  F (36.8  C) (Oral)   Wt 109.5 kg (241 lb 8 oz)   SpO2 98%   BMI 33.07 kg/m      Physical Exam   Constitutional: He is oriented to person, place, and time. No distress.   Cardiovascular: Normal rate, regular rhythm, normal heart sounds and intact distal pulses. Exam reveals no gallop and no friction rub.   No murmur heard.  Pulmonary/Chest: Effort normal and breath sounds normal. No respiratory distress.   Abdominal: Soft. He exhibits no distension. There is no tenderness. There is no guarding.   Neurological: He is alert and oriented to person, place, and time.   Skin: Skin is warm. Capillary refill takes less than 2 seconds. No pallor.   Psychiatric: He has a normal mood and affect.       Labs:  Results for orders placed or performed in visit on 08/24/19 (from the past 24 hour(s))   UA with Microscopic reflex to Culture   Result Value Ref Range    Color Urine Yellow     Appearance Urine Clear     Glucose Urine Negative  NEG^Negative mg/dL    Bilirubin Urine Negative NEG^Negative    Ketones Urine Negative NEG^Negative mg/dL    Specific Gravity Urine 1.025 1.003 - 1.035    pH Urine 6.0 5.0 - 7.0 pH    Protein Albumin Urine Negative NEG^Negative mg/dL    Urobilinogen Urine 0.2 0.2 - 1.0 EU/dL    Nitrite Urine Negative NEG^Negative    Blood Urine Negative NEG^Negative    Leukocyte Esterase Urine Small (A) NEG^Negative    Source Midstream Urine     WBC Urine 10-25 (A) OTO5^0 - 5 /HPF    RBC Urine O - 2 OTO2^O - 2 /HPF    Squamous Epithelial /LPF Urine Few FEW^Few /LPF    Bacteria Urine Few (A) NEG^Negative /HPF    Mucous Urine Present (A) NEG^Negative /LPF    Comment Urine WBC CLUPS PRESENT        ASSESSMENT:    ICD-10-CM    1. Acute cystitis without hematuria N30.00 sulfamethoxazole-trimethoprim (BACTRIM DS/SEPTRA DS) 800-160 MG tablet   2. Urgency incontinence N39.41 UA with Microscopic reflex to Culture   3. Nonspecific finding on examination of urine R82.90 Urine Culture Aerobic Bacterial        Medical Decision Making:    Differential Diagnosis:  UTI: Pyelonephritis, Urethritis and Interstitial Cystitis    Serious Comorbid Conditions:  Adult:  HTN    PLAN: Discussed with patient causes and treatment options with oral antibiotics. Process for urine culture discussed and signs and symptoms of pyelonephritis mentioned.    Advised importance of increased water intake and completion of treatment course. Also discussed avoiding citrus or cranberry juice. Education provided. Referral updated for Urology. Patient advised to follow up with Urology, if symptoms worsen or do not improve as discussed in 3 days. Patient agreed to the plan of care with no further questions or concerns.     Charlie Soria APRN, CNP      Patient Instructions     Patient Education     Bladder Infection, Male (Adult)    You have a bladder infection.  Urine is normally free of bacteria. But bacteria can get into the urinary tract from the skin around the rectum  or it may travel in the blood from elsewhere in the body.  This is called a urinary tract infection (UTI). An infection can occur anywhere in the urinary tract. It could be in a kidney (pyelonephritis)or in the bladder (cystitis) and urethra (urethritis). The urethra is the tube that drains the urine from the bladder through the tip of the penis.  The most common place for a UTI is in the bladder. This is called a bladder infection. Most bladder infections are easily treated. They are not serious unless the infection spreads up to the kidney.  The terms bladder infection, UTI, and cystitis are often used to describe the same thing, but they aren t always the same. Cystitis is an inflammation of the bladder. The most common cause of cystitis is an infection.   Keep in mind:    Infections in the urine are called UTIs.    Cystitis is usually caused by a UTI.    Not all UTIs and cases of cystitis are bladder infections.    Bladder infections are the most common type of cystitis.  Symptoms of a bladder infection  The infection causes inflammation in the urethra and bladder. This inflammation causes many of the symptoms. The most common symptoms of a bladder infection are:    Pain or burning when urinating    Having to go more often than usual    Feeling like you need to go right away    Only a small amount comes out    Blood in urine    Discomfort in your belly (abdomen), usually in the lower abdomen, above the pubic bone    Cloudy, strong, or bad smelling urine    Unable to urinate (retention)    Urinary incontinence    Fever    Loss of appetite  Older adults may also feel confused.  Causes of a bladder infection  Bladder infections are not contagious. You can't get one from someone else, from a toilet seat, or from sharing a bath.  The most common cause of bladder infections is bacteria from the bowels. The bacteria get onto the skin around the opening of the urethra. From there they can get into the urine and travel  up to the bladder. This causes inflammation and an infection. This usually happens because of:    An enlarged prostate    Poor cleaning of the genitals    Procedures that put a tube in your bladder, like a Anderson catheter    Bowel incontinence    Older age    Not emptying your bladder (The urine stays there, giving the bacteria a chance to grow.)    Dehydration (This allows urine to stay in the bladder longer.)    Constipation (This can cause the bowels to push on the bladder or urethra and keep the bladder from emptying.)  Treatment  Bladder infections are treated with antibiotics. They usually clear up quickly without complications. Treatment helps prevent a more serious kidney infection.  Medicines  Medicines can help in the treatment of a bladder infection:    You may have been given phenazopyridine to ease burning when you urinate. It will cause your urine to be bright orange. It can stain clothing.    You may have been prescribed antibiotics. Take this medicine until you have finished it, even if you feel better. Taking all of the medicine will make sure the infection has cleared.  You can use acetaminophen or ibuprofen for pain, fever, or discomfort, unless another medicine was prescribed. You can also alternate them, or use both together. They work differently and are a different class of medicines, so taking them together is not an overdose. If you have chronic liver or kidney disease, talk with your healthcare provider before using these medicines. Also talk with your provider if you ve had a stomach ulcer or GI bleeding or are taking blood thinner medicines.  Home care  Here are some guidelines to help you care for yourself at home:    Drink plenty of fluids, unless your healthcare provider told you not to. Fluids will prevent dehydration and flush out your bladder.    Use good personal hygiene. Wipe from front to back after using the toilet, and clean your penis regularly. If you aren t circumcised,  retract the foreskin when cleaning.    Urinate more frequently, and don t try to hold it in for long periods of time, if possible.    Wear loose-fitting clothes and cotton underwear. Avoid tight-fitting pants. This helps keep you clean and dry.    Change your diet to prevent constipation. This means eating more fresh foods and more fiber, and less junk and fatty foods.    Avoid sex until your symptoms are gone.    Avoid caffeine, alcohol, and spicy foods. These can irritate the bladder.  Follow-up care  Follow up with your healthcare provider, or as advised if all symptoms have not cleared up within 5 days. It is important to keep your follow-up appointment. You can talk with your provider to see if you need more tests of the urinary tract. This is especially important if you have infections that keep coming back.  If a culture was done, you will be told if your treatment needs to be changed. If directed, you can call to find out the results.  If X-rays were taken, you will be told of any findings that may affect your care.  Call 911  Call 911 if any of these occur:    Trouble breathing    Difficulty waking up    Feeling confused    Fainting or loss of consciousness    Rapid heart rate  When to seek medical advice  Call your healthcare provider right away if any of these occur:    Fever of 100.4 F (38 C) or higher, or as directed by your healthcare provider    Your symptoms don t improve after 2 days of treatment    Back or abdominal pain that gets worse    Repeated vomiting, or you aren t able to keep medicine down    Weakness or dizziness  Date Last Reviewed: 10/1/2016    8366-2564 [employer]. [address] [place] [address], [place] [address]. All rights reserved. This information is not intended as a substitute for professional medical care. Always follow your healthcare professional's instructions.

## 2019-08-25 LAB
BACTERIA SPEC CULT: NORMAL
SPECIMEN SOURCE: NORMAL

## 2019-09-09 ENCOUNTER — OFFICE VISIT (OUTPATIENT)
Dept: FAMILY MEDICINE | Facility: CLINIC | Age: 59
End: 2019-09-09
Payer: COMMERCIAL

## 2019-09-09 VITALS
SYSTOLIC BLOOD PRESSURE: 112 MMHG | TEMPERATURE: 97.8 F | DIASTOLIC BLOOD PRESSURE: 72 MMHG | WEIGHT: 242 LBS | HEART RATE: 105 BPM | HEIGHT: 72 IN | OXYGEN SATURATION: 96 % | BODY MASS INDEX: 32.78 KG/M2

## 2019-09-09 DIAGNOSIS — R35.0 FREQUENT URINATION: Primary | ICD-10-CM

## 2019-09-09 DIAGNOSIS — R82.90 ABNORMAL URINE FINDINGS: ICD-10-CM

## 2019-09-09 DIAGNOSIS — Z12.5 SCREENING FOR PROSTATE CANCER: ICD-10-CM

## 2019-09-09 LAB
ALBUMIN UR-MCNC: NEGATIVE MG/DL
APPEARANCE UR: CLEAR
BACTERIA #/AREA URNS HPF: ABNORMAL /HPF
BILIRUB UR QL STRIP: NEGATIVE
COLOR UR AUTO: YELLOW
GLUCOSE UR STRIP-MCNC: NEGATIVE MG/DL
HGB UR QL STRIP: ABNORMAL
KETONES UR STRIP-MCNC: NEGATIVE MG/DL
LEUKOCYTE ESTERASE UR QL STRIP: ABNORMAL
MUCOUS THREADS #/AREA URNS LPF: PRESENT /LPF
NITRATE UR QL: NEGATIVE
NON-SQ EPI CELLS #/AREA URNS LPF: ABNORMAL /LPF
PH UR STRIP: 6 PH (ref 5–7)
RBC #/AREA URNS AUTO: ABNORMAL /HPF
SOURCE: ABNORMAL
SP GR UR STRIP: 1.02 (ref 1–1.03)
UROBILINOGEN UR STRIP-ACNC: 0.2 EU/DL (ref 0.2–1)
WBC #/AREA URNS AUTO: ABNORMAL /HPF

## 2019-09-09 PROCEDURE — 99213 OFFICE O/P EST LOW 20 MIN: CPT | Performed by: PHYSICIAN ASSISTANT

## 2019-09-09 PROCEDURE — 87086 URINE CULTURE/COLONY COUNT: CPT | Performed by: PHYSICIAN ASSISTANT

## 2019-09-09 PROCEDURE — 36415 COLL VENOUS BLD VENIPUNCTURE: CPT | Performed by: PHYSICIAN ASSISTANT

## 2019-09-09 PROCEDURE — 81001 URINALYSIS AUTO W/SCOPE: CPT | Performed by: PHYSICIAN ASSISTANT

## 2019-09-09 PROCEDURE — G0103 PSA SCREENING: HCPCS | Performed by: PHYSICIAN ASSISTANT

## 2019-09-09 RX ORDER — CIPROFLOXACIN 500 MG/1
500 TABLET, FILM COATED ORAL 2 TIMES DAILY
Qty: 14 TABLET | Refills: 0 | Status: SHIPPED | OUTPATIENT
Start: 2019-09-09 | End: 2019-09-19

## 2019-09-09 ASSESSMENT — MIFFLIN-ST. JEOR: SCORE: 1950.21

## 2019-09-09 ASSESSMENT — PAIN SCALES - GENERAL: PAINLEVEL: NO PAIN (0)

## 2019-09-09 NOTE — PATIENT INSTRUCTIONS
At [facility], we strive to deliver an exceptional experience to you, every time we see you.  If you receive a survey in the mail, please send us back your thoughts. We really do value your feedback.    Your care team:                            Family Medicine Internal Medicine   MD Terrell Parker MD Shantel Branch-Fleming, MD Katya Georgiev PA-C Megan Hill, APRN CNP    Eddie Carrera, MD Pediatrics   Patel Uribe, YVON Desouza, MD Delisa Wright APRN CNP   MD Crystal Encinas MD Deborah Mielke, MD Mahogany Moscoso, APRN [facility]      Clinic hours: Monday - Thursday 7 am-7 pm; Fridays 7 am-5 pm.   Urgent care: Monday - Friday 11 am-9 pm; Saturday and Sunday 9 am-5 pm.  Pharmacy : Monday -Thursday 8 am-8 pm; Friday 8 am-6 pm; Saturday and Sunday 9 am-5 pm.     Clinic: [phone]   Pharmacy: [phone]      Return to clinic or Emergency Department for fevers, increasing pain, abdominal pain, vomiting or any other changes in condition.      Understanding Urinary Tract Infections (UTIs)  Most UTIs are caused by bacteria, although they may also be caused by viruses or fungi. Bacteria from the bowel are the most common source of infection. The infection may begin because of any of the following:  Sexual activity. During sex, germs can travel from the penis, vagina, or rectum into the urethra.   Germs on the skin outside the rectum may travel into the urethra. This is more common in women since the rectum and urethra are closer to each other than in men. Wiping from front to back after using the toilet and keeping the area clean can help prevent germs from getting to the urethra.  Blockage of urine flow through the urinary tract. If urine sits too long, germs may begin to grow out of control      Parts of the Urinary Tract  The infection can occur in any part of the urinary tract.  The kidneys collect and store urine.  The ureters carry urine  "from the kidneys to the bladder.  The bladder holds urine until you are ready to let it out.  The urethra carries urine from the bladder out of the body. It is shorter in women, so bacteria can move through it more easily. The urethra is longer in men, so a UTI is less likely to reach the bladder or kidneys in men.    A bladder infection (\"cystitis\" or \"UTI\") usually causes a constant urge to urinate and a burning when passing urine. Urine may be cloudy, smelly or dark. There may be pain in the lower abdomen. A bladder infection occurs when bacteria from the vaginal area enter the bladder opening (urethra). This can occur from sexual intercourse, wearing tight clothing, dehydration and other factors.  HOME CARE:  1. Drink lots of fluids (at least 6-8 glasses a day, unless you must restrict fluids for other medical reasons). This will force the medicine into your urinary system and flush the bacteria out of your body. Cranberry juice has been shown to help clear out the bacteria.  2. Avoid sexual intercourse until your symptoms are gone.  3. A bladder infection is treated with antibiotics. You may also be given Pyridium (generic = phenazopyridine) to reduce the burning sensation. This medicine will cause your urine to become a bright orange color. The orange urine may stain clothing. You may wear a pad or panty-liner to protect clothing.  PREVENTING FUTURE INFECTIONS:  1. Always wipe from front to back after a bowel movement.  2. Keep the genital area clean and dry.  3. Drink plenty of fluids each day to avoid dehydration.  4. Urinate right after intercourse to flush out the bladder.  5. Wear cotton underwear and cotton-lined panty hose; avoid tight-fitting pants.  6. If you are on birth control pills and are having frequent bladder infections, discuss with your doctor.  FOLLOW UP: Return to this facility or see your doctor if ALL symptoms are not gone after three days of treatment.  GET PROMPT MEDICAL ATTENTION if " any of the following occur:    Fever over 101 F (38.3 C)    No improvement by the third day of treatment    Increasing back or abdominal pain    Repeated vomiting; unable to keep medicine down    Weakness, dizziness or fainting    Vaginal discharge    Pain, redness or swelling in the labia (outer vaginal area)    5503-2066 [employer], [address] [place] [address]. All rights reserved. This information is not intended as a substitute for professional medical care. Always follow your healthcare professional's       4795-7161 [facility], [address] [place] [address]. All rights reserved. This information is not intended as a substitute for professional medical care. Always follow your healthcare professional's instructions.

## 2019-09-09 NOTE — PROGRESS NOTES
Subjective     Flakito Hilliard is a 59 year old male who presents to clinic today for the following health issues:    HPI   Genitourinary - Male  Onset: about 1-2 weeks    Description:   Dysuria (painful urination): no   Hematuria (blood in urine): no   Frequency: YES  Are you urinating at night : YES  Hesitancy (delay in urine): YES  Retention (unable to empty): YES  Decrease in urinary flow: YES  Incontinence: YES    Progression of Symptoms:  worsening and constant    Accompanying Signs & Symptoms:  Fever: no   Back/Flank pain: YES  Urethral discharge: no   Testicle lumps/masses/pain: no   Nausea and/or vomiting: no   Abdominal pain: YES    History:   History of frequent UTI's: YES  History of kidney stones: no   History of hernias: no   Personal or Family history of Prostate problems: YES  Sexually active: no     Precipitating factors:   NONE    Alleviating factors:  PATIENT SEEN ON 8/24/19 GIVEN BACTRIM x 1 week, which helped while he was taking it.    cx grew contaminant, UA similar today    Has URO brett tend of month, status-post buccal graft dorsal onlay w/complicated urethroplasty for urethral stricture, hx recurrent bladder and prostate stones and prostate duct reflux.                 Allergies   Allergen Reactions     Contrast Dye Difficulty breathing and Anaphylaxis       Patient Active Problem List   Diagnosis     Acute bacterial prostatitis     Lower urinary tract infectious disease     severe HTN (hypertension)     Suicidal ideation     Major depressive disorder, recurrent episode, moderate (H)     Urinary retention     Urethral stricture     PATRICE (obstructive sleep apnea)     Complicated UTI (urinary tract infection)     RIO (generalized anxiety disorder)     At risk for cardiovascular event     Elevated hemoglobin (H)     Bladder stones     Erectile dysfunction, unspecified erectile dysfunction type     Severe recurrent major depression without psychotic features (H)       Past Medical History:  "  Diagnosis Date     Depressive disorder      H/O urethral stricture      Hypertension      Sleep apnea          Current Outpatient Medications on File Prior to Visit:  amLODIPine (NORVASC) 10 MG tablet Take 1 tablet (10 mg) by mouth daily   atorvastatin (LIPITOR) 20 MG tablet TAKE ONE TABLET BY MOUTH EVERY DAY.   buPROPion 450 MG TB24 Take 450 mg by mouth every morning   busPIRone (BUSPAR) 15 MG tablet Increase if anxiety not doing well: 0.5 tab 2x/day x 3 days, then 1 tab 2x/day x 3 days, then 1.5 tab 2x/day x 3 days, then 2 tab 2x/day.   diltiazem ER COATED BEADS (CARDIZEM CD) 360 MG 24 hr capsule Take 1 capsule (360 mg) by mouth daily   docusate sodium (COLACE) 100 MG capsule Take 1 capsule (100 mg) by mouth 2 times daily   hydrALAZINE (APRESOLINE) 25 MG tablet Take 0.5 tablets (12.5 mg) by mouth every 6 hours as needed (for SBP >175 or DBP >110)   lisinopril (PRINIVIL/ZESTRIL) 20 MG tablet Take 1 tablet (20 mg) by mouth daily   sildenafil (VIAGRA) 100 MG tablet Take 1 tablet (100 mg) by mouth daily as needed 30 min to 4 hrs before sex. Do not use with nitroglycerin, terazosin or doxazosin.   tamsulosin (FLOMAX) 0.4 MG capsule Take 1 capsule (0.4 mg) by mouth daily   traZODone (DESYREL) 100 MG tablet Take 1 tablet (100 mg) by mouth nightly as needed for sleep     No current facility-administered medications on file prior to visit.     Social History     Tobacco Use     Smoking status: Never Smoker     Smokeless tobacco: Never Used   Substance Use Topics     Alcohol use: Yes     Comment: No drinking for 2 weeks     Drug use: No       ROS:   GEN: NO fevers    LUTS sa above  [facility]    OBJECTIVE:  /72 (BP Location: Right arm, Patient Position: Chair, Cuff Size: Adult Large)   Pulse 105   Temp 97.8  F (36.6  C) (Oral)   Ht 1.82 m (5' 11.65\")   Wt 109.8 kg (242 lb)   SpO2 96%   BMI 33.14 kg/m     General:   awake, alert, and cooperative.  NAD.   Head: Normocephalic, atraumatic.  Eyes: " Conjunctiva clear,   Lungs: Regular rate  Neuro: Alert and oriented - normal speech.    ASSESSMENT:well appearing, we discussed UA findings and possibility this isn't infectious process. He did do bett greg the bactrim so will try cipro pending culture results.  Advised depends and close URO f/u/.    ICD-10-CM    1. Frequent urination R35.0 UA with Microscopic reflex to Culture     Urine Culture Aerobic Bacterial   2. Abnormal urine findings R82.90 Urine Culture Aerobic Bacterial     ciprofloxacin (CIPRO) 500 MG tablet   3. Screening for prostate cancer Z12.5 PSA, screen       PLAN:   Follow up: URO   Advised about symptoms which might herald more serious problems.

## 2019-09-10 LAB
BACTERIA SPEC CULT: NO GROWTH
PSA SERPL-ACNC: 0.09 UG/L (ref 0–4)
SPECIMEN SOURCE: NORMAL

## 2019-09-11 ENCOUNTER — TELEPHONE (OUTPATIENT)
Dept: FAMILY MEDICINE | Facility: CLINIC | Age: 59
End: 2019-09-11

## 2019-09-11 DIAGNOSIS — R35.0 FREQUENT URINATION: Primary | ICD-10-CM

## 2019-09-11 RX ORDER — OXYBUTYNIN CHLORIDE 5 MG/1
5 TABLET ORAL 2 TIMES DAILY
Qty: 60 TABLET | Refills: 1 | Status: SHIPPED | OUTPATIENT
Start: 2019-09-11 | End: 2019-10-21

## 2019-09-11 NOTE — TELEPHONE ENCOUNTER
This writer attempted to contact Ranjeet on 09/11/19      Reason for call provider information and left message at the facility he is at to call.      If patient calls back:   Registered Nurse called. Follow Triage Call workflow        Latisha Hearn RN

## 2019-09-11 NOTE — TELEPHONE ENCOUNTER
Please call patient- his urine culture was negative and his prostate test normal.  He should stop the antibiotics and follow up with Urology.  WE can cautiously try a medicine to help with his symptoms which I have sent to the pharmacy.  He should stop it if his symptoms worsen.     Patel Uribe PA-C

## 2019-09-12 NOTE — TELEPHONE ENCOUNTER
Patient contacted and informed of the below per provider documentation. Patient verbalizes understanding. Patient is not interested in starting the medication (oxybutynin). He will follow up Urology on 9/30/19.    Roberta Mendoza RN

## 2019-09-18 ENCOUNTER — PRE VISIT (OUTPATIENT)
Dept: SLEEP MEDICINE | Facility: CLINIC | Age: 59
End: 2019-09-18

## 2019-09-18 NOTE — TELEPHONE ENCOUNTER
"  1.  Reason for the visit:  Establish care for cpap  2.  Referring provider and clinic name:  [facility]  3.  Previous Sleep Doctor or Pulmonlogist (clinic name)?  [facility]  4.  Records, Procedures, Imaging, and Labs (see below)  Records in Epic        All NOTES from previous office visits that pertain to why they are being seen in the Sleep Center    Previous Sleep Studies, Chest CT, Echos and reports that pertain to why they are seeing Sleep Center    All Sleep records that have been done in the last 2 years that pertain to why they are seeing Sleep Center            Are they being seen for continuation of care for Cpap/Bipap/Avap/Trilogy/Dental Device? Cpap    If yes to above Who and Where was Device issued/currently getting supplies from?     Are you currently on \"Supplemental Oxygen\" during the day or night?                                                                                                                                                         Please remind pt to bring Cpap machine and ask to arrive 15 minutes early to appointment due traffic and congestion                                                 5. Pt Sleep Center Packet received Message left asking pt to arrive 30 minutes early to appointment if no packet received.        Yes: \"please make sure that you bring this to your appointment completed, either the doctor will not see you until this completed or you may be asked to reschedule your appointment.\"     No: mail or email to the pt and explain, \"please make sure that you bring this to your appointment completed, either the doctor will not see you until this completed or you may be asked to reschedule your appointment.\"     ~If pt coming early to fill packet out, ask that they come 30 minutes prior to their appointment~     6. Has the pt's medication list been updated and preferred pharmacy added?     7. Has the allergy list been reviewed?    \"Thank you for choosing [facility] " "[facility] and we look forward to seeing you at your upcoming appointment\"     "

## 2019-09-19 ENCOUNTER — OFFICE VISIT (OUTPATIENT)
Dept: SLEEP MEDICINE | Facility: CLINIC | Age: 59
End: 2019-09-19
Attending: PHYSICIAN ASSISTANT
Payer: COMMERCIAL

## 2019-09-19 VITALS
OXYGEN SATURATION: 96 % | HEIGHT: 71 IN | DIASTOLIC BLOOD PRESSURE: 77 MMHG | WEIGHT: 243 LBS | BODY MASS INDEX: 34.02 KG/M2 | SYSTOLIC BLOOD PRESSURE: 118 MMHG | RESPIRATION RATE: 18 BRPM | HEART RATE: 85 BPM

## 2019-09-19 DIAGNOSIS — G47.33 OSA (OBSTRUCTIVE SLEEP APNEA): ICD-10-CM

## 2019-09-19 PROCEDURE — 99204 OFFICE O/P NEW MOD 45 MIN: CPT | Performed by: INTERNAL MEDICINE

## 2019-09-19 RX ORDER — ARIPIPRAZOLE 2 MG/1
2 TABLET ORAL DAILY
COMMUNITY

## 2019-09-19 ASSESSMENT — MIFFLIN-ST. JEOR: SCORE: 1944.37

## 2019-09-19 NOTE — PATIENT INSTRUCTIONS
" MY TREATMENT INFORMATION FOR SLEEP APNEA-  Flakito Hilliard    DOCTOR : BELGICA LESLIE MD      We would like to share some general information about sleep to help us work together to get better sleep for you.     Why do we sleep?   -clear toxins from the brain   -remove unnecessary neural connections that accumulate during the day   -enhance memory and learning   Without adequate sleep, our brain may become impaired in a manner that is similar to being intoxicated.       How much sleep do we need?   -The average adult needs 7-8 hours of sleep while young adolescents need up to 9 hours in order to function at their best.   -Between 12 and 20 years of age our sleep is often delayed up to an hour compared to older or younger people.   Aim for 7-8 sleep and a regular schedule; it may take a week or more to eliminate the effects of chronic insufficient sleep.       When should I sleep?   The timing of sleep is determined genetically for each of us.  Some of us are \"night owls\" and others are \"larks\".   The timing of sleep and the amount of sleep we need changes with our age.   Try to schedule your sleep time to fit your natural sleep schedule when you are not busy with school, work, or travel.       What if my sleep schedule doesn't fit my work schedule?   -If you have no trouble falling asleep or getting up during the work week, your work schedule is probably well-matched to your natural sleep schedule.   -You may benefit from a sleep  who can help support you to get to the best schedule.       What are some good habits to start with?   -Your bedroom is for sleeping and should be quiet, comfortably cool and dark before you lie down.   -You should not have electronic devices such as phones or computers in your bedroom.   -Your bedtime and awakening time should fit your natural tendency to fall asleep and wake up and should not vary much between weekdays and weekends.   -No caffeine within 6 hours or alcohol within two " "hours of bedtime  Be careful and regular with your sleep-you will feel better and think better.    Am I having a sleep study at a sleep center?  Make sure you have an appointment for the study before you leave!    Am I having a home sleep study?  Watch this video:  https://www.[employer].com/watch?v=CteI_GhyP9g&list=[email or web address]  Please verify your insurance coverage with your insurance carrier    Frequently asked questions:  1. What is Obstructive Sleep Apnea (PATRICE)? PATRICE is the most common type of sleep apnea. Apnea means, \"without breath.\"  Apnea is most often caused by narrowing or collapse of the upper airway as muscles relax during sleep.   Almost everyone has occasional apneas. Most people with sleep apnea have had brief interruptions at night frequently for many years.  The severity of sleep apnea is related to how frequent and severe the events are.   2. What are the consequences of PATRICE? Symptoms include: feeling sleepy during the day, snoring loudly, gasping or stopping of breathing, trouble sleeping, and occasionally morning headaches or heartburn at night.  Sleepiness can be serious and even increase the risk of falling asleep while driving. Other health consequences may include development of high blood pressure and other cardiovascular disease in persons who are susceptible. Untreated PATRICE  can contribute to heart disease, stroke and diabetes.   3. What are the treatment options? In most situations, sleep apnea is a lifelong disease that must be managed with daily therapy. Medications are not effective for sleep apnea and surgery is generally not considered until other therapies have been tried. Your treatment is your choice . Continuous Positive Airway (CPAP) works right away and is the therapy that is effective in nearly everyone. An oral device to hold your jaw forward is usually the next most reliable option. Other options include postioning devices (to keep you off your back), " weight loss, and surgery including a tongue pacing device. There is more detail about some of these options below.    Important tips for using CPAP and similar devices   Know your equipment:  CPAP is continuous positive airway pressure that prevents obstructive sleep apnea by keeping the throat from collapsing while you are sleeping. In most cases, the device is  smart  and can slowly self-adjusts if your throat collapses and keeps a record every day of how well you are treated-this information is available to you and your care team.  BPAP is bilevel positive airway pressure that keeps your throat open and also assists each breath with a pressure boost to maintain adequate breathing.  Special kinds of BPAP are used in patients who have inadequate breathing from lung or heart disease. In most cases, the device is  smart  and can slowly self-adjusts to assist breathing. Like CPAP, the device keeps a record of how well you are treated.  Your mask is your connection to the device. You get to choose what feels most comfortable and the staff will help to make sure if fits. Here: are some examples of the different masks that are available:       Key points to remember on your journey with sleep apnea:  1. Sleep study.  PAP devices often need to be adjusted during a sleep study to show that they are effective and adjusted right.  2. Good tips to remember: Try wearing just the mask during a quiet time during the day so your body adapts to wearing it. A humidifier is recommended for comfort in most cases to prevent drying of your nose and throat. Allergy medication from your provider may help you if you are having nasal congestion.  3. Getting settled-in. It takes more than one night for most of us to get used to wearing a mask. Try wearing just the mask during a quiet time during the day so your body adapts to wearing it. A humidifier is recommended for comfort in most cases. Our team will work with you carefully on the  first day and will be in contact within 4 days and again at 2 and 4 weeks for advice and remote device adjustments. Your therapy is evaluated by the device each day.   4. Use it every night. The more you are able to sleep naturally for 7-8 hours, the more likely you will have good sleep and to prevent health risks or symptoms from sleep apnea. Even if you use it 4 hours it helps. Occasionally all of us are unable to use a medical therapy, in sleep apnea, it is not dangerous to miss one night.   5. Communicate. Call our skilled team on the number provided on the first day if your visit for problems that make it difficult to wear the device. Over 2 out of 3 patients can learn to wear the device long-term with help from our team. Remember to call our team or your sleep providers if you are unable to wear the device as we may have other solutions for those who cannot adapt to mask CPAP therapy. It is recommended that you sleep your sleep provider within the first 3 months and yearly after that if you are not having problems.   6. Use it for your health. We encourage use of CPAP masks during daytime quiet periods to allow your face and brain to adapt to the sensation of CPAP so that it will be a more natural sensation to awaken to at night or during naps. This can be very useful during the first few weeks or months of adapting to CPAP though it does not help medically to wear CPAP during wakefulness and  should not be used as a strategy just to meet guidelines.  7. Take care of your equipment. Make sure you clean your mask and tubing using directions every day and that your filter and mask are replaced as recommended or if they are not working.     BESIDES CPAP, WHAT OTHER THERAPIES ARE THERE?    Positioning Device  Positioning devices are generally used when sleep apnea is mild and only occurs on your back.This example shows a pillow that straps around the waist. It may be appropriate for those whose sleep study shows  milder sleep apnea that occurs primarily when lying flat on one's back. Preliminary studies have shown benefit but effectiveness at home may need to be verified by a home sleep test. These devices are generally not covered by medical insurance.  Examples of devices that maintain sleeping on the back to prevent snoring and mild sleep apnea.    Belt type body positioner  Http://[employer]reshay/    Electronic reminder  Http://nightshifttherapy.com/  Http://www.[employer].Areshay.au/      Oral Appliance  What is oral appliance therapy?  An oral appliance device fits on your teeth at night like a retainer used after having braces. The device is made by a specialized dentist and requires several visits over 1-2 months before a manufactured device is made to fit your teeth and is adjusted to prevent your sleep apnea. Once an oral device is working properly, snoring should be improved. A home sleep test may be recommended at that time if to determine whether the sleep apnea is adequately treated.       Some things to remember:  -Oral devices are often, but not always, covered by your medical insurance. Be sure to check with your insurance provider.   -If you are referred for oral therapy, you will be given a list of specialized dentists to consider or you may choose to visit the Web site of the American Academy of Dental Sleep Medicine  -Oral devices are less likely to work if you have severe sleep apnea or are extremely overweight.     More detailed information  An oral appliance is a small acrylic device that fits over the upper and lower teeth  (similar to a retainer or a mouth guard). This device slightly moves jaw forward, which moves the base of the tongue forward, opens the airway, improves breathing for effective treat snoring and obstructive sleep apnea in perhaps 7 out of 10 people .  The best working devices are custom-made by a dental device  after a mold is made of the teeth 1, 2, 3.  When is an oral  appliance indicated?  Oral appliance therapy is recommended as a first-line treatment for patients with primary snoring, mild sleep apnea, and for patients with moderate sleep apnea who prefer appliance therapy to use of CPAP4, 5. Severity of sleep apnea is determined by sleep testing and is based on the number of respiratory events per hour of sleep.   How successful is oral appliance therapy?  The success rate of oral appliance therapy in patients with mild sleep apnea is 75-80% while in patients with moderate sleep apnea it is 50-70%. The chance of success in patients with severe sleep apnea is 40-50%. The research also shows that oral appliances have a beneficial effect on the cardiovascular health of PATRICE patients at the same magnitude as CPAP therapy7.  Oral appliances should be a second-line treatment in cases of severe sleep apnea, but if not completely successful then a combination therapy utilizing CPAP plus oral appliance therapy may be effective. Oral appliances tend to be effective in a broad range of patients although studies show that the patients who have the highest success are females, younger patients, those with milder disease, and less severe obesity. 3, 6.   Finding a dentist that practices dental sleep medicine  Specific training is available through the American Academy of Dental Sleep Medicine for dentists interested in working in the field of sleep. To find a dentist who is educated in the field of sleep and the use of oral appliances, near you, visit the Web site of the American Academy of Dental Sleep Medicine.    References  1. Ethel et al. Objectively measured vs self-reported compliance during oral appliance therapy for sleep-disordered breathing. Chest 2013; 144(5): 3594-1332.  2. Pina et al. Objective measurement of compliance during oral appliance therapy for sleep-disordered breathing. Thorax 2013; 68(1): 91-96.  3. Guilherme et al. Mandibular advancement devices in  620 men and women with PATRICE and snoring: tolerability and predictors of treatment success. Chest 2004; 125: 9747-6419.  4. Joellen et al. Oral appliances for snoring and PATRICE: a review. Sleep 2006; 29: 244-262.  5. Gabriel et al. Oral appliance treatment for PATRICE: an update. J Clin Sleep Med 2014; 10(2): 215-227.  6. David et al. Predictors of OSAH treatment outcome. J Dent Res 2007; 86: 1253-8001.      Weight Loss:    Weight loss is a long-term strategy that may improve sleep apnea in some patients.    Weight management is a personal decision and the decision should be based on your interest and the potential benefits.  If you are interested in exploring weight loss strategies, the following discussion covers the impact on weight loss on sleep apnea and the approaches that may be successful.    Being overweight does not necessarily mean you will have health consequences.  Those who have BMI over 35 or over 27 with existing medical conditions carries greater risk.   Weight loss decreases severity of sleep apnea in most people with obesity. For those with mild obesity who have developed snoring with weight gain, even 15-30 pound weight loss can improve and occasionally eliminate sleep apnea.  Structured and life-long dietary and health habits are necessary to lose weight and keep healthier weight levels.     Though there may be significant health benefits from weight loss, long-term weight loss is very difficult to achieve- studies show success with dietary management in less than 10% of people. In addition, substantial weight loss may require years of dietary control and may be difficult if patients have severe obesity. In these cases, surgical management may be considered.  Finally, older individuals who have tolerated obesity without health complications may be less likely to benefit from weight loss strategies.        Your BMI is Body mass index is 33.89 kg/m .  Weight management is a personal decision.  If  you are interested in exploring weight loss strategies, the following discussion covers the approaches that may be successful. Body mass index (BMI) is one way to tell whether you are at a healthy weight, overweight, or obese. It measures your weight in relation to your height.  A BMI of 18.5 to 24.9 is in the healthy range. A person with a BMI of 25 to 29.9 is considered overweight, and someone with a BMI of 30 or greater is considered obese. More than two-thirds of American adults are considered overweight or obese.  Being overweight or obese increases the risk for further weight gain. Excess weight may lead to heart disease and diabetes.  Creating and following plans for healthy eating and physical activity may help you improve your health.  Weight control is part of healthy lifestyle and includes exercise, emotional health, and healthy eating habits. Careful eating habits lifelong are the mainstay of weight control. Though there are significant health benefits from weight loss, long-term weight loss with diet alone may be very difficult to achieve- studies show long-term success with dietary management in less than 10% of people. Attaining a healthy weight may be especially difficult to achieve in those with severe obesity. In some cases, medications, devices and surgical management might be considered.  What can you do?  If you are overweight or obese and are interested in methods for weight loss, you should discuss this with your provider.     Consider reducing daily calorie intake by 500 calories.     Keep a food journal.     Avoiding skipping meals, consider cutting portions instead.    Diet combined with exercise helps maintain muscle while optimizing fat loss. Strength training is particularly important for building and maintaining muscle mass. Exercise helps reduce stress, increase energy, and improves fitness. Increasing exercise without diet control, however, may not burn enough calories to loose  weight.       Start walking three days a week 10-20 minutes at a time    Work towards walking thirty minutes five days a week     Eventually, increase the speed of your walking for 1-2 minutes at time    In addition, we recommend that you review healthy lifestyles and methods for weight loss available through the National Institutes of Health patient information sites:  http://win.niddk.nih.gov/publications/index.htm    And look into health and wellness programs that may be available through your health insurance provider, employer, local community center, or [employer].          Surgery:    Surgery for obstructive sleep apnea is considered generally only when other therapies fail to work. Surgery may be discussed with you if you are having a difficult time tolerating CPAP and or when there is an abnormal structure that requires surgical correction.  Nose and throat surgeries often enlarge the airway to prevent collapse.  Most of these surgeries create pain for 1-2 weeks and up to half of the most common surgeries are not effective throughout life.  You should carefully discuss the benefits and drawbacks to surgery with your sleep provider and surgeon to determine if it is the best solution for you.   More information  Surgery for PATRICE is directed at areas that are responsible for narrowing or complete obstruction of the airway during sleep.  There are a wide range of procedures available to enlarge and/or stabilize the airway to prevent blockage of breathing in the three major areas where it can occur: the palate, tongue, and nasal regions.  Successful surgical treatment depends on the accurate identification of the factors responsible for obstructive sleep apnea in each person.  A personalized approach is required because there is no single treatment that works well for everyone.  Because of anatomic variation, consultation with an examination by a sleep surgeon is a critical first step in determining what surgical  options are best for each patient.  In some cases, examination during sedation may be recommended in order to guide the selection of procedures.  Patients will be counseled about risks and benefits as well as the typical recovery course after surgery. Surgery is typically not a cure for a person s PATRICE.  However, surgery will often significantly improve one s PATRICE severity (termed  success rate ).  Even in the absence of a cure, surgery will decrease the cardiovascular risk associated with OSA7; improve overall quality of life8 (sleepiness, functionality, sleep quality, etc).      Palate Procedures:  Patients with PATRICE often have narrowing of their airway in the region of their tonsils and uvula.  The goals of palate procedures are to widen the airway in this region as well as to help the tissues resist collapse.  Modern palate procedure techniques focus on tissue conservation and soft tissue rearrangement, rather than tissue removal.  Often the uvula is preserved in this procedure. Residual sleep apnea is common in patient after pharyngoplasty with an average reduction in sleep apnea events of 33%2.      Tongue Procedures:  ExamWhile patients are awake, the muscles that surround the throat are active and keep this region open for breathing. These muscles relax during sleep, allowing the tongue and other structures to collapse and block breathing.  There are several different tongue procedures available.  Selection of a tongue base procedure depends on characteristics seen on physical exam.  Generally, procedures are aimed at removing bulky tissues in this area or preventing the back of the tongue from falling back during sleep.  Success rates for tongue surgery range from 50-62%3.    Hypoglossal Nerve Stimulation:  Hypoglossal nerve stimulation has recently received approval from the [place] Food and Drug Administration for the treatment of obstructive sleep apnea.  This is based on research showing that the  system was safe and effective in treating sleep apnea6.  Results showed that the median AHI score decreased 68%, from 29.3 to 9.0. This therapy uses an implant system that senses breathing patterns and delivers mild stimulation to airway muscles, which keeps the airway open during sleep.  The system consists of three fully implanted components: a small generator (similar in size to a pacemaker), a breathing sensor, and a stimulation lead.  Using a small handheld remote, a patient turns the therapy on before bed and off upon awakening.    Candidates for this device must be greater than 22 years of age, have moderate to severe PATRICE (AHI between 20-65), BMI less than 32, have tried CPAP/oral appliance without success, and have appropriate upper airway anatomy (determined by a sleep endoscopy performed by Dr. Suazo).    Hypoglossal Nerve Stimulation Pathway:    The sleep surgeon s office will work with the patient through the insurance prior-authorization process (including communications and appeals).    Nasal Procedures:  Nasal obstruction can interfere with nasal breathing during the day and night.  Studies have shown that relief of nasal obstruction can improve the ability of some patients to tolerate positive airway pressure therapy for obstructive sleep apnea1.  Treatment options include medications such as nasal saline, topical corticosteroid and antihistamine sprays, and oral medications such as antihistamines or decongestants. Non-surgical treatments can include external nasal dilators for selected patients. If these are not successful by themselves, surgery can improve the nasal airway either alone or in combination with these other options.      Combination Procedures:  Combination of surgical procedures and other treatments may be recommended, particularly if patients have more than one area of narrowing or persistent positional disease.  The success rate of combination surgery ranges from 66-80%2,3.     References  1. Elida SAENZ. The Role of the Nose in Snoring and Obstructive Sleep Apnoea: An Update.  Eur Arch Otorhinolaryngol. 2011; 268: 1365-73.  2.  Justo SM; Kalani JA; Lilo JR; Pallanch JF; Quoc MB; Eboni SG; Mae BRADLEY. Surgical modifications of the upper airway for obstructive sleep apnea in adults: a systematic review and meta-analysis. SLEEP 2010;33(10):1904-2670. Fco DIOR. Hypopharyngeal surgery in obstructive sleep apnea: an evidence-based medicine review.  Arch Otolaryngol Head Neck Surg. 2006 Feb;132(2):206-13.  3. Marty YH1, Janina Y, Hudson YAHAIRA. The efficacy of anatomically based multilevel surgery for obstructive sleep apnea. Otolaryngol Head Neck Surg. 2003 Oct;129(4):327-35.  4. Fco DIOR, Goldberg A. Hypopharyngeal Surgery in Obstructive Sleep Apnea: An Evidence-Based Medicine Review. Arch Otolaryngol Head Neck Surg. 2006 Feb;132(2):206-13.  5. Angélica PJ et al. Upper-Airway Stimulation for Obstructive Sleep Apnea.  N Engl J Med. 2014 Jan 9;370(2):139-49.  6. Sonia Y et al. Increased Incidence of Cardiovascular Disease in Middle-aged Men with Obstructive Sleep Apnea. Am J Respir Crit Care Med; 2002 166: 159-165  7. Elizabeth EM et al. Studying Life Effects and Effectiveness of Palatopharyngoplasty (SLEEP) study: Subjective Outcomes of Isolated Uvulopalatopharyngoplasty. Otolaryngol Head Neck Surg. 2011; 144: 623-631.              Your BMI is Body mass index is 33.89 kg/m .  Weight management is a personal decision.  If you are interested in exploring weight loss strategies, the following discussion covers the approaches that may be successful. Body mass index (BMI) is one way to tell whether you are at a healthy weight, overweight, or obese. It measures your weight in relation to your height.  A BMI of 18.5 to 24.9 is in the healthy range. A person with a BMI of 25 to 29.9 is considered overweight, and someone with a BMI of 30 or greater is considered obese. More than two-thirds of American  adults are considered overweight or obese.  Being overweight or obese increases the risk for further weight gain. Excess weight may lead to heart disease and diabetes.  Creating and following plans for healthy eating and physical activity may help you improve your health.  Weight control is part of healthy lifestyle and includes exercise, emotional health, and healthy eating habits. Careful eating habits lifelong are the mainstay of weight control. Though there are significant health benefits from weight loss, long-term weight loss with diet alone may be very difficult to achieve- studies show long-term success with dietary management in less than 10% of people. Attaining a healthy weight may be especially difficult to achieve in those with severe obesity. In some cases, medications, devices and surgical management might be considered.  What can you do?  If you are overweight or obese and are interested in methods for weight loss, you should discuss this with your provider.     Consider reducing daily calorie intake by 500 calories.     Keep a food journal.     Avoiding skipping meals, consider cutting portions instead.    Diet combined with exercise helps maintain muscle while optimizing fat loss. Strength training is particularly important for building and maintaining muscle mass. Exercise helps reduce stress, increase energy, and improves fitness. Increasing exercise without diet control, however, may not burn enough calories to loose weight.       Start walking three days a week 10-20 minutes at a time    Work towards walking thirty minutes five days a week     Eventually, increase the speed of your walking for 1-2 minutes at time    In addition, we recommend that you review healthy lifestyles and methods for weight loss available through the National Institutes of Health patient information sites:  http://win.niddk.nih.gov/publications/index.htm    And look into health and wellness programs that may be  available through your health insurance provider, employer, local community center, or [employer].    Weight management plan: Patient was referred to their PCP to discuss a diet and exercise plan.

## 2019-09-19 NOTE — PROGRESS NOTES
[facility]   Outpatient Sleep Medicine Consultation  September 19, 2019      Name: Flakito Hilliard MRN# [id number]   Age: 59 year old YOB: 1960     Date of Consultation: September 19, 2019  Consultation is requested by: Susan Hand PA-C  [address] [place], [place] [id number]  Primary care provider: Susan Hand       Reason for Sleep Consult:     Flakito Hilliard is a 59 year old male with a history of severe obstructive sleep apnea associated with sleep disruption which is resolved currently with use of CPAP.  This occurs in the setting of poorly controlled hypertension on 4 drugs and severe depression and anxiety with psychotic and suicidal features.  He is currently staying in a residential treatment program for management of his depression and is displaced from his usual residence in [facility].         Assessment and Plan:     Summary Sleep Diagnoses:      Severe obstructive airway disase     Poorly controlled hypertension    Urethral structure    Severe depression/anxiety    Summary Recommendations:      CPAP supplies    RTC 1 year    No orders of the defined types were placed in this encounter.        Summary Counseling:  See instructions    Counseling included a comprehensive review of diagnostic and therapeutic strategies as well as risks of inadequate therapy.  Educational materials provided in instructions.             History of Present Illness:     Flakito Hilliard is a 59 year old male with a history of severe obstructive sleep apnea associated with sleep disruption which is resolved currently with use of CPAP.  This occurs in the setting of poorly controlled hypertension on 4 drugs and severe depression and anxiety with psychotic and suicidal features.  He is currently staying in a residential treatment program for management of his depression and is displaced from his usual residence in [facility].  He currently denies initiating sleep but  does have extreme daytime fatigue without true excessive sleepiness or nodding.  His [place] Sleepiness Scale is 2.    PREVIOUS IN- LAB or HOME SLEEP STUDIES:   Date: 9/19/19   TYPE: PSG   AHI: 31   Intervention: CPAP 7    Current CPAP adherence 8/20-9/19/19  100% days> 4 hours  Average use 10 hours  AHI 0.8      SLEEP-WAKE SCHEDULE:     Weekday and weekend bedtime is 9 PM with 45 minutes sleep latency and recurrent awakenings to pass urine at night or take medications  Weekday awakening time 8 AM  Does not use electronic equipment in the bedroom       SCALES         INSOMNIA:  Insomnia severity score: 10       SLEEPINESS: [place] sleepiness scale: 2    SLEEP COMPLAINTS:  Cardio-respiratory    Snoring- 0/week  Dyspnea- denies  Morning headaches or confusion-denies  Coexisting Lung disease: denies    Coexisting Heart disease: denies    Does patient have a bed partner: denies  Has bed partner been sleeping separately because of snoring:  denies            RLS Screen: When you try to relax in the evening or sleep at  night, do you ever have unpleasant, restless feelings in your  legs that can be relieved by walking or movement? denies    Periodic limb movement: denies       Sleep Behaviors:    Leg symptoms/movements: denies    Motor restlessness:denies    Night terrors: denies    Bruxism: denies    Automatic behaviors: none    Other subjective complaints:    Anxiety or rumination denies    Pain and discomfort at  night: denies    Waking up with heart pounding or racing: denies    GERD or aspiration:denies         Parasomnia:   NREM - denies recurrent persistent confusional arousal, night eating, sleep walking or sleep terrors   REM  - denies dream enactment; injuries              Medications:     Current Outpatient Medications   Medication Sig     amLODIPine (NORVASC) 10 MG tablet Take 1 tablet (10 mg) by mouth daily     ARIPiprazole (ABILIFY) 2 MG tablet Take 2 mg by mouth daily     atorvastatin (LIPITOR) 20 MG tablet  TAKE ONE TABLET BY MOUTH EVERY DAY.     buPROPion 450 MG TB24 Take 450 mg by mouth every morning     busPIRone (BUSPAR) 15 MG tablet Increase if anxiety not doing well: 0.5 tab 2x/day x 3 days, then 1 tab 2x/day x 3 days, then 1.5 tab 2x/day x 3 days, then 2 tab 2x/day.     diltiazem ER COATED BEADS (CARDIZEM CD) 360 MG 24 hr capsule Take 1 capsule (360 mg) by mouth daily     docusate sodium (COLACE) 100 MG capsule Take 1 capsule (100 mg) by mouth 2 times daily     hydrALAZINE (APRESOLINE) 25 MG tablet Take 0.5 tablets (12.5 mg) by mouth every 6 hours as needed (for SBP >175 or DBP >110)     lisinopril (PRINIVIL/ZESTRIL) 20 MG tablet Take 1 tablet (20 mg) by mouth daily     oxybutynin (DITROPAN) 5 MG tablet Take 1 tablet (5 mg) by mouth 2 times daily     tamsulosin (FLOMAX) 0.4 MG capsule Take 1 capsule (0.4 mg) by mouth daily     traZODone (DESYREL) 100 MG tablet Take 1 tablet (100 mg) by mouth nightly as needed for sleep     No current facility-administered medications for this visit.         Allergies   Allergen Reactions     Contrast Dye Difficulty breathing and Anaphylaxis            Past Medical History:     Does not need 02 supplement at night   Past Medical History:   Diagnosis Date     Depressive disorder      H/O urethral stricture      Hypertension      Sleep apnea              Past Surgical History:    Previous upper airway surgery no   Past Surgical History:   Procedure Laterality Date     BLADDER SURGERY       CYSTOSTOMY, INSERT TUBE SUPRAPUBIC, COMBINED N/A 9/8/2014    Procedure: COMBINED CYSTOSTOMY, INSERT TUBE SUPRAPUBIC;  Surgeon: Min Prasad MD;  Location:  OR     GENITOURINARY SURGERY      prostate surgery for stones     LASER HOLMIUM CYSTOSCOPY, INTERNAL URETHROTOMY, COMBINED N/A 10/24/2014    Procedure: COMBINED LASER HOLMIUM CYSTOSCOPY, INTERNAL URETHROTOMY;  Surgeon: Valentin Villatoro MD;  Location:  OR     URETHROPLASTY N/A 12/10/2014    Procedure: URETHROPLASTY;  Surgeon:  "Niraj Burger MD;  Location: [facility] OR     URETHROPLASTY WITH BUCCAL GRAFT N/A 12/9/2016    Procedure: URETHROPLASTY WITH BUCCAL GRAFT;  Surgeon: Niraj Burger MD;  Location: [facility] OR            Social History:     Social History     Tobacco Use     Smoking status: Never Smoker     Smokeless tobacco: Never Used   Substance Use Topics     Alcohol use: Yes     Comment: No drinking for 2 weeks                  Family History:     Family History   Problem Relation Age of Onset     Hypertension Mother      Depression Father      Hypertension Father      Mental Illness Sister                Review of Systems:     A complete 10 point review of systems was negative other than HPI or as commented below:   Constipation  Numbness of hands  Dry cough  Difficulty passing urine with history of urethral structure             Physical Examination:   /77   Pulse 85   Resp 18   Ht 1.803 m (5' 11\")   Wt 110.2 kg (243 lb)   SpO2 96%   BMI 33.89 kg/m     Constitutional: . Awake, alert, cooperative, dressed casually, good eye contact, comfortably sitting in a chair, in no apparent distress  Mood: euthymic; affect congruent with full range and intensity.  Attention/Concentration:  Normal   Eyes: No icterus.  ENT: Mallampati Class: 2  Cardiovascular: Regular S1 and S2, no gallops or murmurs. No carotid bruits  Neck: Supple, no thyroid enlargement.   Pulmonary:  Chest symmetric, lungs clear bilaterally and no crackles, wheezes or rales  Extremities:  No pedal edema.  Muscle/joint: Strength and tone normal   Skin:  No rash or significant lesions.   Gait Normal.  Neurologic: Alert, oriented x3, no focal neurological deficit, cranial nerves grossly normal            Data: All pertinent previous laboratory data reviewed     No results found for: PH, PHARTERIAL, PO2, [id number], SAT, PCO2, HCO3, BASEEXCESS, DEANNA, BEB  Lab Results   Component Value Date    TSH 2.01 05/19/2019    TSH 3.94 09/29/2016     Lab Results "   Component Value Date    GLC 97 07/20/2019     (H) 07/18/2019     Lab Results   Component Value Date    HGB 16.1 07/16/2019    HGB 16.8 07/15/2019     Lab Results   Component Value Date    BUN 19 07/20/2019    BUN 22 07/18/2019    CR 1.10 07/20/2019    CR 1.12 07/18/2019     Lab Results   Component Value Date    AST 20 05/19/2019    AST 6 05/23/2016    ALT 26 05/19/2019    ALT 15 05/23/2016    ALKPHOS 77 05/19/2019    ALKPHOS 67 05/23/2016    BILITOTAL 1.1 05/19/2019    BILITOTAL 0.6 05/23/2016     Lab Results   Component Value Date    UAMP Negative 06/25/2019    UBARB Negative 06/25/2019    BENZODIAZEUR Negative 06/25/2019    UCANN Negative 06/25/2019    UCOC Negative 06/25/2019    OPIT Negative 06/25/2019    UPCP Negative 05/19/2019     Echo  Copy to: Susan Hand MD 9/19/2019     Total time spent with patient: 45min >50% counseling  \

## 2019-09-24 ENCOUNTER — PRE VISIT (OUTPATIENT)
Dept: UROLOGY | Facility: CLINIC | Age: 59
End: 2019-09-24

## 2019-09-24 NOTE — TELEPHONE ENCOUNTER
Reason for visit: Urethroplasty follow up             Relevant information: pt is having insurance problems     Records/imaging/labs/orders: all records available     Pt called: yes, generic message left     At Rooming: flow/pvr

## 2019-09-26 NOTE — PROGRESS NOTES
[facility]    Niraj Burger MD  Date of Service: 2019     Name: Flakito Hilliard  MRN: [id number]  Age: 59 year old  : 1960  Referring provider: Susan Hand     Assessment and Plan:  Assessment:  Flakito Hilliard  is a 59 year old male with history of urethral stricture disease s/p Hailey urethroplasty in  and ventral buccal for proximal recurrence in . Cystoscopy 2018 negative. Recently developed urinary symptoms at the same time as psych exacerbation and manipulation of medications. He was started on oxybutynin by another provider during this time as well. Uroflow consistent with mild recurrence. Will stop the oxybutynin as it may be contributing to his slow stream symptoms and plan for cystoscopy.    Plan:  -- Will have him stop his oxybutynin to eliminate its contribution to slow stream and incomplete emptying  -- Will set patient up for cystoscopy to evaluate for recurrence based on symptoms, history, and uroflow    Aliza Hill MD  Reconstructive Urology Fellow    =======================================================  As the attending surgeon I, Niraj Burger, interviewed and examined the patient. The plan was developed between me and the patient. My findings and plan are as stated by the resident.    Niraj Burger MD    ---------------------------------------------------------------------------------------------------------------------    Chief Complaint:   Follow up urethral stricture    HPI:   Flakito Hilliard  is a 59 year old male with a history of recurrent bulbomembranous urethral stricture who underwent anterior and posterior single stage urethroplasty with buccal graft in 2014 (Hailey type) and anterior urethroplasty with ventral buccal graft in 2016.  Last seen in clinic 2018 with post op cystoscopy which showed evidence of recurrence.     He returns today stating he noticed a change in his urinary status in the last two months. He has  been having issues with his depression and mental during this time. He noticed his stream has slowed and he has had intermittent sharp pain with urination. Had urine cultures checked during this time which were negative. Renal/bladder US negative for pathology.  Has had psych meds adjusted.     Is taking flomax. Was started on oxybutynin by a provider during this time.     Uroflow and Post-Void Residual by Bladder Scan:  Voided Volume: 192  QMax: 10.8  QAv.1  PVR: 76cc      Review of Systems:   Pertinent items are noted in HPI or as below, remainder of complete ROS is negative.      Active Medications:     Current Outpatient Medications:      amLODIPine (NORVASC) 10 MG tablet, Take 1 tablet (10 mg) by mouth daily, Disp: 30 tablet, Rfl: 1     ARIPiprazole (ABILIFY) 2 MG tablet, Take 2 mg by mouth daily, Disp: , Rfl:      atorvastatin (LIPITOR) 20 MG tablet, TAKE ONE TABLET BY MOUTH EVERY DAY., Disp: 90 tablet, Rfl: 3     buPROPion 450 MG TB24, Take 450 mg by mouth every morning, Disp: 30 tablet, Rfl: 1     busPIRone (BUSPAR) 15 MG tablet, Increase if anxiety not doing well: 0.5 tab 2x/day x 3 days, then 1 tab 2x/day x 3 days, then 1.5 tab 2x/day x 3 days, then 2 tab 2x/day., Disp: 90 tablet, Rfl: 0     diltiazem ER COATED BEADS (CARDIZEM CD) 360 MG 24 hr capsule, Take 1 capsule (360 mg) by mouth daily, Disp: 30 capsule, Rfl: 1     docusate sodium (COLACE) 100 MG capsule, Take 1 capsule (100 mg) by mouth 2 times daily, Disp: 60 capsule, Rfl: 3     hydrALAZINE (APRESOLINE) 25 MG tablet, Take 0.5 tablets (12.5 mg) by mouth every 6 hours as needed (for SBP >175 or DBP >110), Disp: 30 tablet, Rfl: 0     lisinopril (PRINIVIL/ZESTRIL) 20 MG tablet, Take 1 tablet (20 mg) by mouth daily, Disp: 30 tablet, Rfl: 1     oxybutynin (DITROPAN) 5 MG tablet, Take 1 tablet (5 mg) by mouth 2 times daily, Disp: 60 tablet, Rfl: 1     tamsulosin (FLOMAX) 0.4 MG capsule, Take 1 capsule (0.4 mg) by mouth daily, Disp: 30 capsule, Rfl:  1     traZODone (DESYREL) 100 MG tablet, Take 1 tablet (100 mg) by mouth nightly as needed for sleep, Disp: 30 tablet, Rfl: 1      Allergies:  Contrast dye      Past Medical History:  Depression   Urethral stricture   Hypertension   Obstructive sleep apnea   Recurrent UTI's   Suicidal ideation   Urinary retention   Generalized anxiety disorder   Elevated hemoglobin   Bladder stones   Erectile dysfunction      Past Surgical History:  Cystotomy with SP tube-09/2014   Prostate surgery for stones   Laser holmium with internal urethrotomy-10/2014   Urethroplasty-12/2014   Urethroplasty with buccal graft-12/2016     Family History:   Mother: hypertension   Father: depression, hypertension  Sister: mental illness      Social History:   The patient was accompanied to the appointment by: self .  Smoking Status: never   Smokeless Tobacco: never    Alcohol Use: yes       Physical Exam:   BP (!) 151/96 (BP Location: Left arm, Patient Position: Sitting, Cuff Size: Adult Large)   Pulse 88   Resp 18   Wt 110.7 kg (244 lb)   BMI 34.03 kg/m     General:  Well-dressed, well-nourished man in no acute distress.  Eyes: anicteric, EOMI  Lungs:  No respiratory distress.  Heart:  Regular rate  Abdomen:  moderately obese, soft, nontender, without masses.  There are surgical scars in lower abdomen    :  Penis is circumcised. Meatal stenosis is absent, penile plaques are absent. Skin lesions are absent.  There is not firmness along the course of the entire urethra urethra.  Testes are 30 mL bilaterally without masses.  Rectal examination was not done.   Musculoskeletal:  Motor strength is excellent throughout.     Neurologic:  Grossly nonfocal.    Back: Flanks are nontender.    Imaging:   I have personally reviewed the results of the below imaging studies. The results were discussed with the patient.     Results for orders placed or performed during the hospital encounter of 06/20/19   US Renal Complete    Narrative    EXAMINATION: US  RENAL COMPLETE, 7/13/2019 1:53 PM     COMPARISON: 7/31/2014    HISTORY: Pain during urination. History of renal stone.    FINDINGS:    Right kidney: Measures 12.5 cm in length. Parenchyma is of normal  thickness and echogenicity. No focal mass. No hydronephrosis. Multiple  renal cortical cysts, the largest measuring 1.9 x 1.8 x 1.6 cm.    Left kidney: Measures 12 cm in length. Parenchyma is of normal  thickness and echogenicity. No focal mass. No hydronephrosis. Multiple  renal cortical cysts, the largest measuring 1.3 x 1.2 x 1.4 cm. Lower  pole mild caliectasis versus small renal sinus cyst.    Bladder: Distended. Unremarkable.      Impression    IMPRESSION:  1.  No sonographic evidence of renal calculi.  2.  Multiple renal cortical cysts.  3. Left lower pole mild caliectasis versus small renal sinus cyst.  No  jeremy hydronephrosis.       I have personally reviewed the examination and initial interpretation  and I agree with the findings.    HENRRY ASHER MD       Laboratory:   I personally reviewed all applicable laboratory data and went over findings with patient  Significant for:    CBC RESULTS:  Recent Labs   Lab Test 07/16/19  0732 07/15/19  1414 06/28/19  1016 05/19/19  0706 11/13/18  0959   WBC 9.4 12.1*  --  8.1 7.9   HGB 16.1 16.8 16.5 16.6 15.7    335  --  316 289     BMP RESULTS:  Recent Labs   Lab Test 07/20/19  0753 07/18/19  0758 07/16/19  0732 07/15/19  1414    139 141 141   POTASSIUM 3.8 3.5 3.6 4.0   CHLORIDE 109 106 107 106   CO2 27 24 28 26   ANIONGAP 4 9 6 9   GLC 97 109* 98 127*   BUN 19 22 28 26   CR 1.10 1.12 1.29* 1.38*   GFRESTIMATED 73 72 60* 56*   GFRESTBLACK 85 83 70 65   AMILCAR 8.6 9.0 9.2 9.5     UA RESULTS:   Recent Labs   Lab Test 09/09/19  1343 08/24/19  1428 07/15/19  1358   SG 1.025 1.025 1.018   URINEPH 6.0 6.0 7.0   NITRITE Negative Negative Negative   RBCU 5-10* O - 2 29*   WBCU 10-25* 10-25* >182*     PSA RESULTS:   PSA   Date Value Ref Range Status   09/09/2019  0.09 0 - 4 ug/L Final     Comment:     Assay Method:  Chemiluminescence using Siemens Vista analyzer   07/30/2014 0.12 0 - 4 ug/L Final         Scribe Disclosure:  Yessi Treviño, a scribe, prepared the chart for today's encounter.

## 2019-09-30 ENCOUNTER — OFFICE VISIT (OUTPATIENT)
Dept: UROLOGY | Facility: CLINIC | Age: 59
End: 2019-09-30
Attending: PHYSICIAN ASSISTANT
Payer: COMMERCIAL

## 2019-09-30 VITALS
WEIGHT: 244 LBS | RESPIRATION RATE: 18 BRPM | DIASTOLIC BLOOD PRESSURE: 96 MMHG | SYSTOLIC BLOOD PRESSURE: 151 MMHG | BODY MASS INDEX: 34.03 KG/M2 | HEART RATE: 88 BPM

## 2019-09-30 DIAGNOSIS — Z87.448 HISTORY OF URETHRAL STRICTURE: Primary | ICD-10-CM

## 2019-09-30 ASSESSMENT — PAIN SCALES - GENERAL: PAINLEVEL: MILD PAIN (3)

## 2019-09-30 NOTE — PATIENT INSTRUCTIONS
Please follow up for a cystoscopy and flow/PVR  Please come with a full bladder       It was a pleasure meeting with you today.  Thank you for allowing me and my team the privilege of caring for you today.  YOU are the reason we are here, and I truly hope we provided you with the excellent service you deserve.  Please let us know if there is anything else we can do for you so that we can be sure you are leaving completely satisfied with your care experience.

## 2019-09-30 NOTE — LETTER
2019       RE: Flakito Hilliard  [address] [facility] [address] [facility] [address]     Dear Colleague,    Thank you for referring your patient, Flakito Hilliard, to the [facility] UROLOGY AND [facility] FOR PROSTATE AND UROLOGIC CANCERS at [facility]. Please see a copy of my visit note below.      Urology Clinic    Niraj Burger MD  Date of Service: 2019     Name: Flakito Hilliard  MRN: [id number]  Age: 59 year old  : 1960  Referring provider: Susan Hand     Assessment and Plan:  Assessment:  Flakito Hilliard  is a 59 year old male with history of urethral stricture disease s/p Hailey urethroplasty in  and ventral buccal for proximal recurrence in . Cystoscopy 2018 negative. Recently developed urinary symptoms at the same time as psych exacerbation and manipulation of medications. He was started on oxybutynin by another provider during this time as well. Uroflow consistent with mild recurrence. Will stop the oxybutynin as it may be contributing to his slow stream symptoms and plan for cystoscopy.    Plan:  -- Will have him stop his oxybutynin to eliminate its contribution to slow stream and incomplete emptying  -- Will set patient up for cystoscopy to evaluate for recurrence based on symptoms, history, and uroflow    Aliza Hill MD  Reconstructive Urology Fellow    =======================================================  As the attending surgeon I, Niraj Burger, interviewed and examined the patient. The plan was developed between me and the patient. My findings and plan are as stated by the resident.    Niraj Burger MD    ---------------------------------------------------------------------------------------------------------------------    Chief Complaint:   Follow up urethral stricture    HPI:   Flakito Hilliard  is a 59 year old male with a history of recurrent bulbomembranous urethral stricture who underwent anterior and posterior  single stage urethroplasty with buccal graft in 2014 (Hailey type) and anterior urethroplasty with ventral buccal graft in 2016.  Last seen in clinic 2018 with post op cystoscopy which showed evidence of recurrence.     He returns today stating he noticed a change in his urinary status in the last two months. He has been having issues with his depression and mental during this time. He noticed his stream has slowed and he has had intermittent sharp pain with urination. Had urine cultures checked during this time which were negative. Renal/bladder US negative for pathology.  Has had psych meds adjusted.     Is taking flomax. Was started on oxybutynin by a provider during this time.     Uroflow and Post-Void Residual by Bladder Scan:  Voided Volume: 192  QMax: 10.8  QAv.1  PVR: 76cc      Review of Systems:   Pertinent items are noted in HPI or as below, remainder of complete ROS is negative.      Active Medications:     Current Outpatient Medications:      amLODIPine (NORVASC) 10 MG tablet, Take 1 tablet (10 mg) by mouth daily, Disp: 30 tablet, Rfl: 1     ARIPiprazole (ABILIFY) 2 MG tablet, Take 2 mg by mouth daily, Disp: , Rfl:      atorvastatin (LIPITOR) 20 MG tablet, TAKE ONE TABLET BY MOUTH EVERY DAY., Disp: 90 tablet, Rfl: 3     buPROPion 450 MG TB24, Take 450 mg by mouth every morning, Disp: 30 tablet, Rfl: 1     busPIRone (BUSPAR) 15 MG tablet, Increase if anxiety not doing well: 0.5 tab 2x/day x 3 days, then 1 tab 2x/day x 3 days, then 1.5 tab 2x/day x 3 days, then 2 tab 2x/day., Disp: 90 tablet, Rfl: 0     diltiazem ER COATED BEADS (CARDIZEM CD) 360 MG 24 hr capsule, Take 1 capsule (360 mg) by mouth daily, Disp: 30 capsule, Rfl: 1     docusate sodium (COLACE) 100 MG capsule, Take 1 capsule (100 mg) by mouth 2 times daily, Disp: 60 capsule, Rfl: 3     hydrALAZINE (APRESOLINE) 25 MG tablet, Take 0.5 tablets (12.5 mg) by mouth every 6 hours as needed (for SBP >175 or DBP >110), Disp: 30 tablet,  Rfl: 0     lisinopril (PRINIVIL/ZESTRIL) 20 MG tablet, Take 1 tablet (20 mg) by mouth daily, Disp: 30 tablet, Rfl: 1     oxybutynin (DITROPAN) 5 MG tablet, Take 1 tablet (5 mg) by mouth 2 times daily, Disp: 60 tablet, Rfl: 1     tamsulosin (FLOMAX) 0.4 MG capsule, Take 1 capsule (0.4 mg) by mouth daily, Disp: 30 capsule, Rfl: 1     traZODone (DESYREL) 100 MG tablet, Take 1 tablet (100 mg) by mouth nightly as needed for sleep, Disp: 30 tablet, Rfl: 1      Allergies:  Contrast dye      Past Medical History:  Depression   Urethral stricture   Hypertension   Obstructive sleep apnea   Recurrent UTI's   Suicidal ideation   Urinary retention   Generalized anxiety disorder   Elevated hemoglobin   Bladder stones   Erectile dysfunction      Past Surgical History:  Cystotomy with SP tube-09/2014   Prostate surgery for stones   Laser holmium with internal urethrotomy-10/2014   Urethroplasty-12/2014   Urethroplasty with buccal graft-12/2016     Family History:   Mother: hypertension   Father: depression, hypertension  Sister: mental illness      Social History:   The patient was accompanied to the appointment by: self .  Smoking Status: never   Smokeless Tobacco: never    Alcohol Use: yes       Physical Exam:   BP (!) 151/96 (BP Location: Left arm, Patient Position: Sitting, Cuff Size: Adult Large)   Pulse 88   Resp 18   Wt 110.7 kg (244 lb)   BMI 34.03 kg/m      General:  Well-dressed, well-nourished man in no acute distress.  Eyes: anicteric, EOMI  Lungs:  No respiratory distress.  Heart:  Regular rate  Abdomen:  moderately obese, soft, nontender, without masses.  There are surgical scars in lower abdomen    :  Penis is circumcised. Meatal stenosis is absent, penile plaques are absent. Skin lesions are absent.  There is not firmness along the course of the entire urethra urethra.  Testes are 30 mL bilaterally without masses.  Rectal examination was not done.   Musculoskeletal:  Motor strength is excellent throughout.      Neurologic:  Grossly nonfocal.    Back: Flanks are nontender.    Imaging:   I have personally reviewed the results of the below imaging studies. The results were discussed with the patient.     Results for orders placed or performed during the hospital encounter of 06/20/19   US Renal Complete    Narrative    EXAMINATION: US RENAL COMPLETE, 7/13/2019 1:53 PM     COMPARISON: 7/31/2014    HISTORY: Pain during urination. History of renal stone.    FINDINGS:    Right kidney: Measures 12.5 cm in length. Parenchyma is of normal  thickness and echogenicity. No focal mass. No hydronephrosis. Multiple  renal cortical cysts, the largest measuring 1.9 x 1.8 x 1.6 cm.    Left kidney: Measures 12 cm in length. Parenchyma is of normal  thickness and echogenicity. No focal mass. No hydronephrosis. Multiple  renal cortical cysts, the largest measuring 1.3 x 1.2 x 1.4 cm. Lower  pole mild caliectasis versus small renal sinus cyst.    Bladder: Distended. Unremarkable.      Impression    IMPRESSION:  1.  No sonographic evidence of renal calculi.  2.  Multiple renal cortical cysts.  3. Left lower pole mild caliectasis versus small renal sinus cyst.  No  jeremy hydronephrosis.       I have personally reviewed the examination and initial interpretation  and I agree with the findings.    HENRRY ASHER MD       Laboratory:   I personally reviewed all applicable laboratory data and went over findings with patient  Significant for:    CBC RESULTS:  Recent Labs   Lab Test 07/16/19  0732 07/15/19  1414 06/28/19  1016 05/19/19  0706 11/13/18  0959   WBC 9.4 12.1*  --  8.1 7.9   HGB 16.1 16.8 16.5 16.6 15.7    335  --  316 289     BMP RESULTS:  Recent Labs   Lab Test 07/20/19  0753 07/18/19  0758 07/16/19  0732 07/15/19  1414    139 141 141   POTASSIUM 3.8 3.5 3.6 4.0   CHLORIDE 109 106 107 106   CO2 27 24 28 26   ANIONGAP 4 9 6 9   GLC 97 109* 98 127*   BUN 19 22 28 26   CR 1.10 1.12 1.29* 1.38*   GFRESTIMATED 73 72 60* 56*    GFRESTBLACK 85 83 70 65   AMILCAR 8.6 9.0 9.2 9.5     UA RESULTS:   Recent Labs   Lab Test 09/09/19  1343 08/24/19  1428 07/15/19  1358   SG 1.025 1.025 1.018   URINEPH 6.0 6.0 7.0   NITRITE Negative Negative Negative   RBCU 5-10* O - 2 29*   WBCU 10-25* 10-25* >182*     PSA RESULTS:   PSA   Date Value Ref Range Status   09/09/2019 0.09 0 - 4 ug/L Final     Comment:     Assay Method:  Chemiluminescence using Siemens Vista analyzer   07/30/2014 0.12 0 - 4 ug/L Final         Scribe Disclosure:  Yessi Treviño, a scribe, prepared the chart for today's encounter.             Again, thank you for allowing me to participate in the care of your patient.      Sincerely,    Niraj Burger MD

## 2019-09-30 NOTE — NURSING NOTE
Chief Complaint   Patient presents with     Urinary Retention     Patient is here to discuss Urinary Retention     Sugey Gan CMA 9:57 AM on 9/30/2019.

## 2019-10-06 ENCOUNTER — DOCUMENTATION ONLY (OUTPATIENT)
Dept: CARE COORDINATION | Facility: CLINIC | Age: 59
End: 2019-10-06

## 2019-10-11 DIAGNOSIS — F41.1 GENERALIZED ANXIETY DISORDER: Primary | ICD-10-CM

## 2019-10-11 DIAGNOSIS — F33.1 MAJOR DEPRESSIVE DISORDER, RECURRENT EPISODE, MODERATE (H): ICD-10-CM

## 2019-10-11 LAB
CHOLEST SERPL-MCNC: 125 MG/DL
HBA1C MFR BLD: 5.3 % (ref 0–5.6)
HDLC SERPL-MCNC: 47 MG/DL
LDLC SERPL CALC-MCNC: 66 MG/DL
NONHDLC SERPL-MCNC: 78 MG/DL
TRIGL SERPL-MCNC: 59 MG/DL

## 2019-10-11 PROCEDURE — 36415 COLL VENOUS BLD VENIPUNCTURE: CPT | Performed by: PHYSICIAN ASSISTANT

## 2019-10-11 PROCEDURE — 80061 LIPID PANEL: CPT | Performed by: PHYSICIAN ASSISTANT

## 2019-10-11 PROCEDURE — 83036 HEMOGLOBIN GLYCOSYLATED A1C: CPT | Performed by: PHYSICIAN ASSISTANT

## 2019-10-17 DIAGNOSIS — R33.9 URINARY RETENTION: ICD-10-CM

## 2019-10-17 DIAGNOSIS — I10 ESSENTIAL HYPERTENSION: ICD-10-CM

## 2019-10-17 RX ORDER — AMLODIPINE BESYLATE 10 MG/1
10 TABLET ORAL DAILY
Qty: 90 TABLET | Refills: 1 | Status: SHIPPED | OUTPATIENT
Start: 2019-10-17 | End: 2019-11-12

## 2019-10-17 RX ORDER — DILTIAZEM HYDROCHLORIDE 360 MG/1
360 CAPSULE, EXTENDED RELEASE ORAL DAILY
Qty: 90 CAPSULE | Refills: 1 | Status: SHIPPED | OUTPATIENT
Start: 2019-10-17 | End: 2019-10-21

## 2019-10-17 RX ORDER — LISINOPRIL 20 MG/1
20 TABLET ORAL DAILY
Qty: 90 TABLET | Refills: 1 | Status: SHIPPED | OUTPATIENT
Start: 2019-10-17 | End: 2019-10-21

## 2019-10-17 RX ORDER — TAMSULOSIN HYDROCHLORIDE 0.4 MG/1
0.4 CAPSULE ORAL DAILY
Qty: 90 CAPSULE | Refills: 1 | Status: SHIPPED | OUTPATIENT
Start: 2019-10-17 | End: 2019-10-21

## 2019-10-17 NOTE — TELEPHONE ENCOUNTER
"Requested Prescriptions   Pending Prescriptions Disp Refills     diltiazem ER COATED BEADS (CARDIZEM CD) 360 MG 24 hr capsule 30 capsule 1     Sig: Take 1 capsule (360 mg) by mouth daily       Calcium Channel Blockers Protocol  Failed - 10/17/2019  1:43 PM        Failed - Blood pressure under 140/90 in past 12 months     BP Readings from Last 3 Encounters:   09/30/19 (!) 151/96   09/19/19 118/77   09/09/19 112/72                 Passed - Normal ALT in past 12 months     Recent Labs   Lab Test 05/19/19  0706   ALT 26             Passed - Recent (12 mo) or future (30 days) visit within the authorizing provider's specialty     Patient has had an office visit with the authorizing provider or a provider within the authorizing providers department within the previous 12 mos or has a future within next 30 days. See \"Patient Info\" tab in inbasket, or \"Choose Columns\" in Meds & Orders section of the refill encounter.              Passed - Medication is active on med list        Passed - Patient is age 18 or older        Passed - Normal serum creatinine on file in past 12 months     Recent Labs   Lab Test 07/20/19  0753   CR 1.10             amLODIPine (NORVASC) 10 MG tablet 30 tablet 1     Sig: Take 1 tablet (10 mg) by mouth daily       Calcium Channel Blockers Protocol  Failed - 10/17/2019  1:43 PM        Failed - Blood pressure under 140/90 in past 12 months     BP Readings from Last 3 Encounters:   09/30/19 (!) 151/96   09/19/19 118/77   09/09/19 112/72                 Passed - Normal ALT in past 12 months     Recent Labs   Lab Test 05/19/19  0706   ALT 26             Passed - Recent (12 mo) or future (30 days) visit within the authorizing provider's specialty     Patient has had an office visit with the authorizing provider or a provider within the authorizing providers department within the previous 12 mos or has a future within next 30 days. See \"Patient Info\" tab in inbasket, or \"Choose Columns\" in Meds & Orders " "section of the refill encounter.              Passed - Medication is active on med list        Passed - Patient is age 18 or older        Passed - Normal serum creatinine on file in past 12 months     Recent Labs   Lab Test 07/20/19  0753   CR 1.10             lisinopril (PRINIVIL/ZESTRIL) 20 MG tablet 30 tablet 1     Sig: Take 1 tablet (20 mg) by mouth daily       ACE Inhibitors (Including Combos) Protocol Failed - 10/17/2019  1:43 PM        Failed - Blood pressure under 140/90 in past 12 months     BP Readings from Last 3 Encounters:   09/30/19 (!) 151/96   09/19/19 118/77   09/09/19 112/72                 Passed - Recent (12 mo) or future (30 days) visit within the authorizing provider's specialty     Patient has had an office visit with the authorizing provider or a provider within the authorizing providers department within the previous 12 mos or has a future within next 30 days. See \"Patient Info\" tab in inbasket, or \"Choose Columns\" in Meds & Orders section of the refill encounter.              Passed - Medication is active on med list        Passed - Patient is age 18 or older        Passed - Normal serum creatinine on file in past 12 months     Recent Labs   Lab Test 07/20/19  0753   CR 1.10             Passed - Normal serum potassium on file in past 12 months     Recent Labs   Lab Test 07/20/19  0753   POTASSIUM 3.8             tamsulosin (FLOMAX) 0.4 MG capsule 30 capsule 1     Sig: Take 1 capsule (0.4 mg) by mouth daily       Alpha Blockers Failed - 10/17/2019  1:43 PM        Failed - Blood pressure under 140/90 in past 12 months     BP Readings from Last 3 Encounters:   09/30/19 (!) 151/96   09/19/19 118/77   09/09/19 112/72                 Passed - Recent (12 mo) or future (30 days) visit within the authorizing provider's specialty     Patient has had an office visit with the authorizing provider or a provider within the authorizing providers department within the previous 12 mos or has a future within " "next 30 days. See \"Patient Info\" tab in inbasket, or \"Choose Columns\" in Meds & Orders section of the refill encounter.              Passed - Patient does not have Tadalafil, Vardenafil, or Sildenafil on their medication list        Passed - Medication is active on med list        Passed - Patient is 18 years of age or older        Routing refill request to provider for review/approval because:  BP above goal  Break in medication: Last filled by hospitalist in July    Roselia Jaeger RN            "

## 2019-10-17 NOTE — TELEPHONE ENCOUNTER
Reason for Call:  Medication or medication refill:    Do you use a [facility]?  Name of the pharmacy and phone number for the current request:  [facility] - [place], [place] - [address]     Name of the medication requested: Pending Prescriptions:                       Disp   Refills    diltiazem ER COATED BEADS (CARDIZEM CD) 3*30 cap*1            Sig: Take 1 capsule (360 mg) by mouth daily    amLODIPine (NORVASC) 10 MG tablet         30 tab*1            Sig: Take 1 tablet (10 mg) by mouth daily    lisinopril (PRINIVIL/ZESTRIL) 20 MG [id number] tab*1            Sig: Take 1 tablet (20 mg) by mouth daily    tamsulosin (FLOMAX) 0.4 MG capsule        30 cap*1            Sig: Take 1 capsule (0.4 mg) by mouth daily    Other request: Please call when approved just about out needs as a [facility].   Sending High Priority Message    Can we leave a detailed message on this number? YES    Phone number can be reached at: [facility]ARROQU[facility] [employer] program [phone]    Best Time: any    Call taken on 10/17/2019 at 12:38 PM by Ro Henderson

## 2019-10-21 ENCOUNTER — TELEPHONE (OUTPATIENT)
Dept: FAMILY MEDICINE | Facility: CLINIC | Age: 59
End: 2019-10-21

## 2019-10-21 DIAGNOSIS — R33.9 URINARY RETENTION: ICD-10-CM

## 2019-10-21 DIAGNOSIS — R35.0 FREQUENT URINATION: ICD-10-CM

## 2019-10-21 DIAGNOSIS — I10 ESSENTIAL HYPERTENSION: ICD-10-CM

## 2019-10-21 RX ORDER — TAMSULOSIN HYDROCHLORIDE 0.4 MG/1
0.4 CAPSULE ORAL DAILY
Qty: 30 CAPSULE | Refills: 0 | Status: SHIPPED | OUTPATIENT
Start: 2019-10-21 | End: 2019-11-12

## 2019-10-21 RX ORDER — OXYBUTYNIN CHLORIDE 5 MG/1
5 TABLET ORAL 2 TIMES DAILY
Qty: 60 TABLET | Refills: 0 | Status: SHIPPED | OUTPATIENT
Start: 2019-10-21

## 2019-10-21 RX ORDER — DILTIAZEM HYDROCHLORIDE 360 MG/1
360 CAPSULE, EXTENDED RELEASE ORAL DAILY
Qty: 30 CAPSULE | Refills: 0 | Status: SHIPPED | OUTPATIENT
Start: 2019-10-21 | End: 2019-11-12

## 2019-10-21 RX ORDER — LISINOPRIL 20 MG/1
20 TABLET ORAL DAILY
Qty: 30 TABLET | Refills: 0 | Status: SHIPPED | OUTPATIENT
Start: 2019-10-21 | End: 2019-11-12

## 2019-10-22 ENCOUNTER — PATIENT OUTREACH (OUTPATIENT)
Dept: CARE COORDINATION | Facility: CLINIC | Age: 59
End: 2019-10-22

## 2019-10-22 ENCOUNTER — TELEPHONE (OUTPATIENT)
Dept: SLEEP MEDICINE | Facility: CLINIC | Age: 59
End: 2019-10-22

## 2019-10-22 NOTE — TELEPHONE ENCOUNTER
Sleep study and last billing note from [facility] have been rev'd and sent to scanning.    ISABEL Collins

## 2019-10-22 NOTE — PROGRESS NOTES
Clinic Care Coordination Contact  [facility]/Voicemail    Referral Source: IP Report  Clinical Data: Care Coordinator Outreach  Outreach attempted x 1.  Left message on patient's voicemail with call back information and requested return call.  Plan:  Care Coordinator will try to reach patient again in 1-2 business days.    NATALY Deleon, [facility] - Care Coordinator   [facility]  10/22/2019   12:36 PM  [phone]

## 2019-10-23 NOTE — PROGRESS NOTES
Clinic Care Coordination Contact  [facility]/Voicemail    Referral Source: IP Report  Clinical Data: Care Coordinator Outreach  Outreach attempted x 2.  Left message on patient's voicemail with call back information and requested return call.  Plan: Care Coordinator not sending unable to contact letter as address in system is from when he was at the [facility] and he is no longer living there. Care Coordinator will try to reach patient again in 5-10 business days.    NATALY Deleon, [place] [facility] - Care Coordinator   [facility]  10/23/2019   10:01 AM  [phone]

## 2019-10-30 NOTE — PROGRESS NOTES
Clinic Care Coordination Contact  [facility]/Voicemail    Referral Source: IP Report  Clinical Data: Care Coordinator Outreach  Outreach attempted x 3.  Left message on patient's voicemail with call back information and requested return call.  Plan: Care Coordinator did not send introduction letter as address in system is from the [facility] that he d/c'd from on 10-22-19.  Uncertain if old address found in letters is where he returned to or not.  Care Coordinator will do no further outreaches at this time.    NATALY Deleon, [place] [facility] - Care Coordinator   [facility]  10/30/2019   10:06 AM  [phone]

## 2019-11-12 DIAGNOSIS — I10 ESSENTIAL HYPERTENSION: ICD-10-CM

## 2019-11-12 DIAGNOSIS — R33.9 URINARY RETENTION: ICD-10-CM

## 2019-11-12 RX ORDER — AMLODIPINE BESYLATE 10 MG/1
10 TABLET ORAL DAILY
Qty: 90 TABLET | Refills: 0 | Status: SHIPPED | OUTPATIENT
Start: 2019-11-12

## 2019-11-12 RX ORDER — DILTIAZEM HYDROCHLORIDE 360 MG/1
360 CAPSULE, EXTENDED RELEASE ORAL DAILY
Qty: 90 CAPSULE | Refills: 0 | Status: SHIPPED | OUTPATIENT
Start: 2019-11-12

## 2019-11-12 RX ORDER — LISINOPRIL 20 MG/1
20 TABLET ORAL DAILY
Qty: 90 TABLET | Refills: 0 | Status: SHIPPED | OUTPATIENT
Start: 2019-11-12

## 2019-11-12 RX ORDER — TAMSULOSIN HYDROCHLORIDE 0.4 MG/1
0.4 CAPSULE ORAL DAILY
Qty: 90 CAPSULE | Refills: 0 | Status: SHIPPED | OUTPATIENT
Start: 2019-11-12

## 2019-11-12 NOTE — TELEPHONE ENCOUNTER
"Requested Prescriptions   Pending Prescriptions Disp Refills     amLODIPine (NORVASC) 10 MG tablet 90 tablet 1     Sig: Take 1 tablet (10 mg) by mouth daily       Calcium Channel Blockers Protocol  Failed - 11/12/2019  2:20 PM        Failed - Blood pressure under 140/90 in past 12 months     BP Readings from Last 3 Encounters:   09/30/19 (!) 151/96   09/19/19 118/77   09/09/19 112/72                 Passed - Normal ALT in past 12 months     Recent Labs   Lab Test 05/19/19  0706   ALT 26             Passed - Recent (12 mo) or future (30 days) visit within the authorizing provider's specialty     Patient has had an office visit with the authorizing provider or a provider within the authorizing providers department within the previous 12 mos or has a future within next 30 days. See \"Patient Info\" tab in inbasket, or \"Choose Columns\" in Meds & Orders section of the refill encounter.      Last Written Prescription Date:  10/17/19  Last Fill Quantity: 90,  # refills: 1   Last office visit: 9/9/2019 with prescribing provider:     Future Office Visit:                Passed - Medication is active on med list        Passed - Patient is age 18 or older        Passed - Normal serum creatinine on file in past 12 months     Recent Labs   Lab Test 07/20/19  0753   CR 1.10             diltiazem ER COATED BEADS (CARDIZEM CD) 360 MG 24 hr capsule 30 capsule 0     Sig: Take 1 capsule (360 mg) by mouth daily       Calcium Channel Blockers Protocol  Failed - 11/12/2019  2:20 PM        Failed - Blood pressure under 140/90 in past 12 months     BP Readings from Last 3 Encounters:   09/30/19 (!) 151/96   09/19/19 118/77   09/09/19 112/72                 Passed - Normal ALT in past 12 months     Recent Labs   Lab Test 05/19/19  0706   ALT 26             Passed - Recent (12 mo) or future (30 days) visit within the authorizing provider's specialty     Patient has had an office visit with the authorizing provider or a provider within the " "authorizing providers department within the previous 12 mos or has a future within next 30 days. See \"Patient Info\" tab in inbasket, or \"Choose Columns\" in Meds & Orders section of the refill encounter.      Last Written Prescription Date:  10/21/19  Last Fill Quantity: 30,  # refills: 0   Last office visit: 9/9/2019 with prescribing provider:     Future Office Visit:                Passed - Medication is active on med list        Passed - Patient is age 18 or older        Passed - Normal serum creatinine on file in past 12 months     Recent Labs   Lab Test 07/20/19  0753   CR 1.10             tamsulosin (FLOMAX) 0.4 MG capsule 30 capsule 0     Sig: Take 1 capsule (0.4 mg) by mouth daily       Alpha Blockers Failed - 11/12/2019  2:20 PM        Failed - Blood pressure under 140/90 in past 12 months     BP Readings from Last 3 Encounters:   09/30/19 (!) 151/96   09/19/19 118/77   09/09/19 112/72                 Passed - Recent (12 mo) or future (30 days) visit within the authorizing provider's specialty     Patient has had an office visit with the authorizing provider or a provider within the authorizing providers department within the previous 12 mos or has a future within next 30 days. See \"Patient Info\" tab in inbasket, or \"Choose Columns\" in Meds & Orders section of the refill encounter.      Last Written Prescription Date:  10/21/19  Last Fill Quantity: 30,  # refills: 0   Last office visit: 9/9/2019 with prescribing provider:     Future Office Visit:              Passed - Patient does not have Tadalafil, Vardenafil, or Sildenafil on their medication list        Passed - Medication is active on med list        Passed - Patient is 18 years of age or older        lisinopril (PRINIVIL/ZESTRIL) 20 MG tablet 30 tablet 0     Sig: Take 1 tablet (20 mg) by mouth daily       ACE Inhibitors (Including Combos) Protocol Failed - 11/12/2019  2:20 PM        Failed - Blood pressure under 140/90 in past 12 months     BP Readings " "from Last 3 Encounters:   09/30/19 (!) 151/96   09/19/19 118/77   09/09/19 112/72                 Passed - Recent (12 mo) or future (30 days) visit within the authorizing provider's specialty     Patient has had an office visit with the authorizing provider or a provider within the authorizing providers department within the previous 12 mos or has a future within next 30 days. See \"Patient Info\" tab in inbasket, or \"Choose Columns\" in Meds & Orders section of the refill encounter.      Last Written Prescription Date:  10/21/19  Last Fill Quantity: 30,  # refills: 0   Last office visit: 9/9/2019 with prescribing provider:     Future Office Visit:                Passed - Medication is active on med list        Passed - Patient is age 18 or older        Passed - Normal serum creatinine on file in past 12 months     Recent Labs   Lab Test 07/20/19  0753   CR 1.10             Passed - Normal serum potassium on file in past 12 months     Recent Labs   Lab Test 07/20/19  0753   POTASSIUM 3.8               "

## 2019-11-15 ENCOUNTER — PRE VISIT (OUTPATIENT)
Dept: UROLOGY | Facility: CLINIC | Age: 59
End: 2019-11-15

## 2019-11-15 NOTE — TELEPHONE ENCOUNTER
Reason for visit: Urethroplasty follow up             Relevant information: Evaluate for recurrence      Records/imaging/labs/orders: all records available     Pt called: yes, generic message left     At Rooming: flow/pvr

## 2019-12-09 DIAGNOSIS — K59.00 CONSTIPATION, UNSPECIFIED CONSTIPATION TYPE: ICD-10-CM

## 2019-12-09 RX ORDER — DOCUSATE SODIUM 100 MG/1
CAPSULE, LIQUID FILLED ORAL
Qty: 60 CAPSULE | Refills: 11 | Status: SHIPPED | OUTPATIENT
Start: 2019-12-09

## 2019-12-09 NOTE — TELEPHONE ENCOUNTER
"Requested Prescriptions   Pending Prescriptions Disp Refills     docusate sodium (COLACE) 100 MG capsule [Pharmacy Med Name: Docusate Sodium Oral Capsule 100 MG] 60 capsule 0     Sig: TAKE ONE CAPSULE BY MOUTH TWICE DAILY       Laxatives Protocol Passed - 12/9/2019  1:14 PM        Passed - Patient is age 6 or older        Passed - Recent (12 mo) or future (30 days) visit within the authorizing provider's specialty     Patient has had an office visit with the authorizing provider or a provider within the authorizing providers department within the previous 12 mos or has a future within next 30 days. See \"Patient Info\" tab in inbasket, or \"Choose Columns\" in Meds & Orders section of the refill encounter.              Passed - Medication is active on med list        Last Written Prescription Date:  8/20/19  Last Fill Quantity: 60,  # refills: 3   Last office visit: 9/9/2019 with prescribing provider:  DANY CURRIE   Future Office Visit:        "

## 2019-12-31 ENCOUNTER — PRE VISIT (OUTPATIENT)
Dept: UROLOGY | Facility: CLINIC | Age: 59
End: 2019-12-31

## 2019-12-31 NOTE — TELEPHONE ENCOUNTER
"Reason for visit: Urethroplasty follow up             Relevant information: Evaluate for recurrence      Records/imaging/labs/orders: all records available     Pt called: pt answered phone, stated \"who the hell is this\" and then hung up.     At Rooming: flow/pvr  "

## 2020-01-13 ENCOUNTER — TELEPHONE (OUTPATIENT)
Dept: UROLOGY | Facility: CLINIC | Age: 60
End: 2020-01-13

## 2020-01-13 ENCOUNTER — ANESTHESIA (OUTPATIENT)
Dept: SURGERY | Facility: AMBULATORY SURGERY CENTER | Age: 60
End: 2020-01-13

## 2020-01-13 ENCOUNTER — ANESTHESIA EVENT (OUTPATIENT)
Dept: SURGERY | Facility: AMBULATORY SURGERY CENTER | Age: 60
End: 2020-01-13

## 2020-01-13 ENCOUNTER — OFFICE VISIT (OUTPATIENT)
Dept: UROLOGY | Facility: CLINIC | Age: 60
End: 2020-01-13
Payer: COMMERCIAL

## 2020-01-13 ENCOUNTER — HOSPITAL ENCOUNTER (OUTPATIENT)
Facility: AMBULATORY SURGERY CENTER | Age: 60
End: 2020-01-13
Attending: UROLOGY
Payer: COMMERCIAL

## 2020-01-13 VITALS
BODY MASS INDEX: 33.04 KG/M2 | OXYGEN SATURATION: 95 % | SYSTOLIC BLOOD PRESSURE: 113 MMHG | DIASTOLIC BLOOD PRESSURE: 77 MMHG | HEIGHT: 71 IN | HEART RATE: 80 BPM | RESPIRATION RATE: 16 BRPM | WEIGHT: 236 LBS | TEMPERATURE: 98.7 F

## 2020-01-13 VITALS
HEART RATE: 89 BPM | WEIGHT: 236.1 LBS | DIASTOLIC BLOOD PRESSURE: 96 MMHG | BODY MASS INDEX: 33.05 KG/M2 | HEIGHT: 71 IN | SYSTOLIC BLOOD PRESSURE: 152 MMHG

## 2020-01-13 DIAGNOSIS — N21.1 URETHRAL STONE: Primary | ICD-10-CM

## 2020-01-13 DIAGNOSIS — N21.1 URETHRAL STONE: ICD-10-CM

## 2020-01-13 RX ORDER — ONDANSETRON 4 MG/1
4 TABLET, ORALLY DISINTEGRATING ORAL EVERY 30 MIN PRN
Status: DISCONTINUED | OUTPATIENT
Start: 2020-01-13 | End: 2020-01-14 | Stop reason: HOSPADM

## 2020-01-13 RX ORDER — MEPERIDINE HYDROCHLORIDE 25 MG/ML
12.5 INJECTION INTRAMUSCULAR; INTRAVENOUS; SUBCUTANEOUS
Status: DISCONTINUED | OUTPATIENT
Start: 2020-01-13 | End: 2020-01-14 | Stop reason: HOSPADM

## 2020-01-13 RX ORDER — PROPOFOL 10 MG/ML
INJECTION, EMULSION INTRAVENOUS PRN
Status: DISCONTINUED | OUTPATIENT
Start: 2020-01-13 | End: 2020-01-13

## 2020-01-13 RX ORDER — CEFAZOLIN SODIUM 1 G/50ML
1 SOLUTION INTRAVENOUS SEE ADMIN INSTRUCTIONS
Status: DISCONTINUED | OUTPATIENT
Start: 2020-01-13 | End: 2020-01-14 | Stop reason: HOSPADM

## 2020-01-13 RX ORDER — FENTANYL CITRATE 50 UG/ML
25-50 INJECTION, SOLUTION INTRAMUSCULAR; INTRAVENOUS EVERY 5 MIN PRN
Status: DISCONTINUED | OUTPATIENT
Start: 2020-01-13 | End: 2020-01-14 | Stop reason: HOSPADM

## 2020-01-13 RX ORDER — CEFAZOLIN SODIUM 2 G/50ML
2 SOLUTION INTRAVENOUS
Status: COMPLETED | OUTPATIENT
Start: 2020-01-13 | End: 2020-01-13

## 2020-01-13 RX ORDER — PROPOFOL 10 MG/ML
INJECTION, EMULSION INTRAVENOUS CONTINUOUS PRN
Status: DISCONTINUED | OUTPATIENT
Start: 2020-01-13 | End: 2020-01-13

## 2020-01-13 RX ORDER — KETAMINE HYDROCHLORIDE 10 MG/ML
INJECTION, SOLUTION INTRAMUSCULAR; INTRAVENOUS PRN
Status: DISCONTINUED | OUTPATIENT
Start: 2020-01-13 | End: 2020-01-13

## 2020-01-13 RX ORDER — NALOXONE HYDROCHLORIDE 0.4 MG/ML
.1-.4 INJECTION, SOLUTION INTRAMUSCULAR; INTRAVENOUS; SUBCUTANEOUS
Status: DISCONTINUED | OUTPATIENT
Start: 2020-01-13 | End: 2020-01-14 | Stop reason: HOSPADM

## 2020-01-13 RX ORDER — CEFAZOLIN SODIUM 2 G/50ML
2 SOLUTION INTRAVENOUS
Status: CANCELLED | OUTPATIENT
Start: 2020-01-13

## 2020-01-13 RX ORDER — SODIUM CHLORIDE, SODIUM LACTATE, POTASSIUM CHLORIDE, CALCIUM CHLORIDE 600; 310; 30; 20 MG/100ML; MG/100ML; MG/100ML; MG/100ML
INJECTION, SOLUTION INTRAVENOUS CONTINUOUS
Status: DISCONTINUED | OUTPATIENT
Start: 2020-01-13 | End: 2020-01-14 | Stop reason: HOSPADM

## 2020-01-13 RX ORDER — GABAPENTIN 300 MG/1
300 CAPSULE ORAL ONCE
Status: COMPLETED | OUTPATIENT
Start: 2020-01-13 | End: 2020-01-13

## 2020-01-13 RX ORDER — KETOROLAC TROMETHAMINE 30 MG/ML
INJECTION, SOLUTION INTRAMUSCULAR; INTRAVENOUS PRN
Status: DISCONTINUED | OUTPATIENT
Start: 2020-01-13 | End: 2020-01-13

## 2020-01-13 RX ORDER — ONDANSETRON 2 MG/ML
4 INJECTION INTRAMUSCULAR; INTRAVENOUS EVERY 30 MIN PRN
Status: DISCONTINUED | OUTPATIENT
Start: 2020-01-13 | End: 2020-01-14 | Stop reason: HOSPADM

## 2020-01-13 RX ORDER — CEFAZOLIN SODIUM 1 G/50ML
1 INJECTION, SOLUTION INTRAVENOUS SEE ADMIN INSTRUCTIONS
Status: CANCELLED | OUTPATIENT
Start: 2020-01-13

## 2020-01-13 RX ORDER — OXYCODONE HYDROCHLORIDE 5 MG/1
5 TABLET ORAL EVERY 4 HOURS PRN
Status: DISCONTINUED | OUTPATIENT
Start: 2020-01-13 | End: 2020-01-14 | Stop reason: HOSPADM

## 2020-01-13 RX ORDER — LITHIUM CARBONATE 300 MG
300 TABLET ORAL AT BEDTIME
COMMUNITY

## 2020-01-13 RX ORDER — ACETAMINOPHEN 325 MG/1
975 TABLET ORAL ONCE
Status: COMPLETED | OUTPATIENT
Start: 2020-01-13 | End: 2020-01-13

## 2020-01-13 RX ORDER — LIDOCAINE 40 MG/G
CREAM TOPICAL
Status: DISCONTINUED | OUTPATIENT
Start: 2020-01-13 | End: 2020-01-14 | Stop reason: HOSPADM

## 2020-01-13 RX ADMIN — SODIUM CHLORIDE, SODIUM LACTATE, POTASSIUM CHLORIDE, CALCIUM CHLORIDE: 600; 310; 30; 20 INJECTION, SOLUTION INTRAVENOUS at 09:37

## 2020-01-13 RX ADMIN — KETAMINE HYDROCHLORIDE 20 MG: 10 INJECTION, SOLUTION INTRAMUSCULAR; INTRAVENOUS at 10:46

## 2020-01-13 RX ADMIN — PROPOFOL: 10 INJECTION, EMULSION INTRAVENOUS at 11:01

## 2020-01-13 RX ADMIN — GABAPENTIN 300 MG: 300 CAPSULE ORAL at 09:37

## 2020-01-13 RX ADMIN — KETOROLAC TROMETHAMINE 30 MG: 30 INJECTION, SOLUTION INTRAMUSCULAR; INTRAVENOUS at 11:15

## 2020-01-13 RX ADMIN — PROPOFOL 200 MCG/KG/MIN: 10 INJECTION, EMULSION INTRAVENOUS at 10:34

## 2020-01-13 RX ADMIN — ACETAMINOPHEN 975 MG: 325 TABLET ORAL at 09:36

## 2020-01-13 RX ADMIN — SODIUM CHLORIDE, SODIUM LACTATE, POTASSIUM CHLORIDE, CALCIUM CHLORIDE: 600; 310; 30; 20 INJECTION, SOLUTION INTRAVENOUS at 09:36

## 2020-01-13 RX ADMIN — PROPOFOL 200 MG: 10 INJECTION, EMULSION INTRAVENOUS at 10:34

## 2020-01-13 RX ADMIN — CEFAZOLIN SODIUM 2 G: 2 SOLUTION INTRAVENOUS at 10:37

## 2020-01-13 ASSESSMENT — MIFFLIN-ST. JEOR
SCORE: 1908.07
SCORE: 1907.62

## 2020-01-13 ASSESSMENT — PAIN SCALES - GENERAL
PAINLEVEL: NO PAIN (0)
PAINLEVEL: NO PAIN (0)

## 2020-01-13 ASSESSMENT — PATIENT HEALTH QUESTIONNAIRE - PHQ9: SUM OF ALL RESPONSES TO PHQ QUESTIONS 1-9: 23

## 2020-01-13 NOTE — NURSING NOTE
"Chief Complaint   Patient presents with     Cystoscopy     Urethroplasty follow up       Blood pressure (!) 152/96, pulse 89, height 1.803 m (5' 11\"), weight 107.1 kg (236 lb 1.6 oz). Body mass index is 32.93 kg/m .    Patient Active Problem List   Diagnosis     Acute bacterial prostatitis     Lower urinary tract infectious disease     severe HTN (hypertension)     Suicidal ideation     Major depressive disorder, recurrent episode, moderate (H)     Urinary retention     Urethral stricture     PATRICE (obstructive sleep apnea)     Complicated UTI (urinary tract infection)     RIO (generalized anxiety disorder)     At risk for cardiovascular event     Elevated hemoglobin (H)     Bladder stones     Erectile dysfunction, unspecified erectile dysfunction type     Severe recurrent major depression without psychotic features (H)       Allergies   Allergen Reactions     Contrast Dye Difficulty breathing and Anaphylaxis       Current Outpatient Medications   Medication Sig Dispense Refill     ARIPiprazole (ABILIFY) 2 MG tablet Take 2 mg by mouth daily       atorvastatin (LIPITOR) 20 MG tablet TAKE ONE TABLET BY MOUTH EVERY DAY. 90 tablet 3     diltiazem ER COATED BEADS (CARDIZEM CD) 360 MG 24 hr capsule Take 1 capsule (360 mg) by mouth daily 90 capsule 0     docusate sodium (COLACE) 100 MG capsule TAKE ONE CAPSULE BY MOUTH TWICE DAILY  60 capsule 11     hydrALAZINE (APRESOLINE) 25 MG tablet Take 0.5 tablets (12.5 mg) by mouth every 6 hours as needed (for SBP >175 or DBP >110) 30 tablet 0     lisinopril (PRINIVIL/ZESTRIL) 20 MG tablet Take 1 tablet (20 mg) by mouth daily 90 tablet 0     lithium (ESKALITH) 300 MG tablet Take 300 mg by mouth 3 times daily       tamsulosin (FLOMAX) 0.4 MG capsule Take 1 capsule (0.4 mg) by mouth daily 90 capsule 0     traZODone (DESYREL) 100 MG tablet Take 1 tablet (100 mg) by mouth nightly as needed for sleep 30 tablet 1     amLODIPine (NORVASC) 10 MG tablet Take 1 tablet (10 mg) by mouth daily " (Patient not taking: Reported on 2020) 90 tablet 0     buPROPion 450 MG TB24 Take 450 mg by mouth every morning (Patient not taking: Reported on 2020) 30 tablet 1     busPIRone (BUSPAR) 15 MG tablet Increase if anxiety not doing well: 0.5 tab 2x/day x 3 days, then 1 tab 2x/day x 3 days, then 1.5 tab 2x/day x 3 days, then 2 tab 2x/day. (Patient not taking: Reported on 2020) 90 tablet 0     oxybutynin (DITROPAN) 5 MG tablet Take 1 tablet (5 mg) by mouth 2 times daily (Patient not taking: Reported on 2020) 60 tablet 0       Social History     Tobacco Use     Smoking status: Never Smoker     Smokeless tobacco: Never Used   Substance Use Topics     Alcohol use: Yes     Comment: No drinking for 2 weeks     Drug use: No       Genesis Hawkins CMA, RMA  2020  7:19 AM     Invasive Procedure Safety Checklist:    Procedure: Cystoscopy    Action: Complete sections and checkboxes as appropriate.    Pre-procedure:  1. Patient ID Verified with 2 identifiers (Marquita and  or MRN) : YES    2. Procedure and site verified with patient/designee (when able) : YES    3. Accurate consent documentation in medical record : YES    4. H&P (or appropriate assessment) documented in medical record : YES  H&P must be up to 30 days prior to procedure an updated within 24 hours of                 Procedure as applicable.     5. Relevant diagnostic and radiology test results appropriately labeled and displayed as applicable : YES    6. Blood products, implants, devices, and/or special equipment available for the procedure as applicable : YES    7. Procedure site(s) marked with provider initials [Exclusions: None] : NO    8. Marking not required. Reason : Yes  Procedure does not require site marking    Time Out:     Time-Out performed immediately prior to starting procedure, including verbal and active participation of all team members addressing: YES    1. Correct patient identity.  2. Confirmed that the correct side and  site are marked.  3. An accurate procedure to be done.  4. Agreement on the procedure to be done.  5. Correct patient position.  6. Relevant images and results are properly labeled and appropriately displayed.  7. The need to administer antibiotics or fluids for irrigation purposes during the procedure as applicable.  8. Safety precautions based on patient history or medication use.    During Procedure: Verification of correct person, site, and procedure occurs any time the responsibility for care of the patient is transferred to another member of the care team.    No medications administered during this procedure.    Genesis Hawkins CMA  January 13, 2020

## 2020-01-13 NOTE — ANESTHESIA POSTPROCEDURE EVALUATION
Anesthesia POST Procedure Evaluation    Patient: Flakito Hilliard   MRN:     [id number] Gender:   male   Age:    60 year old :      1960        Preoperative Diagnosis: Urethral stone [N21.1]   Procedure(s):  Cystoscopy, laser lithotripsy of urethral stone with holmium laser   Postop Comments: No value filed.       Anesthesia Type:  Not documented  General    Reportable Event: NO     PAIN: Uncomplicated   Sign Out status: Comfortable, Well controlled pain     PONV: No PONV   Sign Out status:  No Nausea or Vomiting     Neuro/Psych: Uneventful perioperative course   Sign Out Status: Preoperative baseline; Age appropriate mentation     Airway/Resp.: Uneventful perioperative course   Sign Out Status: Non labored breathing, age appropriate RR; Resp. Status within EXPECTED Parameters     CV: Uneventful perioperative course   Sign Out status: Appropriate BP and perfusion indices; Appropriate HR/Rhythm     Disposition:   Sign Out in:  PACU  Disposition:  Phase II; Home  Recovery Course: Uneventful  Follow-Up: Not required           Last Anesthesia Record Vitals:  CRNA VITALS  2020 1042 - 2020 1142      2020             Resp Rate (observed):  (!) 2          Last PACU Vitals:  Vitals Value Taken Time   /77 2020 11:32 AM   Temp 37.1  C (98.7  F) 2020 11:30 AM   Pulse 80 2020 11:32 AM   Resp 12 2020 11:31 AM   SpO2 94 % 2020 11:30 AM   Temp src     NIBP     Pulse     SpO2     Resp     Temp     Ht Rate     Temp 2     Vitals shown include unvalidated device data.      Electronically Signed By: Deonte Westfall DO, 2020, 2:00 PM

## 2020-01-13 NOTE — PROGRESS NOTES
Urology Clinic    Niraj Burger MD  Date of Service: 2020     Name: Flakito Hilliard  MRN: [id number]  Age: 60 year old  : 1960  Referring provider: Referred Self     Assessment and Plan:  Assessment:  Flakito Hilliard  is a 60 year old male with history of urethral stricture disease s/p Hailey urethroplasty in  and ventral buccal for proximal recurrence in . Cystoscopy 2018 negative. Qmax today 8, and cystoscopy noted a urethral stone obstructing most of the urethral lumen. Given his reconstruction and the size of the stone, this would be best managed with cystoscopy in OR with holmium lithotrispy. The source of the stone is most likely the prostatic stones noted on previous cystoscopy and his reconstruction procedure.      Plan:  Add on for OR today for cystoscopy with urethral stone lithotripsy; will assess proximal urethra to see if there's evidence of recurrence    As the attending surgeon I, Niraj Burger, was present and throughout the procedure. I counseled the patient based on findings.     ______________________________________________________________________    HPI  Flakito Hilliard is a 60 year old male with a history of recurrent bulbomembranous urethral stricture who underwent anterior and posterior single stage urethroplasty with double buccal graft in 2014 (Hailey type) and anterior urethroplasty with ventral buccal graft in 2016.  Cystoscopy in 2018 showed no evidence of recurrence.      During summer  he noticed his stream has slowed and he has had intermittent sharp pain with urination. Had urine cultures checked during this time which were negative. Renal/bladder US negative for pathology. He has been having issues with his depression and mental during this time. He has had psych meds adjusted.      Is taking flomax. He had been started on oxybutynin by another provider, which was discontinued on follow up in 2019. He presents today for cystoscopy.      Uroflow today was noted for prolonged plateau pattern with Qmax of 8, PVR of 130 cc    Cystoscopy:   After informed consent was obtained, the patient was brought to the procedure room where he was placed in the supine position with all pressure points well padded.  He was prepped and draped in a sterile fashion. A flexible cystoscope was introduced through a well-lubricated urethra. There were several areas of narrowing in the penile and distal bulbar urethra but none worse than 18F. We encountered a stone at the mid-bulbar urethra that was obstructing the lumen. There were no strictures proximal to this point. The the scope was then withdrawn.    Laboratory:   I personally reviewed all applicable laboratory data and went over findings with patient  Significant for:    CBC RESULTS:  Recent Labs   Lab Test 07/16/19  0732 07/15/19  1414 06/28/19  1016 05/19/19  0706 11/13/18  0959   WBC 9.4 12.1*  --  8.1 7.9   HGB 16.1 16.8 16.5 16.6 15.7    335  --  316 289     BMP RESULTS:  Recent Labs   Lab Test 07/20/19  0753 07/18/19  0758 07/16/19  0732 07/15/19  1414    139 141 141   POTASSIUM 3.8 3.5 3.6 4.0   CHLORIDE 109 106 107 106   CO2 27 24 28 26   ANIONGAP 4 9 6 9   GLC 97 109* 98 127*   BUN 19 22 28 26   CR 1.10 1.12 1.29* 1.38*   GFRESTIMATED 73 72 60* 56*   GFRESTBLACK 85 83 70 65   AMILCAR 8.6 9.0 9.2 9.5     UA RESULTS:   Recent Labs   Lab Test 09/09/19  1343 08/24/19  1428 07/15/19  1358   SG 1.025 1.025 1.018   URINEPH 6.0 6.0 7.0   NITRITE Negative Negative Negative   RBCU 5-10* O - 2 29*   WBCU 10-25* 10-25* >182*     PSA RESULTS:   PSA   Date Value Ref Range Status   09/09/2019 0.09 0 - 4 ug/L Final     Comment:     Assay Method:  Chemiluminescence using Siemens Vista analyzer   07/30/2014 0.12 0 - 4 ug/L Final       Imaging:   I personally reviewed all applicable imaging and went over the below findings with patient.    Results for orders placed or performed during the hospital encounter of  06/20/19   US Renal Complete    Narrative    EXAMINATION: US RENAL COMPLETE, 7/13/2019 1:53 PM     COMPARISON: 7/31/2014    HISTORY: Pain during urination. History of renal stone.    FINDINGS:    Right kidney: Measures 12.5 cm in length. Parenchyma is of normal  thickness and echogenicity. No focal mass. No hydronephrosis. Multiple  renal cortical cysts, the largest measuring 1.9 x 1.8 x 1.6 cm.    Left kidney: Measures 12 cm in length. Parenchyma is of normal  thickness and echogenicity. No focal mass. No hydronephrosis. Multiple  renal cortical cysts, the largest measuring 1.3 x 1.2 x 1.4 cm. Lower  pole mild caliectasis versus small renal sinus cyst.    Bladder: Distended. Unremarkable.      Impression    IMPRESSION:  1.  No sonographic evidence of renal calculi.  2.  Multiple renal cortical cysts.  3. Left lower pole mild caliectasis versus small renal sinus cyst.  No  jeremy hydronephrosis.       I have personally reviewed the examination and initial interpretation  and I agree with the findings.    HENRRY ASHER MD     Seen with Dr. Tao Jolley,   Urology Resident     Scribe Disclosure:      I, Cinthya Agudelo, a scribe, prepared the chart for today's encounter.

## 2020-01-13 NOTE — ANESTHESIA CARE TRANSFER NOTE
Patient: Flakito Hilliard    Procedure(s):  Cystoscopy, laser lithotripsy of urethral stone with holmium laser    Diagnosis: Urethral stone [N21.1]  Diagnosis Additional Information: No value filed.    Anesthesia Type:   General     Note:  Airway :Nasal Cannula  Patient transferred to:PACU  Handoff Report: Identifed the Patient, Identified the Reponsible Provider, Reviewed the pertinent medical history, Discussed the surgical course, Reviewed Intra-OP anesthesia mangement and issues during anesthesia, Set expectations for post-procedure period and Allowed opportunity for questions and acknowledgement of understanding      Vitals: (Last set prior to Anesthesia Care Transfer)    CRNA VITALS  1/13/2020 1042 - 1/13/2020 1122      1/13/2020             Resp Rate (observed):  (!) 2        SpO2 94%  RR 12  HR 80   Drowsy.  Denies discomfort.            Electronically Signed By: ЮЛИЯ Jack CRNA  January 13, 2020  11:22 AM

## 2020-01-13 NOTE — NURSING NOTE
[facility]:  PHQ-9 Screening Note  SITUATION/BACKGROUND                                                    Flakito Hilliard is a 60 year old male who completed the PHQ-9 assessment for depression and Score is >9.    Onset of symptoms: 1/13/2020  Trigger: ongoing  Recent related events: patient not feeling himself right now  Prior history of suicide attempt or self harm: No  Risk Factors: feeling a confused about life right now  History of depression or mental illness: Yes  Medications reviewed: Yes     ASSESSMENT      A. Are any of the following present?      Suicidal thoughts with a plan and means to carry out the plan?    Intent to harm others    Altered mental status: confusion, delusional, psychotic NO - go to B   B. Are any of the following present?      Suicidal thoughts without a plan or means to carry out the plan    New onset of delusional ideas    Past inpatient admission for depression    New onset and recent change or addition of new medication NO - go to C   C. Are any of the following present?      Previous suicide attempts    Depression interfering with ability to work or function    Loss of appetite and eating poorly    Abrupt cessation of drugs (OTC or RX), alcohol or caffeine    Drug or alcohol abuse NO - go to D   D. Are several of the following present?      Difficulty concentrating    Difficulty sleeping    Reduced interest in sexual activity or impotency    Irregular or absent menstruation    No interest in activity    Change in interpersonal relationships    Increased use/abuse of alcohol or drugs    Pregnant or recent child birth    Recent major life change    History of depression NO - provide home care instructions         PLAN      Home Care Instructions:       Report the following to your PCP:   does not    Seek emergency care immediately if any of the following occur:   does not     BEHAVIORAL HEALTH TEAMS      [facility] - Behavioral Health Team    [place] [facility] Pager:  [phone]    [place]  - Behavioral Health Team    Pager number: [phone]    Referral to Behavioral Health    BEHAVIORAL / SPIRITUAL HEALTH SOWQ [97212}    RESOURCES      - 24/7 Crisis Hotlines: National Suicide Prevention Hotline  590-815-MNQV (0788)    Ramila Riley, RN        Copyright 2016 [employer]

## 2020-01-13 NOTE — DISCHARGE INSTRUCTIONS
[facility]  Home Care Following Anesthesia  For 24 hours after surgery:  1. Get plenty of rest.  A responsible adult must stay with you for at least 24 hours after you leave the surgery center.  2. Do not drive or use heavy equipment.  If you have weakness or tingling, don't drive or use heavy equipment until this feeling goes away.   3. Do not drink alcohol.   4. Avoid strenuous or risky activities.  Ask for help when climbing stairs.  5. You may feel lightheaded.  IF so, sit for a few minutes before standing.  Have someone help you get up.   6. If you have nausea (feel sick to your stomach): Drink only clear liquids such as apple juice, ginger ale, broth or 7-Up.  Rest may also help.  Be sure to drink enough fluids.  Move to a regular diet as you feel able.   7. You may have a slight fever.  Call the doctor if your fever is over 100 F (37.7 C) (taken under the tongue) or lasts longer than 24 hours.  8. You may have a dry mouth, a sore throat, muscle aches or trouble sleeping. These should go away after 24 hours.  9. Do not make important or legal decisions.               Tips for taking pain medications  To get the best pain relief possible, remember these points:    Take pain medications as directed, before pain becomes severe.    Pain medication can upset your stomach: taking it with food may help.    Constipation is a common side effect of pain medication. Drink plenty of  fluids.    Eat foods high in fiber. Take a stool softener if recommended by your doctor or pharmacist.    Do not drink alcohol, drive or operate machinery while taking pain medications.    Ask about other ways to control pain, such as with heat, ice or relaxation.    Tylenol/Acetaminophen Consumption  To help encourage the safe use of acetaminophen, the makers of TYLENOL  have lowered the maximum daily dose for single-ingredient Extra Strength TYLENOL  (acetaminophen) products sold in the [place] from 8  pills per day (4,000 mg) to 6 pills per day (3,000 mg). The dosing interval has also changed from 2 pills every 4-6 hours to 2 pills every 6 hours.    If you feel your pain relief is insufficient, you may take Tylenol/Acetaminophen in addition to your narcotic pain medication.     Be careful not to exceed 3,000 mg of Tylenol/Acetaminophen in a 24 hour period from all sources.    If you are taking extra strength Tylenol/acetaminophen (500 mg), the maximum dose is 6 tablets in 24 hours.    If you are taking regular strength acetaminophen (325 mg), the maximum dose is 9 tablets in 24 hours.    Call a doctor for any of the followin. Signs of infection (fever, growing tenderness at the surgery site, a large amount of drainage or bleeding, severe pain, foul-smelling drainage, redness, swelling).  2. It has been over 8 to 10 hours since surgery and you are still not able to urinate (pass water).  3. Headache for over 24 hours.  4. Numbness, tingling or weakness the day after surgery (if you had spinal anesthesia).  Your doctor is:                         Or dial [phone] and ask for the resident on call for:  Prostate Urology  For emergency care, call the:  [facility]:  [phone] (TTY for hearing impaired: [phone])            Discharge Instructions Following a Cystoscopy, Stent and/or Ureteroscopy    Drainage/Urination:    Urine may be slightly bloody but should taper off in a few days.    You may have some frequency and urgency for a few days.    Comfort:    A burning sensation when urinating is common. Drinking extra fluids helps decrease this burning feeling and also helps to clear the bloody urine.    Mild abdominal cramps may occur for a few days.    Baths:    Daily tub baths aide in passing urine.    Frequent use of bubble bath is discouraged since it encourages urinary tract infections.    Report to your doctor at once if you experience:    Inability to pass your  urine    Continuous or heavy bleeding    Severe Pain    Elevated temperature > than 101.5 for 24 hours    Home Activity:    On the day of surgery spend a quiet evening at home.    Increase activity as tolerated.

## 2020-01-13 NOTE — ANESTHESIA PREPROCEDURE EVALUATION
Anesthesia Pre-Procedure Evaluation    Patient: Flakito Hilliard   MRN:     [id number] Gender:   male   Age:    60 year old :      1960        Preoperative Diagnosis: Urethral stone [N21.1]   Procedure(s):  Cystoscopy, laser lithotripsy of urethral stone     Past Medical History:   Diagnosis Date     Depressive disorder      H/O urethral stricture      Hypertension      Sleep apnea       Past Surgical History:   Procedure Laterality Date     BLADDER SURGERY       CYSTOSTOMY, INSERT TUBE SUPRAPUBIC, COMBINED N/A 2014    Procedure: COMBINED CYSTOSTOMY, INSERT TUBE SUPRAPUBIC;  Surgeon: Min Prasad MD;  Location: [facility] OR     GENITOURINARY SURGERY      prostate surgery for stones     LASER HOLMIUM CYSTOSCOPY, INTERNAL URETHROTOMY, COMBINED N/A 10/24/2014    Procedure: COMBINED LASER HOLMIUM CYSTOSCOPY, INTERNAL URETHROTOMY;  Surgeon: Valentin Villatoro MD;  Location: [facility] OR     URETHROPLASTY N/A 12/10/2014    Procedure: URETHROPLASTY;  Surgeon: Niraj Burger MD;  Location: [facility] OR     URETHROPLASTY WITH BUCCAL GRAFT N/A 2016    Procedure: URETHROPLASTY WITH BUCCAL GRAFT;  Surgeon: Niraj Burger MD;  Location: [facility] OR          Anesthesia Evaluation     . Pt has had prior anesthetic. Type: General    No history of anesthetic complications          ROS/MED HX    ENT/Pulmonary:     (+)sleep apnea, , . .    Neurologic:  - neg neurologic ROS     Cardiovascular:     (+) hypertension----. : . . . :. . Previous cardiac testing date:results:date: results:ECG reviewed date: results:SR with 1 degree AV block date: results:          METS/Exercise Tolerance:  >4 METS   Hematologic:  - neg hematologic  ROS       Musculoskeletal:  - neg musculoskeletal ROS       GI/Hepatic:  - neg GI/hepatic ROS       Renal/Genitourinary:     (+) Nephrolithiasis ,       Endo:         Psychiatric:     (+) psychiatric history depression      Infectious Disease:  - neg infectious disease ROS       Malignancy:      - no  "malignancy   Other:    - neg other ROS                     PHYSICAL EXAM:   Mental Status/Neuro: A/A/O   Airway: Facies: Feasible  Mallampati: II  Mouth/Opening: Full  TM distance: > 6 cm  Neck ROM: Full   Respiratory: Auscultation: CTAB     Resp. Rate: Normal     Resp. Effort: Normal      CV: Rhythm: Regular  Rate: Age appropriate  Heart: Normal Sounds   Comments:                      LABS:  CBC:   Lab Results   Component Value Date    WBC 9.4 07/16/2019    WBC 12.1 (H) 07/15/2019    HGB 16.1 07/16/2019    HGB 16.8 07/15/2019    HCT 46.6 07/16/2019    HCT 48.1 07/15/2019     07/16/2019     07/15/2019     BMP:   Lab Results   Component Value Date     07/20/2019     07/18/2019    POTASSIUM 3.8 07/20/2019    POTASSIUM 3.5 07/18/2019    CHLORIDE 109 07/20/2019    CHLORIDE 106 07/18/2019    CO2 27 07/20/2019    CO2 24 07/18/2019    BUN 19 07/20/2019    BUN 22 07/18/2019    CR 1.10 07/20/2019    CR 1.12 07/18/2019    GLC 97 07/20/2019     (H) 07/18/2019     COAGS: No results found for: PTT, INR, FIBR  POC:   Lab Results   Component Value Date    BGM 95 09/09/2014     OTHER:   Lab Results   Component Value Date    LACT 0.6 05/23/2016    A1C 5.3 10/11/2019    AMILCAR 8.6 07/20/2019    ALBUMIN 3.8 05/19/2019    PROTTOTAL 7.5 05/19/2019    ALT 26 05/19/2019    AST 20 05/19/2019    ALKPHOS 77 05/19/2019    BILITOTAL 1.1 05/19/2019    TSH 2.01 05/19/2019    T4 1.19 05/25/2016        Preop Vitals    BP Readings from Last 3 Encounters:   01/13/20 (!) 152/96   09/30/19 (!) 151/96   09/19/19 118/77    Pulse Readings from Last 3 Encounters:   01/13/20 89   09/30/19 88   09/19/19 85      Resp Readings from Last 3 Encounters:   09/30/19 18   09/19/19 18   08/16/19 18    SpO2 Readings from Last 3 Encounters:   09/19/19 96%   09/09/19 96%   08/24/19 98%      Temp Readings from Last 1 Encounters:   09/09/19 36.6  C (97.8  F) (Oral)    Ht Readings from Last 1 Encounters:   01/13/20 1.803 m (5' 11\")      Wt " "Readings from Last 1 Encounters:   01/13/20 107.1 kg (236 lb 1.6 oz)    Estimated body mass index is 32.93 kg/m  as calculated from the following:    Height as of an earlier encounter on 1/13/20: 1.803 m (5' 11\").    Weight as of an earlier encounter on 1/13/20: 107.1 kg (236 lb 1.6 oz).     LDA:        Assessment:   ASA SCORE: 3    H&P: History and physical reviewed and following examination; no interval change.   Smoking Status:  Non-Smoker/Unknown   NPO Status: NPO Appropriate     Plan:   Anes. Type:  General   Pre-Medication: Acetaminophen; Steroid Stress Dose   Induction:  IV (Standard)   Airway: LMA   Access/Monitoring: PIV   Maintenance: Balanced     Postop Plan:   Postop Pain: Opioids  Postop Sedation/Airway: Not planned  Disposition: Outpatient     PONV Management:   Adult Risk Factors:, Non-Smoker, Postop Opioids   Prevention: Ondansetron, Dexamethasone     CONSENT: Direct conversation   Plan and risks discussed with: Patient   Blood Products: Consent Deferred (Minimal Blood Loss)                   Deonte Westfall,   "

## 2020-01-13 NOTE — OP NOTE
Urology Operative Summary    Pre-operative diagnosis: 1. Obstructive voiding  2. Urethral stone     Post-operative diagnosis: 1. Obstructive voiding  2. Urethral stone     Procedure: 1. Cystourethroscopy   2. Laser lithotripsy of 1.5cm urethral stone  3. Basket extraction of stone fragments     Surgeon: Sarah Hill MD     Assistant(s): No residents available      Anesthesia: General       Estimated blood loss: Less than 2 ml     Complications: None     Condition: Patient taken to recovery in stable condition.     Indications: Mr Hilliard is a 60 year old male with history of complex urethral stricture disease who presented with very slow stream in Sept 2019. Due to his history it was recommended he undergo cystoscopy to confirm recurrence. He unfortunately did not return for cystoscopy until today. This revealed a large stone stuck in the patient's urethra. He understands the risks and benefits of the various options and elects to proceed with the procedure understanding the risks for infection, pain, bleeding, damage to urethra resulting in recurrent stricture, and need for future procedures and risks of anesthesia.    Procedure: After informed consent was obtained, he was taken to the operating room. After induction of general anesthesia he was placed in the lithotomy position with care to pad all bony prominences. He was prepped and draped in the standard fashion. A time-out was performed to ensure proper patient, procedure and positioning.  The patient received appropriate IV antibiotics prior to the procedure.    The procedure was initiated with insertion of a pediatric off-set rigid cystoscope. The anterior urethra was patent with some mild narrowing in the mid penile urethra but this easily accommodated the pediatric scope. This was advanced to the bulbar urethra where the large became visible. The 270 micron laser fiber was used to laser fragment the stone into multiple small pieces. A zero tip basket  was used to grasp and visually remove the fragments to ensure they were all small enough and passed the urethra without disrupting any of the urethral mucosa. After all fragments were removed the scope was advanced into the bladder. The narrowest portion of the urethra was approximately 14Fr in the distal bulbar urethra but this easily accommodated the scope. There was some cloudy urine and tiny stone debris from lasering but no larger stones remained.     The scope was removed and crede of the abdomen revealed a very strong urine stream.     The patient was woken from anesthesia and transferred to the recovery room in stable condition.     Plan:   Will have the patient return in the next month for update Uroflow and PVR since his flow from Sept was likely confounded by this stone.   After this will get him back on yearly surveillance for recurrence.

## 2020-01-13 NOTE — H&P
Urology Admission History and Physical    Flakito Hilliard MRN# [id number]   Age: 60 year old YOB: 1960     Date of Admission:  1/13/2020    Primary care provider: Susan Hand          Impression and Plan:   Impression:   History of urethral stricture  Obstructing urethral stone      Plan:   To OR today for cystoscopy, laser lithotripsy of urethral stone           Chief Complaint:   Urethral stone.         History of Present Illness:   Flakito Hilliard is a 60 year old male with a history of recurrent bulbomembranous urethral stricture who underwent anterior and posterior single stage urethroplasty with buccal graft in 12/2014 (Hailey type) and anterior urethroplasty with ventral buccal graft in 12/2016.  Cystoscopy in 04/2018 showed evidence of 18Fr narrowing.      During summer 2019 he noticed his stream has slowed and he has had intermittent sharp pain with urination. Had urine cultures checked during this time which were negative. Renal/bladder US negative for pathology. He was seen in 9/2019 and his oxybutynin was stopped and he was recommended to have a cystoscopy because his uroflow was suggestive of recurrence. He did not show up for cystoscopy until today. This showed no obvious stricture progression but there was a large stone in his urethra causing obstruction.     Uroflow today was noted for prolonged plateau pattern with Qmax of 8, PVR of 130 cc           Past Medical History:     Past Medical History:   Diagnosis Date     Depressive disorder      H/O urethral stricture      Hypertension      Sleep apnea             Past Surgical History:     Past Surgical History:   Procedure Laterality Date     BLADDER SURGERY       CYSTOSTOMY, INSERT TUBE SUPRAPUBIC, COMBINED N/A 9/8/2014    Procedure: COMBINED CYSTOSTOMY, INSERT TUBE SUPRAPUBIC;  Surgeon: Min Prasad MD;  [facility]     GENITOURINARY SURGERY      prostate surgery for stones     LASER HOLMIUM CYSTOSCOPY, INTERNAL  URETHROTOMY, COMBINED N/A 10/24/2014    Procedure: COMBINED LASER HOLMIUM CYSTOSCOPY, INTERNAL URETHROTOMY;  Surgeon: Valentin Villatoro MD;  Location: [facility] OR     URETHROPLASTY N/A 12/10/2014    Procedure: URETHROPLASTY;  Surgeon: Niraj Burger MD;  Location: [facility] OR     URETHROPLASTY WITH BUCCAL GRAFT N/A 12/9/2016    Procedure: URETHROPLASTY WITH BUCCAL GRAFT;  Surgeon: Niraj Burger MD;  [facility] OR            Social History:     Social History     Tobacco Use     Smoking status: Never Smoker     Smokeless tobacco: Never Used   Substance Use Topics     Alcohol use: Yes     Comment: No drinking for 2 weeks            Family History:     Family History   Problem Relation Age of Onset     Hypertension Mother      Depression Father      Hypertension Father      Mental Illness Sister             Immunizations:     VACCINE/DOSE   Diptheria   DPT   DTAP   HBIG   Hepatitis A   Hepatitis B   HIB   Influenza   Measles   Meningococcal   MMR   Mumps   Pneumococcal   Polio   Rubella   Small Pox   TDAP   Varicella   Zoster            Allergies:     Allergies   Allergen Reactions     Contrast Dye Difficulty breathing and Anaphylaxis            Medications:     Current Outpatient Medications   Medication Sig     amLODIPine (NORVASC) 10 MG tablet Take 1 tablet (10 mg) by mouth daily     ARIPiprazole (ABILIFY) 2 MG tablet Take 2 mg by mouth daily     atorvastatin (LIPITOR) 20 MG tablet TAKE ONE TABLET BY MOUTH EVERY DAY.     diltiazem ER COATED BEADS (CARDIZEM CD) 360 MG 24 hr capsule Take 1 capsule (360 mg) by mouth daily     docusate sodium (COLACE) 100 MG capsule TAKE ONE CAPSULE BY MOUTH TWICE DAILY      lisinopril (PRINIVIL/ZESTRIL) 20 MG tablet Take 1 tablet (20 mg) by mouth daily     lithium (ESKALITH) 300 MG tablet Take 300 mg by mouth 3 times daily     tamsulosin (FLOMAX) 0.4 MG capsule Take 1 capsule (0.4 mg) by mouth daily     traZODone (DESYREL) 100 MG tablet Take 1 tablet (100 mg) by mouth nightly as  "needed for sleep     buPROPion 450 MG TB24 Take 450 mg by mouth every morning (Patient not taking: Reported on 1/13/2020)     busPIRone (BUSPAR) 15 MG tablet Increase if anxiety not doing well: 0.5 tab 2x/day x 3 days, then 1 tab 2x/day x 3 days, then 1.5 tab 2x/day x 3 days, then 2 tab 2x/day. (Patient not taking: Reported on 1/13/2020)     hydrALAZINE (APRESOLINE) 25 MG tablet Take 0.5 tablets (12.5 mg) by mouth every 6 hours as needed (for SBP >175 or DBP >110)     oxybutynin (DITROPAN) 5 MG tablet Take 1 tablet (5 mg) by mouth 2 times daily (Patient not taking: Reported on 1/13/2020)     Current Facility-Administered Medications   Medication     acetaminophen (TYLENOL) tablet 975 mg     ceFAZolin (ANCEF) intermittent infusion 1 g (pre-mix)     ceFAZolin (ANCEF) intermittent infusion 2 g in 50 mL dextrose PREMIX     gabapentin (NEURONTIN) capsule 300 mg     lactated ringers infusion     lidocaine (LMX4) kit     lidocaine 1 % 0.1-1 mL     sodium chloride (PF) 0.9% PF flush 3 mL     sodium chloride (PF) 0.9% PF flush 3 mL             Review of Systems:   The review of systems other than listed in HPI negative.          Physical Exam:     Blood pressure (!) 152/94, pulse 87, temperature 98.6  F (37  C), temperature source Oral, resp. rate 16, height 1.803 m (5' 11\"), weight 107 kg (236 lb), SpO2 97 %.  Constitutional: healthy, alert and no distress   Cardiovascular: regular rate, normal perfusion  Respiratory: normal work of breathing  Psychiatric: mentation appears normal and affect normal/bright  Head: Normocephalic. EOMI. Moist membranes  Abdomen: Abdomen soft, non-tender.   NEURO: Gait normal. Grossly non-focal  SKIN: Soft, dry  JOINT/EXTREMITIES: extremities normal - no gross deformities noted and gait normal            Data:   All laboratory data reviewed       Sarah Hill MD           "

## 2020-01-13 NOTE — LETTER
2020       RE: Flakito Hilliard  [address] [facility] [address]     Dear Colleague,    Thank you for referring your patient, Flakito Hilliard, to the [facility] UROLOGY AND [facility] FOR PROSTATE AND UROLOGIC CANCERS at [facility]. Please see a copy of my visit note below.      Urology Clinic    Niraj Burger MD  Date of Service: 2020     Name: Flakito Hilliard  MRN: [id number]  Age: 60 year old  : 1960  Referring provider: Referred Self     Assessment and Plan:  Assessment:  Flakito Hilliard  is a 60 year old male with history of urethral stricture disease s/p Hailey urethroplasty in  and ventral buccal for proximal recurrence in . Cystoscopy 2018 negative. Qmax today 8, and cystoscopy noted a urethral stone obstructing most of the urethral lumen. Given his reconstruction and the size of the stone, this would be best managed with cystoscopy in OR with holmium lithotrispy. The source of the stone is most likely the prostatic stones noted on previous cystoscopy and his reconstruction procedure.      Plan:  Add on for OR today for cystoscopy with urethral stone lithotripsy; will assess proximal urethra to see if there's evidence of recurrence    As the attending surgeon I, Niraj Burger, was present and throughout the procedure. I counseled the patient based on findings.     ______________________________________________________________________    HPI  Flakito Hilliard is a 60 year old male with a history of recurrent bulbomembranous urethral stricture who underwent anterior and posterior single stage urethroplasty with double buccal graft in 2014 (Hailey type) and anterior urethroplasty with ventral buccal graft in 2016.   Cystoscopy in 2018 showed no evidence of recurrence.      During summer 2019 he noticed his stream has slowed and he has had intermittent sharp pain with urination. Had urine cultures checked during this time which were negative.  Renal/bladder US negative for pathology. He has been having issues with his depression and mental during this time. He has had psych meds adjusted.      Is taking flomax. He had been started on oxybutynin by another provider, which was discontinued on follow up in 09/2019. He presents today for cystoscopy.     Uroflow today was noted for prolonged plateau pattern with Qmax of 8, PVR of 130 cc    Cystoscopy:   After informed consent was obtained, the patient was brought to the procedure room where he was placed in the supine position with all pressure points well padded.  He was prepped and draped in a sterile fashion. A flexible cystoscope was introduced through a well-lubricated urethra. There were several areas of narrowing in the penile and distal bulbar urethra but none worse than 18F. We encountered a stone at the mid-bulbar urethra that was obstructing the lumen. There were no strictures proximal to this point. The the scope was then withdrawn.    Laboratory:   I personally reviewed all applicable laboratory data and went over findings with patient  Significant for:    CBC RESULTS:  Recent Labs   Lab Test 07/16/19  0732 07/15/19  1414 06/28/19  1016 05/19/19  0706 11/13/18  0959   WBC 9.4 12.1*  --  8.1 7.9   HGB 16.1 16.8 16.5 16.6 15.7    335  --  316 289     BMP RESULTS:  Recent Labs   Lab Test 07/20/19  0753 07/18/19  0758 07/16/19  0732 07/15/19  1414    139 141 141   POTASSIUM 3.8 3.5 3.6 4.0   CHLORIDE 109 106 107 106   CO2 27 24 28 26   ANIONGAP 4 9 6 9   GLC 97 109* 98 127*   BUN 19 22 28 26   CR 1.10 1.12 1.29* 1.38*   GFRESTIMATED 73 72 60* 56*   GFRESTBLACK 85 83 70 65   AMILCAR 8.6 9.0 9.2 9.5     UA RESULTS:   Recent Labs   Lab Test 09/09/19  1343 08/24/19  1428 07/15/19  1358   SG 1.025 1.025 1.018   URINEPH 6.0 6.0 7.0   NITRITE Negative Negative Negative   RBCU 5-10* O - 2 29*   WBCU 10-25* 10-25* >182*     PSA RESULTS:   PSA   Date Value Ref Range Status   09/09/2019 0.09 0 - 4 ug/L  Final     Comment:     Assay Method:  Chemiluminescence using Siemens Vista analyzer   07/30/2014 0.12 0 - 4 ug/L Final       Imaging:   I personally reviewed all applicable imaging and went over the below findings with patient.    Results for orders placed or performed during the hospital encounter of 06/20/19   US Renal Complete    Narrative    EXAMINATION: US RENAL COMPLETE, 7/13/2019 1:53 PM     COMPARISON: 7/31/2014    HISTORY: Pain during urination. History of renal stone.    FINDINGS:    Right kidney: Measures 12.5 cm in length. Parenchyma is of normal  thickness and echogenicity. No focal mass. No hydronephrosis. Multiple  renal cortical cysts, the largest measuring 1.9 x 1.8 x 1.6 cm.    Left kidney: Measures 12 cm in length. Parenchyma is of normal  thickness and echogenicity. No focal mass. No hydronephrosis. Multiple  renal cortical cysts, the largest measuring 1.3 x 1.2 x 1.4 cm. Lower  pole mild caliectasis versus small renal sinus cyst.    Bladder: Distended. Unremarkable.      Impression    IMPRESSION:  1.  No sonographic evidence of renal calculi.  2.  Multiple renal cortical cysts.  3. Left lower pole mild caliectasis versus small renal sinus cyst.  No  jeremy hydronephrosis.       I have personally reviewed the examination and initial interpretation  and I agree with the findings.    HENRRY ASHER MD     Seen with Dr. Tao Jolley,   Urology Resident     Scribe Disclosure:      I, Cinthya Agudelo, a scribe, prepared the chart for today's encounter.       Again, thank you for allowing me to participate in the care of your patient.      Sincerely,    Niraj Burger MD

## 2020-01-14 NOTE — TELEPHONE ENCOUNTER
----- Message from Mireille Ortiz RN sent at 1/13/2020  4:07 PM CST -----  Hi,    Please schedule post op appt with Dr. Hill for uroflow/PVR on a Friday in the next 1-3 weeks at noon    Mireille Simon  ----- Message -----  From: Sarah Hill MD  Sent: 1/13/2020   3:54 PM CST  To: Mireille Ortiz RN    It can be any Friday. I told his daughter we would try to work with her schedule since she lives far and has to bring him to all appointments :( If she is okay with this Friday then noon is just fine.  ----- Message -----  From: Mireille Ortiz RN  Sent: 1/13/2020  11:28 AM CST  To: Sarah Hill MD    Yes you are at the [facility], only a 1 and 2 pm case nothing in the morning.  What time do you want to see him in clinic on Friday?    Mireille Simon  ----- Message -----  From: Sarah Hill MD  Sent: 1/13/2020  11:23 AM CST  To: Mireille Ortiz RN    I got his stone out! Can you coordinate for him to have a Friday clinic visit with me so his daughter can drive him down. As long as we are at [facility] that day I can make it work.

## 2020-01-15 LAB
APPEARANCE STONE: NORMAL
COMPN STONE: NORMAL
NUMBER STONE: NORMAL
SIZE STONE: NORMAL MM
WT STONE: 278 MG

## 2020-01-22 NOTE — TELEPHONE ENCOUNTER
[facility] Call Center    Phone Message    May a detailed message be left on voicemail: yes    Reason for Call: Other: pt's daughter, Nayeli, reporting that pt does not have his post-op appt following the 1/13/20 surgery with Dr. Payne. pt's daughter, Nayeli, schedules appt for pt. Please call Nayeli donahue to schedule this appt. per guidelines, call center cannot schedule the post-op. Thank you.  Please call Nayeli at the cell ph# listed in pt's chart. Thank you.     Action Taken: Message routed to:  [facility]  Urology

## 2020-01-22 NOTE — TELEPHONE ENCOUNTER
Left message for Nayeli to call back to schedule post op appt with Dr. Sergio Bryant January 22, 2020 10:10 AM

## 2020-01-24 ENCOUNTER — TELEPHONE (OUTPATIENT)
Dept: UROLOGY | Facility: CLINIC | Age: 60
End: 2020-01-24

## 2020-01-24 NOTE — TELEPHONE ENCOUNTER
[facility] Call Center    Phone Message    May a detailed message be left on voicemail: yes    Reason for Call: Symptoms or Concerns     If patient has red-flag symptoms, warm transfer to triage line    Current symptom or concern: possible UTI     Symptoms have been present for:  3 day(s)    Has patient previously been seen for this? Yes    By : K. Sergio    Date: NA     Are there any new or worsening symptoms? Yes: patient daughter is calling as they are at a urgent care for pt due to severe lower back pain, they were told if this happened to call ASAP / stated out mild then gets worse     They are currently @ [facility]     PHONE [phone]  If needed                 Action Taken: Message routed to:  [facility] URO

## 2020-01-24 NOTE — TELEPHONE ENCOUNTER
Spoke to Ranjeet SANZ from [facility] urgent care, upon assessment he found the lower back pain to be musculoskeletal that isn't urologic related, no fever/chills, no urinary frequency/urgency.  UA/UC was done, UA shows positive nitrites with large leukocytes, UC is pending.  Will wait for final UC results before treating.  Ranjeet would like me to reach out to patients daughter about plan as she's apprehensive.    Mireille Ortiz, RN, BSN  Care Coordinator- Reconstructive Urology

## 2020-01-24 NOTE — TELEPHONE ENCOUNTER
[facility] Call Center    Phone Message    May a detailed message be left on voicemail: yes    Reason for Call: Other: Ranjeet calling to request to speak with Dr. Hill or someone on her team to discuss patient care.  Please call him back as soon as possible as Flakito is currently in clinic with him     Action Taken: Message routed to:  [facility] TOMMY Uro

## 2020-02-05 ENCOUNTER — TELEPHONE (OUTPATIENT)
Dept: FAMILY MEDICINE | Facility: CLINIC | Age: 60
End: 2020-02-05

## 2020-02-05 NOTE — TELEPHONE ENCOUNTER
Panel Management Review      Patient has the following on his problem list:     Depression / Dysthymia review    Measure:  Needs PHQ-9 score of 4 or less during index window.  Administer PHQ-9 and if score is 5 or more, send encounter to provider for next steps.    5 - 7 month window range: 10/15/19-2/12/20    PHQ-9 SCORE 8/5/2019 8/16/2019 1/13/2020   PHQ-9 Total Score - - -   PHQ-9 Total Score MyChart 21 (Severe depression) - -   PHQ-9 Total Score 21 23 23       If PHQ-9 recheck is 5 or more, route to provider for next steps.    Patient is due for:  PHQ9      Composite cancer screening  Chart review shows that this patient is due/due soon for the following None  Summary:    Patient is due/failing the following:   PHQ9    Action needed:   Patient needs to do PHQ9.    Type of outreach:    Phone, left message for patient to call back.     Questions for provider review:    None                                                                                                                                    Tiffanie Saeed RN

## 2020-02-21 ENCOUNTER — OFFICE VISIT (OUTPATIENT)
Dept: UROLOGY | Facility: CLINIC | Age: 60
End: 2020-02-21
Payer: COMMERCIAL

## 2020-02-21 VITALS — HEART RATE: 106 BPM | SYSTOLIC BLOOD PRESSURE: 120 MMHG | DIASTOLIC BLOOD PRESSURE: 82 MMHG

## 2020-02-21 DIAGNOSIS — N21.1 URETHRAL STONE: Primary | ICD-10-CM

## 2020-02-21 ASSESSMENT — PAIN SCALES - GENERAL: PAINLEVEL: NO PAIN (0)

## 2020-02-21 NOTE — NURSING NOTE
Chief Complaint   Patient presents with     Surgical Followup     4 week post op- Flow/PVR     Mireille Ortiz RN, BSN  Care Coordinator- Reconstructive Urology

## 2020-02-21 NOTE — PROGRESS NOTES
Reason for visit:  Follow up of urethral stone    HPI:  Mr Hilliard is a 60 year old male with history of complex urethral stricture disease s/p Hailey urethroplasty in 2014 and ventral buccal for proximal recurrence in 2016. Cystoscopy 4/2018 negative. He presented with very slow stream in Sept 2019. Due to his history it was recommended he undergo cystoscopy to confirm recurrence. He unfortunately did not return for cystoscopy until Jan 2020. This revealed a large stone stuck in the patient's urethra. He was taken to the OR for laser lithotripsy and extraction using a pediatric scope so as to not disrupt his urethral stricture which was 14Fr in narrowest caliber in the OR.     Today he returns for uroflow and symptom assessment.   He states he is voiding great.     Urinary Flow Rate  Peak Flow: 13.9 mL/s  Average Flow: 7.7 mL/s  Voided (mL): 113 mL  Residual Volume by Ultrasound: 0 mL  Character of Curve: Bell Shape        Current Outpatient Medications   Medication Sig Dispense Refill     amLODIPine (NORVASC) 10 MG tablet Take 1 tablet (10 mg) by mouth daily 90 tablet 0     ARIPiprazole (ABILIFY) 2 MG tablet Take 2 mg by mouth daily       atorvastatin (LIPITOR) 20 MG tablet TAKE ONE TABLET BY MOUTH EVERY DAY. 90 tablet 3     buPROPion 450 MG TB24 Take 450 mg by mouth every morning (Patient not taking: Reported on 1/13/2020) 30 tablet 1     busPIRone (BUSPAR) 15 MG tablet Increase if anxiety not doing well: 0.5 tab 2x/day x 3 days, then 1 tab 2x/day x 3 days, then 1.5 tab 2x/day x 3 days, then 2 tab 2x/day. (Patient not taking: Reported on 1/13/2020) 90 tablet 0     diltiazem ER COATED BEADS (CARDIZEM CD) 360 MG 24 hr capsule Take 1 capsule (360 mg) by mouth daily 90 capsule 0     docusate sodium (COLACE) 100 MG capsule TAKE ONE CAPSULE BY MOUTH TWICE DAILY  60 capsule 11     hydrALAZINE (APRESOLINE) 25 MG tablet Take 0.5 tablets (12.5 mg) by mouth every 6 hours as needed (for SBP >175 or DBP >110) 30 tablet 0      lisinopril (PRINIVIL/ZESTRIL) 20 MG tablet Take 1 tablet (20 mg) by mouth daily 90 tablet 0     lithium (ESKALITH) 300 MG tablet Take 300 mg by mouth 3 times daily       oxybutynin (DITROPAN) 5 MG tablet Take 1 tablet (5 mg) by mouth 2 times daily (Patient not taking: Reported on 1/13/2020) 60 tablet 0     tamsulosin (FLOMAX) 0.4 MG capsule Take 1 capsule (0.4 mg) by mouth daily 90 capsule 0     traZODone (DESYREL) 100 MG tablet Take 1 tablet (100 mg) by mouth nightly as needed for sleep 30 tablet 1       Allergies   Allergen Reactions     Contrast Dye Difficulty breathing and Anaphylaxis     Past Medical History:   Diagnosis Date     Depressive disorder      H/O urethral stricture      Hypertension      Sleep apnea      Past Surgical History:   Procedure Laterality Date     BLADDER SURGERY       CYSTOSCOPY, LITHOTRIPSY, COMBINED N/A 1/13/2020    Procedure: Cystoscopy, laser lithotripsy of urethral stone with holmium laser;  Surgeon: Sarah Hill MD;  [facility]     CYSTOSTOMY, INSERT TUBE SUPRAPUBIC, COMBINED N/A 9/8/2014    Procedure: COMBINED CYSTOSTOMY, INSERT TUBE SUPRAPUBIC;  Surgeon: Min Prasad MD;  [facility]: [facility] OR     GENITOURINARY SURGERY      prostate surgery for stones     LASER HOLMIUM CYSTOSCOPY, INTERNAL URETHROTOMY, COMBINED N/A 10/24/2014    Procedure: COMBINED LASER HOLMIUM CYSTOSCOPY, INTERNAL URETHROTOMY;  Surgeon: Valentin Villatoro MD;  [facility] OR     URETHROPLASTY N/A 12/10/2014    Procedure: URETHROPLASTY;  Surgeon: Niraj Burger MD;  [facility]     URETHROPLASTY WITH BUCCAL GRAFT N/A 12/9/2016    Procedure: URETHROPLASTY WITH BUCCAL GRAFT;  Surgeon: Niraj Burger MD;  [facility]         ROS -see hpi otherwise rest is negative     OBJECTIVE:  Vitals:    02/21/20 1156   BP: 120/82   BP Location: Right arm   Pulse: 106     Constitutional: healthy, alert and no distress   Cardiovascular: regular rate, normal perfusion  Respiratory: normal  work of breathing  Psychiatric: mentation appears normal and affect normal/bright  Head: Normocephalic. EOMI. Moist membranes  NEURO: Gait normal. Grossly non-focal  SKIN: Soft, dry  JOINT/EXTREMITIES: extremities normal - no gross deformities noted and gait normal      Assessment/Plan:  60M with complex urethral stricture history who was recently treated for a stone lodged in his urethra. Treated with laser lithotripsy. Now doing very well.     --RTC in 1 year with uroflow and PVR      Aliza Hill MD  Reconstructive Urology Fellow

## 2020-02-21 NOTE — LETTER
2/21/2020       RE: Flakito Hilliard  [address] [facility] [address]     Dear Colleague,    Thank you for referring your patient, Flakito Hilliard, to the [facility] UROLOGY AND INST FOR PROSTATE AND UROLOGIC CANCERS at [facility]. Please see a copy of my visit note below.    Reason for visit:  Follow up of urethral stone    HPI:  Mr Hilliard is a 60 year old male with history of complex urethral stricture disease s/p Hailey urethroplasty in 2014 and ventral buccal for proximal recurrence in 2016. Cystoscopy 4/2018 negative. He presented with very slow stream in Sept 2019. Due to his history it was recommended he undergo cystoscopy to confirm recurrence. He unfortunately did not return for cystoscopy until Jan 2020. This revealed a large stone stuck in the patient's urethra. He was taken to the OR for laser lithotripsy and extraction using a pediatric scope so as to not disrupt his urethral stricture which was 14Fr in narrowest caliber in the OR.     Today he returns for uroflow and symptom assessment.   He states he is voiding great.     Urinary Flow Rate  Peak Flow: 13.9 mL/s  Average Flow: 7.7 mL/s  Voided (mL): 113 mL  Residual Volume by Ultrasound: 0 mL  Character of Curve: Bell Shape        Current Outpatient Medications   Medication Sig Dispense Refill     amLODIPine (NORVASC) 10 MG tablet Take 1 tablet (10 mg) by mouth daily 90 tablet 0     ARIPiprazole (ABILIFY) 2 MG tablet Take 2 mg by mouth daily       atorvastatin (LIPITOR) 20 MG tablet TAKE ONE TABLET BY MOUTH EVERY DAY. 90 tablet 3     buPROPion 450 MG TB24 Take 450 mg by mouth every morning (Patient not taking: Reported on 1/13/2020) 30 tablet 1     busPIRone (BUSPAR) 15 MG tablet Increase if anxiety not doing well: 0.5 tab 2x/day x 3 days, then 1 tab 2x/day x 3 days, then 1.5 tab 2x/day x 3 days, then 2 tab 2x/day. (Patient not taking: Reported on 1/13/2020) 90 tablet 0     diltiazem ER COATED BEADS (CARDIZEM CD) 360  MG 24 hr capsule Take 1 capsule (360 mg) by mouth daily 90 capsule 0     docusate sodium (COLACE) 100 MG capsule TAKE ONE CAPSULE BY MOUTH TWICE DAILY  60 capsule 11     hydrALAZINE (APRESOLINE) 25 MG tablet Take 0.5 tablets (12.5 mg) by mouth every 6 hours as needed (for SBP >175 or DBP >110) 30 tablet 0     lisinopril (PRINIVIL/ZESTRIL) 20 MG tablet Take 1 tablet (20 mg) by mouth daily 90 tablet 0     lithium (ESKALITH) 300 MG tablet Take 300 mg by mouth 3 times daily       oxybutynin (DITROPAN) 5 MG tablet Take 1 tablet (5 mg) by mouth 2 times daily (Patient not taking: Reported on 1/13/2020) 60 tablet 0     tamsulosin (FLOMAX) 0.4 MG capsule Take 1 capsule (0.4 mg) by mouth daily 90 capsule 0     traZODone (DESYREL) 100 MG tablet Take 1 tablet (100 mg) by mouth nightly as needed for sleep 30 tablet 1       Allergies   Allergen Reactions     Contrast Dye Difficulty breathing and Anaphylaxis     Past Medical History:   Diagnosis Date     Depressive disorder      H/O urethral stricture      Hypertension      Sleep apnea      Past Surgical History:   Procedure Laterality Date     BLADDER SURGERY       CYSTOSCOPY, LITHOTRIPSY, COMBINED N/A 1/13/2020    Procedure: Cystoscopy, laser lithotripsy of urethral stone with holmium laser;  Surgeon: Sarah Hill MD;  Location: [facility] OR     CYSTOSTOMY, INSERT TUBE SUPRAPUBIC, COMBINED N/A 9/8/2014    Procedure: COMBINED CYSTOSTOMY, INSERT TUBE SUPRAPUBIC;  Surgeon: Min Prasad MD;  Location: [facility] OR     GENITOURINARY SURGERY      prostate surgery for stones     LASER HOLMIUM CYSTOSCOPY, INTERNAL URETHROTOMY, COMBINED N/A 10/24/2014    Procedure: COMBINED LASER HOLMIUM CYSTOSCOPY, INTERNAL URETHROTOMY;  Surgeon: Valentin Villatoro MD;  Location: [facility] OR     URETHROPLASTY N/A 12/10/2014    Procedure: URETHROPLASTY;  Surgeon: Niraj Burger MD;  Location: [facility] OR     URETHROPLASTY WITH BUCCAL GRAFT N/A 12/9/2016    Procedure: URETHROPLASTY WITH BUCCAL GRAFT;   Surgeon: Niraj Burger MD;  Location: [facility] OR         ROS -see hpi otherwise rest is negative     OBJECTIVE:  Vitals:    02/21/20 1156   BP: 120/82   BP Location: Right arm   Pulse: 106     Constitutional: healthy, alert and no distress   Cardiovascular: regular rate, normal perfusion  Respiratory: normal work of breathing  Psychiatric: mentation appears normal and affect normal/bright  Head: Normocephalic. EOMI. Moist membranes  NEURO: Gait normal. Grossly non-focal  SKIN: Soft, dry  JOINT/EXTREMITIES: extremities normal - no gross deformities noted and gait normal      Assessment/Plan:  60M with complex urethral stricture history who was recently treated for a stone lodged in his urethra. Treated with laser lithotripsy. Now doing very well.     --RTC in 1 year with uroflow and PVR      Aliza Hill MD  Reconstructive Urology Fellow    Again, thank you for allowing me to participate in the care of your patient.      Sincerely,    Sarah Hill MD

## 2020-02-21 NOTE — PATIENT INSTRUCTIONS
Follow up in one year with a Flow/PVR and urethral stricture questionnaires     It was a pleasure meeting with you today.  Thank you for allowing me and my team the privilege of caring for you today.  YOU are the reason we are here, and I truly hope we provided you with the excellent service you deserve.  Please let us know if there is anything else we can do for you so that we can be sure you are leaving completely satisfied with your care experience.

## 2020-03-04 NOTE — PROGRESS NOTES
07/10/19 1300   Art Therapy   Type of Intervention structured groups   Response participates with encouragement   Hours 1   Treatment Detail Art Therapy- wellness wheel interpretation   Problem-Major depressive disorder, current, severe.  Rule out generalized anxiety disorder.  The patient was also diagnosed in the past with dependent personality disorder.        Goal- cope, express, regulate through Art Therapy directives     Outcome- pt was engaged in small group. He did a thoughtful drawing of the wellness wheel. It was very simple and clear. He was a bit self conscious about his drawing and compared himself to others. The discussion surrounded simplicity of his image being possible to make those wellness changes and not be overwhelmed by the complexity of trying to make many changes all at once. He then started to not be so critical of his art nor compare himself to others after that conversation. He was pleasant and quietly engaged.      negative

## 2020-09-09 ENCOUNTER — TELEPHONE (OUTPATIENT)
Dept: FAMILY MEDICINE | Facility: CLINIC | Age: 60
End: 2020-09-09

## 2020-09-09 NOTE — TELEPHONE ENCOUNTER
Panel Management Review          Patient is due/failing the following:   COLONOSCOPY and PHQ9    Action needed:   Reminder letter, mail PHQ    Type of outreach:    none    Questions for provider review:    None                                                                                                                                    LITA Verduzco      Chart routed to Care Team .

## 2020-09-11 NOTE — TELEPHONE ENCOUNTER
Patient phone contacted and it is daughter's phone. She states that patient no longer lives in the [facility] and has established with another clinic - [facility].    Curtis Mercado CMA

## 2021-08-03 NOTE — PLAN OF CARE
BEHAVIORAL TEAM DISCUSSION    Participants: Dr. Edwards, Dell Lee (CTC), Lilian Smith (OT), Suzanne Brown (RN)  Progress: No Progress; Staff report that the patient is continuing to present very flat and depressed.   Continued Stay Criteria/Rationale: Stabilization and decrease in depressive symptoms.   Medical/Physical: No acute medical issues.   Precautions:   Behavioral Orders   Procedures     Code 1 - Restrict to Unit     Routine Programming     As clinically indicated     Status 15     Every 15 minutes.     Suicide precautions     Patients on Suicide Precautions should have a Combination Diet ordered that includes a Diet selection(s) AND a Behavioral Tray selection for Safe Tray - with utensils, or Safe Tray - NO utensils       Plan: The patient has agreed to begin ECT. There is not yet a start date. The patient has also requested individual therapy while on the unit. He has been assessed by one the psychotherapists and can begin that treatment at any time.     Rationale for change in precautions or plan: None.      03-Aug-2021

## 2021-12-20 ENCOUNTER — PRE VISIT (OUTPATIENT)
Dept: UROLOGY | Facility: CLINIC | Age: 61
End: 2021-12-20
Payer: MEDICARE

## 2021-12-20 ENCOUNTER — TELEPHONE (OUTPATIENT)
Dept: UROLOGY | Facility: CLINIC | Age: 61
End: 2021-12-20
Payer: MEDICARE

## 2021-12-20 NOTE — TELEPHONE ENCOUNTER
[facility] Call Center    Phone Message    May a detailed message be left on voicemail: yes     Reason for Call: Other: Nayeli called in stating that pt told her he has a lodged stone. She is wondering if they should wait until 1/24/21 appt with Dr. Burger or if he should be seen sooner. Please call her back to discuss     Action Taken: Message routed to:  [facility] ([facility] uro    Travel Screening: Not Applicable

## 2021-12-20 NOTE — TELEPHONE ENCOUNTER
Attempted to call Nayeli.  Left message to offer appointment for 12/27/21 at 115p. She is to call back at [phone].

## 2021-12-21 NOTE — TELEPHONE ENCOUNTER
Reason for visit: decreased urinary stream     Relevant information: history of stones    Records/imaging/labs/orders: all records available    Pt called: no    At Rooming: flow/pvr, questionnaires

## 2021-12-21 NOTE — TELEPHONE ENCOUNTER
Spoke to pt's daughter Nayeli.  She confirmed appointment.  No other questions noted.     Verenice Betancourt RN MSN

## 2021-12-27 ENCOUNTER — OFFICE VISIT (OUTPATIENT)
Dept: UROLOGY | Facility: CLINIC | Age: 61
End: 2021-12-27
Payer: MEDICARE

## 2021-12-27 ENCOUNTER — LAB (OUTPATIENT)
Dept: LAB | Facility: CLINIC | Age: 61
End: 2021-12-27
Payer: COMMERCIAL

## 2021-12-27 ENCOUNTER — ANCILLARY PROCEDURE (OUTPATIENT)
Dept: CT IMAGING | Facility: CLINIC | Age: 61
End: 2021-12-27
Payer: MEDICARE

## 2021-12-27 VITALS
BODY MASS INDEX: 29.8 KG/M2 | HEART RATE: 104 BPM | HEIGHT: 72 IN | SYSTOLIC BLOOD PRESSURE: 114 MMHG | DIASTOLIC BLOOD PRESSURE: 83 MMHG | WEIGHT: 220 LBS

## 2021-12-27 DIAGNOSIS — R68.83 CHILLS: Primary | ICD-10-CM

## 2021-12-27 DIAGNOSIS — R53.83 FATIGUE, UNSPECIFIED TYPE: ICD-10-CM

## 2021-12-27 DIAGNOSIS — N30.00 ACUTE CYSTITIS WITHOUT HEMATURIA: ICD-10-CM

## 2021-12-27 DIAGNOSIS — R30.0 DYSURIA: ICD-10-CM

## 2021-12-27 DIAGNOSIS — R68.83 CHILLS: ICD-10-CM

## 2021-12-27 DIAGNOSIS — N21.1 URETHRAL STONE: ICD-10-CM

## 2021-12-27 LAB
ANION GAP SERPL CALCULATED.3IONS-SCNC: 5 MMOL/L (ref 3–14)
BUN SERPL-MCNC: 18 MG/DL (ref 7–30)
CALCIUM SERPL-MCNC: 9.4 MG/DL (ref 8.5–10.1)
CHLORIDE BLD-SCNC: 110 MMOL/L (ref 94–109)
CO2 SERPL-SCNC: 28 MMOL/L (ref 20–32)
CREAT SERPL-MCNC: 1.22 MG/DL (ref 0.66–1.25)
ERYTHROCYTE [DISTWIDTH] IN BLOOD BY AUTOMATED COUNT: 13.8 % (ref 10–15)
GFR SERPL CREATININE-BSD FRML MDRD: 68 ML/MIN/1.73M2
GLUCOSE BLD-MCNC: 97 MG/DL (ref 70–99)
HCT VFR BLD AUTO: 42.7 % (ref 40–53)
HGB BLD-MCNC: 14.9 G/DL (ref 13.3–17.7)
MCH RBC QN AUTO: 29.4 PG (ref 26.5–33)
MCHC RBC AUTO-ENTMCNC: 34.9 G/DL (ref 31.5–36.5)
MCV RBC AUTO: 84 FL (ref 78–100)
PLATELET # BLD AUTO: 309 10E3/UL (ref 150–450)
POTASSIUM BLD-SCNC: 3.9 MMOL/L (ref 3.4–5.3)
RBC # BLD AUTO: 5.07 10E6/UL (ref 4.4–5.9)
SARS-COV-2 RNA RESP QL NAA+PROBE: NEGATIVE
SODIUM SERPL-SCNC: 143 MMOL/L (ref 133–144)
WBC # BLD AUTO: 11.2 10E3/UL (ref 4–11)

## 2021-12-27 PROCEDURE — 87106 FUNGI IDENTIFICATION YEAST: CPT | Performed by: STUDENT IN AN ORGANIZED HEALTH CARE EDUCATION/TRAINING PROGRAM

## 2021-12-27 PROCEDURE — 99215 OFFICE O/P EST HI 40 MIN: CPT | Mod: GC | Performed by: UROLOGY

## 2021-12-27 PROCEDURE — 80048 BASIC METABOLIC PNL TOTAL CA: CPT | Performed by: PATHOLOGY

## 2021-12-27 PROCEDURE — G1004 CDSM NDSC: HCPCS | Performed by: RADIOLOGY

## 2021-12-27 PROCEDURE — 74176 CT ABD & PELVIS W/O CONTRAST: CPT | Mod: ME | Performed by: RADIOLOGY

## 2021-12-27 PROCEDURE — 85027 COMPLETE CBC AUTOMATED: CPT | Performed by: PATHOLOGY

## 2021-12-27 PROCEDURE — 36415 COLL VENOUS BLD VENIPUNCTURE: CPT | Performed by: PATHOLOGY

## 2021-12-27 PROCEDURE — U0003 INFECTIOUS AGENT DETECTION BY NUCLEIC ACID (DNA OR RNA); SEVERE ACUTE RESPIRATORY SYNDROME CORONAVIRUS 2 (SARS-COV-2) (CORONAVIRUS DISEASE [COVID-19]), AMPLIFIED PROBE TECHNIQUE, MAKING USE OF HIGH THROUGHPUT TECHNOLOGIES AS DESCRIBED BY CMS-2020-01-R: HCPCS | Performed by: STUDENT IN AN ORGANIZED HEALTH CARE EDUCATION/TRAINING PROGRAM

## 2021-12-27 RX ORDER — DULOXETIN HYDROCHLORIDE 60 MG/1
60 CAPSULE, DELAYED RELEASE ORAL
COMMUNITY
Start: 2021-12-03

## 2021-12-27 RX ORDER — LAMOTRIGINE 100 MG/1
100 TABLET ORAL
COMMUNITY
Start: 2021-12-22

## 2021-12-27 RX ORDER — LURASIDONE HYDROCHLORIDE 80 MG/1
TABLET, FILM COATED ORAL
COMMUNITY
Start: 2021-04-12

## 2021-12-27 RX ORDER — AMOXICILLIN 500 MG/1
500 CAPSULE ORAL
COMMUNITY
Start: 2021-12-24

## 2021-12-27 RX ORDER — HYDROXYZINE PAMOATE 50 MG/1
CAPSULE ORAL
COMMUNITY
Start: 2021-09-23

## 2021-12-27 RX ORDER — CLONAZEPAM 0.5 MG/1
0.5 TABLET ORAL
COMMUNITY
Start: 2021-12-03

## 2021-12-27 ASSESSMENT — PAIN SCALES - GENERAL: PAINLEVEL: SEVERE PAIN (7)

## 2021-12-27 ASSESSMENT — MIFFLIN-ST. JEOR: SCORE: 1845.91

## 2021-12-27 NOTE — LETTER
12/27/2021       RE: Flakito Hilliard  [address] [facility] [address]     Dear Colleague,    Thank you for referring your patient, Flakito Hilliard, to the [facility] UROLOGY CLINIC [place] at [facility]. Please see a copy of my visit note below.    Urology Clinic Note      Date: 12/27/2021  Time: 1:44 PM  Patient: Flakito Hilliard  MRN: [id number]    Reason for Visit: Flank pain    HPI/Subjective: Flakito Hilliard is a 61 year old male with history of complex urethral stricture disease s/p Hailey urethroplasty in 2014 and ventral buccal for proximal recurrence in 2016. Cystoscopy 4/2018 negative. He presented with very slow stream in Sept 2019. Due to his history it was recommended he undergo cystoscopy to confirm recurrence. He unfortunately did not return for cystoscopy until Jan 2020. This revealed a large stone stuck in the patient's urethra. He was taken to the OR for laser lithotripsy and extraction using a pediatric scope so as to not disrupt his urethral stricture which was 14Fr in narrowest caliber in the OR.      He saw a physician in [place], [place], for these symptoms. He says they gave him a course of amoxicillin. Per review of Care Everywhere records, specifically Dr. Leblanc's notes, patient has had 3 UTIs since Sept 2021 - all E Faecalis. He was first treated with amoxicillin in 09/2021 but symptoms did not improve so he was started on doxycycline. After this course of abx he says his symptoms did improve some. He saw Dr. Leblanc (PCP) again on 12/10/21 with left flank pain. He had associated dysuria and spraying of urinary stream. Denies hematuria but does report orange urine recently. He does not feel that he is emptying well. Positive for chills for last 2 months. He has not checked his temperature. Denies nausea or vomiting. These symptoms are persistent despite starting another course of amoxicillin on 12/22/21 with exception of  "mild improvement in his flank pain. He now has pain on the right flank today. Additionally, he does endorse some URI symptoms.    Denies hx of kidney stones but reports hx of \"prostate stones\" and has removal of urethral stone as recently as 1/13/2020 as well as multiple prior cystolithalopaxy of prostatic urethral stones in 2014.    2/21/21  Uroflow  Peak Flow: 13.9 mL/s  Average Flow: 7.7 mL/s  Voided (mL): 113 mL  Residual Volume by Ultrasound: 0 mL  Character of Curve: Bell Shape     Objective:  /83   Pulse 104   Ht 1.829 m (6')   Wt 99.8 kg (220 lb)   BMI 29.84 kg/m    Gen: In NAD, conversant  Resp: Breathing non-labored on room air.  CV: Warm and well perfused, increased rate on peripheral pulse and vitals above (104)  Abd: Soft, non-distended, non-tender. C/o of suprapubic discomfort with palpation but this is mild  Back: no CVA tenderness  Ext: Moving all 4, no BLE edema noted  Neuro: No focal deficits noted    Current Meds: Reviewed  Amoxicillin    Labs/Imaging:     Urine cultures  12/10/21 - > 100 K mixed luis fernando    10/8/21 - > 100 K E faecalis (pan-sensitive)    9/7/21 -  > 100 K E faecalis (pan-sensitive)    12/27/21  CBC - WNL with exception of WBC 11.2  BMP - WNL, Cr 1.22 (near patient's baseline)   COVID-19 - PENDING    CT A/P 12/27/21 images reviewed by me and interpreted for patient.   Impression:   1. Nonobstructing 3 mm stone in the left mid kidney. Tiny punctate  calcification in the right lower renal pole.  2. Multiple urinary bladder wall diverticula, likely sequela of  chronic urinary bladder lumen obstruction.  3. Innumerable subcentimeter hypoattenuating lesions throughout the  liver, likely cysts.  4. Trace bilateral pleural effusions and trace pericardial fluid,  similar to prior.    Assessment & Plan: Flakito Hilliard is a 61 year old male with complex urethral stricture history as well as endoscopic treatment of urethral stone. Presents today with sx concerning for possible " obstructing ureteral stone and/or UTI. Urgent evaluation initiated below:     --UCx, CBC, BMP, COVID 19 PCR today STAT - results as above  --STAT CT A/P noncontrast to level of mid thighs - negative for obstructing ureteral stone; persistent large prostatic stone burden, bladder is not overly distended so patient does not appear to be in jeremy urinary retention and less likely that one of the prostatic stones is obstructing  --Plan for follow up with cystoscopy on 1/10/21 to evaluate for prostatic urethral stone and/or prostatitis   --Continue amoxicillin for UTI treatment. Advised patient to complete the entire abx course. Will follow up urine culture results and adjust abx as needed pending these results.    Patient was seen and examined with staff surgeon, Dr. Burger, who developed the above treatment plan.    Jose Byers MD  Urology, PGY-4    =======================================================  As the attending surgeon I, Niraj Burger, interviewed and examined the patient. The plan was developed between me and the patient. My findings and plan are as stated by the resident.    Niraj Burger MD    ===================    Additional Coding Information:    Problems:  4 -- one undiagnosed new problem with uncertain prognosis    Data Reviewed  Review of the result(s) of each unique test - multiple labs as in A/P    Tests ordered: labs as above, CT    Independent interpretation of a test performed by another physician/other qualified health care professional (not separately reported) - CT, cultures    Level of risk:  4 -- prescription drug management    Time spent:  60 minutes spent on the date of the encounter doing chart review, history and exam, documentation and further activities per the note          Addendum 12/29/21:  COVID neg 12/27  UCx pos for C Albicans 12/27 50-100K. Will start 14d course of fluc and repeat culture. Will notify PCP. Will continue with plan to repeat cysto to eval for  source of recurrent UTIs including possible recurrence of urethral stricture vs. Prostatic stones.     Again, thank you for allowing me to participate in the care of your patient.      Sincerely,    Niraj Burger MD

## 2021-12-27 NOTE — PATIENT INSTRUCTIONS
You are scheduled for a cystoscopy with Dr. Burger 1/10/22 at 2:15.    It was a pleasure meeting with you today.  Thank you for allowing me and my team the privilege of caring for you today.  YOU are the reason we are here, and I truly hope we provided you with the excellent service you deserve.  Please let us know if there is anything else we can do for you so that we can be sure you are leaving completely satisfied with your care experience.        Genesis Hawkins, CMA

## 2021-12-27 NOTE — NURSING NOTE
Chief Complaint   Patient presents with     Follow Up     thinks he has a stone        Blood pressure 114/83, pulse 104, height 1.829 m (6'), weight 99.8 kg (220 lb). Body mass index is 29.84 kg/m .    Patient Active Problem List   Diagnosis     Acute bacterial prostatitis     Lower urinary tract infectious disease     severe HTN (hypertension)     Suicidal ideation     Major depressive disorder, recurrent episode, moderate (H)     Urinary retention     Urethral stricture     PATRICE (obstructive sleep apnea)     Complicated UTI (urinary tract infection)     RIO (generalized anxiety disorder)     At risk for cardiovascular event     Elevated hemoglobin (H)     Bladder stones     Erectile dysfunction, unspecified erectile dysfunction type     Severe recurrent major depression without psychotic features (H)     Urethral stone       Allergies   Allergen Reactions     Contrast Dye Difficulty breathing and Anaphylaxis       Current Outpatient Medications   Medication Sig Dispense Refill     amoxicillin (AMOXIL) 500 MG capsule Take 500 mg by mouth       atorvastatin (LIPITOR) 20 MG tablet TAKE ONE TABLET BY MOUTH EVERY DAY. 90 tablet 3     buPROPion 450 MG TB24 Take 450 mg by mouth every morning 30 tablet 1     amLODIPine (NORVASC) 10 MG tablet Take 1 tablet (10 mg) by mouth daily 90 tablet 0     ARIPiprazole (ABILIFY) 2 MG tablet Take 2 mg by mouth daily       busPIRone (BUSPAR) 15 MG tablet Increase if anxiety not doing well: 0.5 tab 2x/day x 3 days, then 1 tab 2x/day x 3 days, then 1.5 tab 2x/day x 3 days, then 2 tab 2x/day. 90 tablet 0     diltiazem ER COATED BEADS (CARDIZEM CD) 360 MG 24 hr capsule Take 1 capsule (360 mg) by mouth daily 90 capsule 0     docusate sodium (COLACE) 100 MG capsule TAKE ONE CAPSULE BY MOUTH TWICE DAILY  60 capsule 11     hydrALAZINE (APRESOLINE) 25 MG tablet Take 0.5 tablets (12.5 mg) by mouth every 6 hours as needed (for SBP >175 or DBP >110) 30 tablet 0     lisinopril (PRINIVIL/ZESTRIL) 20  MG tablet Take 1 tablet (20 mg) by mouth daily 90 tablet 0     lithium (ESKALITH) 300 MG tablet Take 300 mg by mouth At Bedtime        oxybutynin (DITROPAN) 5 MG tablet Take 1 tablet (5 mg) by mouth 2 times daily (Patient not taking: Reported on 1/13/2020) 60 tablet 0     tamsulosin (FLOMAX) 0.4 MG capsule Take 1 capsule (0.4 mg) by mouth daily 90 capsule 0     traZODone (DESYREL) 100 MG tablet Take 1 tablet (100 mg) by mouth nightly as needed for sleep 30 tablet 1       Social History     Tobacco Use     Smoking status: Never Smoker     Smokeless tobacco: Never Used   Substance Use Topics     Alcohol use: Yes     Comment: No drinking for 2 weeks     Drug use: No       Ruthann Sanchez  12/27/2021  1:29 PM

## 2021-12-27 NOTE — PROGRESS NOTES
"Urology Clinic Note      Date: 12/27/2021  Time: 1:44 PM  Patient: Flakito Hilliard  MRN: [id number]    Reason for Visit: Flank pain    HPI/Subjective: Flakito Hilliard is a 61 year old male with history of complex urethral stricture disease s/p Hailey urethroplasty in 2014 and ventral buccal for proximal recurrence in 2016. Cystoscopy 4/2018 negative. He presented with very slow stream in Sept 2019. Due to his history it was recommended he undergo cystoscopy to confirm recurrence. He unfortunately did not return for cystoscopy until Jan 2020. This revealed a large stone stuck in the patient's urethra. He was taken to the OR for laser lithotripsy and extraction using a pediatric scope so as to not disrupt his urethral stricture which was 14Fr in narrowest caliber in the OR.      He saw a physician in [place], [place], for these symptoms. He says they gave him a course of amoxicillin. Per review of Care Everywhere records, specifically Dr. Leblanc's notes, patient has had 3 UTIs since Sept 2021 - all E Faecalis. He was first treated with amoxicillin in 09/2021 but symptoms did not improve so he was started on doxycycline. After this course of abx he says his symptoms did improve some. He saw Dr. Leblanc (PCP) again on 12/10/21 with left flank pain. He had associated dysuria and spraying of urinary stream. Denies hematuria but does report orange urine recently. He does not feel that he is emptying well. Positive for chills for last 2 months. He has not checked his temperature. Denies nausea or vomiting. These symptoms are persistent despite starting another course of amoxicillin on 12/22/21 with exception of mild improvement in his flank pain. He now has pain on the right flank today. Additionally, he does endorse some URI symptoms.    Denies hx of kidney stones but reports hx of \"prostate stones\" and has removal of urethral stone as recently as 1/13/2020 as well as multiple prior cystolithalopaxy of prostatic urethral " stones in 2014.    2/21/21  Uroflow  Peak Flow: 13.9 mL/s  Average Flow: 7.7 mL/s  Voided (mL): 113 mL  Residual Volume by Ultrasound: 0 mL  Character of Curve: Bell Shape     Objective:  /83   Pulse 104   Ht 1.829 m (6')   Wt 99.8 kg (220 lb)   BMI 29.84 kg/m    Gen: In NAD, conversant  Resp: Breathing non-labored on room air.  CV: Warm and well perfused, increased rate on peripheral pulse and vitals above (104)  Abd: Soft, non-distended, non-tender. C/o of suprapubic discomfort with palpation but this is mild  Back: no CVA tenderness  Ext: Moving all 4, no BLE edema noted  Neuro: No focal deficits noted    Current Meds: Reviewed  Amoxicillin    Labs/Imaging:     Urine cultures  12/10/21 - > 100 K mixed luis fernando    10/8/21 - > 100 K E faecalis (pan-sensitive)    9/7/21 -  > 100 K E faecalis (pan-sensitive)    12/27/21  CBC - WNL with exception of WBC 11.2  BMP - WNL, Cr 1.22 (near patient's baseline)   COVID-19 - PENDING    CT A/P 12/27/21 images reviewed by me and interpreted for patient.   Impression:   1. Nonobstructing 3 mm stone in the left mid kidney. Tiny punctate  calcification in the right lower renal pole.  2. Multiple urinary bladder wall diverticula, likely sequela of  chronic urinary bladder lumen obstruction.  3. Innumerable subcentimeter hypoattenuating lesions throughout the  liver, likely cysts.  4. Trace bilateral pleural effusions and trace pericardial fluid,  similar to prior.    Assessment & Plan: Flakito Hilliard is a 61 year old male with complex urethral stricture history as well as endoscopic treatment of urethral stone. Presents today with sx concerning for possible obstructing ureteral stone and/or UTI. Urgent evaluation initiated below:     --UCx, CBC, BMP, COVID 19 PCR today STAT - results as above  --STAT CT A/P noncontrast to level of mid thighs - negative for obstructing ureteral stone; persistent large prostatic stone burden, bladder is not overly distended so patient does  not appear to be in jeremy urinary retention and less likely that one of the prostatic stones is obstructing  --Plan for follow up with cystoscopy on 1/10/21 to evaluate for prostatic urethral stone and/or prostatitis   --Continue amoxicillin for UTI treatment. Advised patient to complete the entire abx course. Will follow up urine culture results and adjust abx as needed pending these results.    Patient was seen and examined with staff surgeon, Dr. Burger, who developed the above treatment plan.    Jose Byers MD  Urology, PGY-4    =======================================================  As the attending surgeon I, Niraj Burger, interviewed and examined the patient. The plan was developed between me and the patient. My findings and plan are as stated by the resident.    Niraj Burger MD    ===================    Additional Coding Information:    Problems:  4 -- one undiagnosed new problem with uncertain prognosis    Data Reviewed  Review of the result(s) of each unique test - multiple labs as in A/P    Tests ordered: labs as above, CT    Independent interpretation of a test performed by another physician/other qualified health care professional (not separately reported) - CT, cultures    Level of risk:  4 -- prescription drug management    Time spent:  60 minutes spent on the date of the encounter doing chart review, history and exam, documentation and further activities per the note          Addendum 12/29/21:  COVID neg 12/27  UCx pos for C Albicans 12/27 50-100K. Will start 14d course of fluc and repeat culture. Will notify PCP. Will continue with plan to repeat cysto to eval for source of recurrent UTIs including possible recurrence of urethral stricture vs. Prostatic stones.

## 2021-12-28 ENCOUNTER — PRE VISIT (OUTPATIENT)
Dept: UROLOGY | Facility: CLINIC | Age: 61
End: 2021-12-28
Payer: MEDICARE

## 2021-12-28 NOTE — TELEPHONE ENCOUNTER
Reason for visit: Cystoscopy      Relevant information: evaluate for prostatic urethral stone and/or prostatitis     Records/imaging/labs/orders: all records available    Pt called: no need for a call    At Rooming: standard

## 2021-12-29 ENCOUNTER — PATIENT OUTREACH (OUTPATIENT)
Dept: UROLOGY | Facility: CLINIC | Age: 61
End: 2021-12-29
Payer: MEDICARE

## 2021-12-29 LAB — BACTERIA UR CULT: ABNORMAL

## 2021-12-29 RX ORDER — FLUCONAZOLE 200 MG/1
200 TABLET ORAL DAILY
Qty: 14 TABLET | Refills: 0 | Status: SHIPPED | OUTPATIENT
Start: 2021-12-29 | End: 2022-01-12

## 2022-01-10 ENCOUNTER — OFFICE VISIT (OUTPATIENT)
Dept: UROLOGY | Facility: CLINIC | Age: 62
End: 2022-01-10
Payer: MEDICARE

## 2022-01-10 VITALS
DIASTOLIC BLOOD PRESSURE: 91 MMHG | BODY MASS INDEX: 29.8 KG/M2 | SYSTOLIC BLOOD PRESSURE: 125 MMHG | WEIGHT: 220 LBS | HEIGHT: 72 IN | HEART RATE: 102 BPM

## 2022-01-10 DIAGNOSIS — N35.812 OTHER STRICTURE OF BULBOUS URETHRA IN MALE: Primary | ICD-10-CM

## 2022-01-10 PROCEDURE — 52000 CYSTOURETHROSCOPY: CPT | Performed by: UROLOGY

## 2022-01-10 RX ORDER — DIPHENHYDRAMINE HCL 50 MG
50 CAPSULE ORAL EVERY 6 HOURS PRN
Qty: 1 CAPSULE | Refills: 0 | Status: SHIPPED | OUTPATIENT
Start: 2022-01-10

## 2022-01-10 RX ORDER — METHYLPREDNISOLONE 32 MG/1
32 TABLET ORAL DAILY
Qty: 1 TABLET | Refills: 0 | Status: SHIPPED | OUTPATIENT
Start: 2022-01-10

## 2022-01-10 ASSESSMENT — MIFFLIN-ST. JEOR: SCORE: 1840.91

## 2022-01-10 ASSESSMENT — PAIN SCALES - GENERAL: PAINLEVEL: NO PAIN (1)

## 2022-01-10 NOTE — PROGRESS NOTES
Male Cystoscopy Procedure Note     PRE-PROCEDURE DIAGNOSIS:   1) LUTS  2) Hx of urethral stricture s/p surgical repair x 2    POST-PROCEDURE DIAGNOSIS:   1) LUTS  2) Hx of urethral stricture s/p surgical repair x 2  3) Recurrent bulbar urethral stricture    PROCEDURE: Flexible urethroscopy    HISTORY: Flakito Hilliard is a 62 year old man with history of complex urethral stricture disease s/p Hailey urethroplasty in 2014 and ventral buccal for proximal recurrence in 2016. Cystoscopy 4/2018 negative. He presented with very slow stream in Sept 2019. Due to his history it was recommended he undergo cystoscopy to confirm recurrence. He unfortunately did not return for cystoscopy until Jan 2020. This revealed a large stone stuck in the patient's urethra. He was taken to the OR for laser lithotripsy and extraction using a pediatric scope so as to not disrupt his urethral stricture which was 14Fr in narrowest caliber in the OR. He returned to clinic in late Dec 2021 with rUTIs (enterococcus), new onset flank pain and worsening LUTS. Urgent evaluation for ureteral stone was negative. Urine culture positive for candida so fluconazole added to his amoxicillin regimen. Returns today for cystoscopy in light of hx of urethral strictures an urethral/prostatic stones.    REVIEW OF OFFICE STUDIES:      CT - 12/27/21 - report reviewed    DESCRIPTION OF PROCEDURE:  After informed consent was obtained, the patient was brought to the procedure room where he was placed in the supine position with all pressure points well padded.  The penis and scrotum were prepped and draped in a sterile fashion. A 16 Fr flexible cystoscope was introduced through a well-lubricated urethra.  The urethral meatus was narrow and required some manipulation of the scope to access anterior urethra. The anterior urethra was free of stricture. There was a narrowing with semi-annular flap of tissue in the proximal penile urethra vs distal bulbar urethra  followed by a asymmetric circumferential narrowing suspected to be at the distal end of the prior buccal mucosa graft. The flexible cystoscope could not be safely advanced beyond this area of narrowing, estimated to be ~ 12 Fr. The scope was removed.     The patient was allowed to get dressed then we returned to the room to describe the findings to the patient.     ASSESSMENT AND PLAN:  62 year old man with hx of urethral stricture s/p repair x 2 and urethral stone s/p endoscopic tx now with recurrent urethral stricture and worsening LUTS.    -RUG/VCUG with premedication due to contrast dye allergy  -Repeat cystoscopy with pediatric scope  -Will also plan to discuss findings of prostatic stones on CT with Dr. Leavitt    Patient seen and examined with staff, Dr. Burger.    Jose Byers MD  Urology, PGY-4  (p) [phone]    As the attending surgeon I, Niraj Burger, was present and scrubbed throughout the procedure. I had an extensive discussion with patient after the cysto about the findings and the plan.

## 2022-01-10 NOTE — PATIENT INSTRUCTIONS
"Please schedule the next available cystoscopy with Dr. Burger with a RUG/VCUG prior (same day as the procedure)      XR URETHROGRAM RETROGRADE / XR CYSTOGRAM VOIDING      How do I prepare for my exam:   No Food and Drink Restrictions.     What should I wear:    Wear comfortable clothes.     How long does the exam take:    The entire exam will take about one hour.     What should I bring:    Bring a list of your medicines, including vitamins, minerals and over-the-counter drugs. It is safest to leave personal items at home. ? ?    Do I need a :?    No  is needed. ? ?    What do I need to tell my doctor:    Tell your doctor if there s any chance you are pregnant.     What should I do after the exam:    No restrictions, you may resume normal activities.     What is this test:    The entire exam will take about one hour. You will wear a hospital gown. We will ask you to empty your bladder before the exam. You will lie on your back and we will insert a long, thin tube into your bladder. The tube will drain any remaining urine from your body. We will inject contrast dye through this tube and into the bladder. Your bladder will feel very full. You will empty your bladder into a container. As you do, we will take X-ray pictures. Your doctor may ask you to stand up or change position. After you empty your bladder, we will take a final set of X-rays.     Who should I call with questions:    If you have any questions, please call the ordering provider. Directions, parking instructions, and other information is available on our website, [email or web address]       AFTER YOUR CYSTOSCOPY        You have just completed a cystoscopy, or \"cysto\", which allowed your physician to learn more about your bladder (or to remove a stent placed after surgery). We suggest that you continue to avoid caffeine, fruit juice, and alcohol for the next 24 hours, however, you are encouraged to return to your normal activities. " "        A few things that are considered normal after your cystoscopy:     * Small amount of bleeding (or spotting) that clears within the next 24 hours     * Slight burning sensation with urination     * Sensation to of needing to avoid more frequently     * The feeling of \"air\" in your urine     * Mild discomfort that is relieved with Tylenol        Please contact our office promptly if you:     * Develop a fever above 101 degrees     * Are unable to urinate     * Develop bright red blood that does not stop     * Severe pain or swelling         Please contact our office with any concerns or questions @[email or web address].  "

## 2022-01-10 NOTE — LETTER
1/10/2022       RE: Flakito Hilliard  [id number] [address] [place] [address]     Dear Colleague,    Thank you for referring your patient, Flakito Hilliard, to the [facility]OLOG[facility] [place] at [facility]. Please see a copy of my visit note below.    Male Cystoscopy Procedure Note     PRE-PROCEDURE DIAGNOSIS:   1) LUTS  2) Hx of urethral stricture s/p surgical repair x 2    POST-PROCEDURE DIAGNOSIS:   1) LUTS  2) Hx of urethral stricture s/p surgical repair x 2  3) Recurrent bulbar urethral stricture    PROCEDURE: Flexible urethroscopy    HISTORY: Flakito Hilliard is a 62 year old man with history of complex urethral stricture disease s/p Hailey urethroplasty in 2014 and ventral buccal for proximal recurrence in 2016. Cystoscopy 4/2018 negative. He presented with very slow stream in Sept 2019. Due to his history it was recommended he undergo cystoscopy to confirm recurrence. He unfortunately did not return for cystoscopy until Jan 2020. This revealed a large stone stuck in the patient's urethra. He was taken to the OR for laser lithotripsy and extraction using a pediatric scope so as to not disrupt his urethral stricture which was 14Fr in narrowest caliber in the OR. He returned to clinic in late Dec 2021 with rUTIs (enterococcus), new onset flank pain and worsening LUTS. Urgent evaluation for ureteral stone was negative. Urine culture positive for candida so fluconazole added to his amoxicillin regimen. Returns today for cystoscopy in light of hx of urethral strictures an urethral/prostatic stones.    REVIEW OF OFFICE STUDIES:      CT - 12/27/21 - report reviewed    DESCRIPTION OF PROCEDURE:  After informed consent was obtained, the patient was brought to the procedure room where he was placed in the supine position with all pressure points well padded.  The penis and scrotum were prepped and draped in a sterile fashion. A 16 Fr flexible cystoscope  was introduced through a well-lubricated urethra.  The urethral meatus was narrow and required some manipulation of the scope to access anterior urethra. The anterior urethra was free of stricture. There was a narrowing with semi-annular flap of tissue in the proximal penile urethra vs distal bulbar urethra followed by a asymmetric circumferential narrowing suspected to be at the distal end of the prior buccal mucosa graft. The flexible cystoscope could not be safely advanced beyond this area of narrowing, estimated to be ~ 12 Fr. The scope was removed.     The patient was allowed to get dressed then we returned to the room to describe the findings to the patient.     ASSESSMENT AND PLAN:  62 year old man with hx of urethral stricture s/p repair x 2 and urethral stone s/p endoscopic tx now with recurrent urethral stricture and worsening LUTS.    -RUG/VCUG with premedication due to contrast dye allergy  -Repeat cystoscopy with pediatric scope  -Will also plan to discuss findings of prostatic stones on CT with Dr. Leavitt    Patient seen and examined with staff, Dr. Burger.    Jose Byers MD  Urology, PGY-4  (p) [phone]    As the attending surgeon I, Niraj Burger, was present and scrubbed throughout the procedure. I had an extensive discussion with patient after the cysto about the findings and the plan.

## 2022-01-10 NOTE — PROGRESS NOTES
HPI:  Flakito Hilliard is a 62 year old male being seen for ***.    The following subcategories of the HISTORY were reviewed with the patient and updated accordingly. If no updates, this indicates no change since last visit:    {ACCOMPANIED BY STATEMENT (Optional):134865}  Reviewed previous notes from  *** from *** service at ***    Exam:  BP (!) 125/91   Pulse 102   Ht 1.829 m (6')   Wt 99.8 kg (220 lb)   BMI 29.84 kg/m    {urology exam options:537671}    Review of Imaging:  The following imaging exams were independently viewed and interpreted by me and discussed with patient:  {Imaging Urology:417367}    Review of Labs:  The following labs were reviewed by me and discussed with the patient:  Recent Results (from the past 720 hour(s))   Basic metabolic panel    Collection Time: 12/27/21  3:09 PM   Result Value Ref Range    Sodium 143 133 - 144 mmol/L    Potassium 3.9 3.4 - 5.3 mmol/L    Chloride 110 (H) 94 - 109 mmol/L    Carbon Dioxide (CO2) 28 20 - 32 mmol/L    Anion Gap 5 3 - 14 mmol/L    Urea Nitrogen 18 7 - 30 mg/dL    Creatinine 1.22 0.66 - 1.25 mg/dL    Calcium 9.4 8.5 - 10.1 mg/dL    Glucose 97 70 - 99 mg/dL    GFR Estimate 68 >60 mL/min/1.73m2   CBC with platelets    Collection Time: 12/27/21  3:09 PM   Result Value Ref Range    WBC Count 11.2 (H) 4.0 - 11.0 10e3/uL    RBC Count 5.07 4.40 - 5.90 10e6/uL    Hemoglobin 14.9 13.3 - 17.7 g/dL    Hematocrit 42.7 40.0 - 53.0 %    MCV 84 78 - 100 fL    MCH 29.4 26.5 - 33.0 pg    MCHC 34.9 31.5 - 36.5 g/dL    RDW 13.8 10.0 - 15.0 %    Platelet Count 309 150 - 450 10e3/uL   Asymptomatic COVID-19 Virus (Coronavirus) by PCR Nasopharyngeal    Collection Time: 12/27/21  3:09 PM    Specimen: Nasopharyngeal; Swab   Result Value Ref Range    SARS CoV2 PCR Negative Negative, Testing sent to reference lab. Results will be returned via unsolicited result   Urine Culture Aerobic Bacterial - lab collect    Collection Time: 12/27/21  3:12 PM    Specimen: Urine, Midstream  "  Result Value Ref Range    Culture 10,000-50,000 CFU/mL Candida albicans (A)          Assessment & Plan   1. ***  2. ***    Niraj Burger MD  [facility] UROLOGY [facility]      ==========================      Additional Coding Information:    Problems:  {[facility] problems:447105}    Data Reviewed  {Tests, documents, or independent historian(s) (Optional):605706}    Tests ordered: ***    {Independent interpretation of tests (Optional):066514::\"Independent interpretation of a test performed by another physician/other qualified health care professional (not separately reported) - ***\"}    {Discussion of management or test interpretation (Optional):844333::\"Discussion of management or test interpretation with external physician/other qualified healthcare professional/appropriate source - ***\"}    Level of risk:  {[facility] risk of intervention:990465}    Time spent:  {2021 E&M time (Optional):982095}          "

## 2022-01-24 ENCOUNTER — TELEPHONE (OUTPATIENT)
Dept: UROLOGY | Facility: CLINIC | Age: 62
End: 2022-01-24

## 2022-01-24 DIAGNOSIS — R30.0 DYSURIA: Primary | ICD-10-CM

## 2022-01-24 NOTE — TELEPHONE ENCOUNTER
[facility] Call Center    Phone Message    May a detailed message be left on voicemail: yes     Reason for Call: Medication Question or concern regarding medication   Prescription Clarification  Name of Medication: methylPREDNISolone (MEDROL) 32 MG tablet  Prescribing Provider: Jerome   Pharmacy:    [facility] - [facility] [address] [address]   What on the order needs clarification? The patients daughter called stating the patient has been off this medication for about 1 week and has started having discomfort again. They are wondering if he needs more medication or a urine sample tested? If testing needs to be done, they would like this done closer to their home. Please advise. Thank you.    Action Taken: Message routed to:  [facility] Urology    Travel Screening: Not Applicable

## 2022-01-24 NOTE — TELEPHONE ENCOUNTER
Attempted to call pt's daughter Nayeli. Left detailed message to call clinic back at [phone].   Calling to discuss symptoms.

## 2022-01-24 NOTE — TELEPHONE ENCOUNTER
Spoke to pt's daughter Nayeli. She reports that pt is having symptoms again   Mostly low back and pt complains that he doesn't feel good to pee. Started up again yesterday. Pt completed recent medications about a week ago. She requests to have orders faxed to [facility], phone number [phone]. Fax [phone].    Verenice Betancourt RN MSN

## 2022-02-03 ENCOUNTER — TELEPHONE (OUTPATIENT)
Dept: UROLOGY | Facility: CLINIC | Age: 62
End: 2022-02-03
Payer: MEDICARE

## 2022-02-03 NOTE — TELEPHONE ENCOUNTER
[facility]    Phone Message    May a detailed message be left on voicemail: yes     Reason for Call: Requesting Results   Name/type of test: UA/UC  Date of test: 1/28/2022  Was test done at a location other than [facility] (Please fill in the location if not [facility])?: Yes: [facility] in [place]- crissy stated [facility] faxed us the results, please call crissy, thank you      Action Taken: Message routed to:  [facility] uro    Travel Screening: Not Applicable

## 2022-02-04 NOTE — TELEPHONE ENCOUNTER
Called pt's daughter. Informed that we do not have these results. Writer obtained [facility] phone number to call and get results faxed over again.    Writer called [employer] and the UA and UC results will be refaxed over to us. Routing message to nurse to check for the results on Monday.

## 2022-02-10 NOTE — TELEPHONE ENCOUNTER
Left detailed message for daughter at main number.  UA from [facility] negative waiting on UC/ Advised patient to seek care for further evaluation of additional complaints by PCP. Writers direct line left for return call as needed. Viviana Terrazas RN

## 2022-02-21 ENCOUNTER — PRE VISIT (OUTPATIENT)
Dept: UROLOGY | Facility: CLINIC | Age: 62
End: 2022-02-21
Payer: MEDICARE

## 2022-02-21 NOTE — TELEPHONE ENCOUNTER
Reason for visit: cystoscopy     Relevant information: repeat cysto    Records/imaging/labs/orders: all appointments scheduled    Pt called: no need for a call    At Rooming: PEDS scope

## 2022-02-28 ENCOUNTER — OFFICE VISIT (OUTPATIENT)
Dept: UROLOGY | Facility: CLINIC | Age: 62
End: 2022-02-28
Payer: MEDICARE

## 2022-02-28 ENCOUNTER — ANCILLARY PROCEDURE (OUTPATIENT)
Dept: GENERAL RADIOLOGY | Facility: CLINIC | Age: 62
End: 2022-02-28
Attending: UROLOGY
Payer: MEDICARE

## 2022-02-28 VITALS
DIASTOLIC BLOOD PRESSURE: 98 MMHG | WEIGHT: 210 LBS | HEIGHT: 72 IN | SYSTOLIC BLOOD PRESSURE: 147 MMHG | BODY MASS INDEX: 28.44 KG/M2 | HEART RATE: 92 BPM

## 2022-02-28 DIAGNOSIS — N35.812 OTHER STRICTURE OF BULBOUS URETHRA IN MALE: ICD-10-CM

## 2022-02-28 DIAGNOSIS — N99.116 POSTPROCEDURAL STRICTURE OF OVERLAPPING SITES OF URETHRA IN MALE: Primary | ICD-10-CM

## 2022-02-28 PROCEDURE — 74450 X-RAY URETHRA/BLADDER: CPT | Mod: GC | Performed by: RADIOLOGY

## 2022-02-28 PROCEDURE — 51610 INJECTION FOR BLADDER X-RAY: CPT | Mod: GC | Performed by: RADIOLOGY

## 2022-02-28 PROCEDURE — 52000 CYSTOURETHROSCOPY: CPT | Performed by: UROLOGY

## 2022-02-28 RX ORDER — LIDOCAINE HYDROCHLORIDE 20 MG/ML
10 JELLY TOPICAL ONCE
Status: COMPLETED | OUTPATIENT
Start: 2022-02-28 | End: 2022-02-28

## 2022-02-28 RX ADMIN — LIDOCAINE HYDROCHLORIDE 10 ML: 20 JELLY TOPICAL at 14:19

## 2022-02-28 ASSESSMENT — PAIN SCALES - GENERAL: PAINLEVEL: NO PAIN (0)

## 2022-02-28 NOTE — LETTER
2/28/2022       RE: Flakito Hilliard  [id number] [facility] [id number]     Dear Colleague,    Thank you for referring your patient, Flakito Hilliard, to the [facility] UROLOGY [facility]INIC [place] at [facility]. Please see a copy of my visit note below.    Male Cystoscopy Procedure Note     PRE-PROCEDURE DIAGNOSIS: urethral stricture  POST-PROCEDURE DIAGNOSIS: same and prostatic urethral stones  PROCEDURE: cystoscopy    HISTORY: Flakito Hilliard is a 62 year old man with slow stream, recurrent UTIs, major depression who has recurrent stricture after 2 previous urethroplasties by me. We did a scope with adult flex a few weeks ago and could not eval the whole urethra.     He returns today for peds flex scope and urethrogram    REVIEW OF OFFICE STUDIES:             DESCRIPTION OF PROCEDURE:  After informed consent was obtained, the patient was brought to the procedure room where he was placed in the supine position with all pressure points well padded.  The penis and scrotum were prepped and draped in a sterile fashion. A flexible peds cystoscope was introduced through a well-lubricated urethra.  The anterior urethra was significant for panurethral stricture the tightest areas were in the bulbar urethra where there were two areas of narrowing of about eight Bahraini.  One was in the distal bulbar urethra and one was in the mid to proximal bulbar urethra.  The flexible scope passed these areas easily.  There were three or 4 stones in the prostatic urethra and each of these was a platelike shape.  There is a large false passage in the posterior wall of the prostate.  Some of the stones were hiding in that false passage.  The bladder neck was open and competent.  The bladder showed detrusor overgrowth without diverticuli or stones.      I then removed the scope and on the way out examined how close the stricture was to the sphincter.  It appears that there is about  a 1 cm area of normal urethra between the most proximal stricture and his sphincter.    I then reviewed the retrograde urethrogram and voiding cystourethrogram.  Findings on this are consistent with findings on cystoscopy.  It shows the two areas of tightness stricture to be in the distal and proximal bulbar urethra.  It also shows this false passage.  The radiologist had a hard time getting the catheter past this false passage.      ASSESSMENT AND PLAN:  62 year old man with significant recurrent stricture after two previous urethroplasty is.  Indications for treatment are recurrent urinary tract infections and slowing of the urinary stream.  His self-care is complicated by his major depression.  He had a significant decline in his ability to care for himself in the last year.  With his permission I did explain the findings to his daughter as she could be an advocate for coordination of his health care.  I presented the following options:  1.  Urethral dilation by me followed by self dilation.  I do not think that he could do this given his false passage and I do not think he would stay committed to doing this and neither does his daughter  2. creation of a perineal urethrostomy.  This would allow for removal of the stones and optimize his voiding.  In talking with his daughter we both favor this option.  3.  An option that occurred to me after he left the office was Optilume.  I did not discuss this option with him.      Niraj Burger MD

## 2022-02-28 NOTE — NURSING NOTE
Chief Complaint   Patient presents with    Cystoscopy       Blood pressure (!) 147/98, pulse 92, height 1.829 m (6'), weight 95.3 kg (210 lb). Body mass index is 28.48 kg/m .    Patient Active Problem List   Diagnosis    Acute bacterial prostatitis    Lower urinary tract infectious disease    severe HTN (hypertension)    Suicidal ideation    Major depressive disorder, recurrent episode, moderate (H)    Urinary retention    Urethral stricture    PATRICE (obstructive sleep apnea)    Complicated UTI (urinary tract infection)    RIO (generalized anxiety disorder)    At risk for cardiovascular event    Elevated hemoglobin (H)    Bladder stones    Erectile dysfunction, unspecified erectile dysfunction type    Severe recurrent major depression without psychotic features (H)    Urethral stone       Allergies   Allergen Reactions    Contrast Dye Difficulty breathing and Anaphylaxis       Current Outpatient Medications   Medication Sig Dispense Refill    amLODIPine (NORVASC) 10 MG tablet Take 1 tablet (10 mg) by mouth daily (Patient not taking: Reported on 12/27/2021) 90 tablet 0    amoxicillin (AMOXIL) 500 MG capsule Take 500 mg by mouth      ARIPiprazole (ABILIFY) 2 MG tablet Take 2 mg by mouth daily (Patient not taking: Reported on 12/27/2021)      atorvastatin (LIPITOR) 20 MG tablet TAKE ONE TABLET BY MOUTH EVERY DAY. 90 tablet 3    buPROPion 450 MG TB24 Take 450 mg by mouth every morning 30 tablet 1    busPIRone (BUSPAR) 15 MG tablet Increase if anxiety not doing well: 0.5 tab 2x/day x 3 days, then 1 tab 2x/day x 3 days, then 1.5 tab 2x/day x 3 days, then 2 tab 2x/day. (Patient not taking: Reported on 12/27/2021) 90 tablet 0    clonazePAM (KLONOPIN) 0.5 MG tablet Take 0.5 mg by mouth      diltiazem ER COATED BEADS (CARDIZEM CD) 360 MG 24 hr capsule Take 1 capsule (360 mg) by mouth daily 90 capsule 0    diphenhydrAMINE (BENADRYL) 50 MG capsule Take 1 capsule (50 mg) by mouth every 6 hours as needed for itching or allergies  Administer 30 min - 2 hours pre - IV contrast injection 1 capsule 0    docusate sodium (COLACE) 100 MG capsule TAKE ONE CAPSULE BY MOUTH TWICE DAILY  60 capsule 11    DULoxetine (CYMBALTA) 60 MG capsule Take 60 mg by mouth      hydrALAZINE (APRESOLINE) 25 MG tablet Take 0.5 tablets (12.5 mg) by mouth every 6 hours as needed (for SBP >175 or DBP >110) (Patient not taking: Reported on 2021) 30 tablet 0    hydrOXYzine (VISTARIL) 50 MG capsule TAKE 1 CAP BY MOUTH DAILY IN THE MORNING; MAY TAKE 1 ADDITIONAL CAP DAILY AS NEEDED      lamoTRIgine (LAMICTAL) 100 MG tablet Take 100 mg by mouth      LATUDA 80 MG TABS tablet TAKE 1 TABLET BY MOUTH ONCE DAILY TAKE WITH FOOD      lisinopril (PRINIVIL/ZESTRIL) 20 MG tablet Take 1 tablet (20 mg) by mouth daily 90 tablet 0    lithium (ESKALITH) 300 MG tablet Take 300 mg by mouth At Bedtime  (Patient not taking: Reported on 2021)      methylPREDNISolone (MEDROL) 32 MG tablet Take 1 tablet (32 mg) by mouth daily 12 hours prior to the procedure with IV contrast 1 tablet 0    oxybutynin (DITROPAN) 5 MG tablet Take 1 tablet (5 mg) by mouth 2 times daily (Patient not taking: Reported on 2020) 60 tablet 0    tamsulosin (FLOMAX) 0.4 MG capsule Take 1 capsule (0.4 mg) by mouth daily 90 capsule 0    traZODone (DESYREL) 100 MG tablet Take 1 tablet (100 mg) by mouth nightly as needed for sleep 30 tablet 1       Social History     Tobacco Use    Smoking status: Never Smoker    Smokeless tobacco: Never Used   Substance Use Topics    Alcohol use: Yes     Comment: No drinking for 2 weeks    Drug use: No       Invasive Procedure Safety Checklist:    Procedure: Cystoscopy    Action: Complete sections and checkboxes as appropriate.    Pre-procedure:  1. Patient ID Verified with 2 identifiers (Marquita and  or MRN) : YES    2. Procedure and site verified with patient/designee (when able) : YES    3. Accurate consent documentation in medical record : YES    4. H&P (or appropriate  assessment) documented in medical record : N/A  H&P must be up to 30 days prior to procedure an updated within 24 hours of                 Procedure as applicable.     5. Relevant diagnostic and radiology test results appropriately labeled and displayed as applicable : YES    6. Blood products, implants, devices, and/or special equipment available for the procedure as applicable : YES    7. Procedure site(s) marked with provider initials [Exclusions: none] : NO    8. Marking not required. Reason : Yes  Procedure does not require site marking    Time Out:     Time-Out performed immediately prior to starting procedure, including verbal and active participation of all team members addressing: YES    1. Correct patient identity.  2. Confirmed that the correct side and site are marked.  3. An accurate procedure to be done.  4. Agreement on the procedure to be done.  5. Correct patient position.  6. Relevant images and results are properly labeled and appropriately displayed.  7. The need to administer antibiotics or fluids for irrigation purposes during the procedure as applicable.  8. Safety precautions based on patient history or medication use.    During Procedure: Verification of correct person, site, and procedure occurs any time the responsibility for care of the patient is transferred to another member of the care team.    Anuja Cartagena  2/28/2022  3:22 PM

## 2022-02-28 NOTE — PROGRESS NOTES
Male Cystoscopy Procedure Note     PRE-PROCEDURE DIAGNOSIS: urethral stricture  POST-PROCEDURE DIAGNOSIS: same and prostatic urethral stones  PROCEDURE: cystoscopy    HISTORY: Flakito Hilliard is a 62 year old man with slow stream, recurrent UTIs, major depression who has recurrent stricture after 2 previous urethroplasties by me. We did a scope with adult flex a few weeks ago and could not eval the whole urethra.     He returns today for peds flex scope and urethrogram    REVIEW OF OFFICE STUDIES:             DESCRIPTION OF PROCEDURE:  After informed consent was obtained, the patient was brought to the procedure room where he was placed in the supine position with all pressure points well padded.  The penis and scrotum were prepped and draped in a sterile fashion. A flexible peds cystoscope was introduced through a well-lubricated urethra.  The anterior urethra was significant for panurethral stricture the tightest areas were in the bulbar urethra where there were two areas of narrowing of about eight Cambodian.  One was in the distal bulbar urethra and one was in the mid to proximal bulbar urethra.  The flexible scope passed these areas easily.  There were three or 4 stones in the prostatic urethra and each of these was a platelike shape.  There is a large false passage in the posterior wall of the prostate.  Some of the stones were hiding in that false passage.  The bladder neck was open and competent.  The bladder showed detrusor overgrowth without diverticuli or stones.      I then removed the scope and on the way out examined how close the stricture was to the sphincter.  It appears that there is about a 1 cm area of normal urethra between the most proximal stricture and his sphincter.    I then reviewed the retrograde urethrogram and voiding cystourethrogram.  Findings on this are consistent with findings on cystoscopy.  It shows the two areas of tightness stricture to be in the distal and proximal bulbar  urethra.  It also shows this false passage.  The radiologist had a hard time getting the catheter past this false passage.      ASSESSMENT AND PLAN:  62 year old man with significant recurrent stricture after two previous urethroplasty is.  Indications for treatment are recurrent urinary tract infections and slowing of the urinary stream.  His self-care is complicated by his major depression.  He had a significant decline in his ability to care for himself in the last year.  With his permission I did explain the findings to his daughter as she could be an advocate for coordination of his health care.  I presented the following options:  1.  Urethral dilation by me followed by self dilation.  I do not think that he could do this given his false passage and I do not think he would stay committed to doing this and neither does his daughter  2. creation of a perineal urethrostomy.  This would allow for removal of the stones and optimize his voiding.  In talking with his daughter we both favor this option.  3.  An option that occurred to me after he left the office was Optilume.  I did not discuss this option with him.

## 2022-02-28 NOTE — PATIENT INSTRUCTIONS
"Follow up in one month with a video visit.    Contact us if you decide to schedule surgery.    Dr. Burger's RN care coordinator is Viviana Terrazas RN. She can be reached at [phone].    It was a pleasure meeting with you today.  Thank you for allowing me and my team the privilege of caring for you today.  YOU are the reason we are here, and I truly hope we provided you with the excellent service you deserve.  Please let us know if there is anything else we can do for you so that we can be sure you are leaving completely satisfied with your care experience.      Genesis Hawkins CMA      AFTER YOUR CYSTOSCOPY        You have just completed a cystoscopy, or \"cysto\", which allowed your physician to learn more about your bladder (or to remove a stent placed after surgery). We suggest that you continue to avoid caffeine, fruit juice, and alcohol for the next 24 hours, however, you are encouraged to return to your normal activities.         A few things that are considered normal after your cystoscopy:     * Small amount of bleeding (or spotting) that clears within the next 24 hours     * Slight burning sensation with urination     * Sensation to of needing to avoid more frequently     * The feeling of \"air\" in your urine     * Mild discomfort that is relieved with Tylenol        Please contact our office promptly if you:     * Develop a fever above 101 degrees     * Are unable to urinate     * Develop bright red blood that does not stop     * Severe pain or swelling         Please contact our office with any concerns or questions @[email or web address].  "

## 2022-03-02 ENCOUNTER — TELEPHONE (OUTPATIENT)
Dept: UROLOGY | Facility: CLINIC | Age: 62
End: 2022-03-02
Payer: MEDICARE

## 2022-03-02 NOTE — TELEPHONE ENCOUNTER
Detailed message left asking pt to call back to schedule a video visit to discuss additional treatment option.    Genesis Hawkins CMA  3/2/2022  11:13 AM      ----- Message from Niraj Burger MD sent at 3/2/2022 10:21 AM CST -----  Regarding: video visit  Could you set up a video visit to discuss treatment options? Let him know I thought of one more for him (Optilume).

## 2022-03-07 ENCOUNTER — PRE VISIT (OUTPATIENT)
Dept: UROLOGY | Facility: CLINIC | Age: 62
End: 2022-03-07
Payer: MEDICARE

## 2022-03-07 NOTE — TELEPHONE ENCOUNTER
Reason for visit: Discuss additional treatment options     Relevant information: urethral strictures and prostatic urethral stones    Records/imaging/labs/orders: all records available    Pt called: no need for a call

## 2022-03-14 ENCOUNTER — VIRTUAL VISIT (OUTPATIENT)
Dept: UROLOGY | Facility: CLINIC | Age: 62
End: 2022-03-14
Payer: MEDICARE

## 2022-03-14 DIAGNOSIS — N99.111 POSTPROCEDURAL BULBOUS URETHRAL STRICTURE: Primary | ICD-10-CM

## 2022-03-14 PROCEDURE — 99214 OFFICE O/P EST MOD 30 MIN: CPT | Mod: 95 | Performed by: UROLOGY

## 2022-03-14 RX ORDER — CIPROFLOXACIN 2 MG/ML
400 INJECTION, SOLUTION INTRAVENOUS EVERY 12 HOURS
Status: CANCELLED | OUTPATIENT
Start: 2022-03-14

## 2022-03-14 RX ORDER — CEFAZOLIN SODIUM 2 G/50ML
2 SOLUTION INTRAVENOUS
Status: CANCELLED | OUTPATIENT
Start: 2022-03-14

## 2022-03-14 RX ORDER — CEFAZOLIN SODIUM 2 G/50ML
2 SOLUTION INTRAVENOUS SEE ADMIN INSTRUCTIONS
Status: CANCELLED | OUTPATIENT
Start: 2022-03-14

## 2022-03-14 NOTE — LETTER
3/14/2022       RE: Flakito Hilliard  [id number] [facility] [address]     Dear Colleague,    Thank you for referring your patient, Flakito Hilliard, to the [facility]OLOGY CLIN[facility] [place] at [facility]. Please see a copy of my visit note below.    HPI:    Ranjeet is 62-year-old man with a history of 2 previous urethroplasty is who has recurrent stricture and stones in his prostate and recurrent urinary tract infections.  I saw him for a cystoscopy a week or 2 ago and explained to him that we could do perineal urethrostomy or dilation followed by self dilation.  We scheduled today's visit to discuss his preferences further with his daughter present.    He has major depression with some difficulties making decisions and his daughter helps with some decision-making    Exam:  There were no vitals taken for this visit.  GENERAL: Healthy, alert and no distress  EYES: Eyes grossly normal to inspection.  No discharge or erythema, or obvious scleral/conjunctival abnormalities.  RESP: No audible wheeze, cough, or visible cyanosis.  No visible retractions or increased work of breathing.    SKIN: Visible skin clear. No significant rash, abnormal pigmentation or lesions.  NEURO: Cranial nerves grossly intact.  Mentation and speech appropriate for age.  PSYCH: Mentation appears normal, affect normal/bright, judgement and insight intact, normal speech and appearance well-groomed.    Assessment & Plan   1. Recurrent bulbar stricture and prostate stones  2. R/B/A of balloon dilation vs. Drug-coated balloon dilation vs. Perineal urethrostomy discussed. He elects for balloon dilation and stone extraction because he cannot wait a couple months for approval of the drug-coated balloon in hospital. But we assume we will be back to do that later.  I think this will also be helpful to split the procedure up into 2 different procedures so that we can get rid of the infected  stones before doing the drug-coated balloon on a separate day.  He understands the risks of the procedure to include bleeding, infection, stricture recurrence, urinary incontinence, abscess and sepsis.    Niraj Burger MD  [facility] UROLOGY [facility]      ==========================      Additional Coding Information:    Problems:  4 -- two or more stable chronic illnesses    Data ReviewedLevel of risk:  4 -- minor surgery with patient or procedure risks    Time spent:  25 minutes spent on the date of the encounter doing chart review, history and exam, surgery scheduling and documentation and further activities per the note    Ranjeet is a 62 year old who is being evaluated via a billable video visit.      How would you like to obtain your AVS? Mail a copy  If the video visit is dropped, the invitation should be resent by: Text to cell phone: [phone]  Will anyone else be joining your video visit? No    Video Start Time: 7:20  Video-Visit Details    Type of service:  Video Visit    Video End Time:7:37 AM    Originating Location (pt. Location): Home    Distant Location (provider location):  [facility] UROLOGY [facility]     Platform used for Video Visit: Tasqe

## 2022-03-14 NOTE — PROGRESS NOTES
HPI:    Ranjeet is 62-year-old man with a history of 2 previous urethroplasty is who has recurrent stricture and stones in his prostate and recurrent urinary tract infections.  I saw him for a cystoscopy a week or 2 ago and explained to him that we could do perineal urethrostomy or dilation followed by self dilation.  We scheduled today's visit to discuss his preferences further with his daughter present.    He has major depression with some difficulties making decisions and his daughter helps with some decision-making    Exam:  There were no vitals taken for this visit.  GENERAL: Healthy, alert and no distress  EYES: Eyes grossly normal to inspection.  No discharge or erythema, or obvious scleral/conjunctival abnormalities.  RESP: No audible wheeze, cough, or visible cyanosis.  No visible retractions or increased work of breathing.    SKIN: Visible skin clear. No significant rash, abnormal pigmentation or lesions.  NEURO: Cranial nerves grossly intact.  Mentation and speech appropriate for age.  PSYCH: Mentation appears normal, affect normal/bright, judgement and insight intact, normal speech and appearance well-groomed.    Assessment & Plan   1. Recurrent bulbar stricture and prostate stones  2. R/B/A of balloon dilation vs. Drug-coated balloon dilation vs. Perineal urethrostomy discussed. He elects for balloon dilation and stone extraction because he cannot wait a couple months for approval of the drug-coated balloon in hospital. But we assume we will be back to do that later.  I think this will also be helpful to split the procedure up into 2 different procedures so that we can get rid of the infected stones before doing the drug-coated balloon on a separate day.  He understands the risks of the procedure to include bleeding, infection, stricture recurrence, urinary incontinence, abscess and sepsis.    Niraj Burger MD  [facility] UROLOGY CLINIC  [place]      ==========================      Additional Coding Information:    Problems:  4 -- two or more stable chronic illnesses    Data ReviewedLevel of risk:  4 -- minor surgery with patient or procedure risks    Time spent:  25 minutes spent on the date of the encounter doing chart review, history and exam, surgery scheduling and documentation and further activities per the note              Ranjeet is a 62 year old who is being evaluated via a billable video visit.      How would you like to obtain your AVS? Mail a copy  If the video visit is dropped, the invitation should be resent by: Text to cell phone: [phone]  Will anyone else be joining your video visit? No    Video Start Time: 7:20  Video-Visit Details    Type of service:  Video Visit    Video End Time:7:37 AM    Originating Location (pt. Location): Home    Distant Location (provider location):  [facility] UROLOGY [facility]     Platform used for Video Visit: IntroFly

## 2022-03-14 NOTE — PATIENT INSTRUCTIONS
Please schedule surgery.    Dr. Burger's RN care coordinator is Viviana Terrazas RN. She can be reached at [phone].    It was a pleasure meeting with you today.  Thank you for allowing me and my team the privilege of caring for you today.  YOU are the reason we are here, and I truly hope we provided you with the excellent service you deserve.  Please let us know if there is anything else we can do for you so that we can be sure you are leaving completely satisfied with your care experience.      Genesis Hawkins, [facility]

## 2022-04-06 ENCOUNTER — TELEPHONE (OUTPATIENT)
Dept: UROLOGY | Facility: CLINIC | Age: 62
End: 2022-04-06
Payer: MEDICARE

## 2022-04-07 ENCOUNTER — TELEPHONE (OUTPATIENT)
Dept: UROLOGY | Facility: CLINIC | Age: 62
End: 2022-04-07
Payer: MEDICARE

## 2022-04-07 NOTE — TELEPHONE ENCOUNTER
Writer received message from surgery scheduler who passed message from Nayeli/pt's daughter to send COVID test order to [facility] in [place] at [phone].    Writer awaiting further surgery scheduling and COVID test order before faxing request.

## 2022-04-11 ENCOUNTER — TRANSFERRED RECORDS (OUTPATIENT)
Dept: HEALTH INFORMATION MANAGEMENT | Facility: CLINIC | Age: 62
End: 2022-04-11
Payer: MEDICARE

## 2022-04-11 DIAGNOSIS — Z11.59 ENCOUNTER FOR SCREENING FOR OTHER VIRAL DISEASES: Primary | ICD-10-CM

## 2022-04-11 LAB
CREATININE (EXTERNAL): 1.3 MG/DL (ref 0.7–1.2)
GFR ESTIMATED (EXTERNAL): 56 ML/MIN/1.73M*2
GLUCOSE (EXTERNAL): 115 MG/DL (ref 70–99)
POTASSIUM (EXTERNAL): 4.6 MEQ/L (ref 3.4–5.1)

## 2022-04-13 ENCOUNTER — ANESTHESIA EVENT (OUTPATIENT)
Dept: SURGERY | Facility: AMBULATORY SURGERY CENTER | Age: 62
End: 2022-04-13
Payer: MEDICARE

## 2022-04-14 ENCOUNTER — ANESTHESIA (OUTPATIENT)
Dept: SURGERY | Facility: AMBULATORY SURGERY CENTER | Age: 62
End: 2022-04-14
Payer: MEDICARE

## 2022-04-14 ENCOUNTER — HOSPITAL ENCOUNTER (OUTPATIENT)
Facility: AMBULATORY SURGERY CENTER | Age: 62
Discharge: HOME | End: 2022-04-14
Attending: UROLOGY
Payer: MEDICARE

## 2022-04-14 VITALS
HEIGHT: 73 IN | BODY MASS INDEX: 30.75 KG/M2 | WEIGHT: 232 LBS | HEART RATE: 66 BPM | TEMPERATURE: 97.4 F | DIASTOLIC BLOOD PRESSURE: 70 MMHG | SYSTOLIC BLOOD PRESSURE: 132 MMHG | OXYGEN SATURATION: 96 % | RESPIRATION RATE: 16 BRPM

## 2022-04-14 DIAGNOSIS — N21.1 URETHRAL STONE: Primary | ICD-10-CM

## 2022-04-14 DIAGNOSIS — D49.4 BLADDER TUMOR: ICD-10-CM

## 2022-04-14 DIAGNOSIS — N99.111 POSTPROCEDURAL BULBOUS URETHRAL STRICTURE: ICD-10-CM

## 2022-04-14 PROCEDURE — 52204 CYSTOSCOPY W/BIOPSY(S): CPT

## 2022-04-14 PROCEDURE — 52204 CYSTOSCOPY W/BIOPSY(S): CPT | Mod: GC | Performed by: UROLOGY

## 2022-04-14 PROCEDURE — 53899 UNLISTED PX URINARY SYSTEM: CPT | Mod: GC | Performed by: UROLOGY

## 2022-04-14 PROCEDURE — 53899 UNLISTED PX URINARY SYSTEM: CPT

## 2022-04-14 PROCEDURE — 88305 TISSUE EXAM BY PATHOLOGIST: CPT | Mod: TC | Performed by: UROLOGY

## 2022-04-14 RX ORDER — ONDANSETRON 2 MG/ML
4 INJECTION INTRAMUSCULAR; INTRAVENOUS EVERY 30 MIN PRN
Status: DISCONTINUED | OUTPATIENT
Start: 2022-04-14 | End: 2022-04-15 | Stop reason: HOSPADM

## 2022-04-14 RX ORDER — SODIUM CHLORIDE, SODIUM LACTATE, POTASSIUM CHLORIDE, CALCIUM CHLORIDE 600; 310; 30; 20 MG/100ML; MG/100ML; MG/100ML; MG/100ML
INJECTION, SOLUTION INTRAVENOUS CONTINUOUS
Status: DISCONTINUED | OUTPATIENT
Start: 2022-04-14 | End: 2022-04-14 | Stop reason: HOSPADM

## 2022-04-14 RX ORDER — FENTANYL CITRATE 50 UG/ML
25 INJECTION, SOLUTION INTRAMUSCULAR; INTRAVENOUS EVERY 5 MIN PRN
Status: DISCONTINUED | OUTPATIENT
Start: 2022-04-14 | End: 2022-04-14 | Stop reason: HOSPADM

## 2022-04-14 RX ORDER — ATROPA BELLADONNA AND OPIUM 16.2; 3 MG/1; MG/1
SUPPOSITORY RECTAL PRN
Status: DISCONTINUED | OUTPATIENT
Start: 2022-04-14 | End: 2022-04-14 | Stop reason: HOSPADM

## 2022-04-14 RX ORDER — PROPOFOL 10 MG/ML
INJECTION, EMULSION INTRAVENOUS PRN
Status: DISCONTINUED | OUTPATIENT
Start: 2022-04-14 | End: 2022-04-14

## 2022-04-14 RX ORDER — OXYCODONE HYDROCHLORIDE 5 MG/1
5 TABLET ORAL EVERY 4 HOURS PRN
Status: DISCONTINUED | OUTPATIENT
Start: 2022-04-14 | End: 2022-04-15 | Stop reason: HOSPADM

## 2022-04-14 RX ORDER — AMOXICILLIN 250 MG
1-2 CAPSULE ORAL 2 TIMES DAILY
Qty: 30 TABLET | Refills: 0 | Status: SHIPPED | OUTPATIENT
Start: 2022-04-14

## 2022-04-14 RX ORDER — ONDANSETRON 2 MG/ML
INJECTION INTRAMUSCULAR; INTRAVENOUS PRN
Status: DISCONTINUED | OUTPATIENT
Start: 2022-04-14 | End: 2022-04-14

## 2022-04-14 RX ORDER — SODIUM CHLORIDE, SODIUM LACTATE, POTASSIUM CHLORIDE, CALCIUM CHLORIDE 600; 310; 30; 20 MG/100ML; MG/100ML; MG/100ML; MG/100ML
INJECTION, SOLUTION INTRAVENOUS CONTINUOUS
Status: DISCONTINUED | OUTPATIENT
Start: 2022-04-14 | End: 2022-04-15 | Stop reason: HOSPADM

## 2022-04-14 RX ORDER — DEXAMETHASONE SODIUM PHOSPHATE 4 MG/ML
INJECTION, SOLUTION INTRA-ARTICULAR; INTRALESIONAL; INTRAMUSCULAR; INTRAVENOUS; SOFT TISSUE PRN
Status: DISCONTINUED | OUTPATIENT
Start: 2022-04-14 | End: 2022-04-14

## 2022-04-14 RX ORDER — FENTANYL CITRATE 50 UG/ML
25 INJECTION, SOLUTION INTRAMUSCULAR; INTRAVENOUS
Status: DISCONTINUED | OUTPATIENT
Start: 2022-04-14 | End: 2022-04-15 | Stop reason: HOSPADM

## 2022-04-14 RX ORDER — LIDOCAINE HYDROCHLORIDE 20 MG/ML
INJECTION, SOLUTION INFILTRATION; PERINEURAL PRN
Status: DISCONTINUED | OUTPATIENT
Start: 2022-04-14 | End: 2022-04-14

## 2022-04-14 RX ORDER — PROPOFOL 10 MG/ML
INJECTION, EMULSION INTRAVENOUS CONTINUOUS PRN
Status: DISCONTINUED | OUTPATIENT
Start: 2022-04-14 | End: 2022-04-14

## 2022-04-14 RX ORDER — CEFAZOLIN SODIUM 2 G/50ML
2 SOLUTION INTRAVENOUS SEE ADMIN INSTRUCTIONS
Status: DISCONTINUED | OUTPATIENT
Start: 2022-04-14 | End: 2022-04-14 | Stop reason: HOSPADM

## 2022-04-14 RX ORDER — LIDOCAINE 40 MG/G
CREAM TOPICAL
Status: DISCONTINUED | OUTPATIENT
Start: 2022-04-14 | End: 2022-04-14 | Stop reason: HOSPADM

## 2022-04-14 RX ORDER — CEFAZOLIN SODIUM 2 G/50ML
2 SOLUTION INTRAVENOUS
Status: DISCONTINUED | OUTPATIENT
Start: 2022-04-14 | End: 2022-04-14 | Stop reason: HOSPADM

## 2022-04-14 RX ORDER — GABAPENTIN 300 MG/1
300 CAPSULE ORAL
Status: DISCONTINUED | OUTPATIENT
Start: 2022-04-14 | End: 2022-04-14 | Stop reason: HOSPADM

## 2022-04-14 RX ORDER — ACETAMINOPHEN 325 MG/1
975 TABLET ORAL ONCE
Status: COMPLETED | OUTPATIENT
Start: 2022-04-14 | End: 2022-04-14

## 2022-04-14 RX ORDER — MEPERIDINE HYDROCHLORIDE 25 MG/ML
12.5 INJECTION INTRAMUSCULAR; INTRAVENOUS; SUBCUTANEOUS
Status: DISCONTINUED | OUTPATIENT
Start: 2022-04-14 | End: 2022-04-15 | Stop reason: HOSPADM

## 2022-04-14 RX ORDER — ONDANSETRON 4 MG/1
4 TABLET, ORALLY DISINTEGRATING ORAL EVERY 30 MIN PRN
Status: DISCONTINUED | OUTPATIENT
Start: 2022-04-14 | End: 2022-04-15 | Stop reason: HOSPADM

## 2022-04-14 RX ORDER — SODIUM CHLORIDE, SODIUM LACTATE, POTASSIUM CHLORIDE, CALCIUM CHLORIDE 600; 310; 30; 20 MG/100ML; MG/100ML; MG/100ML; MG/100ML
INJECTION, SOLUTION INTRAVENOUS CONTINUOUS PRN
Status: DISCONTINUED | OUTPATIENT
Start: 2022-04-14 | End: 2022-04-14

## 2022-04-14 RX ORDER — LIDOCAINE HYDROCHLORIDE 20 MG/ML
JELLY TOPICAL PRN
Status: DISCONTINUED | OUTPATIENT
Start: 2022-04-14 | End: 2022-04-14 | Stop reason: HOSPADM

## 2022-04-14 RX ORDER — KETOROLAC TROMETHAMINE 30 MG/ML
INJECTION, SOLUTION INTRAMUSCULAR; INTRAVENOUS PRN
Status: DISCONTINUED | OUTPATIENT
Start: 2022-04-14 | End: 2022-04-14

## 2022-04-14 RX ORDER — CIPROFLOXACIN 2 MG/ML
400 INJECTION, SOLUTION INTRAVENOUS EVERY 12 HOURS
Status: DISCONTINUED | OUTPATIENT
Start: 2022-04-14 | End: 2022-04-14 | Stop reason: HOSPADM

## 2022-04-14 RX ORDER — ACETAMINOPHEN 325 MG/1
650 TABLET ORAL EVERY 4 HOURS PRN
Qty: 50 TABLET | Refills: 0 | Status: SHIPPED | OUTPATIENT
Start: 2022-04-14

## 2022-04-14 RX ADMIN — PROPOFOL 200 MG: 10 INJECTION, EMULSION INTRAVENOUS at 08:37

## 2022-04-14 RX ADMIN — DEXAMETHASONE SODIUM PHOSPHATE 4 MG: 4 INJECTION, SOLUTION INTRA-ARTICULAR; INTRALESIONAL; INTRAMUSCULAR; INTRAVENOUS; SOFT TISSUE at 08:45

## 2022-04-14 RX ADMIN — SODIUM CHLORIDE, SODIUM LACTATE, POTASSIUM CHLORIDE, CALCIUM CHLORIDE: 600; 310; 30; 20 INJECTION, SOLUTION INTRAVENOUS at 07:47

## 2022-04-14 RX ADMIN — Medication 100 MCG: at 09:24

## 2022-04-14 RX ADMIN — SODIUM CHLORIDE, SODIUM LACTATE, POTASSIUM CHLORIDE, CALCIUM CHLORIDE: 600; 310; 30; 20 INJECTION, SOLUTION INTRAVENOUS at 08:29

## 2022-04-14 RX ADMIN — Medication 200 MCG: at 09:04

## 2022-04-14 RX ADMIN — Medication 200 MCG: at 09:32

## 2022-04-14 RX ADMIN — LIDOCAINE HYDROCHLORIDE 100 MG: 20 INJECTION, SOLUTION INFILTRATION; PERINEURAL at 08:37

## 2022-04-14 RX ADMIN — ONDANSETRON 4 MG: 2 INJECTION INTRAMUSCULAR; INTRAVENOUS at 08:45

## 2022-04-14 RX ADMIN — KETOROLAC TROMETHAMINE 30 MG: 30 INJECTION, SOLUTION INTRAMUSCULAR; INTRAVENOUS at 09:10

## 2022-04-14 RX ADMIN — ACETAMINOPHEN 975 MG: 325 TABLET ORAL at 07:45

## 2022-04-14 RX ADMIN — Medication 100 MCG: at 08:57

## 2022-04-14 RX ADMIN — PROPOFOL 150 MCG/KG/MIN: 10 INJECTION, EMULSION INTRAVENOUS at 08:37

## 2022-04-14 NOTE — BRIEF OP NOTE
[facility]    Brief Operative Note    Pre-operative diagnosis: Postprocedural bulbous urethral stricture [N99.111]  Post-operative diagnosis Same as pre-operative diagnosis    Procedure: Procedure(s):  CYSTOSCOPY, WITH URETHRAL DILATION; LASER LITHOTRIPSY OF PROSTATE STONES BLADDER BIOPSY, FULGERATION.  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  Complications: None.  Implants: * No implants in log *     Plan:  - Anderson out at home on Monday  - Follow up with Dr. Burger via video visit for path review in 2 weeks    Tommy Villegas MD  Urology PGY-4

## 2022-04-14 NOTE — OP NOTE
OPERATIVE REPORT    PREOPERATIVE DIAGNOSIS:   1) Urethral stricture  2) Prostatic urethral stones    POSTOPERATIVE DIAGNOSIS:    1) Penile urethral stricture  2) Prostatic urethral stones  3) Bladder tumor    PROCEDURE PERFORMED:   1. Balloon dilation of urethra  2. Laser lithotripsy of prostatic urethral stones  3. Laser ablation of prostatic ductal diverticuli  4. Cystourethroscopy  5. Biopsy and fulguration of bladder tumor(s), 2-5 cm    ATTENDING SURGEON: Niraj Burger MD    RESIDENT SURGEON: Tommy Villegas MD    FINDINGS:  Penile urethral stricture, ~10 Fr, balloon dilated, numerous prostatic urethral and prostatic duct stones, some of which required laser prostatic diverticular ablation to access. 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    ANESTHESIA: General   INTRAVENOUS FLUIDS: See dictated anesthesia record  ESTIMATED BLOOD LOSS: 10 mL.     SPECIMENS:   1. Bladder tumor biopsy  2. Prostatic urethral stones for analysis    DRAINS:   16 Fr Anderson catheter    MODIFIER 22:  This case took far above average time to complete due to difficult to access prostatic urethral stones which were large, and many of which were difficult to access due to being within prostatic ductal diverticuli requiring laser ablation to access. We also incidentally encountered a bladder tumor which required biopsy and fulguration.     INDICATIONS FOR PROCEDURE: Flakito Hilliard is a(n) 62 year old male with a history of urethral stricture for which he underwent urethroplasty twice previously with subsequent recurrence, as well as prostatic urethral stones which have previously required laser lithotripsy, who was again found to have recurrent stricture and prostatic stones. Drug-coated balloon dilation versus regular balloon dilation were discussed, and given his concomitant prostatic urethral stones decision was made to proceed with  regular balloon dilation as well as laser lithotripsy of his stones with eventual plan for drug-coated balloon dilation at a later time. After discussion of the risks, benefits and alternatives of the procedure, the patient agreed to proceed with the above.    DESCRIPTION OF PROCEDURE: After verifying informed consent, the patient was taken to the operating room. Adequate anesthesia was induced, the patient was placed in the dorsal lithotomy position and prepped and draped in standard sterile fashion. A timeout was performed to verify correct patient and procedure. Pneumo boots and perioperative antibiotics were in place before the procedure commenced.     A 20 Bahamian rigid cystoscope was inserted into a well-lubricated urethra.  We began by performing a white light cystoscopy ureteroscopy.  The urethra was notable for an approximately 10 Bahamian annular proximal penile urethral stricture which was approximately 5 mm in length.  We passed a sensor wire through the scope and backed the scope out over the wire.  We then readvanced the scope into the urethra and passed a 24 Bah[place]ian UroMax balloon across the stricture.  We then balloon dilated the stricture under direct visualization.  The balloon was taken down and removed and we advanced the scope into the prostatic urethra where we encountered several stones, more than 2 cm in total, many of which were wedged within prostatic urethral diverticula and prostatic ducts with fibrotic septae which were entrapping the stones. The bladder neck was noted to be quite high in the prostatic fossa large with numerous deep prostatic diverticuli.    Next, we inserted a 500  m laser and on settings of 1 J and 8 Hz, we lasered the stones into basketable fragments and then remove these with a halo basket.  This was laborious and took over an hour and a half.  The stones within the diverticula and prostatic ducts were also lasered however some of the septae did require laser ablation to  be able to access the stones and we took care to do this as anteriorly as possible so as to minimize any risk of potential rectal injury.  We ablated these septations at a setting of 0.5 J and 20 Hz.  Good hemostasis was also achieved.  After removing all the stones we then inserted the scope into the bladder.      Cystoscopy was notable for heavy trabeculation with numerous diverticuli, as well as an approximately 1 cm left trigonal papillary tumor with a broad base just posterior medial to the left ureteral orifice which was clearly visible and effluxing clear urine.  The right ureteral orifice was also identified.  We also noted additional flat, velvety appearing tumor along the trigone and posterior wall.  We removed the scope and reinserted the 22 [place]ian rigid cystoscope and then used the rigid cold cup biopsy forceps to biopsy the left trigonal and posterior wall tumors and these were sent for pathology.  We then introduced the Bugbee and fulgurated any bleeding areas at the base of the tumor as well as any remaining flat appearing tumor along the posterior wall and at case and the patient was tumor free and good hemostasis was achieved.  The ureteral orifice ease were left unscathed.  The sensor wire was then replaced and the cystoscope was then removed over the wire.     A 16 Tunisian Anderson catheter was advanced over a wire after a hole was fashioned in the tip using a 14-gauge Angiocath needle.  After return of urine was appreciated 10 cc of sterile water were used to fill the balloon and the wire was removed.  The catheter was then connected to a drainage bag.  A belladonna and opium suppository was administered.    This concluded our procedure.  There were no immediate complications.  The patient was extubated and awakened from anesthesia, and transported to the postanesthesia care unit in stable condition.    POSTOPERATIVE PLAN:   - Anderson out at home on Monday  - Follow up with Dr. Burger via video visit  for path review in 2 weeks  - Prelim plan would be to take back for re-TURBT and if negative could do drug-coated balloon dilation at that time, depending on pathology results and discussion with patient

## 2022-04-14 NOTE — DISCHARGE INSTRUCTIONS
Emptying and Cleaning Your Urinary Catheter Bag  You have an indwelling urinary catheter. This drains urine from your bladder into a bag. The bag can be one that is used at your bedside. Or it can be a smaller bag that is strapped to your leg. Follow the steps below to empty and clean a urinary bag.       Drain Clean tube Clean catheter   Step 1. Drain the bag  Wash your hands well with soap and water to prevent infecting the urinary catheter and bag.  If the short drainage tube is inserted into a pocket on the bag, take the drainage tube out of the pocket.  Hold the drainage tube over a toilet or measuring container. Open the valve.  Don t touch the tip of the valve or let it touch the toilet or container.  Wash your hands again.  Step 2. Clean the drainage tube  When the bag is empty, clean the tip of the drainage valve with an alcohol wipe.  Close the valve.  Reinsert the drainage tube into the pocket, if there is one.  Step 3. Clean your skin  Wash your hands well before and after cleaning your skin.  If you have a catheter (such as a Anderson) that enters through the urethra, clean the urethral area with soap and water 1 time(s) daily as you were taught by your healthcare provider. You should also clean after every bowel movement to prevent infection.  Don't pull on the tubing when cleaning so you don t injure the urethra.  Don t apply powder to the genital area or to the tubing.  If you have a suprapubic catheter, your healthcare provider will tell you how to clean your skin around the catheter. This is a catheter that was surgically placed into the bladder through the lower abdomen.  Step 4. Check and clean the catheter tubing  Check the tubing. If there are kinks, cracks, clogs, or you can t see into the tubing, you ll need to change to new tubing as you were shown by your healthcare provider.  If the current tubing can still be used, wash it with soap and water. Always wash the tubing in the direction away  from your body. Don't pull on the tubing.  Dry the tubing with a clean washcloth or paper towel.  Step 5. Keep the drainage bag clean.  If  you have a catheter in place long term, talk to you provider about if and how often you should clean your drainage bag.  If need, clean your drainage bag by following these steps:  Wash your hands well with soap and water.  Disconnect the bag from the catheter tubing. Connect the tubing to the backup bag or drainage device.  Drain any remaining urine from the bag you just disconnected. Close the drainage valve.  Pour some warm (not hot) soapy water into the bag. Swish the soap around, being sure to get the corners of the bag.  Open the drainage valve to drain the soap. Close the valve.  Ask your healthcare provider how often you should clean your bag and what solution you should use to reduce odor and keep your bag free of germs.  Shake the solution a bit and allow it to remain in the bag for 30 minutes.  Drain the solution and rinse the bag with cold tap water.  Hang the bag to drain and air-dry.  When to call your healthcare provider  Call your healthcare provider right away if you have any of the following:  Little or no urine flowing into the bag  Urine leaking where the catheter enters the body  Pain, burning, or redness of the area where the catheter enters the body  Bloody urine (a trace of blood is normal)  Cloudy or foul-smelling urine, or sand-like grains in your urine  Pain in your lower back or lower abdomen  Your catheter falls out  Fever of 100.4 F (38 C) or higher, or as directed by your healthcare provider  Shaking chills      Discharge Instructions: Caring for Your Leg Bag  You are going home with a urinary catheter and collection device (drainage bag) in place. One type of collection device is called a leg bag. This is a smaller drainage bag that you can wear on your leg to collect urine during the day. The bag can fit under your clothing.   Home care  Wash  your hands thoroughly before and after you care for your catheter or collection device.  Gather your supplies:  Alcohol wipes  Soap and water  Towel and washcloth  Leg strap and leg bag  Use soap and water to wash the area where your catheter enters your body. Rinse well.  Secure the bag figueredo to your leg:  Put the leg band high on your thigh with the product label pointing away from your leg.  Stretch the leg band in place and fasten.  Place the catheter tubing over the bag and secure it. You may secure it with a Velcro tab or other method, depending on the product you use. Be sure to leave enough loop in the catheter above the leg band so you won't pull on the tube.  Every 4 to 6 hours, reposition the band. This will prevent pressure from the elastic on your leg. You can do this by changing the bag to the other leg or by raising or lowering the leg band.  Wash the band as often as needed. You can hand wash and dry the leg band.  Place the bag in the bag figueredo.  Clean the urine bag end of the catheter and your catheter port with an alcohol wipe.  Place a towel under the bag and port to keep urine from dripping onto your leg.  Before connecting the outlet valve at the bottom of the bag to the catheter, make sure that it is firmly closed. Flip the valve upward toward the bag. It needs to snap firmly in place. Be sure not to tug on the tubing. Be gentle.  Attach the urine bag to the end of the catheter. Insert the connector snugly into the catheter port. You can prevent dribbling urine by bending the catheter tubing just below the tip and holding it while you disconnect it from the catheter. Be careful to keep the tip clean while connecting the leg bag tubing to the catheter--this keeps germs from getting into the system.  Drain the bag when it's full. To drain the bag, flip the clamp downward. Direct the flexible outlet tube to control the flow of urine. You don t have to disconnect the leg bag from the catheter to  empty it. Raise your leg up to the edge of the toilet to reach the leg bag. Then you can empty the bag directly into the toilet. This way, you won t need to bend over, which may be uncomfortable.  Keep the leg bag clean.  Ask your healthcare provider how often you should clean your bag and what solution you should use ?to reduce odor and keep the bag free of germs.  Shake the solution a bit and allow it to remain in the bag for 30 minutes.    Drain the solution and rinse the bag with cold tap water.  Hang the bag to drain and air dry.  Remember to keep the drainage bag below the level of your bladder for proper drainage.  Follow-up  Make a follow-up appointment as directed by your healthcare provider.  When to call your healthcare provider  Call your healthcare provider right away if you have any of the following:  Redness, swelling, or warmth around the catheter entry site  Pus draining from your catheter entry site or into the catheter tubing and bag  Blood, clots, or floating debris in the urine  Nausea and vomiting  Shaking chills  Fever above 100.4 F (38 C), or as directed by your healthcare provider  Pain that is not relieved by medicine   Catheter that falls out or is dislodged       [facility]  Home Care Following Anesthesia  For 24 hours after surgery:  Get plenty of rest.  A responsible adult must stay with you for at least 24 hours after you leave the surgery center.  Do not drive or use heavy equipment.  If you have weakness or tingling, don't drive or use heavy equipment until this feeling goes away.   Do not drink alcohol.   Avoid strenuous or risky activities.  Ask for help when climbing stairs.  You may feel lightheaded.  IF so, sit for a few minutes before standing.  Have someone help you get up.   If you have nausea (feel sick to your stomach): Drink only clear liquids such as apple juice, ginger ale, broth or 7-Up.  Rest may also help.  Be sure to drink enough  fluids.  Move to a regular diet as you feel able.   You may have a slight fever.  Call the doctor if your fever is over 100 F (37.7 C) (taken under the tongue) or lasts longer than 24 hours.  You may have a dry mouth, a sore throat, muscle aches or trouble sleeping. These should go away after 24 hours.  Do not make important or legal decisions.   It is recommended to avoid smoking.               Tips for taking pain medications  To get the best pain relief possible, remember these points:  Take pain medications as directed, before pain becomes severe.  Pain medication can upset your stomach: taking it with food may help.  Constipation is a common side effect of pain medication. Drink plenty of  fluids.  Eat foods high in fiber. Take a stool softener if recommended by your doctor or pharmacist.  Do not drink alcohol, drive or operate machinery while taking pain medications.  Ask about other ways to control pain, such as with heat, ice or relaxation.    Tylenol/Acetaminophen Consumption  To help encourage the safe use of acetaminophen, the makers of TYLENOL  have lowered the maximum daily dose for single-ingredient Extra Strength TYLENOL  (acetaminophen) products sold in the [place] from 8 pills per day (4,000 mg) to 6 pills per day (3,000 mg). The dosing interval has also changed from 2 pills every 4-6 hours to 2 pills every 6 hours.  If you feel your pain relief is insufficient, you may take Tylenol/Acetaminophen in addition to your narcotic pain medication.   Be careful not to exceed 3,000 mg of Tylenol/Acetaminophen in a 24 hour period from all sources.  If you are taking extra strength Tylenol/acetaminophen (500 mg), the maximum dose is 6 tablets in 24 hours.  If you are taking regular strength acetaminophen (325 mg), the maximum dose is 9 tablets in 24 hours.  You took 975mg of tylenol at 7:45am. You can take additional tylenol after 1:45pm.    Call a doctor for any of the following:  Signs of infection (fever,  growing tenderness at the surgery site, a large amount of drainage or bleeding, severe pain, foul-smelling drainage, redness, swelling).  It has been over 8 to 10 hours since surgery and you are still not able to urinate (pass water).  Headache for over 24 hours.  Signs of Covid-19 infection (temperature over 100 degrees, shortness of breath, cough, loss of taste/smell, generalized body aches, persistent headache, chills, sore throat, nausea/vomiting/diarrhea)  Your doctor is:  Dr. Niraj Cano, Prostate and Urology: [phone]                    Or dial [phone] and ask for the resident on call for:  Prostate Urology  For emergency care, call the:  [facility]:  [phone] (TTY for hearing impaired: [phone])

## 2022-04-14 NOTE — ANESTHESIA CARE TRANSFER NOTE
Patient: Flakito Hilliard    Procedure: Procedure(s):  CYSTOSCOPY, WITH URETHRAL DILATION; LASER LITHOTRIPSY OF PROSTATE STONES BLADDER BIOPSY, FULGERATION.       Diagnosis: Postprocedural bulbous urethral stricture [N99.111]  Diagnosis Additional Information: No value filed.    Anesthesia Type:   General     Note:      Level of Consciousness: drowsy  Oxygen Supplementation: face mask            Patient transferred to: PACU    Handoff Report: Identifed the Patient, Identified the Reponsible Provider, Reviewed the pertinent medical history, Discussed the surgical course, Reviewed Intra-OP anesthesia mangement and issues during anesthesia, Set expectations for post-procedure period and Allowed opportunity for questions and acknowledgement of understanding      Vitals:  Vitals Value Taken Time   BP     Temp     Pulse 66 04/14/22 1059   Resp 21 04/14/22 1059   SpO2 99 % 04/14/22 1059   Vitals shown include unvalidated device data.    Electronically Signed By: ЮЛИЯ Walker CRNA  April 14, 2022  11:01 AM

## 2022-04-14 NOTE — INTERVAL H&P NOTE
"I have reviewed the surgical (or preoperative) H&P that is linked to this encounter, and examined the patient. There are no significant changes    Clinical Conditions Present on Arrival:  Clinically Significant Risk Factors Present on Admission                   # Obesity: Estimated body mass index is 30.61 kg/m  as calculated from the following:    Height as of this encounter: 1.854 m (6' 1\").    Weight as of this encounter: 105.2 kg (232 lb).       "

## 2022-04-14 NOTE — ANESTHESIA POSTPROCEDURE EVALUATION
Patient: Flakito Hilliard    Procedure: Procedure(s):  CYSTOSCOPY, WITH URETHRAL DILATION; LASER LITHOTRIPSY OF PROSTATE STONES BLADDER BIOPSY, FULGERATION.       Anesthesia Type:  General    Note:  Disposition: Outpatient   Postop Pain Control: Uneventful            Sign Out: Well controlled pain   PONV: No   Neuro/Psych: Uneventful            Sign Out: Acceptable/Baseline neuro status   Airway/Respiratory: Uneventful            Sign Out: Acceptable/Baseline resp. status   CV/Hemodynamics: Uneventful            Sign Out: Acceptable CV status; No obvious hypovolemia; No obvious fluid overload   Other NRE: NONE   DID A NON-ROUTINE EVENT OCCUR? No           Last vitals:  Vitals Value Taken Time   /80 04/14/22 1121   Temp 35.8  C (96.4  F) 04/14/22 1115   Pulse 68 04/14/22 1121   Resp 12 04/14/22 1121   SpO2 97 % 04/14/22 1122   Vitals shown include unvalidated device data.    Electronically Signed By: Tommy Pool MD, MD  April 14, 2022  11:59 AM

## 2022-04-14 NOTE — ANESTHESIA PREPROCEDURE EVALUATION
Anesthesia Pre-Procedure Evaluation    Patient: Flakito Hilliard   MRN: [id number] : 1960        Procedure : Procedure(s):  CYSTOSCOPY, WITH URETHRAL DILATION; LASER LITHOTRIPSY OF PROSTATE STONES          Past Medical History:   Diagnosis Date     Depressive disorder      H/O urethral stricture      Hypertension      Sleep apnea       Past Surgical History:   Procedure Laterality Date     BLADDER SURGERY       CYSTOSCOPY, LITHOTRIPSY, COMBINED N/A 2020    Procedure: Cystoscopy, laser lithotripsy of urethral stone with holmium laser;  Surgeon: Sarah Hill MD;  Location: [facility] OR     CYSTOSTOMY, INSERT TUBE SUPRAPUBIC, COMBINED N/A 2014    Procedure: COMBINED CYSTOSTOMY, INSERT TUBE SUPRAPUBIC;  Surgeon: Min Prasad MD;  Location: [facility] OR     GENITOURINARY SURGERY      prostate surgery for stones     LASER HOLMIUM CYSTOSCOPY, INTERNAL URETHROTOMY, COMBINED N/A 10/24/2014    Procedure: COMBINED LASER HOLMIUM CYSTOSCOPY, INTERNAL URETHROTOMY;  Surgeon: Valentin Villatoro MD;  Location: [facility] OR     URETHROPLASTY N/A 12/10/2014    Procedure: URETHROPLASTY;  Surgeon: Niraj Burger MD;  Location: [facility] OR     URETHROPLASTY WITH BUCCAL GRAFT N/A 2016    Procedure: URETHROPLASTY WITH BUCCAL GRAFT;  Surgeon: Niraj Burger MD;  Location: [facility] OR      Allergies   Allergen Reactions     Contrast Dye Difficulty breathing and Anaphylaxis      Social History     Tobacco Use     Smoking status: Never Smoker     Smokeless tobacco: Never Used   Substance Use Topics     Alcohol use: Yes     Comment: No drinking for 2 weeks      Wt Readings from Last 1 Encounters:   22 105.2 kg (232 lb)        Anesthesia Evaluation   Pt has had prior anesthetic. Type: General.        ROS/MED HX  ENT/Pulmonary:     (+) sleep apnea, uses CPAP,     Neurologic:  - neg neurologic ROS     Cardiovascular:     (+) hypertension-----    METS/Exercise Tolerance:     Hematologic:  - neg hematologic  ROS      Musculoskeletal:  - neg musculoskeletal ROS     GI/Hepatic:  - neg GI/hepatic ROS     Renal/Genitourinary:  - neg Renal ROS     Endo:  - neg endo ROS     Psychiatric/Substance Use:  - neg psychiatric ROS     Infectious Disease:  - neg infectious disease ROS     Malignancy:       Other:            Physical Exam    Airway  airway exam normal           Respiratory Devices and Support         Dental  no notable dental history         Cardiovascular   cardiovascular exam normal          Pulmonary   pulmonary exam normal                OUTSIDE LABS:  CBC:   Lab Results   Component Value Date    WBC 11.2 (H) 12/27/2021    WBC 9.4 07/16/2019    HGB 14.9 12/27/2021    HGB 16.1 07/16/2019    HCT 42.7 12/27/2021    HCT 46.6 07/16/2019     12/27/2021     07/16/2019     BMP:   Lab Results   Component Value Date     12/27/2021     07/20/2019    POTASSIUM 3.9 12/27/2021    POTASSIUM 3.8 07/20/2019    CHLORIDE 110 (H) 12/27/2021    CHLORIDE 109 07/20/2019    CO2 28 12/27/2021    CO2 27 07/20/2019    BUN 18 12/27/2021    BUN 19 07/20/2019    CR 1.22 12/27/2021    CR 1.10 07/20/2019    GLC 97 12/27/2021    GLC 97 07/20/2019     COAGS: No results found for: PTT, INR, FIBR  POC:   Lab Results   Component Value Date    BGM 95 09/09/2014     HEPATIC:   Lab Results   Component Value Date    ALBUMIN 3.8 05/19/2019    PROTTOTAL 7.5 05/19/2019    ALT 26 05/19/2019    AST 20 05/19/2019    ALKPHOS 77 05/19/2019    BILITOTAL 1.1 05/19/2019     OTHER:   Lab Results   Component Value Date    LACT 0.6 05/23/2016    A1C 5.3 10/11/2019    AMILCAR 9.4 12/27/2021    TSH 2.01 05/19/2019    T4 1.19 05/25/2016       Anesthesia Plan    ASA Status:  2   NPO Status:  NPO Appropriate    Anesthesia Type: General.     - Airway: LMA   Induction: Intravenous.   Maintenance: TIVA.        Consents    Anesthesia Plan(s) and associated risks, benefits, and realistic alternatives discussed. Questions answered and patient/representative(s)  expressed understanding.     - Discussed: Risks, Benefits and Alternatives for BOTH SEDATION and the PROCEDURE were discussed     - Discussed with:  Patient         Postoperative Care    Pain management: Oral pain medications.   PONV prophylaxis: Ondansetron (or other 5HT-3), Dexamethasone or Solumedrol     Comments:                Tommy Pool MD, MD

## 2022-04-15 ENCOUNTER — TELEPHONE (OUTPATIENT)
Dept: UROLOGY | Facility: CLINIC | Age: 62
End: 2022-04-15
Payer: MEDICARE

## 2022-04-15 NOTE — TELEPHONE ENCOUNTER
Spoke to pt's daughter Nayeli. She reports that pt is experiencing bladder spasms and noted sediment in urinary bag. Pt still has whittaker in place. Discussed bladder spasms are caused by whittaker and sediment due to procedure. Advised for pt to increase fluid intake and take tylenol, ibuprofen, and use heating pad for relief of spasms. She verbalized understanding. Confirmed that they have after hours line available. Reviewed urgent symptoms of uncontrolled pain and no urinary output. She will continue to monitor pt at home and call clinic back as needed.     Verenice Betancourt RN MSN

## 2022-04-15 NOTE — TELEPHONE ENCOUNTER
[facility] Call Center    Phone Message    May a detailed message be left on voicemail: yes     Reason for Call: Other: Pts daughter Nayeli called and stated that the pt is experiencing bladder spasms and she is wondering if this is normal also stated that in the cath bag it looks like there is easton stuff in there. Please call Nayeli back to further discuss, Thanks!    Action Taken: Message routed to:  [facility] Uro    Travel Screening: Not Applicable

## 2022-04-18 LAB
APPEARANCE STONE: NORMAL
COMPN STONE: NORMAL
SPECIMEN WT: 1815 MG

## 2022-04-19 LAB
PATH REPORT.COMMENTS IMP SPEC: ABNORMAL
PATH REPORT.COMMENTS IMP SPEC: ABNORMAL
PATH REPORT.COMMENTS IMP SPEC: YES
PATH REPORT.FINAL DX SPEC: ABNORMAL
PATH REPORT.GROSS SPEC: ABNORMAL
PATH REPORT.MICROSCOPIC SPEC OTHER STN: ABNORMAL
PATH REPORT.RELEVANT HX SPEC: ABNORMAL
PHOTO IMAGE: ABNORMAL

## 2022-04-19 PROCEDURE — 88305 TISSUE EXAM BY PATHOLOGIST: CPT | Mod: 26 | Performed by: PATHOLOGY

## 2022-04-20 ENCOUNTER — PRE VISIT (OUTPATIENT)
Dept: UROLOGY | Facility: CLINIC | Age: 62
End: 2022-04-20
Payer: MEDICARE

## 2022-04-20 NOTE — TELEPHONE ENCOUNTER
Reason for visit: Review pathology     Relevant information: bladder biopsy    Records/imaging/labs/orders: pathology available    Pt called: no need for a call      Genesis Hawkins CMA  4/20/2022  2:23 PM

## 2022-05-02 ENCOUNTER — VIRTUAL VISIT (OUTPATIENT)
Dept: UROLOGY | Facility: CLINIC | Age: 62
End: 2022-05-02
Payer: MEDICARE

## 2022-05-02 DIAGNOSIS — C67.0 MALIGNANT NEOPLASM OF TRIGONE OF URINARY BLADDER (H): ICD-10-CM

## 2022-05-02 DIAGNOSIS — N99.111 POSTPROCEDURAL BULBOUS URETHRAL STRICTURE: Primary | ICD-10-CM

## 2022-05-02 PROCEDURE — 99213 OFFICE O/P EST LOW 20 MIN: CPT | Mod: 95 | Performed by: UROLOGY

## 2022-05-02 NOTE — PROGRESS NOTES
HPI:  Flakito Hilliard is a 62 year old male being seen for review of pathology frolm procedure the other week.   accompanied by his daughter.    Voiding better but still feels like he is getting another UTI right now.     Exam:  There were no vitals taken for this visit.  GENERAL: Healthy, alert and no distress  EYES: Eyes grossly normal to inspection.  No discharge or erythema, or obvious scleral/conjunctival abnormalities.  RESP: No audible wheeze, cough, or visible cyanosis.  No visible retractions or increased work of breathing.    SKIN: Visible skin clear. No significant rash, abnormal pigmentation or lesions.  NEURO: Cranial nerves grossly intact.  Mentation and speech appropriate for age.  PSYCH: Mentation appears normal, affect normal/bright, judgement and insight intact, normal speech and appearance well-groomed.    Review of Imaging:  The following imaging exams were independently viewed and interpreted by me and discussed with patient:  none    Review of Labs:  The following labs were reviewed by me and discussed with the patient:  Recent Results (from the past 720 hour(s))   COVID-19 VIRUS (CORONAVIRUS) BY PCR (EXTERNAL RESULT)    Collection Time: 04/11/22  8:54 AM   Result Value Ref Range    COVID-19 Virus by PCR (External Result) Negative Negative/Not Detected   Creatinine (External Result)    Collection Time: 04/11/22  8:54 AM   Result Value Ref Range    Creatinine (External) 1.30 (A) 0.70 - 1.20 mg/dL    GFR Estimated (External) 56 (L) >60 mL/min/1.73m*2   Glucose (External Result)    Collection Time: 04/11/22  8:54 AM   Result Value Ref Range    Glucose (External) 115 (A) 70 - 99 mg/dL   Potassium (External Result)    Collection Time: 04/11/22  8:54 AM   Result Value Ref Range    Potassium (External) 4.6 3.4 - 5.1 mEq/L   Stone analysis    Collection Time: 04/14/22  9:11 AM   Result Value Ref Range    Stone Mass 1815 mg    Calculi Description See Note     Stone Composition See Note    Surgical  "Pathology Exam    Collection Time: 04/14/22  9:57 AM   Result Value Ref Range    Case Report       Surgical Pathology Report                         Case: [id number]                                  Authorizing Provider:  Niraj Burger MD  Collected:           04/14/2022 09:57 AM          Ordering Location:     [facility] OR  Received:            04/14/2022 10:56 AM                                 [place]                                                                  Pathologist:           Capri Ramos MD                                                   Specimens:   A) - Urinary Bladder, papillary tumor at left ureteral orfice                                       B) - Urinary Bladder, Sessile tumor at central trigone                                     Final Diagnosis       A. Bladder, papillary tumor at left ureteral orifice, biopsy:  - Non-invasive low grade papillary urothelial carcinoma  - No muscularis propria identified    B. Bladder, sessile tumor at central trigone, biopsy:  - Non-invasive low grade papillary urothelial carcinoma  - No muscularis propria identified      Clinical Information       The patient is a 62 year old man. Clinical diagnoses: Penile urethral stricture, prostatic urethral stones, bladder tumor. Procedure: Balloon dilation of urethra, laser lithotripsy of prostatic urethral stones, laser ablation of prostatic ductal diverticula, cystourethroscopy, biopsy and fulguration of bladder tumor (1 cm papillary bladder tumor at left trigone medial to UO, and flat tumor along the trigone and posterior wall.      Gross Description       A(1). Urinary Bladder, papillary tumor at left ureteral orfice:  The specimen is received in formalin with proper patient identification, labeled \"papulary tumor at the left ureteral orifice\".  It consists of 3 pieces of pink-tan soft tissue ranging from 0.1 to 0.2 cm in greatest dimension, which are wrapped and entirely " "submitted in cassette A1.     B(2). Urinary Bladder, Sessile tumor at central trigone:  The specimen is received in formalin with proper patient identification, labeled \"sessile in central trigone\".  The specimen consists of a 0.2 cm piece of pink-tan soft tissue, which is wrapped and entirely submitted in cassette B1.       Microscopic Description       Microscopic examination is performed.       MCRS Yes (A) N/A    Performing Labs       The technical component of this testing was completed at [facility]      Case Images           Assessment & Plan   1. Low grade UCC of bladder. No further treatment at this time. Will need surveillance cysto in 3 months  2. KEVIN. Should get UA and UC with PCP now. We will then manage him with suppressive anitbiotics -- we have tried everything else.   3. If he needs another balloon dilation of stricture then we can do that with the Optilume in the future.     Niraj Burger MD  [facility]OLOG[facility] CLINIC [place]      ==========================      Additional Coding Information:    Problems:  4 -- two or more stable chronic illnesses    Data Reviewed  Review of the result(s) of each unique test - path    Level of risk:  3 -- low risk (e.g., OTC medication or observation, minor surgery without risks)    Time spent:  15 minutes spent on the date of the encounter doing chart review, history and exam, documentation and further activities per the note              Ranjeet is a 62 year old who is being evaluated via a billable video visit.      How would you like to obtain your AVS? Mail a copy  If the video visit is dropped, the invitation should be resent by: Text to cell phone: [phone]  Will anyone else be joining your video visit? No    Video Start Time: 3:45  Video-Visit Details    Type of service:  Video Visit    Video End Time:4:00    Originating Location (pt. Location): Home    Distant Location " (provider location):  [facility] UROLOGY CLINIC [place]     Platform used for Video Visit: [employer]Well

## 2022-05-02 NOTE — LETTER
5/2/2022       RE: Flakito Hilliard  [address] [address] [place] [address]     Dear Colleague,    Thank you for referring your patient, Flakito Hilliard, to the [facility] [place] at [facility]. Please see a copy of my visit note below.    HPI:  Flakito Hilliard is a 62 year old male being seen for review of pathology frolm procedure the other week.   accompanied by his daughter.    Voiding better but still feels like he is getting another UTI right now.     Exam:  There were no vitals taken for this visit.  GENERAL: Healthy, alert and no distress  EYES: Eyes grossly normal to inspection.  No discharge or erythema, or obvious scleral/conjunctival abnormalities.  RESP: No audible wheeze, cough, or visible cyanosis.  No visible retractions or increased work of breathing.    SKIN: Visible skin clear. No significant rash, abnormal pigmentation or lesions.  NEURO: Cranial nerves grossly intact.  Mentation and speech appropriate for age.  PSYCH: Mentation appears normal, affect normal/bright, judgement and insight intact, normal speech and appearance well-groomed.    Review of Imaging:  The following imaging exams were independently viewed and interpreted by me and discussed with patient:  none    Review of Labs:  The following labs were reviewed by me and discussed with the patient:  Recent Results (from the past 720 hour(s))   COVID-19 VIRUS (CORONAVIRUS) BY PCR (EXTERNAL RESULT)    Collection Time: 04/11/22  8:54 AM   Result Value Ref Range    COVID-19 Virus by PCR (External Result) Negative Negative/Not Detected   Creatinine (External Result)    Collection Time: 04/11/22  8:54 AM   Result Value Ref Range    Creatinine (External) 1.30 (A) 0.70 - 1.20 mg/dL    GFR Estimated (External) 56 (L) >60 mL/min/1.73m*2   Glucose (External Result)    Collection Time: 04/11/22  8:54 AM   Result Value Ref Range    Glucose (External) 115 (A) 70 - 99 mg/dL    Potassium (External Result)    Collection Time: 04/11/22  8:54 AM   Result Value Ref Range    Potassium (External) 4.6 3.4 - 5.1 mEq/L   Stone analysis    Collection Time: 04/14/22  9:11 AM   Result Value Ref Range    Stone Mass 1815 mg    Calculi Description See Note     Stone Composition See Note    Surgical Pathology Exam    Collection Time: 04/14/22  9:57 AM   Result Value Ref Range    Case Report       Surgical Pathology Report                         Case: [id number]                                  Authorizing Provider:  Niraj Burger MD  Collected:           04/14/2022 09:57 AM          Ordering Location:     [facility] OR  Received:            04/14/2022 10:56 AM                                 [place]                                                                  Pathologist:           Capri Ramos MD                                                   Specimens:   A) - Urinary Bladder, papillary tumor at left ureteral orfice                                       B) - Urinary Bladder, Sessile tumor at central trigone                                     Final Diagnosis       A. Bladder, papillary tumor at left ureteral orifice, biopsy:  - Non-invasive low grade papillary urothelial carcinoma  - No muscularis propria identified    B. Bladder, sessile tumor at central trigone, biopsy:  - Non-invasive low grade papillary urothelial carcinoma  - No muscularis propria identified      Clinical Information       The patient is a 62 year old man. Clinical diagnoses: Penile urethral stricture, prostatic urethral stones, bladder tumor. Procedure: Balloon dilation of urethra, laser lithotripsy of prostatic urethral stones, laser ablation of prostatic ductal diverticula, cystourethroscopy, biopsy and fulguration of bladder tumor (1 cm papillary bladder tumor at left trigone medial to UO, and flat tumor along the trigone and posterior wall.      Gross Description       A(1). Urinary  "Bladder, papillary tumor at left ureteral orfice:  The specimen is received in formalin with proper patient identification, labeled \"papulary tumor at the left ureteral orifice\".  It consists of 3 pieces of pink-tan soft tissue ranging from 0.1 to 0.2 cm in greatest dimension, which are wrapped and entirely submitted in cassette A1.     B(2). Urinary Bladder, Sessile tumor at central trigone:  The specimen is received in formalin with proper patient identification, labeled \"sessile in central trigone\".  The specimen consists of a 0.2 cm piece of pink-tan soft tissue, which is wrapped and entirely submitted in cassette B1.       Microscopic Description       Microscopic examination is performed.       MCRS Yes (A) N/A    Performing Labs       The technical component of this testing was completed at [facility]      Case Images           Assessment & Plan   1. Low grade UCC of bladder. No further treatment at this time. Will need surveillance cysto in 3 months  2. KEVIN. Should get UA and UC with PCP now. We will then manage him with suppressive anitbiotics -- we have tried everything else.   3. If he needs another balloon dilation of stricture then we can do that with the Optilume in the future.     Niraj Burger MD  [facility] UROLOGY CLINIC [place]      ==========================      Additional Coding Information:    Problems:  4 -- two or more stable chronic illnesses    Data Reviewed  Review of the result(s) of each unique test - path    Level of risk:  3 -- low risk (e.g., OTC medication or observation, minor surgery without risks)    Time spent:  15 minutes spent on the date of the encounter doing chart review, history and exam, documentation and further activities per the note              Ranjeet is a 62 year old who is being evaluated via a billable video visit.      How would you like to obtain your AVS? Mail a copy  If the video " visit is dropped, the invitation should be resent by: Text to cell phone: [phone]  Will anyone else be joining your video visit? No    Video Start Time: 3:45  Video-Visit Details    Type of service:  Video Visit    Video End Time:4:00    Originating Location (pt. Location): Home    Distant Location (provider location):  [facility]OLOG[facility] [place]     Platform used for Video Visit: [email or web address]

## 2022-05-12 ENCOUNTER — PATIENT OUTREACH (OUTPATIENT)
Dept: UROLOGY | Facility: CLINIC | Age: 62
End: 2022-05-12
Payer: MEDICARE

## 2022-05-12 NOTE — TELEPHONE ENCOUNTER
Reached out to daughter Nayeli to talk about UA results. Nayeli stated that pts primary treated him with an antibiotic based on the UA results with a recommended follow up in 2 weeks (5/16). Writer called and spoke to the clinic staff at  in [place] to confirm the results of UA and question a UC; a UC was never ordered.      Nayeli also indicating that she was under the impression that Dr. Burger was going to prescribe a longer course of antibiotics; unsure what he was planning to order. Writer reached out to the urology team for guidance. Will continue to follow as needed.

## 2022-05-16 ENCOUNTER — PATIENT OUTREACH (OUTPATIENT)
Dept: UROLOGY | Facility: CLINIC | Age: 62
End: 2022-05-16
Payer: MEDICARE

## 2022-05-16 DIAGNOSIS — N39.0 URINARY TRACT INFECTION: Primary | ICD-10-CM

## 2022-05-16 NOTE — TELEPHONE ENCOUNTER
Reached out to pts daughter Nayeli to inform her that the team would like a new urine sample from pt to run a culture. Writer faxed new orders to pts clinic in [facility] (). Nayeli indicated she will get pt to clinic in the next day or two for a urine sample. Will continue to follow as needed.

## 2022-05-27 ENCOUNTER — TELEPHONE (OUTPATIENT)
Dept: UROLOGY | Facility: CLINIC | Age: 62
End: 2022-05-27
Payer: MEDICARE

## 2022-05-27 DIAGNOSIS — N39.0 URINARY TRACT INFECTION: Primary | ICD-10-CM

## 2022-05-27 RX ORDER — NITROFURANTOIN 25; 75 MG/1; MG/1
100 CAPSULE ORAL 2 TIMES DAILY
Qty: 10 CAPSULE | Refills: 0 | Status: SHIPPED | OUTPATIENT
Start: 2022-05-27

## 2022-05-27 NOTE — TELEPHONE ENCOUNTER
Spoke to Nayeli, pt's daughter. Informed her of UC results. Sent order to preferred pharmacy per protocol.   Daughter asking about starting pt on daily antibiotic. Will send request to Dr. Burger for input.     Verenice Betancourt RN MSN

## 2022-05-27 NOTE — TELEPHONE ENCOUNTER
[facility]    Phone Message    May a detailed message be left on voicemail: yes     Reason for Call: Requesting Results   Name/type of test: UA/UC  Date of test: 5/20/22  Was test done at a location other than [facility] (Please fill in the location if not [facility])?: Yes: [facility]      Action Taken: Message routed to:  [facility] Uro    Travel Screening: Not Applicable

## 2022-06-01 RX ORDER — AMOXICILLIN 500 MG/1
500 CAPSULE ORAL DAILY
Qty: 90 CAPSULE | Refills: 2 | Status: SHIPPED | OUTPATIENT
Start: 2022-06-01

## 2022-06-01 NOTE — TELEPHONE ENCOUNTER
Capri Del Valle MD Bratsch, Angie J, RN; Niraj Burger MD  Cc: Viviana Terrazas RN  Caller: Unspecified (5 days ago,  2:40 PM)  Thanks Verenice. Can you send him 500mg amoxicillin daily? You can give him a 90day supply with a few refills and make sure he comes in in a year     Spoke to pt's daughter Nayeli. Informed her of order. She verbalized understanding. Pt has follow up appointment in July.     Thank you,   Verenice Ty, LOPEZ MSN    Signed Prescriptions:                        Disp   Refills    nitroFURantoin macrocrystal-monohydrate (M*10 cap*0        Sig: Take 1 capsule (100 mg) by mouth 2 times daily  Authorizing Provider: NIRAJ BURGER  Ordering User: VERENICE TY    amoxicillin (AMOXIL) 500 MG capsule        90 cap*2        Sig: Take 1 capsule (500 mg) by mouth daily  Authorizing Provider: NIRAJ BURGER  Ordering User: VERENICE TY

## 2022-07-07 ENCOUNTER — PRE VISIT (OUTPATIENT)
Dept: UROLOGY | Facility: CLINIC | Age: 62
End: 2022-07-07

## 2022-07-07 NOTE — TELEPHONE ENCOUNTER
Reason for visit: Cystoscopy      Relevant information: Low grade UCC of bladder    Records/imaging/labs/orders: all records available    Pt called: no need for a call    At Rooming: angel Hawkins CMA  7/7/2022  2:53 PM

## 2022-07-18 ENCOUNTER — OFFICE VISIT (OUTPATIENT)
Dept: UROLOGY | Facility: CLINIC | Age: 62
End: 2022-07-18
Payer: MEDICARE

## 2022-07-18 VITALS
BODY MASS INDEX: 30.75 KG/M2 | SYSTOLIC BLOOD PRESSURE: 109 MMHG | HEART RATE: 92 BPM | HEIGHT: 73 IN | WEIGHT: 232 LBS | DIASTOLIC BLOOD PRESSURE: 71 MMHG

## 2022-07-18 DIAGNOSIS — Z87.448 HISTORY OF URETHRAL STRICTURE: Primary | ICD-10-CM

## 2022-07-18 PROCEDURE — 52000 CYSTOURETHROSCOPY: CPT | Mod: GC | Performed by: UROLOGY

## 2022-07-18 ASSESSMENT — PAIN SCALES - GENERAL: PAINLEVEL: MILD PAIN (3)

## 2022-07-18 NOTE — LETTER
7/18/2022       RE: Flakito Hilliard  [address] [address] [place] [address]     Dear Colleague,    Thank you for referring your patient, Flakito Hilliard, to the [facility] UROLOGY CLINIC [place] at [facility]. Please see a copy of my visit note below.    Male Cystoscopy Procedure Note     PRE-PROCEDURE DIAGNOSIS: penile urethral stricture, low-grade non-invasive papillary UCC  POST-PROCEDURE DIAGNOSIS: same  PROCEDURE: cystoscopy    HISTORY: Flakito Hilliard is a 62 year old man with history of penile urethral stricture, prostatic urethral tumor, history of recurrent UTI's, and low-grade UCC of the bladder; underwent balloon dilation of 10Fr penile urethral stricture, removal of prostatic urethral stones, and TURBT of incidental papillary bladder tumor on 4/14. Here for 3mo surveillance cystoscopy for bladder cancer and follow-up for urethral stricture.     At last appointment, he was started on suppressive antibiotic therapy with amoxicillin for recurrent UTI's. Reports no recurrence of UTI's on suppressive therapy, does experience intermittent urinary frequency. Also reporting some recurrence of obstructive symptoms after stricture dilation.     REVIEW OF OFFICE/LAB STUDIES:      5/20 urinalysis - + WBC, bacteria, small leuk est; - nitrites, RBC  Urine Culture  >100,000 cfu/mL Enterococcus faecalis Abnormal       Enterococcus faecalis Ampicillin ($) 1 ug/mL: Sensitive   Enterococcus faecalis Nitrofurantoin ($) <=16 ug/mL: Sensitive   Enterococcus faecalis Tetracycline ($) <=0.5 ug/mL: Sensitive   Enterococcus faecalis Vancomycin ($$)(PO) / ($)(IV) 1 ug/mL: Sensitive       DESCRIPTION OF PROCEDURE:  After informed consent was obtained, the patient was brought to the procedure room where he was placed in the supine position with all pressure points well padded.  The penis and scrotum were prepped and draped in a sterile fashion. A flexible cystoscope was  introduced through a well-lubricated urethra.  The penile urethra demonstrated an annular narrowing (~14Fr) in the mid-urethra that was passed with gentle pressure from the scope. The urinary sphincter was intact. The prostate demonstrated fibrotic septae and multiple deep diverticuli that were all visually stone free, with small amounts of mucus that were flushed out with irrigation. Bladder neck was open. The media upon entry into the bladder was cloudy with wispy mucus; we irrigated to clear the visualization. The bladder demonstrated multiple diverticuli and heavy trabeculation; no evidence of recurrence of previously resected papillary tumor seen near left ureteral orifice. The urethra was examined once more as the scope was removed and the previously seen annular narrowing had been dilated.     ASSESSMENT AND PLAN:  62 year old man with NMIBC, urethral stricture, recurrent UTI's.     1. Low grade UCC of bladder - no evidence of recurrence on cystoscopy today. Plan for next surveillance cystoscopy in 6 months.   2. Recurrent UTI - continue low-dose suppressive therapy with amoxicillin.   3. Penile urethral stricture - evidence of mild recurrence today, 14Fr narrowing dilated with scope. Plan for surveillance cystoscopy in 6 months to monitor for recurrence; patient knows to reach out sooner if obstructive sx acutely worsen. If he requires another balloon dilation of stricture in the future, then this will be performed with Optilume.        Attestation signed by Niraj Burger MD at 7/18/2022  4:46 PM:  =======================================================  As the attending surgeon I, Niraj Burger, interviewed and examined the patient. The plan was developed between me and the patient. My findings and plan are as stated by the resident.    As the attending surgeon I, Niraj Burger, was present and scrubbed throughout the procedure.      Niraj Burger MD

## 2022-07-18 NOTE — PROGRESS NOTES
Male Cystoscopy Procedure Note     PRE-PROCEDURE DIAGNOSIS: penile urethral stricture, low-grade non-invasive papillary UCC  POST-PROCEDURE DIAGNOSIS: same  PROCEDURE: cystoscopy    HISTORY: Flakito Hilliard is a 62 year old man with history of penile urethral stricture, prostatic urethral tumor, history of recurrent UTI's, and low-grade UCC of the bladder; underwent balloon dilation of 10Fr penile urethral stricture, removal of prostatic urethral stones, and TURBT of incidental papillary bladder tumor on 4/14. Here for 3mo surveillance cystoscopy for bladder cancer and follow-up for urethral stricture.     At last appointment, he was started on suppressive antibiotic therapy with amoxicillin for recurrent UTI's. Reports no recurrence of UTI's on suppressive therapy, does experience intermittent urinary frequency. Also reporting some recurrence of obstructive symptoms after stricture dilation.     REVIEW OF OFFICE/LAB STUDIES:      5/20 urinalysis - + WBC, bacteria, small leuk est; - nitrites, RBC  Urine Culture  >100,000 cfu/mL Enterococcus faecalis Abnormal       Enterococcus faecalis Ampicillin ($) 1 ug/mL: Sensitive   Enterococcus faecalis Nitrofurantoin ($) <=16 ug/mL: Sensitive   Enterococcus faecalis Tetracycline ($) <=0.5 ug/mL: Sensitive   Enterococcus faecalis Vancomycin ($$)(PO) / ($)(IV) 1 ug/mL: Sensitive       DESCRIPTION OF PROCEDURE:  After informed consent was obtained, the patient was brought to the procedure room where he was placed in the supine position with all pressure points well padded.  The penis and scrotum were prepped and draped in a sterile fashion. A flexible cystoscope was introduced through a well-lubricated urethra.  The penile urethra demonstrated an annular narrowing (~14Fr) in the mid-urethra that was passed with gentle pressure from the scope. The urinary sphincter was intact. The prostate demonstrated fibrotic septae and multiple deep diverticuli that were all visually stone  free, with small amounts of mucus that were flushed out with irrigation. Bladder neck was open. The media upon entry into the bladder was cloudy with wispy mucus; we irrigated to clear the visualization. The bladder demonstrated multiple diverticuli and heavy trabeculation; no evidence of recurrence of previously resected papillary tumor seen near left ureteral orifice. The urethra was examined once more as the scope was removed and the previously seen annular narrowing had been dilated.     ASSESSMENT AND PLAN:  62 year old man with NMIBC, urethral stricture, recurrent UTI's.     1. Low grade UCC of bladder - no evidence of recurrence on cystoscopy today. Plan for next surveillance cystoscopy in 6 months.   2. Recurrent UTI - continue low-dose suppressive therapy with amoxicillin.   3. Penile urethral stricture - evidence of mild recurrence today, 14Fr narrowing dilated with scope. Plan for surveillance cystoscopy in 6 months to monitor for recurrence; patient knows to reach out sooner if obstructive sx acutely worsen. If he requires another balloon dilation of stricture in the future, then this will be performed with Optilume.

## 2022-07-18 NOTE — NURSING NOTE
"Chief Complaint   Patient presents with     Cystoscopy       Blood pressure 109/71, pulse 92, height 1.854 m (6' 1\"), weight 105.2 kg (232 lb). Body mass index is 30.61 kg/m .    Patient Active Problem List   Diagnosis     Acute bacterial prostatitis     Lower urinary tract infectious disease     severe HTN (hypertension)     Suicidal ideation     Major depressive disorder, recurrent episode, moderate (H)     Urinary retention     Urethral stricture     PATRICE (obstructive sleep apnea)     Complicated UTI (urinary tract infection)     RIO (generalized anxiety disorder)     At risk for cardiovascular event     Elevated hemoglobin (H)     Bladder stones     Erectile dysfunction, unspecified erectile dysfunction type     Severe recurrent major depression without psychotic features (H)     Urethral stone       Allergies   Allergen Reactions     Contrast Dye Difficulty breathing and Anaphylaxis       Current Outpatient Medications   Medication Sig Dispense Refill     amLODIPine (NORVASC) 10 MG tablet Take 1 tablet (10 mg) by mouth daily 90 tablet 0     ARIPiprazole (ABILIFY) 2 MG tablet Take 2 mg by mouth daily       atorvastatin (LIPITOR) 20 MG tablet TAKE ONE TABLET BY MOUTH EVERY DAY. 90 tablet 3     clonazePAM (KLONOPIN) 0.5 MG tablet Take 0.5 mg by mouth       diltiazem ER COATED BEADS (CARDIZEM CD) 360 MG 24 hr capsule Take 1 capsule (360 mg) by mouth daily 90 capsule 0     docusate sodium (COLACE) 100 MG capsule TAKE ONE CAPSULE BY MOUTH TWICE DAILY  60 capsule 11     DULoxetine (CYMBALTA) 60 MG capsule Take 60 mg by mouth       hydrALAZINE (APRESOLINE) 25 MG tablet Take 0.5 tablets (12.5 mg) by mouth every 6 hours as needed (for SBP >175 or DBP >110) 30 tablet 0     hydrOXYzine (VISTARIL) 50 MG capsule TAKE 1 CAP BY MOUTH DAILY IN THE MORNING; MAY TAKE 1 ADDITIONAL CAP DAILY AS NEEDED       lamoTRIgine (LAMICTAL) 100 MG tablet Take 100 mg by mouth       LATUDA 80 MG TABS tablet TAKE 1 TABLET BY MOUTH ONCE DAILY TAKE " WITH FOOD       lisinopril (PRINIVIL/ZESTRIL) 20 MG tablet Take 1 tablet (20 mg) by mouth daily 90 tablet 0     nitroFURantoin macrocrystal-monohydrate (MACROBID) 100 MG capsule Take 1 capsule (100 mg) by mouth 2 times daily 10 capsule 0     oxybutynin (DITROPAN) 5 MG tablet Take 1 tablet (5 mg) by mouth 2 times daily 60 tablet 0     tamsulosin (FLOMAX) 0.4 MG capsule Take 1 capsule (0.4 mg) by mouth daily 90 capsule 0     traZODone (DESYREL) 100 MG tablet Take 1 tablet (100 mg) by mouth nightly as needed for sleep 30 tablet 1     acetaminophen (TYLENOL) 325 MG tablet Take 2 tablets (650 mg) by mouth every 4 hours as needed for mild pain (Patient not taking: Reported on 7/18/2022) 50 tablet 0     amoxicillin (AMOXIL) 500 MG capsule Take 1 capsule (500 mg) by mouth daily (Patient not taking: Reported on 7/18/2022) 90 capsule 2     amoxicillin (AMOXIL) 500 MG capsule Take 500 mg by mouth (Patient not taking: Reported on 7/18/2022)       buPROPion 450 MG TB24 Take 450 mg by mouth every morning (Patient not taking: Reported on 7/18/2022) 30 tablet 1     busPIRone (BUSPAR) 15 MG tablet Increase if anxiety not doing well: 0.5 tab 2x/day x 3 days, then 1 tab 2x/day x 3 days, then 1.5 tab 2x/day x 3 days, then 2 tab 2x/day. (Patient not taking: Reported on 7/18/2022) 90 tablet 0     diphenhydrAMINE (BENADRYL) 50 MG capsule Take 1 capsule (50 mg) by mouth every 6 hours as needed for itching or allergies Administer 30 min - 2 hours pre - IV contrast injection (Patient not taking: Reported on 7/18/2022) 1 capsule 0     lithium (ESKALITH) 300 MG tablet Take 300 mg by mouth At Bedtime (Patient not taking: Reported on 7/18/2022)       methylPREDNISolone (MEDROL) 32 MG tablet Take 1 tablet (32 mg) by mouth daily 12 hours prior to the procedure with IV contrast (Patient not taking: Reported on 7/18/2022) 1 tablet 0     senna-docusate (SENOKOT-S/PERICOLACE) 8.6-50 MG tablet Take 1-2 tablets by mouth 2 times daily (Patient not  taking: Reported on 2022) 30 tablet 0       Social History     Tobacco Use     Smoking status: Never Smoker     Smokeless tobacco: Never Used   Substance Use Topics     Alcohol use: Yes     Comment: No drinking for 2 weeks     Drug use: No       Invasive Procedure Safety Checklist:    Procedure: Cystoscopy    Action: Complete sections and checkboxes as appropriate.    Pre-procedure:  1. Patient ID Verified with 2 identifiers (Marquita and  or MRN) : YES    2. Procedure and site verified with patient/designee (when able) : YES    3. Accurate consent documentation in medical record : YES    4. H&P (or appropriate assessment) documented in medical record : N/A  H&P must be up to 30 days prior to procedure an updated within 24 hours of                 Procedure as applicable.     5. Relevant diagnostic and radiology test results appropriately labeled and displayed as applicable : YES    6. Blood products, implants, devices, and/or special equipment available for the procedure as applicable : YES    7. Procedure site(s) marked with provider initials [Exclusions: none] : NO    8. Marking not required. Reason : Yes  Procedure does not require site marking    Time Out:     Time-Out performed immediately prior to starting procedure, including verbal and active participation of all team members addressing: YES    1. Correct patient identity.  2. Confirmed that the correct side and site are marked.  3. An accurate procedure to be done.  4. Agreement on the procedure to be done.  5. Correct patient position.  6. Relevant images and results are properly labeled and appropriately displayed.  7. The need to administer antibiotics or fluids for irrigation purposes during the procedure as applicable.  8. Safety precautions based on patient history or medication use.    During Procedure: Verification of correct person, site, and procedure occurs any time the responsibility for care of the patient is transferred to another member  of the care team.      Norah Santillan  7/18/2022  2:11 PM

## 2023-03-21 ENCOUNTER — PRE VISIT (OUTPATIENT)
Dept: UROLOGY | Facility: CLINIC | Age: 63
End: 2023-03-21
Payer: MEDICARE

## 2023-03-21 NOTE — TELEPHONE ENCOUNTER
Reason for visit: Cystoscopy       Relevant information: Low grade UCC of bladder     Records/imaging/labs/orders: all records available     Pt called: no need for a call     At Rooming: angel Hawkins CMA  3/21/2023  1:28 PM

## 2023-04-03 ENCOUNTER — OFFICE VISIT (OUTPATIENT)
Dept: UROLOGY | Facility: CLINIC | Age: 63
End: 2023-04-03
Attending: UROLOGY
Payer: MEDICARE

## 2023-04-03 DIAGNOSIS — Z85.51 HISTORY OF BLADDER CANCER: ICD-10-CM

## 2023-04-03 DIAGNOSIS — Z87.448 HISTORY OF URETHRAL STRICTURE: Primary | ICD-10-CM

## 2023-04-03 PROCEDURE — 52000 CYSTOURETHROSCOPY: CPT | Performed by: UROLOGY

## 2023-04-03 NOTE — PATIENT INSTRUCTIONS
"Please follow up in six months for cystoscopy.     It was a pleasure meeting with you today.  Thank you for allowing me and my team the privilege of caring for you today.  YOU are the reason we are here, and I truly hope we provided you with the excellent service you deserve.  Please let us know if there is anything else we can do for you so that we can be sure you are leaving completely satisfied with your care experience.        AFTER YOUR CYSTOSCOPY        You have just completed a cystoscopy, or \"cysto\", which allowed your physician to learn more about your bladder (or to remove a stent placed after surgery). We suggest that you continue to avoid caffeine, fruit juice, and alcohol for the next 24 hours, however, you are encouraged to return to your normal activities.         A few things that are considered normal after your cystoscopy:     * Small amount of bleeding (or spotting) that clears within the next 24 hours     * Slight burning sensation with urination     * Sensation to of needing to avoid more frequently     * The feeling of \"air\" in your urine     * Mild discomfort that is relieved with Tylenol        Please contact our office promptly if you:     * Develop a fever above 101 degrees     * Are unable to urinate     * Develop bright red blood that does not stop     * Severe pain or swelling         Please contact our office with any concerns or questions @[email or web address].  "

## 2023-04-03 NOTE — PROGRESS NOTES
Male Cystoscopy Procedure Note     PRE-PROCEDURE DIAGNOSIS: Superficial bladder cancer and distal bulbar urethral stricture  POST-PROCEDURE DIAGNOSIS: No evidence of bladder cancer currently.  Mild recurrence of bulbar urethral stricture  PROCEDURE: cystoscopy and dilation of urethral stricture    HISTORY: Flakito Hilliard is a 63 year old man with complex history of bulbar urethral stricture, recurrent urinary tract infections and superficial bladder cancer.  Last procedure with me was 1 year ago at which time I did balloon dilation of bulbar urethral stricture, laser of prostatic urethral stones and biopsy of bladder tumor.  He reports that since then his symptoms have been much improved.  His only complaint is occasional hesitancy in the morning but his symptoms throughout the day have improved a lot.  He is very happy    REVIEW OF OFFICE STUDIES:        DESCRIPTION OF PROCEDURE:  After informed consent was obtained, the patient was brought to the procedure room where he was placed in the supine position with all pressure points well padded.  The penis and scrotum were prepped and draped in a sterile fashion. A flexible cystoscope was introduced through a well-lubricated urethra.  The anterior urethra was significant for a 14-15 Pashto bulbar urethral stricture in the distal bulbar urethra that dilated easily with the scope.. The urinary sphincter was intact. The prostate demonstrated no recurrence of stones. Bladder neck was open. Bladder was significant moderate trabeculation and for several diverticuli, cellules.there were no tumors.  There was a red patch that looked like cystitis cystica at the dome of the bladder.  We will keep an eye on this in future cystoscopies.  It did not look like it warranted biopsying today.  The flexible cystoscope was removed and the findings were described to the patient.   ASSESSMENT AND PLAN:  63 year old man with multiple urologic problems as follows:  1. recurrent urinary  tract infections under good control on chronic oral antibiotic prophylaxis  2.  Bulbar urethral stricture which is pretty open right now after balloon dilation last year  3.  Superficial bladder cancer with no evidence of recurrence      He can follow-up in 6 months for another cystoscopy to keep an eye on the stricture and the superficial bladder cancer.  For now I do not want to push it out further because he has had so many troubles in the past and things are finally going well.

## 2023-04-03 NOTE — LETTER
4/3/2023       RE: Flakito Hilliard  [id number] [address] [address]     Dear Colleague,    Thank you for referring your patient, Flakito Hilliard, to the [facility] [place] at [facility]. Please see a copy of my visit note below.      Male Cystoscopy Procedure Note     PRE-PROCEDURE DIAGNOSIS: Superficial bladder cancer and distal bulbar urethral stricture  POST-PROCEDURE DIAGNOSIS: No evidence of bladder cancer currently.  Mild recurrence of bulbar urethral stricture  PROCEDURE: cystoscopy and dilation of urethral stricture    HISTORY: Flakito Hilliard is a 63 year old man with complex history of bulbar urethral stricture, recurrent urinary tract infections and superficial bladder cancer.  Last procedure with me was 1 year ago at which time I did balloon dilation of bulbar urethral stricture, laser of prostatic urethral stones and biopsy of bladder tumor.  He reports that since then his symptoms have been much improved.  His only complaint is occasional hesitancy in the morning but his symptoms throughout the day have improved a lot.  He is very happy    REVIEW OF OFFICE STUDIES:        DESCRIPTION OF PROCEDURE:  After informed consent was obtained, the patient was brought to the procedure room where he was placed in the supine position with all pressure points well padded.  The penis and scrotum were prepped and draped in a sterile fashion. A flexible cystoscope was introduced through a well-lubricated urethra.  The anterior urethra was significant for a 14-15 Finnish bulbar urethral stricture in the distal bulbar urethra that dilated easily with the scope.. The urinary sphincter was intact. The prostate demonstrated no recurrence of stones. Bladder neck was open. Bladder was significant moderate trabeculation and for several diverticuli, cellules.there were no tumors.  There was a red patch that looked like cystitis cystica at the  dome of the bladder.  We will keep an eye on this in future cystoscopies.  It did not look like it warranted biopsying today.  The flexible cystoscope was removed and the findings were described to the patient.   ASSESSMENT AND PLAN:  63 year old man with multiple urologic problems as follows:  1. recurrent urinary tract infections under good control on chronic oral antibiotic prophylaxis  2.  Bulbar urethral stricture which is pretty open right now after balloon dilation last year  3.  Superficial bladder cancer with no evidence of recurrence      He can follow-up in 6 months for another cystoscopy to keep an eye on the stricture and the superficial bladder cancer.  For now I do not want to push it out further because he has had so many troubles in the past and things are finally going well.            Again, thank you for allowing me to participate in the care of your patient.      Sincerely,    Niraj Burger MD

## 2023-08-23 ENCOUNTER — PRE VISIT (OUTPATIENT)
Dept: UROLOGY | Facility: CLINIC | Age: 63
End: 2023-08-23
Payer: MEDICARE

## 2023-08-23 NOTE — TELEPHONE ENCOUNTER
Reason for visit: cystoscopy     Dx/Hx/Sx: history of urethral stricture     Records/imaging/labs/orders: in epic    At Rooming: standard

## 2023-09-01 ENCOUNTER — OFFICE VISIT (OUTPATIENT)
Dept: UROLOGY | Facility: CLINIC | Age: 63
End: 2023-09-01
Payer: MEDICARE

## 2023-09-01 VITALS
HEART RATE: 108 BPM | BODY MASS INDEX: 30.61 KG/M2 | DIASTOLIC BLOOD PRESSURE: 109 MMHG | SYSTOLIC BLOOD PRESSURE: 145 MMHG | HEIGHT: 73 IN

## 2023-09-01 DIAGNOSIS — Z85.51 PERSONAL HISTORY OF MALIGNANT NEOPLASM OF BLADDER: ICD-10-CM

## 2023-09-01 DIAGNOSIS — N35.819 OTHER STRICTURE OF URETHRA IN MALE: Primary | ICD-10-CM

## 2023-09-01 PROCEDURE — 52000 CYSTOURETHROSCOPY: CPT | Performed by: UROLOGY

## 2023-09-01 RX ORDER — CIPROFLOXACIN 500 MG/1
500 TABLET, FILM COATED ORAL
COMMUNITY
Start: 2023-08-24 | End: 2023-09-05

## 2023-09-01 ASSESSMENT — PAIN SCALES - GENERAL: PAINLEVEL: SEVERE PAIN (6)

## 2023-09-01 NOTE — PATIENT INSTRUCTIONS
"Please schedule a 6 month cystoscopy.    It was a pleasure meeting with you today.  Thank you for allowing me and my team the privilege of caring for you today.  YOU are the reason we are here, and I truly hope we provided you with the excellent service you deserve.  Please let us know if there is anything else we can do for you so that we can be sure you are leaving completely satisfied with your care experience.        AFTER YOUR CYSTOSCOPY        You have just completed a cystoscopy, or \"cysto\", which allowed your physician to learn more about your bladder (or to remove a stent placed after surgery). We suggest that you continue to avoid caffeine, fruit juice, and alcohol for the next 24 hours, however, you are encouraged to return to your normal activities.         A few things that are considered normal after your cystoscopy:     * Small amount of bleeding (or spotting) that clears within the next 24 hours     * Slight burning sensation with urination     * Sensation to of needing to avoid more frequently     * The feeling of \"air\" in your urine     * Mild discomfort that is relieved with Tylenol        Please contact our office promptly if you:     * Develop a fever above 101 degrees     * Are unable to urinate     * Develop bright red blood that does not stop     * Severe pain or swelling         Please contact our office with any concerns or questions @[email or web address].  "

## 2023-09-01 NOTE — LETTER
9/1/2023       RE: Flakito Hilliard  [address] [address] [facility] [address]     Dear Colleague,    Thank you for referring your patient, Flakito Hilliard, to the [facility] [place] at [facility]. Please see a copy of my visit note below.      Male Cystoscopy Procedure Note      PRE-PROCEDURE DIAGNOSIS: penile urethral stricture, low-grade non-invasive papillary UCC  POST-PROCEDURE DIAGNOSIS: same  PROCEDURE: cystoscopy     HISTORY: Flakito Hilliard is a 63 year old man with complex history of bulbar urethral stricture, recurrent urinary tract infections and superficial bladder cancer.  Last procedure was 4/3/2023 for cystoscopy    He had a recent UTI and is being treated on abx.      DESCRIPTION OF PROCEDURE:  After informed consent was obtained, the patient was brought to the procedure room where he was placed in the supine position with all pressure points well padded.  The penis and scrotum were prepped and draped in a sterile fashion. A flexible cystoscope was introduced through a well-lubricated urethra.  The penile urethra demonstrated an annular narrowing (~14Fr) in the mid-urethra that was passed with gentle pressure from the scope. The urinary sphincter was intact. The prostate demonstrated fibrotic septae and multiple deep posterior/lateral diverticuli that were all visually stone free, with some  mucous which was flushed out with irrigation. Bladder neck was open. The media upon entry into the bladder was cloudy with wispy mucus; we irrigated to clear the visualization. The bladder demonstrated multiple diverticuli and heavy trabeculation. No evidence of recurrence.     ASSESSMENT AND PLAN:  62 year old man with NMIBC, urethral stricture, recurrent UTI's.      Low grade UCC of bladder - no evidence of recurrence on cystoscopy today. Plan for next surveillance cystoscopy in 6 months.   Recurrent UTI - continue low-dose suppressive  therapy with amoxicillin.   Penile urethral stricture - evidence of mild recurrence today, 14Fr narrowing dilated with scope. Plan for surveillance cystoscopy in 6 months to monitor for recurrence; patient knows to reach out sooner if obstructive sx acutely worsen. If he requires another balloon dilation of stricture in the future, then this will be performed with Optilume.      Gerardo Suero MD

## 2023-09-01 NOTE — PROGRESS NOTES
Male Cystoscopy Procedure Note      PRE-PROCEDURE DIAGNOSIS: penile urethral stricture, low-grade non-invasive papillary UCC  POST-PROCEDURE DIAGNOSIS: same  PROCEDURE: cystoscopy     HISTORY: Flakito Hilliard is a 63 year old man with complex history of bulbar urethral stricture, recurrent urinary tract infections and superficial bladder cancer.  Last procedure was 4/3/2023 for cystoscopy    He had a recent UTI and is being treated on abx.      DESCRIPTION OF PROCEDURE:  After informed consent was obtained, the patient was brought to the procedure room where he was placed in the supine position with all pressure points well padded.  The penis and scrotum were prepped and draped in a sterile fashion. A flexible cystoscope was introduced through a well-lubricated urethra.  The penile urethra demonstrated an annular narrowing (~14Fr) in the mid-urethra that was passed with gentle pressure from the scope. The urinary sphincter was intact. The prostate demonstrated fibrotic septae and multiple deep posterior/lateral diverticuli that were all visually stone free, with some  mucous which was flushed out with irrigation. Bladder neck was open. The media upon entry into the bladder was cloudy with wispy mucus; we irrigated to clear the visualization. The bladder demonstrated multiple diverticuli and heavy trabeculation. No evidence of recurrence.     ASSESSMENT AND PLAN:  62 year old man with NMIBC, urethral stricture, recurrent UTI's.      1. Low grade UCC of bladder - no evidence of recurrence on cystoscopy today. Plan for next surveillance cystoscopy in 6 months.   2. Recurrent UTI - continue low-dose suppressive therapy with amoxicillin.   3. Penile urethral stricture - evidence of mild recurrence today, 14Fr narrowing dilated with scope. Plan for surveillance cystoscopy in 6 months to monitor for recurrence; patient knows to reach out sooner if obstructive sx acutely worsen. If he requires another balloon dilation of  stricture in the future, then this will be performed with Optilume.      Gerardo Suero MD

## 2023-12-10 NOTE — TELEPHONE ENCOUNTER
Family practice doesn't deal with CPAP equipment/settings/supplies so please place referral to sleep medicine wherever closest to his current residential setting.  If this is was sole reason for Aug 16th appt with me, then please cancel.   [facility]

## 2023-12-18 NOTE — OR NURSING
"Subjective:      Patient ID: Flex Thornton is a 29 y.o. female.    Vitals:  height is 5' 2" (1.575 m) and weight is 77.1 kg (170 lb). Her oral temperature is 97.8 °F (36.6 °C). Her blood pressure is 119/85 and her pulse is 89. Her respiration is 18 and oxygen saturation is 96%.     Chief Complaint: Sinus Problem    Pt is coming in today with a sore throat, sinus pressure and body aches that began Saturday. Pt is unaware of any recent exposure to an illness.  She is tried multiple medications over-the-counter with no relief.    Sinus Problem  This is a new problem. The current episode started in the past 7 days. The problem is unchanged. There has been no fever. Her pain is at a severity of 9/10. The pain is severe. Associated symptoms include congestion, coughing, ear pain, headaches, sinus pressure and a sore throat. Treatments tried: benadryl, tylenol severe, theraflu. The treatment provided no relief.       HENT:  Positive for ear pain, congestion, sinus pressure and sore throat.    Respiratory:  Positive for cough.    Neurological:  Positive for headaches.      Objective:     Physical Exam   Constitutional: She is oriented to person, place, and time. She appears well-developed. She is cooperative.  Non-toxic appearance. She appears ill. No distress.   HENT:   Head: Normocephalic and atraumatic.   Ears:   Right Ear: External ear and ear canal normal. A middle ear effusion is present.   Left Ear: External ear and ear canal normal. A middle ear effusion is present.   Nose: Mucosal edema, rhinorrhea and congestion present. No nasal deformity. No epistaxis. Right sinus exhibits maxillary sinus tenderness and frontal sinus tenderness. Left sinus exhibits maxillary sinus tenderness and frontal sinus tenderness.   Mouth/Throat: Uvula is midline and mucous membranes are normal. No trismus in the jaw. Normal dentition. No uvula swelling. Posterior oropharyngeal erythema present. No oropharyngeal exudate or posterior " Patient adequate for discharge. Report called to inpatient RNYannick. VSS, A/O, IV removed. Discharged with staff at this time.    oropharyngeal edema.   Eyes: Conjunctivae and lids are normal. No scleral icterus.   Neck: Trachea normal and phonation normal. Neck supple. No edema present. No erythema present. No neck rigidity present.   Cardiovascular: Normal rate, regular rhythm, normal heart sounds and normal pulses.   Pulmonary/Chest: Effort normal and breath sounds normal. No respiratory distress. She has no decreased breath sounds. She has no rhonchi.   Abdominal: Normal appearance.   Musculoskeletal: Normal range of motion.         General: No deformity. Normal range of motion.   Neurological: She is alert and oriented to person, place, and time. She exhibits normal muscle tone. Coordination normal.   Skin: Skin is warm, dry, intact, not diaphoretic and not pale.   Psychiatric: Her speech is normal and behavior is normal. Judgment and thought content normal.   Nursing note and vitals reviewed.      Assessment:     1. Acute sinusitis, recurrence not specified, unspecified location    2. Acute pharyngitis, unspecified etiology    3. Otitis media with effusion, bilateral        Plan:     Results for orders placed or performed in visit on 12/18/23   POCT Influenza A/B MOLECULAR   Result Value Ref Range    POC Molecular Influenza A Ag Negative Negative, Not Reported    POC Molecular Influenza B Ag Negative Negative, Not Reported     Acceptable Yes         Acute sinusitis, recurrence not specified, unspecified location  -     POCT Influenza A/B MOLECULAR    Acute pharyngitis, unspecified etiology    Otitis media with effusion, bilateral    Other orders  -     dexAMETHasone injection 6 mg      Pt insisting on steroid injection.  Complications and side effects discussed but was still like to proceed with injection. Last inj 1 yr ago.     Medical Decision Making:   History:   Old Medical Records: I decided to obtain old medical records.  Old Records Summarized: records from clinic visits.  Clinical Tests:   Lab Tests: Ordered and  Reviewed       <> Summary of Lab: Influenza swab obtained and negative

## 2024-02-21 ENCOUNTER — PRE VISIT (OUTPATIENT)
Dept: UROLOGY | Facility: CLINIC | Age: 64
End: 2024-02-21
Payer: MEDICARE

## 2024-02-21 NOTE — TELEPHONE ENCOUNTER
Reason for visit: surveillance cystoscopy per hansel last note      Dx/Hx/Sx: stricture in male     Records/imaging/labs/orders: in epic     At Rooming: consent

## 2024-03-01 ENCOUNTER — OFFICE VISIT (OUTPATIENT)
Dept: UROLOGY | Facility: CLINIC | Age: 64
End: 2024-03-01
Payer: MEDICARE

## 2024-03-01 VITALS
DIASTOLIC BLOOD PRESSURE: 103 MMHG | SYSTOLIC BLOOD PRESSURE: 143 MMHG | HEART RATE: 100 BPM | HEIGHT: 73 IN | BODY MASS INDEX: 38 KG/M2 | WEIGHT: 286.7 LBS

## 2024-03-01 DIAGNOSIS — N39.0 RECURRENT UTI: Primary | ICD-10-CM

## 2024-03-01 DIAGNOSIS — N99.114 POSTPROCEDURAL MALE URETHRAL STRICTURE: ICD-10-CM

## 2024-03-01 DIAGNOSIS — C67.8 MALIGNANT NEOPLASM OF OVERLAPPING SITES OF BLADDER (H): ICD-10-CM

## 2024-03-01 PROCEDURE — 52000 CYSTOURETHROSCOPY: CPT | Performed by: UROLOGY

## 2024-03-01 ASSESSMENT — PAIN SCALES - GENERAL: PAINLEVEL: NO PAIN (0)

## 2024-03-01 NOTE — PROGRESS NOTES
Male Cystoscopy Procedure Note      PRE-PROCEDURE DIAGNOSIS: penile urethral stricture, low-grade non-invasive papillary UCC  POST-PROCEDURE DIAGNOSIS: same  PROCEDURE: cystoscopy     HISTORY: Flakito Hilliard is a 64 year old man with complex history of bulbar urethral stricture, recurrent urinary tract infections and superficial bladder cancer.    He had some laser stone prostate procedure with recurrent UTI    He takes amoxicillin prophy    DESCRIPTION OF PROCEDURE:  After informed consent was obtained, the patient was brought to the procedure room where he was placed in the supine position with all pressure points well padded.  The penis and scrotum were prepped and draped in a sterile fashion. A flexible cystoscope was introduced through a well-lubricated urethra.  The penile urethra demonstrated an annular narrowing (~14Fr) in the mid-urethra that was passed with gentle pressure from the scope. The urinary sphincter was intact. The prostate demonstrated fibrotic septae and multiple deep posterior/lateral diverticuli that were all visually stone free, with some  mucous which was flushed out with irrigation. Bladder neck was open. The media upon entry into the bladder was cloudy with wispy mucus; we irrigated to clear the visualization. The bladder demonstrated multiple diverticuli and heavy trabeculation. No evidence of recurrence.     ASSESSMENT AND PLAN:  62 year old man with NMIBC, urethral stricture, recurrent UTI's.      1. Low grade UCC of bladder - no evidence of recurrence on cystoscopy today. Plan for next surveillance cystoscopy in 6 months. Perhaps can spread out surveillance in the future.  2. Recurrent UTI - continue low-dose suppressive therapy with amoxicillin.   3. Penile urethral stricture - evidence of mild recurrence today, 14-16 Fr urethral stricture with banding and diffusely located. I do not recommend treatment at this time.    Gerardo Suero MD

## 2024-03-01 NOTE — LETTER
3/1/2024       RE: Flakito Hilliard  [address] [facility] [address]     Dear Colleague,    Thank you for referring your patient, Flakito Hilliard, to the [facility] [place] at [facility]. Please see a copy of my visit note below.    Male Cystoscopy Procedure Note      PRE-PROCEDURE DIAGNOSIS: penile urethral stricture, low-grade non-invasive papillary UCC  POST-PROCEDURE DIAGNOSIS: same  PROCEDURE: cystoscopy     HISTORY: Flakito Hilliard is a 64 year old man with complex history of bulbar urethral stricture, recurrent urinary tract infections and superficial bladder cancer.    He had some laser stone prostate procedure with recurrent UTI    He takes amoxicillin prophy    DESCRIPTION OF PROCEDURE:  After informed consent was obtained, the patient was brought to the procedure room where he was placed in the supine position with all pressure points well padded.  The penis and scrotum were prepped and draped in a sterile fashion. A flexible cystoscope was introduced through a well-lubricated urethra.  The penile urethra demonstrated an annular narrowing (~14Fr) in the mid-urethra that was passed with gentle pressure from the scope. The urinary sphincter was intact. The prostate demonstrated fibrotic septae and multiple deep posterior/lateral diverticuli that were all visually stone free, with some  mucous which was flushed out with irrigation. Bladder neck was open. The media upon entry into the bladder was cloudy with wispy mucus; we irrigated to clear the visualization. The bladder demonstrated multiple diverticuli and heavy trabeculation. No evidence of recurrence.     ASSESSMENT AND PLAN:  62 year old man with NMIBC, urethral stricture, recurrent UTI's.      Low grade UCC of bladder - no evidence of recurrence on cystoscopy today. Plan for next surveillance cystoscopy in 6 months. Perhaps can spread out surveillance in the  future.  Recurrent UTI - continue low-dose suppressive therapy with amoxicillin.   Penile urethral stricture - evidence of mild recurrence today, 14-16 Fr urethral stricture with banding and diffusely located. I do not recommend treatment at this time.    Gerardo Suero MD

## 2024-03-01 NOTE — PATIENT INSTRUCTIONS
"Please follow up with Dr. Cano in 6 months for a surveillance cystoscopy.     It was a pleasure meeting with you today.  Thank you for allowing me and my team the privilege of caring for you today.  YOU are the reason we are here, and I truly hope we provided you with the excellent service you deserve.  Please let us know if there is anything else we can do for you so that we can be sure you are leaving completely satisfied with your care experience.        AFTER YOUR CYSTOSCOPY        You have just completed a cystoscopy, or \"cysto\", which allowed your physician to learn more about your bladder (or to remove a stent placed after surgery). We suggest that you continue to avoid caffeine, fruit juice, and alcohol for the next 24 hours, however, you are encouraged to return to your normal activities.         A few things that are considered normal after your cystoscopy:     * Small amount of bleeding (or spotting) that clears within the next 24 hours     * Slight burning sensation with urination     * Sensation to of needing to avoid more frequently     * The feeling of \"air\" in your urine     * Mild discomfort that is relieved with Tylenol        Please contact our office promptly if you:     * Develop a fever above 101 degrees     * Are unable to urinate     * Develop bright red blood that does not stop     * Severe pain or swelling         Please contact our office with any concerns or questions @[email or web address].  "

## 2024-03-01 NOTE — NURSING NOTE
"Chief Complaint   Patient presents with    Cystoscopy       Blood pressure (!) 143/103, pulse 100, height 1.854 m (6' 1\"), weight 130 kg (286 lb 11.2 oz). Body mass index is 37.83 kg/m .    Patient Active Problem List   Diagnosis    Acute bacterial prostatitis    Lower urinary tract infectious disease    severe HTN (hypertension)    Suicidal ideation    Major depressive disorder, recurrent episode, moderate (H)    Urinary retention    Urethral stricture    PATRICE (obstructive sleep apnea)    Complicated UTI (urinary tract infection)    RIO (generalized anxiety disorder)    At risk for cardiovascular event    Elevated hemoglobin (H24)    Bladder stones    Erectile dysfunction, unspecified erectile dysfunction type    Severe recurrent major depression without psychotic features (H)    Urethral stone       Allergies   Allergen Reactions    Contrast Dye Difficulty breathing and Anaphylaxis       Current Outpatient Medications   Medication Sig Dispense Refill    acetaminophen (TYLENOL) 325 MG tablet Take 2 tablets (650 mg) by mouth every 4 hours as needed for mild pain 50 tablet 0    amLODIPine (NORVASC) 10 MG tablet Take 1 tablet (10 mg) by mouth daily 90 tablet 0    amoxicillin (AMOXIL) 500 MG capsule Take 1 capsule (500 mg) by mouth daily (Patient not taking: Reported on 7/18/2022) 90 capsule 2    amoxicillin (AMOXIL) 500 MG capsule Take 500 mg by mouth      ARIPiprazole (ABILIFY) 2 MG tablet Take 2 mg by mouth daily      atorvastatin (LIPITOR) 20 MG tablet TAKE ONE TABLET BY MOUTH EVERY DAY. 90 tablet 3    buPROPion 450 MG TB24 Take 450 mg by mouth every morning (Patient not taking: Reported on 7/18/2022) 30 tablet 1    busPIRone (BUSPAR) 15 MG tablet Increase if anxiety not doing well: 0.5 tab 2x/day x 3 days, then 1 tab 2x/day x 3 days, then 1.5 tab 2x/day x 3 days, then 2 tab 2x/day. (Patient not taking: Reported on 7/18/2022) 90 tablet 0    clonazePAM (KLONOPIN) 0.5 MG tablet Take 0.5 mg by mouth      diltiazem ER " COATED BEADS (CARDIZEM CD) 360 MG 24 hr capsule Take 1 capsule (360 mg) by mouth daily 90 capsule 0    diphenhydrAMINE (BENADRYL) 50 MG capsule Take 1 capsule (50 mg) by mouth every 6 hours as needed for itching or allergies Administer 30 min - 2 hours pre - IV contrast injection (Patient not taking: Reported on 7/18/2022) 1 capsule 0    docusate sodium (COLACE) 100 MG capsule TAKE ONE CAPSULE BY MOUTH TWICE DAILY  60 capsule 11    DULoxetine (CYMBALTA) 60 MG capsule Take 60 mg by mouth      hydrALAZINE (APRESOLINE) 25 MG tablet Take 0.5 tablets (12.5 mg) by mouth every 6 hours as needed (for SBP >175 or DBP >110) 30 tablet 0    hydrOXYzine (VISTARIL) 50 MG capsule TAKE 1 CAP BY MOUTH DAILY IN THE MORNING; MAY TAKE 1 ADDITIONAL CAP DAILY AS NEEDED      lamoTRIgine (LAMICTAL) 100 MG tablet Take 100 mg by mouth      LATUDA 80 MG TABS tablet TAKE 1 TABLET BY MOUTH ONCE DAILY TAKE WITH FOOD      lisinopril (PRINIVIL/ZESTRIL) 20 MG tablet Take 1 tablet (20 mg) by mouth daily (Patient taking differently: Take 40 mg by mouth daily) 90 tablet 0    lithium (ESKALITH) 300 MG tablet Take 300 mg by mouth At Bedtime (Patient not taking: Reported on 7/18/2022)      methylPREDNISolone (MEDROL) 32 MG tablet Take 1 tablet (32 mg) by mouth daily 12 hours prior to the procedure with IV contrast (Patient not taking: Reported on 7/18/2022) 1 tablet 0    nitroFURantoin macrocrystal-monohydrate (MACROBID) 100 MG capsule Take 1 capsule (100 mg) by mouth 2 times daily 10 capsule 0    oxybutynin (DITROPAN) 5 MG tablet Take 1 tablet (5 mg) by mouth 2 times daily 60 tablet 0    senna-docusate (SENOKOT-S/PERICOLACE) 8.6-50 MG tablet Take 1-2 tablets by mouth 2 times daily (Patient not taking: Reported on 7/18/2022) 30 tablet 0    tamsulosin (FLOMAX) 0.4 MG capsule Take 1 capsule (0.4 mg) by mouth daily 90 capsule 0    traZODone (DESYREL) 100 MG tablet Take 1 tablet (100 mg) by mouth nightly as needed for sleep 30 tablet 1       Social History      Tobacco Use    Smoking status: Never    Smokeless tobacco: Never   Substance Use Topics    Alcohol use: Yes     Comment: No drinking for 2 weeks    Drug use: No       Invasive Procedure Safety Checklist:    Procedure: Cystoscopy    Action: Complete sections and checkboxes as appropriate.    Pre-procedure:  1. Patient ID Verified with 2 identifiers (Marquita and  or MRN) : YES    2. Procedure and site verified with patient/designee (when able) : YES    3. Accurate consent documentation in medical record : YES    4. H&P (or appropriate assessment) documented in medical record : N/A  H&P must be up to 30 days prior to procedure an updated within 24 hours of                 Procedure as applicable.     5. Relevant diagnostic and radiology test results appropriately labeled and displayed as applicable : YES    6. Blood products, implants, devices, and/or special equipment available for the procedure as applicable : YES    7. Procedure site(s) marked with provider initials [Exclusions: none] : NO    8. Marking not required. Reason : Yes  Procedure does not require site marking    Time Out:     Time-Out performed immediately prior to starting procedure, including verbal and active participation of all team members addressing: YES    1. Correct patient identity.  2. Confirmed that the correct side and site are marked.  3. An accurate procedure to be done.  4. Agreement on the procedure to be done.  5. Correct patient position.  6. Relevant images and results are properly labeled and appropriately displayed.  7. The need to administer antibiotics or fluids for irrigation purposes during the procedure as applicable.  8. Safety precautions based on patient history or medication use.    During Procedure: Verification of correct person, site, and procedure occurs any time the responsibility for care of the patient is transferred to another member of the care team.          Emerita Carbajal  3/1/2024  10:23 AM

## 2024-08-20 ENCOUNTER — PRE VISIT (OUTPATIENT)
Dept: UROLOGY | Facility: CLINIC | Age: 64
End: 2024-08-20
Payer: MEDICARE

## 2024-08-20 NOTE — TELEPHONE ENCOUNTER
Reason for visit: Surveillance cystoscopy     Relevant information: Hx of rUTIs, malignant neoplasm of overlapping sites of bladder, postprocedural male urethral stricture    Records/imaging/labs/orders: All records available    Pt called: No need for a call    At Rooming: Standard procedure    Ban Theodore  8/20/2024  11:56 AM

## 2024-09-16 ENCOUNTER — OFFICE VISIT (OUTPATIENT)
Dept: UROLOGY | Facility: CLINIC | Age: 64
End: 2024-09-16
Payer: MEDICARE

## 2024-09-16 VITALS
BODY MASS INDEX: 37.91 KG/M2 | DIASTOLIC BLOOD PRESSURE: 115 MMHG | RESPIRATION RATE: 12 BRPM | OXYGEN SATURATION: 95 % | HEART RATE: 89 BPM | WEIGHT: 286 LBS | SYSTOLIC BLOOD PRESSURE: 166 MMHG | HEIGHT: 73 IN

## 2024-09-16 DIAGNOSIS — C67.0 MALIGNANT NEOPLASM OF TRIGONE OF URINARY BLADDER (H): Primary | ICD-10-CM

## 2024-09-16 DIAGNOSIS — R33.9 URINARY RETENTION: ICD-10-CM

## 2024-09-16 DIAGNOSIS — Z87.448 HISTORY OF URETHRAL STRICTURE: ICD-10-CM

## 2024-09-16 PROCEDURE — 52000 CYSTOURETHROSCOPY: CPT | Performed by: UROLOGY

## 2024-09-16 PROCEDURE — 99213 OFFICE O/P EST LOW 20 MIN: CPT | Mod: 25 | Performed by: UROLOGY

## 2024-09-16 RX ORDER — LIDOCAINE HYDROCHLORIDE 20 MG/ML
JELLY TOPICAL ONCE
Status: COMPLETED | OUTPATIENT
Start: 2024-09-16 | End: 2024-09-16

## 2024-09-16 RX ADMIN — LIDOCAINE HYDROCHLORIDE: 20 JELLY TOPICAL at 13:22

## 2024-09-16 ASSESSMENT — PAIN SCALES - GENERAL: PAINLEVEL: MILD PAIN (2)

## 2024-09-16 NOTE — LETTER
9/16/2024       RE: Flakito Hilliard  [address]  [facility] [address]     Dear Colleague,    Thank you for referring your patient, Flakito Hilliard, to the [facility] [place] at [facility]. Please see a copy of my visit note below.    Male Cystoscopy Procedure Note      PRE-PROCEDURE DIAGNOSIS: Superficial bladder cancer and distal bulbar urethral stricture  POST-PROCEDURE DIAGNOSIS: Mild recurrence of bulbar urethral stricture. Superficial-appearing bladder cancer recurrence.   PROCEDURE: cystoscopy      HISTORY: Flakito Hilliard is a 64 year old man with complex history of bulbar urethral stricture, recurrent urinary tract infections and superficial bladder cancer.  Last procedure with me was 1 year ago at which time there was no bladder cancer. His urinary symptoms have slightly worsened lately --> nocturia has increased from 1 to 3 times per night. No GH.      REVIEW OF OFFICE STUDIES:         DESCRIPTION OF PROCEDURE:  After informed consent was obtained, the patient was brought to the procedure room where he was placed in the supine position with all pressure points well padded.  The penis and scrotum were prepped and draped in a sterile fashion. A flexible cystoscope was introduced through a well-lubricated urethra.  The anterior urethra was significant for a 14-15 [place] bulbar urethral stricture in the distal bulbar urethra that dilated easily with the scope.. The urinary sphincter was intact. The prostate demonstrated no recurrence of stones. Bladder neck was open. Bladder was significant moderate trabeculation and for several diverticuli, cellules. There were several (6ish) superficial-appearing very small papillary tumors around the edges of the trigone. None were close to the UOs. The center of his trigone has a large stellate scar , probably from prior TURBT.    The flexible cystoscope was removed and the findings were  described to the patient.   ASSESSMENT AND PLAN:  63 year old man with multiple urologic problems as follows:  1. recurrent urinary tract infections under good control on chronic oral antibiotic prophylaxis  2.  Bulbar urethral stricture which is pretty open right now at about 14-16F  3.  Superficial bladder cancer recurrence      We discussed the risks and benefits of a resection or cauterization of these bladder tumors. I explained to him that I would feel more comfortable with 1 month cancer specialist were to do this procedure.  He was amenable to that.  I then communicated with Dr. Borden who is willing to do the procedure and the the patient on the day of the procedure.    In addition to the time on the cystoscopy I spent 20 minutes counseling the patient about his bladder cancer and documenting the surgery portion of the visit outside of the documentation and performance of the cystoscopy portion.    Pre Op Teaching Flowsheet       Pre and Post op Patient Education  Relevant Diagnosis:  Urethral Stricture   Surgical procedure:  Resection or cauterization of bladder tumors   Teaching Topic:  Pre and post op teaching  Person Involved in teaching: Yes    Motivation Level:  Asks Questions: Yes  Eager to Learn: Yes  Cooperative: Yes  Receptive (willing/able to accept information):  Yes    Patient demonstrates understanding of the following:  Date of surgery:  TBD  Location of surgery:  TBD  History and Physical and any other testing necessary prior to surgery: Yes  Required time line for completion of History and Physical and any pre-op testing: Yes    Patient demonstrates understanding of the following:  Pre-op bowel prep:  N/A  Pre-op showering/scrub information with PCMX Soap: Yes  Blood thinner medications discussed and when to stop (if applicable):  Yes    Infection Prevention:   Patient demonstrates understanding of the following:  Surgical procedure site care taught: Yes  Signs and symptoms of infection  taught: Yes      Post-op follow-up:  Discussed how to contact the hospital, nurse, and clinic scheduling staff if necessary. (See packet information)    Instructional materials used/given/mailed:  [facility] Packet, post op teaching sheet, Map, Soap, and with the arrival/location information to come closer to the surgery date.    Surgical instructions packet given to patient in office:  N/A  Follow up: Discussed arranging for someone to drive you home. ( No public transportation)  Someone needed to stay the first twenty hours after surgery: Yes     referral: No      home:  Yes     Care Giver:  Yes     PCP:  Matt Burkett RN   2:06 PM       Again, thank you for allowing me to participate in the care of your patient.      Sincerely,    Niraj Burger MD

## 2024-09-16 NOTE — PROGRESS NOTES
Pre Op Teaching Flowsheet       Pre and Post op Patient Education  Relevant Diagnosis:  Urethral Stricture   Surgical procedure:  Resection or cauterization of bladder tumors   Teaching Topic:  Pre and post op teaching  Person Involved in teaching: Yes    Motivation Level:  Asks Questions: Yes  Eager to Learn: Yes  Cooperative: Yes  Receptive (willing/able to accept information):  Yes    Patient demonstrates understanding of the following:  Date of surgery:  TBD  Location of surgery:  TBD  History and Physical and any other testing necessary prior to surgery: Yes  Required time line for completion of History and Physical and any pre-op testing: Yes    Patient demonstrates understanding of the following:  Pre-op bowel prep:  N/A  Pre-op showering/scrub information with PCMX Soap: Yes  Blood thinner medications discussed and when to stop (if applicable):  Yes    Infection Prevention:   Patient demonstrates understanding of the following:  Surgical procedure site care taught: Yes  Signs and symptoms of infection taught: Yes      Post-op follow-up:  Discussed how to contact the hospital, nurse, and clinic scheduling staff if necessary. (See packet information)    Instructional materials used/given/mailed:  [facility] [facility] Packet, post op teaching sheet, Map, Soap, and with the arrival/location information to come closer to the surgery date.    Surgical instructions packet given to patient in office:  N/A  Follow up: Discussed arranging for someone to drive you home. ( No public transportation)  Someone needed to stay the first twenty hours after surgery: Yes     referral: No      home:  Yes     Care Giver:  Yes     PCP:  Matt Burkett RN   2:06 PM

## 2024-09-16 NOTE — NURSING NOTE
"Chief Complaint   Patient presents with    Cystoscopy       Blood pressure (!) 166/115, pulse 89, resp. rate 12, height 1.854 m (6' 1\"), weight 129.7 kg (286 lb), SpO2 95%. Body mass index is 37.73 kg/m .    Patient Active Problem List   Diagnosis    Acute bacterial prostatitis    Lower urinary tract infectious disease    severe HTN (hypertension)    Suicidal ideation    Major depressive disorder, recurrent episode, moderate (H)    Urinary retention    Urethral stricture    PATRICE (obstructive sleep apnea)    Complicated UTI (urinary tract infection)    RIO (generalized anxiety disorder)    At risk for cardiovascular event    Elevated hemoglobin (H24)    Bladder stones    Erectile dysfunction, unspecified erectile dysfunction type    Severe recurrent major depression without psychotic features (H)    Urethral stone       Allergies   Allergen Reactions    Contrast Dye Difficulty breathing and Anaphylaxis       Current Outpatient Medications   Medication Sig Dispense Refill    acetaminophen (TYLENOL) 325 MG tablet Take 2 tablets (650 mg) by mouth every 4 hours as needed for mild pain 50 tablet 0    amLODIPine (NORVASC) 10 MG tablet Take 1 tablet (10 mg) by mouth daily 90 tablet 0    amoxicillin (AMOXIL) 500 MG capsule Take 500 mg by mouth      ARIPiprazole (ABILIFY) 2 MG tablet Take 2 mg by mouth daily      atorvastatin (LIPITOR) 20 MG tablet TAKE ONE TABLET BY MOUTH EVERY DAY. 90 tablet 3    clonazePAM (KLONOPIN) 0.5 MG tablet Take 0.5 mg by mouth      diltiazem ER COATED BEADS (CARDIZEM CD) 360 MG 24 hr capsule Take 1 capsule (360 mg) by mouth daily 90 capsule 0    docusate sodium (COLACE) 100 MG capsule TAKE ONE CAPSULE BY MOUTH TWICE DAILY  60 capsule 11    DULoxetine (CYMBALTA) 60 MG capsule Take 60 mg by mouth      hydrALAZINE (APRESOLINE) 25 MG tablet Take 0.5 tablets (12.5 mg) by mouth every 6 hours as needed (for SBP >175 or DBP >110) 30 tablet 0    hydrOXYzine (VISTARIL) 50 MG capsule TAKE 1 CAP BY MOUTH DAILY " IN THE MORNING; MAY TAKE 1 ADDITIONAL CAP DAILY AS NEEDED      lamoTRIgine (LAMICTAL) 100 MG tablet Take 100 mg by mouth      LATUDA 80 MG TABS tablet TAKE 1 TABLET BY MOUTH ONCE DAILY TAKE WITH FOOD      lisinopril (PRINIVIL/ZESTRIL) 20 MG tablet Take 1 tablet (20 mg) by mouth daily (Patient taking differently: Take 40 mg by mouth daily.) 90 tablet 0    nitroFURantoin macrocrystal-monohydrate (MACROBID) 100 MG capsule Take 1 capsule (100 mg) by mouth 2 times daily 10 capsule 0    oxybutynin (DITROPAN) 5 MG tablet Take 1 tablet (5 mg) by mouth 2 times daily 60 tablet 0    tamsulosin (FLOMAX) 0.4 MG capsule Take 1 capsule (0.4 mg) by mouth daily 90 capsule 0    traZODone (DESYREL) 100 MG tablet Take 1 tablet (100 mg) by mouth nightly as needed for sleep 30 tablet 1    amoxicillin (AMOXIL) 500 MG capsule Take 1 capsule (500 mg) by mouth daily (Patient not taking: Reported on 7/18/2022) 90 capsule 2    buPROPion 450 MG TB24 Take 450 mg by mouth every morning (Patient not taking: Reported on 7/18/2022) 30 tablet 1    busPIRone (BUSPAR) 15 MG tablet Increase if anxiety not doing well: 0.5 tab 2x/day x 3 days, then 1 tab 2x/day x 3 days, then 1.5 tab 2x/day x 3 days, then 2 tab 2x/day. (Patient not taking: Reported on 7/18/2022) 90 tablet 0    diphenhydrAMINE (BENADRYL) 50 MG capsule Take 1 capsule (50 mg) by mouth every 6 hours as needed for itching or allergies Administer 30 min - 2 hours pre - IV contrast injection (Patient not taking: Reported on 7/18/2022) 1 capsule 0    lithium (ESKALITH) 300 MG tablet Take 300 mg by mouth At Bedtime (Patient not taking: Reported on 7/18/2022)      methylPREDNISolone (MEDROL) 32 MG tablet Take 1 tablet (32 mg) by mouth daily 12 hours prior to the procedure with IV contrast (Patient not taking: Reported on 7/18/2022) 1 tablet 0    senna-docusate (SENOKOT-S/PERICOLACE) 8.6-50 MG tablet Take 1-2 tablets by mouth 2 times daily (Patient not taking: Reported on 7/18/2022) 30 tablet 0        Social History     Tobacco Use    Smoking status: Never    Smokeless tobacco: Never   Substance Use Topics    Alcohol use: Yes     Comment: No drinking for 2 weeks    Drug use: No       Invasive Procedure Safety Checklist:    Procedure: Cystoscopy    Action: Complete sections and checkboxes as appropriate.    Pre-procedure:  1. Patient ID Verified with 2 identifiers (Marquita and  or MRN) : YES    2. Procedure and site verified with patient/designee (when able) : YES    3. Accurate consent documentation in medical record : YES    4. H&P (or appropriate assessment) documented in medical record : N/A  H&P must be up to 30 days prior to procedure an updated within 24 hours of                 Procedure as applicable.     5. Relevant diagnostic and radiology test results appropriately labeled and displayed as applicable : YES    6. Blood products, implants, devices, and/or special equipment available for the procedure as applicable : YES    7. Procedure site(s) marked with provider initials [Exclusions: none] : NO    8. Marking not required. Reason : Yes  Procedure does not require site marking    Time Out:     Time-Out performed immediately prior to starting procedure, including verbal and active participation of all team members addressing: YES    1. Correct patient identity.  2. Confirmed that the correct side and site are marked.  3. An accurate procedure to be done.  4. Agreement on the procedure to be done.  5. Correct patient position.  6. Relevant images and results are properly labeled and appropriately displayed.  7. The need to administer antibiotics or fluids for irrigation purposes during the procedure as applicable.  8. Safety precautions based on patient history or medication use.    During Procedure: Verification of correct person, site, and procedure occurs any time the responsibility for care of the patient is transferred to another member of the care team.    The following medication was given:      MEDICATION:  Lidocaine without epinephrine 2% jelly  ROUTE: urethral   SITE: urethral   DOSE: 10 mL  LOT #: [id number]  : [employer]  EXPIRATION DATE: 4-26  NDC#: [id number]   Was there drug waste? No    Prior to med admin, verified patient identity using patient's name and date of birth.  Due to med administration, patient instructed to remain in clinic for 15 minutes  afterwards, and to report any adverse reaction to me immediately.    Drug Amount Wasted:  None.  Vial/Syringe: Syringe      Khadra Tim  9/16/2024  1:04 PM

## 2024-09-16 NOTE — PROGRESS NOTES
Male Cystoscopy Procedure Note      PRE-PROCEDURE DIAGNOSIS: Superficial bladder cancer and distal bulbar urethral stricture  POST-PROCEDURE DIAGNOSIS: Mild recurrence of bulbar urethral stricture. Superficial-appearing bladder cancer recurrence.   PROCEDURE: cystoscopy      HISTORY: Flakito Hilliard is a 64 year old man with complex history of bulbar urethral stricture, recurrent urinary tract infections and superficial bladder cancer.  Last procedure with me was 1 year ago at which time there was no bladder cancer. His urinary symptoms have slightly worsened lately --> nocturia has increased from 1 to 3 times per night. No GH.      REVIEW OF OFFICE STUDIES:         DESCRIPTION OF PROCEDURE:  After informed consent was obtained, the patient was brought to the procedure room where he was placed in the supine position with all pressure points well padded.  The penis and scrotum were prepped and draped in a sterile fashion. A flexible cystoscope was introduced through a well-lubricated urethra.  The anterior urethra was significant for a 14-15 Micronesian bulbar urethral stricture in the distal bulbar urethra that dilated easily with the scope.. The urinary sphincter was intact. The prostate demonstrated no recurrence of stones. Bladder neck was open. Bladder was significant moderate trabeculation and for several diverticuli, cellules. There were several (6ish) superficial-appearing very small papillary tumors around the edges of the trigone. None were close to the UOs. The center of his trigone has a large stellate scar , probably from prior TURBT.    The flexible cystoscope was removed and the findings were described to the patient.   ASSESSMENT AND PLAN:  63 year old man with multiple urologic problems as follows:  1. recurrent urinary tract infections under good control on chronic oral antibiotic prophylaxis  2.  Bulbar urethral stricture which is pretty open right now at about 14-16F  3.  Superficial bladder cancer  recurrence      We discussed the risks and benefits of a resection or cauterization of these bladder tumors. I explained to him that I would feel more comfortable with 1 month cancer specialist were to do this procedure.  He was amenable to that.  I then communicated with Dr. Borden who is willing to do the procedure and the the patient on the day of the procedure.    In addition to the time on the cystoscopy I spent 20 minutes counseling the patient about his bladder cancer and documenting the surgery portion of the visit outside of the documentation and performance of the cystoscopy portion.

## 2024-09-17 ENCOUNTER — PREP FOR PROCEDURE (OUTPATIENT)
Dept: UROLOGY | Facility: CLINIC | Age: 64
End: 2024-09-17
Payer: MEDICARE

## 2024-09-17 DIAGNOSIS — C67.8 MALIGNANT NEOPLASM OF OVERLAPPING SITES OF BLADDER (H): Primary | ICD-10-CM

## 2024-09-17 RX ORDER — CEFAZOLIN SODIUM IN 0.9 % NACL 3 G/100 ML
3 INTRAVENOUS SOLUTION, PIGGYBACK (ML) INTRAVENOUS SEE ADMIN INSTRUCTIONS
OUTPATIENT
Start: 2024-09-17

## 2024-09-17 RX ORDER — CEFAZOLIN SODIUM IN 0.9 % NACL 3 G/100 ML
3 INTRAVENOUS SOLUTION, PIGGYBACK (ML) INTRAVENOUS
OUTPATIENT
Start: 2024-09-17

## 2024-09-17 RX ORDER — ACETAMINOPHEN 650 MG/1
650 SUPPOSITORY RECTAL ONCE
OUTPATIENT
Start: 2024-09-17

## 2024-09-17 RX ORDER — ACETAMINOPHEN 325 MG/1
975 TABLET ORAL ONCE
OUTPATIENT
Start: 2024-09-17 | End: 2024-09-17

## 2024-09-18 ENCOUNTER — HOSPITAL ENCOUNTER (OUTPATIENT)
Facility: CLINIC | Age: 64
End: 2024-09-18
Attending: UROLOGY | Admitting: UROLOGY
Payer: MEDICARE

## 2024-09-18 ENCOUNTER — TELEPHONE (OUTPATIENT)
Dept: ONCOLOGY | Facility: CLINIC | Age: 64
End: 2024-09-18
Payer: MEDICARE

## 2024-09-18 PROBLEM — C67.8 MALIGNANT NEOPLASM OF OVERLAPPING SITES OF BLADDER (H): Status: ACTIVE | Noted: 2024-09-17

## 2024-09-18 NOTE — TELEPHONE ENCOUNTER
Patient is scheduled for surgery with Dr. cardenas    Spoke with: pts daughter Nayeli    Date of Surgery: 10/30    [facility]    Informed patient they will need an adult  y    Pre op with Provider yes    H&P: Scheduled with pt to schedule    Additional imaging/appointments: n    Surgery packet: I mailed     Additional comments: post op in [facility] on 9/18/2024 at 9:47 AM

## 2024-10-24 ENCOUNTER — PRE VISIT (OUTPATIENT)
Dept: UROLOGY | Facility: CLINIC | Age: 64
End: 2024-10-24
Payer: MEDICARE

## 2024-10-24 NOTE — TELEPHONE ENCOUNTER
Reason for visit: post op      Dx/Hx/Sx: bladder cancer    Records/imaging/labs/orders: in [facility] -- DOS scheduled for 10/30/24    At Rooming: standard

## 2024-10-28 RX ORDER — LISINOPRIL 40 MG/1
40 TABLET ORAL DAILY
COMMUNITY
Start: 2024-09-19

## 2024-10-28 RX ORDER — MULTIVIT WITH MINERALS/LUTEIN
1 TABLET ORAL AT BEDTIME
COMMUNITY

## 2024-10-28 RX ORDER — DILTIAZEM HYDROCHLORIDE 120 MG/1
240 CAPSULE, EXTENDED RELEASE ORAL DAILY
COMMUNITY
Start: 2024-10-16

## 2024-10-28 RX ORDER — BUPROPION HYDROCHLORIDE 300 MG/1
300 TABLET ORAL AT BEDTIME
COMMUNITY
Start: 2024-10-04

## 2024-10-28 RX ORDER — TRAZODONE HYDROCHLORIDE 150 MG/1
150 TABLET ORAL AT BEDTIME
COMMUNITY
Start: 2024-10-19

## 2024-10-28 RX ORDER — HYDROCHLOROTHIAZIDE 12.5 MG/1
1 CAPSULE ORAL DAILY
COMMUNITY
Start: 2024-08-11

## 2024-10-30 ENCOUNTER — ANESTHESIA (OUTPATIENT)
Dept: SURGERY | Facility: CLINIC | Age: 64
End: 2024-10-30
Payer: MEDICARE

## 2024-10-30 ENCOUNTER — ANESTHESIA EVENT (OUTPATIENT)
Dept: SURGERY | Facility: CLINIC | Age: 64
End: 2024-10-30
Payer: MEDICARE

## 2024-10-30 ENCOUNTER — HOSPITAL ENCOUNTER (OUTPATIENT)
Facility: CLINIC | Age: 64
Discharge: HOME | End: 2024-10-30
Attending: UROLOGY | Admitting: UROLOGY
Payer: MEDICARE

## 2024-10-30 VITALS
OXYGEN SATURATION: 93 % | TEMPERATURE: 97.6 F | HEART RATE: 93 BPM | HEIGHT: 73 IN | DIASTOLIC BLOOD PRESSURE: 105 MMHG | BODY MASS INDEX: 37.69 KG/M2 | SYSTOLIC BLOOD PRESSURE: 148 MMHG | RESPIRATION RATE: 18 BRPM | WEIGHT: 284.39 LBS

## 2024-10-30 DIAGNOSIS — C67.8 MALIGNANT NEOPLASM OF OVERLAPPING SITES OF BLADDER (H): Primary | ICD-10-CM

## 2024-10-30 PROCEDURE — 360N000075 HC SURGERY LEVEL 2, PER MIN: Performed by: UROLOGY

## 2024-10-30 PROCEDURE — 710N000010 HC RECOVERY PHASE 1, LEVEL 2, PER MIN: Performed by: UROLOGY

## 2024-10-30 PROCEDURE — C1769 GUIDE WIRE: HCPCS | Performed by: UROLOGY

## 2024-10-30 PROCEDURE — 250N000013 HC RX MED GY IP 250 OP 250 PS 637: Performed by: UROLOGY

## 2024-10-30 PROCEDURE — 52234 CYSTOSCOPY AND TREATMENT: CPT | Performed by: STUDENT IN AN ORGANIZED HEALTH CARE EDUCATION/TRAINING PROGRAM

## 2024-10-30 PROCEDURE — 88305 TISSUE EXAM BY PATHOLOGIST: CPT | Mod: 26 | Performed by: PATHOLOGY

## 2024-10-30 PROCEDURE — 272N000001 HC OR GENERAL SUPPLY STERILE: Performed by: UROLOGY

## 2024-10-30 PROCEDURE — 52234 CYSTOSCOPY AND TREATMENT: CPT | Performed by: NURSE ANESTHETIST, CERTIFIED REGISTERED

## 2024-10-30 PROCEDURE — 370N000017 HC ANESTHESIA TECHNICAL FEE, PER MIN: Performed by: UROLOGY

## 2024-10-30 PROCEDURE — 258N000003 HC RX IP 258 OP 636: Performed by: NURSE ANESTHETIST, CERTIFIED REGISTERED

## 2024-10-30 PROCEDURE — 250N000009 HC RX 250: Performed by: NURSE ANESTHETIST, CERTIFIED REGISTERED

## 2024-10-30 PROCEDURE — 250N000011 HC RX IP 250 OP 636: Performed by: NURSE ANESTHETIST, CERTIFIED REGISTERED

## 2024-10-30 PROCEDURE — 710N000012 HC RECOVERY PHASE 2, PER MINUTE: Performed by: UROLOGY

## 2024-10-30 PROCEDURE — 52234 CYSTOSCOPY AND TREATMENT: CPT | Performed by: UROLOGY

## 2024-10-30 PROCEDURE — 250N000025 HC SEVOFLURANE, PER MIN: Performed by: UROLOGY

## 2024-10-30 PROCEDURE — 250N000011 HC RX IP 250 OP 636: Performed by: UROLOGY

## 2024-10-30 PROCEDURE — 88305 TISSUE EXAM BY PATHOLOGIST: CPT | Mod: TC | Performed by: UROLOGY

## 2024-10-30 PROCEDURE — 999N000141 HC STATISTIC PRE-PROCEDURE NURSING ASSESSMENT: Performed by: UROLOGY

## 2024-10-30 RX ORDER — NALOXONE HYDROCHLORIDE 0.4 MG/ML
0.1 INJECTION, SOLUTION INTRAMUSCULAR; INTRAVENOUS; SUBCUTANEOUS
Status: DISCONTINUED | OUTPATIENT
Start: 2024-10-30 | End: 2024-10-30 | Stop reason: HOSPADM

## 2024-10-30 RX ORDER — HYDROMORPHONE HCL IN WATER/PF 6 MG/30 ML
0.2 PATIENT CONTROLLED ANALGESIA SYRINGE INTRAVENOUS EVERY 5 MIN PRN
Status: DISCONTINUED | OUTPATIENT
Start: 2024-10-30 | End: 2024-10-30 | Stop reason: HOSPADM

## 2024-10-30 RX ORDER — CEFAZOLIN SODIUM/WATER 3 G/30 ML
3 SYRINGE (ML) INTRAVENOUS
Status: COMPLETED | OUTPATIENT
Start: 2024-10-30 | End: 2024-10-30

## 2024-10-30 RX ORDER — LIDOCAINE HYDROCHLORIDE 20 MG/ML
INJECTION, SOLUTION INFILTRATION; PERINEURAL PRN
Status: DISCONTINUED | OUTPATIENT
Start: 2024-10-30 | End: 2024-10-30

## 2024-10-30 RX ORDER — OXYCODONE HYDROCHLORIDE 5 MG/1
5 TABLET ORAL
Status: DISCONTINUED | OUTPATIENT
Start: 2024-10-30 | End: 2024-10-30 | Stop reason: HOSPADM

## 2024-10-30 RX ORDER — ONDANSETRON 2 MG/ML
4 INJECTION INTRAMUSCULAR; INTRAVENOUS EVERY 30 MIN PRN
Status: DISCONTINUED | OUTPATIENT
Start: 2024-10-30 | End: 2024-10-30 | Stop reason: HOSPADM

## 2024-10-30 RX ORDER — ACETAMINOPHEN 325 MG/1
650 TABLET ORAL ONCE
Status: DISCONTINUED | OUTPATIENT
Start: 2024-10-30 | End: 2024-10-30 | Stop reason: HOSPADM

## 2024-10-30 RX ORDER — ONDANSETRON 4 MG/1
4 TABLET, ORALLY DISINTEGRATING ORAL EVERY 30 MIN PRN
Status: DISCONTINUED | OUTPATIENT
Start: 2024-10-30 | End: 2024-10-30 | Stop reason: HOSPADM

## 2024-10-30 RX ORDER — PROPOFOL 10 MG/ML
INJECTION, EMULSION INTRAVENOUS PRN
Status: DISCONTINUED | OUTPATIENT
Start: 2024-10-30 | End: 2024-10-30

## 2024-10-30 RX ORDER — ACETAMINOPHEN 325 MG/1
975 TABLET ORAL ONCE
Status: COMPLETED | OUTPATIENT
Start: 2024-10-30 | End: 2024-10-30

## 2024-10-30 RX ORDER — FENTANYL CITRATE 50 UG/ML
INJECTION, SOLUTION INTRAMUSCULAR; INTRAVENOUS PRN
Status: DISCONTINUED | OUTPATIENT
Start: 2024-10-30 | End: 2024-10-30

## 2024-10-30 RX ORDER — HYDROMORPHONE HCL IN WATER/PF 6 MG/30 ML
0.4 PATIENT CONTROLLED ANALGESIA SYRINGE INTRAVENOUS EVERY 5 MIN PRN
Status: DISCONTINUED | OUTPATIENT
Start: 2024-10-30 | End: 2024-10-30 | Stop reason: HOSPADM

## 2024-10-30 RX ORDER — SODIUM CHLORIDE, SODIUM LACTATE, POTASSIUM CHLORIDE, CALCIUM CHLORIDE 600; 310; 30; 20 MG/100ML; MG/100ML; MG/100ML; MG/100ML
INJECTION, SOLUTION INTRAVENOUS CONTINUOUS PRN
Status: DISCONTINUED | OUTPATIENT
Start: 2024-10-30 | End: 2024-10-30

## 2024-10-30 RX ORDER — SODIUM CHLORIDE, SODIUM LACTATE, POTASSIUM CHLORIDE, CALCIUM CHLORIDE 600; 310; 30; 20 MG/100ML; MG/100ML; MG/100ML; MG/100ML
INJECTION, SOLUTION INTRAVENOUS CONTINUOUS
Status: DISCONTINUED | OUTPATIENT
Start: 2024-10-30 | End: 2024-10-30 | Stop reason: HOSPADM

## 2024-10-30 RX ORDER — ACETAMINOPHEN 650 MG/1
650 SUPPOSITORY RECTAL ONCE
Status: COMPLETED | OUTPATIENT
Start: 2024-10-30 | End: 2024-10-30

## 2024-10-30 RX ORDER — ONDANSETRON 2 MG/ML
INJECTION INTRAMUSCULAR; INTRAVENOUS PRN
Status: DISCONTINUED | OUTPATIENT
Start: 2024-10-30 | End: 2024-10-30

## 2024-10-30 RX ORDER — FENTANYL CITRATE 50 UG/ML
50 INJECTION, SOLUTION INTRAMUSCULAR; INTRAVENOUS EVERY 5 MIN PRN
Status: DISCONTINUED | OUTPATIENT
Start: 2024-10-30 | End: 2024-10-30 | Stop reason: HOSPADM

## 2024-10-30 RX ORDER — CEFAZOLIN SODIUM/WATER 3 G/30 ML
3 SYRINGE (ML) INTRAVENOUS SEE ADMIN INSTRUCTIONS
Status: DISCONTINUED | OUTPATIENT
Start: 2024-10-30 | End: 2024-10-30 | Stop reason: HOSPADM

## 2024-10-30 RX ORDER — FENTANYL CITRATE 50 UG/ML
25 INJECTION, SOLUTION INTRAMUSCULAR; INTRAVENOUS EVERY 5 MIN PRN
Status: DISCONTINUED | OUTPATIENT
Start: 2024-10-30 | End: 2024-10-30 | Stop reason: HOSPADM

## 2024-10-30 RX ORDER — DEXAMETHASONE SODIUM PHOSPHATE 4 MG/ML
INJECTION, SOLUTION INTRA-ARTICULAR; INTRALESIONAL; INTRAMUSCULAR; INTRAVENOUS; SOFT TISSUE PRN
Status: DISCONTINUED | OUTPATIENT
Start: 2024-10-30 | End: 2024-10-30

## 2024-10-30 RX ORDER — PHENAZOPYRIDINE HYDROCHLORIDE 200 MG/1
200 TABLET, FILM COATED ORAL 3 TIMES DAILY PRN
Qty: 9 TABLET | Refills: 0 | Status: SHIPPED | OUTPATIENT
Start: 2024-10-30

## 2024-10-30 RX ADMIN — ACETAMINOPHEN 975 MG: 325 TABLET, FILM COATED ORAL at 11:17

## 2024-10-30 RX ADMIN — ONDANSETRON 4 MG: 2 INJECTION INTRAMUSCULAR; INTRAVENOUS at 12:41

## 2024-10-30 RX ADMIN — PROPOFOL 200 MG: 10 INJECTION, EMULSION INTRAVENOUS at 12:34

## 2024-10-30 RX ADMIN — LIDOCAINE HYDROCHLORIDE 100 MG: 20 INJECTION, SOLUTION INFILTRATION; PERINEURAL at 12:30

## 2024-10-30 RX ADMIN — FENTANYL CITRATE 100 MCG: 50 INJECTION INTRAMUSCULAR; INTRAVENOUS at 12:30

## 2024-10-30 RX ADMIN — CEFAZOLIN 3 G: 10 INJECTION, POWDER, FOR SOLUTION INTRAVENOUS at 12:35

## 2024-10-30 RX ADMIN — DEXAMETHASONE SODIUM PHOSPHATE 4 MG: 4 INJECTION, SOLUTION INTRA-ARTICULAR; INTRALESIONAL; INTRAMUSCULAR; INTRAVENOUS; SOFT TISSUE at 12:41

## 2024-10-30 RX ADMIN — SODIUM CHLORIDE, POTASSIUM CHLORIDE, SODIUM LACTATE AND CALCIUM CHLORIDE: 600; 310; 30; 20 INJECTION, SOLUTION INTRAVENOUS at 12:26

## 2024-10-30 RX ADMIN — MIDAZOLAM 2 MG: 1 INJECTION INTRAMUSCULAR; INTRAVENOUS at 12:23

## 2024-10-30 ASSESSMENT — ACTIVITIES OF DAILY LIVING (ADL)
ADLS_ACUITY_SCORE: 0

## 2024-10-30 ASSESSMENT — ENCOUNTER SYMPTOMS: ORTHOPNEA: 0

## 2024-10-30 NOTE — ANESTHESIA POSTPROCEDURE EVALUATION
Patient: Flakito Hilliard    Procedure: Procedure(s):  CYSTOSCOPY, WITH BLADDER BIOPSY WITH FULGRATION. URETHRAL DILATION       Anesthesia Type:  General    Note:  Disposition: Outpatient   Postop Pain Control: Uneventful            Sign Out: Well controlled pain   PONV: No   Neuro/Psych: Uneventful            Sign Out: Acceptable/Baseline neuro status   Airway/Respiratory: Uneventful            Sign Out: Acceptable/Baseline resp. status   CV/Hemodynamics: Uneventful            Sign Out: Acceptable CV status; No obvious hypovolemia; No obvious fluid overload   Other NRE: NONE   DID A NON-ROUTINE EVENT OCCUR? No           Last vitals:  Vitals Value Taken Time   /94 10/30/24 1400   Temp 36.6  C (97.8  F) 10/30/24 1340   Pulse 79 10/30/24 1412   Resp 15 10/30/24 1412   SpO2 92 % 10/30/24 1412   Vitals shown include unfiled device data.    Electronically Signed By: Vinnie Reyna MD  October 30, 2024  2:12 PM

## 2024-10-30 NOTE — BRIEF OP NOTE
[facility] [place]    Brief Operative Note    Pre-operative diagnosis: Malignant neoplasm of overlapping sites of bladder (H) [C67.8]  Post-operative diagnosis Same as pre-operative diagnosis    Procedure: CYSTOSCOPY, WITH BLADDER BIOPSY WITH FULGRATION. URETHRAL DILATION, N/A - Urethra    Surgeon: Surgeons and Role:     * Gerardo Hemphill MD - Primary     * Franklin Osuna MD - Resident - Assisting  Anesthesia: General   Estimated Blood Loss: Minimal    Drains: 16 F Mi'kmaq tip catheter   Specimens:   ID Type Source Tests Collected by Time Destination   1 : trigone biopsy Tissue Urinary Bladder SURGICAL PATHOLOGY EXAM Gerardo Hemphill MD 10/30/2024  1:09 PM      Findings:   Several small papillary areas in the trigone, biopsied and fulgurated  .  Complications: None.  Implants: * No implants in log *      May remove catheter at home tomorrow

## 2024-10-30 NOTE — ANESTHESIA PREPROCEDURE EVALUATION
Anesthesia Pre-Procedure Evaluation    Patient: Flakito Hilliard   MRN: [id number] : 1960        Procedure : Procedure(s):  CYSTOSCOPY, WITH BLADDER BIOPSY. URETHRAL DILATION          Past Medical History:   Diagnosis Date    Depressive disorder     H/O urethral stricture     Hypertension     Sleep apnea       Past Surgical History:   Procedure Laterality Date    BLADDER SURGERY      CYSTOSCOPY, DILATE URETHRA, COMBINED N/A 2022    Procedure: CYSTOSCOPY, WITH URETHRAL DILATION; LASER LITHOTRIPSY OF PROSTATE STONES BLADDER BIOPSY, FULGERATION.;  Surgeon: Niraj Burger MD;  Location: [facility] OR    CYSTOSCOPY, LITHOTRIPSY, COMBINED N/A 2020    Procedure: Cystoscopy, laser lithotripsy of urethral stone with holmium laser;  Surgeon: Sarah Hill MD;  [facility]    CYSTOSTOMY, INSERT TUBE SUPRAPUBIC, COMBINED N/A 2014    Procedure: COMBINED CYSTOSTOMY, INSERT TUBE SUPRAPUBIC;  Surgeon: Min Prasad MD;  Location: [facility] OR    GENITOURINARY SURGERY      prostate surgery for stones    LASER HOLMIUM CYSTOSCOPY, INTERNAL URETHROTOMY, COMBINED N/A 10/24/2014    Procedure: COMBINED LASER HOLMIUM CYSTOSCOPY, INTERNAL URETHROTOMY;  Surgeon: Valentin Villatoro MD;  Location: [facility] OR    URETHROPLASTY N/A 12/10/2014    Procedure: URETHROPLASTY;  Surgeon: Niraj Burger MD;  Location: [facility]    URETHROPLASTY WITH BUCCAL GRAFT N/A 2016    Procedure: URETHROPLASTY WITH BUCCAL GRAFT;  Surgeon: Niraj Burger MD;  Location: [facility]      Allergies   Allergen Reactions    Contrast Dye Difficulty breathing and Anaphylaxis      Social History     Tobacco Use    Smoking status: Never    Smokeless tobacco: Never   Substance Use Topics    Alcohol use: Not Currently      Wt Readings from Last 1 Encounters:   10/30/24 129 kg (284 lb 6.3 oz)        Anesthesia Evaluation   Pt has had prior anesthetic. Type: General and MAC.    No history of anesthetic complications       ROS/MED  "HX  ENT/Pulmonary:     (+) sleep apnea, uses CPAP,                                   (-) asthma and recent URI   Neurologic:  - neg neurologic ROS     Cardiovascular:     (+) Dyslipidemia hypertension- -   -  - -           SHAH.                        (-) orthopnea/PND, syncope and irregular heartbeat/palpitations   METS/Exercise Tolerance: 4 - Raking leaves, gardening    Hematologic:  - neg hematologic  ROS     Musculoskeletal:  - neg musculoskeletal ROS     GI/Hepatic:  - neg GI/hepatic ROS  (-) GERD   Renal/Genitourinary: Comment: Urinary retention and urethral stricture      Endo:  - neg endo ROS     Psychiatric/Substance Use:     (+) psychiatric history depression       Infectious Disease:  - neg infectious disease ROS     Malignancy:   (+) Malignancy, History of Other.    Other:            Physical Exam    Airway        Mallampati: III   TM distance: > 3 FB   Neck ROM: full   Mouth opening: > 3 cm    Respiratory Devices and Support         Dental       (+) Modest Abnormalities - crowns, retainers, 1 or 2 missing teeth      Cardiovascular          Rhythm and rate: regular and normal     Pulmonary   pulmonary exam normal        breath sounds clear to auscultation           OUTSIDE LABS:  CBC:   Lab Results   Component Value Date    WBC 11.2 (H) 12/27/2021    WBC 9.4 07/16/2019    HGB 14.9 12/27/2021    HGB 16.1 07/16/2019    HCT 42.7 12/27/2021    HCT 46.6 07/16/2019     12/27/2021     07/16/2019     BMP:   Lab Results   Component Value Date     12/27/2021     07/20/2019    POTASSIUM 3.9 12/27/2021    POTASSIUM 3.8 07/20/2019    CHLORIDE 110 (H) 12/27/2021    CHLORIDE 109 07/20/2019    CO2 28 12/27/2021    CO2 27 07/20/2019    BUN 18 12/27/2021    BUN 19 07/20/2019    CR 1.22 12/27/2021    CR 1.10 07/20/2019    GLC 97 12/27/2021    GLC 97 07/20/2019     COAGS: No results found for: \"PTT\", \"INR\", \"FIBR\"  POC:   Lab Results   Component Value Date    BGM 95 09/09/2014     HEPATIC:   Lab " "Results   Component Value Date    ALBUMIN 3.8 05/19/2019    PROTTOTAL 7.5 05/19/2019    ALT 26 05/19/2019    AST 20 05/19/2019    ALKPHOS 77 05/19/2019    BILITOTAL 1.1 05/19/2019     OTHER:   Lab Results   Component Value Date    LACT 0.6 05/23/2016    A1C 5.3 10/11/2019    AMILCAR 9.4 12/27/2021    TSH 2.01 05/19/2019    T4 1.19 05/25/2016       Anesthesia Plan    ASA Status:  3    NPO Status:  NPO Appropriate    Anesthesia Type: General.     - Airway: LMA   Induction: Intravenous, Propofol.   Maintenance: Balanced.   Techniques and Equipment:     - Lines/Monitors: BIS     Consents    Anesthesia Plan(s) and associated risks, benefits, and realistic alternatives discussed. Questions answered and patient/representative(s) expressed understanding.     - Discussed: Risks, Benefits and Alternatives for BOTH SEDATION and the PROCEDURE were discussed     - Discussed with:  Patient      - Extended Intubation/Ventilatory Support Discussed: No.      - Patient is DNR/DNI Status: No     Use of blood products discussed: No .     Postoperative Care    Pain management: IV analgesics, Oral pain medications.   PONV prophylaxis: Ondansetron (or other 5HT-3), Dexamethasone or Solumedrol     Comments:               Rosy Palomino MD    I have reviewed the pertinent notes and labs in the chart from the past 30 days and (re)examined the patient.  Any updates or changes from those notes are reflected in this note.               # Hypertension: Noted on problem list         # Obesity: Estimated body mass index is 37.52 kg/m  as calculated from the following:    Height as of this encounter: 1.854 m (6' 1\").    Weight as of this encounter: 129 kg (284 lb 6.3 oz).             "

## 2024-10-30 NOTE — OP NOTE
OPERATIVE NOTE  10/30/2024      PREOPERATIVE DIAGNOSIS:   Malignant neoplasm of overlapping sites of bladder     POSTOPERATIVE DIAGNOSIS:  Same    PROCEDURES PERFORMED:   Procedure(s):  1) Cystoscopy with transurethral resection of bladder tumor <2 cm in size   2) Urethral Dilation with Complex Catheter Placement     STAFF SURGEON:    Gerardo Hemphill MD    RESIDENT(S):  Franklin Osuna MD    ANESTHESIA:  General    ESTIMATED BLOOD LOSS: 1 mL     IV FLUIDS: see dictated anesthesia record    COMPLICATIONS: None.     SPECIMEN:    ID Type Source Tests Collected by Time Destination   1 : trigone biopsy Tissue Urinary Bladder SURGICAL PATHOLOGY EXAM Gerardo Hemphill MD 10/30/2024  1:09 PM        SIGNIFICANT FINDINGS:   Small papillary recurrences in the trigone, resected and fulgurated. Total size of lesion less than 2 cm.    BRIEF OPERATIVE INDICATIONS: Flakito Hilliard is a 64 year old male who presented with NMIBC in the setting of urethral stricture disease. After a discussion of the risks, benefits, and alternatives they elected to proceed with the aforementioned procedure.     DESCRIPTION OF PROCEDURE: After full informed voluntary consent was obtained, the patient was transported to the operating room, placed supine on the table. After adequate anesthesia was induced, they were prepped and draped in the usual sterile fashion in the lithotomy position. A timeout was taken to confirm correct patient, procedure and laterality.    We began the case with a 19F rigid cystoscope. Throughout the penis and bulbar urethra there were several annular rings that were ~14-16 Fr that were dilated with gentle passage of the scope. At the level of the sphincter there was another stricture that needed to be dilated with the scope over a super-stiff wire. The fossa of the prostate was abnormal with many cavities that appeared to be the location of previous stones. The bladder neck was stenosed but allowed  passage of the scope.     Once in the bladder the media was somewhat cloudy. He had many trabeculations and diverticuli filled with debris. There were no stones. The bilateral ureteral orifices were in their normal orthotopic position. In the center of the trigone there were several small papillary recurrences that were in close proximity to previous resection scar. These were removed with cold forceps and sent for frozen pathology. Meticulous hemostasis was then obtained using bugbee electrocautery. Once hemostasis was confirmed under low pressure the wire was replaced through the scope and a fashioned 16 Fr Ambler tip catheter was advanced into the bladder with return of crystal clear urine. 10 mL of water was placed into the balloon.     Patient tolerated the procedure well. No apparent complications. They were transported to the postanesthesia care unit in stable condition.    Franklin Osuna MD   Urology Chief Resident, PGY-5     Physician Attestation   I was present for the entire procedure between opening and closing, including all key portions of the operation.    Gerardo Hemphill MD  Urology  [facility]

## 2024-10-30 NOTE — ANESTHESIA CARE TRANSFER NOTE
Patient: Flakito Hilliard    Procedure: Procedure(s):  CYSTOSCOPY, WITH BLADDER BIOPSY WITH FULGRATION. URETHRAL DILATION       Diagnosis: Malignant neoplasm of overlapping sites of bladder (H) [C67.8]  Diagnosis Additional Information: No value filed.    Anesthesia Type:   General     Note:    Oropharynx: oropharynx clear of all foreign objects and spontaneously breathing  Level of Consciousness: awake  Oxygen Supplementation: nasal cannula  Level of Supplemental Oxygen (L/min / FiO2): 4  Independent Airway: airway patency satisfactory and stable  Dentition: dentition unchanged  Vital Signs Stable: post-procedure vital signs reviewed and stable  Report to RN Given: handoff report given  Patient transferred to: PACU    Handoff Report: Identifed the Patient, Identified the Reponsible Provider, Reviewed the pertinent medical history, Discussed the surgical course, Reviewed Intra-OP anesthesia mangement and issues during anesthesia, Set expectations for post-procedure period and Allowed opportunity for questions and acknowledgement of understanding      Vitals:  Vitals Value Taken Time   /98    Temp 36.5    Pulse 75    Resp 12    SpO2 97        Electronically Signed By: ЮЛИЯ Fletcher CRNA  October 30, 2024  1:40 PM

## 2024-10-30 NOTE — ANESTHESIA PROCEDURE NOTES
Airway         Procedure Start/Stop Times: 10/30/2024 12:31 PM and 10/30/2024 12:34 PM  Staff -        CRNA: Filemon Gil APRN CRNA       Performed By: CRNA  Consent for Airway        Urgency: elective  Indications and Patient Condition       Indications for airway management: karina-procedural       Induction type:intravenous       Mask difficulty assessment: 0 - not attempted    Final Airway Details       Final airway type: supraglottic airway    Supraglottic Airway Details        Type: LMA       Brand: LMA Unique       LMA size: 5    Post intubation assessment        Placement verified by: capnometry, equal breath sounds and chest rise        Number of attempts at approach: 1       Number of other approaches attempted: 0       Ease of procedure: easy       Dentition: Intact and Unchanged    Medication(s) Administered   Medication Administration Time: 10/30/2024 12:31 PM

## 2024-10-30 NOTE — DISCHARGE INSTRUCTIONS
Contacting your Doctor -   To contact a doctor, call Dr Hemphill's urology clinic at [phone]   or:  [phone] and ask for the resident on call for Urology (answered 24 hours a day)   Emergency Department:  [facility]: [phone]  [facility]: [phone] 911 if you are in need of immediate or emergent help

## 2025-03-12 NOTE — PROGRESS NOTES
Patient reports continued depression but denies SI/SIB. Patient was visible and social with full range affect during the shift, participating in all groups and activities.      06/26/19 2200   Behavioral Health   Hallucinations denies / not responding to hallucinations   Thinking intact   Orientation person: oriented;place: oriented;date: oriented;time: oriented   Memory baseline memory   Insight admits / accepts   Judgement intact   Eye Contact at examiner   Affect full range affect   Mood mood is calm   Physical Appearance/Attire appears stated age;attire appropriate to age and situation   Hygiene well groomed   Suicidality other (see comments)  (Denies)   1. Wish to be Dead No   2. Non-Specific Active Suicidal Thoughts  No   Self Injury other (see comment)  (Denies)   Activity other (see comment)  (Visible and social.)   Speech clear;coherent   Medication Sensitivity no stated side effects;no observed side effects   Psychomotor / Gait balanced;steady   Activities of Daily Living   Hygiene/Grooming independent   Oral Hygiene independent   Dress independent   Laundry with supervision   Room Organization independent      General Sunscreen Counseling: I recommended a broad spectrum sunscreen with a SPF of 30 or higher.  I explained that SPF 30 sunscreens block approximately 97 percent of the sun's harmful rays.  Sunscreens should be applied at least 15 minutes prior to expected sun exposure and then every 2 hours after that as long as sun exposure continues. If swimming or exercising sunscreen should be reapplied every 45 minutes to an hour after getting wet or sweating.  One ounce, or the equivalent of a shot glass full of sunscreen, is adequate to protect the skin not covered by a bathing suit. I also recommended a lip balm with a sunscreen as well. Sun protective clothing can be used in lieu of sunscreen but must be worn the entire time you are exposed to the sun's rays. Detail Level: Detailed Products Recommended: Zinc, mineral, or titanium SPF 30-50

## 2025-06-02 ENCOUNTER — PRE VISIT (OUTPATIENT)
Dept: ONCOLOGY | Facility: CLINIC | Age: 65
End: 2025-06-02
Payer: MEDICARE

## 2025-06-02 NOTE — TELEPHONE ENCOUNTER
Reason for visit: cystoscopy    Dx/Hx/Sx:   -malignant neoplasm of trigone of urinary bladder  -TRBT on 10/30/2024    Records/imaging/labs/orders: available in Epic    At rooming:   -consent form  -request urine sample     NOTE: The patient has a history of a penile urethral stricture. The last cystoscopy performed in-office (by Dr. Suero) was in March 2024 and did not require dilation (gentle pressure of the scope was enough to pass through the strictures, which were ~14 - 16 Fr). Before rooming ask Dr. Hemphill if he would like to have any dilation supplies in the room just in case.       --  Zane Purdy on 6/2/2025 at 11:50 AM

## 2025-06-03 ENCOUNTER — OFFICE VISIT (OUTPATIENT)
Dept: UROLOGY | Facility: CLINIC | Age: 65
End: 2025-06-03
Payer: MEDICARE

## 2025-06-03 VITALS
BODY MASS INDEX: 24.38 KG/M2 | OXYGEN SATURATION: 98 % | HEART RATE: 86 BPM | HEIGHT: 72 IN | SYSTOLIC BLOOD PRESSURE: 161 MMHG | DIASTOLIC BLOOD PRESSURE: 108 MMHG | WEIGHT: 180 LBS

## 2025-06-03 DIAGNOSIS — C67.8 MALIGNANT NEOPLASM OF OVERLAPPING SITES OF BLADDER (H): Primary | ICD-10-CM

## 2025-06-03 PROCEDURE — 3080F DIAST BP >= 90 MM HG: CPT | Performed by: UROLOGY

## 2025-06-03 PROCEDURE — 1126F AMNT PAIN NOTED NONE PRSNT: CPT | Performed by: UROLOGY

## 2025-06-03 PROCEDURE — 88112 CYTOPATH CELL ENHANCE TECH: CPT | Mod: 26 | Performed by: PATHOLOGY

## 2025-06-03 PROCEDURE — 52000 CYSTOURETHROSCOPY: CPT | Performed by: UROLOGY

## 2025-06-03 PROCEDURE — 88112 CYTOPATH CELL ENHANCE TECH: CPT | Mod: TC | Performed by: UROLOGY

## 2025-06-03 PROCEDURE — 3077F SYST BP >= 140 MM HG: CPT | Performed by: UROLOGY

## 2025-06-03 ASSESSMENT — PAIN SCALES - GENERAL: PAINLEVEL_OUTOF10: NO PAIN (0)

## 2025-06-03 NOTE — Clinical Note
6/3/2025       RE: Flakito Hilliard  [address]  [place] [address]     Dear Colleague,    Thank you for referring your patient, Flakito Hilliard, to the [facility] [place]. Please see a copy of my visit note below.    No notes on file    Again, thank you for allowing me to participate in the care of your patient.      Sincerely,    Gerardo Hemphill MD

## 2025-06-03 NOTE — PROGRESS NOTES
CHIEF COMPLAINT   It was my pleasure to see Flakito Hilliard who is a 65 year old male for follow-up of urethral stricture and bladder cancer.      HPI   Flakito Hilliard is a very pleasant 65 year old male who presents with a history of  bladder cancer. He has remote history of complex bulbar urethral stricture with multiple prior urethral procedures with Dr. Burger. Has had low grade urothelial carcinomas in the past, last resected 10/2024. Here for cysto.    TURBT 10/30/2024  Urinary bladder, trigone, biopsy:  - Early noninvasive low-grade papillary urothelial carcinoma    PHYSICAL EXAM  Patient is a 65 year old  male   Vitals: Blood pressure (!) 161/108, pulse 86, height 1.829 m (6'), weight 81.6 kg (180 lb), SpO2 98%.  General Appearance Adult: Body mass index is 24.41 kg/m .  Alert, no acute distress, oriented  Lungs: no respiratory distress, or pursed lip breathing  Abdomen: soft, nontender, no organomegaly or masses  Back: no CVAT  Neuro: Alert, oriented, speech and mentation normal  Psych: affect and mood normal  : penis, scrotum, testes normal    OFFICE CYSTOSCOPY 6/3/2025    Pre-procedure diagnosis:  History of bladder cancer  Post-procedure diagnosis: Urethral strictures; No suspicious lesions  Procedure performed:  Cystourethroscopy  Surgeon:    Gerardo Hemphill MD  Anesthesia:    Local    Description of procedure:   After fully informed, voluntary consent was obtained, the patient was brought into the procedure room, identified and placed in a supine position on the cystoscopy table.  The groin/scrotum were prepped with betadine and draped in a sterile fashion.  Urojet lidocaine gel was introduced.  A 15F flexible cystoscope was inserted into the urethra, and the bladder and urethra were examined in a systematic manner.  The patient tolerated the procedure well and there were no complications.      Cystoscopic findings:  The urethra was notable for multiple soft strictures in the anterior urethra,  all were about 14-16 Amharic and were able to be navigated with the scope. There was a large prostatic fossa with debris. Bladder neck with mild stenosis. The external sphincter coapted well. The bladder was completely surveyed.  There was severe trabeculation.  There were no neoplasms, stones, or identified. There was debris within the bladder. Mild inflammation noted. No suspicious lesions identified.    ASSESSMENT and PLAN  65 year old male with complex history of urethral reconstruction for strictures. Voiding well and scope able to get into bladder today. No suspicious lesions identified in context of prior low grade urothelial carcinoma. No recurrence today.     - Cytology today  - Follow-up 1 year with office cysto    Gerardo Hemphill MD  Urology  [facility]

## 2025-06-03 NOTE — PATIENT INSTRUCTIONS
"AFTER YOUR CYSTOSCOPY        You have just completed a cystoscopy, or \"cysto\", which allowed your physician to learn more about your bladder (or to remove a stent placed after surgery). We suggest that you continue to avoid caffeine, fruit juice, and alcohol for the next 24 hours, however, you are encouraged to return to your normal activities.         A few things that are considered normal after your cystoscopy:     * Small amount of bleeding (or spotting) that clears within the next 24 hours     * Slight burning sensation with urination     * Sensation to of needing to avoid more frequently     * The feeling of \"air\" in your urine     * Mild discomfort that is relieved with Tylenol        Please contact our office promptly if you:     * Develop a fever above 101 degrees     * Are unable to urinate     * Develop bright red blood that does not stop     * Severe pain or swelling         Please contact our office with any concerns or questions @ [phone]    "

## 2025-06-03 NOTE — NURSING NOTE
Chief Complaint   Patient presents with    Cystoscopy     Pt states here for cystoscopy       Blood pressure (!) 161/108, pulse 86, height 1.829 m (6'), weight 81.6 kg (180 lb), SpO2 98%. Body mass index is 24.41 kg/m .    Patient Active Problem List   Diagnosis    Acute bacterial prostatitis    Lower urinary tract infectious disease    severe HTN (hypertension)    Suicidal ideation    Major depressive disorder, recurrent episode, moderate (H)    Urinary retention    Urethral stricture    PATRICE (obstructive sleep apnea)    Complicated UTI (urinary tract infection)    RIO (generalized anxiety disorder)    At risk for cardiovascular event    Elevated hemoglobin    Bladder stones    Erectile dysfunction, unspecified erectile dysfunction type    Severe recurrent major depression without psychotic features (H)    Urethral stone    Malignant neoplasm of overlapping sites of bladder (H)       Allergies   Allergen Reactions    Contrast Dye Difficulty breathing and Anaphylaxis       Current Outpatient Medications   Medication Sig Dispense Refill    acetaminophen (TYLENOL) 325 MG tablet Take 2 tablets (650 mg) by mouth every 4 hours as needed for mild pain 50 tablet 0    amLODIPine (NORVASC) 10 MG tablet Take 1 tablet (10 mg) by mouth daily 90 tablet 0    amoxicillin (AMOXIL) 500 MG capsule Take 1 capsule (500 mg) by mouth daily 90 capsule 2    atorvastatin (LIPITOR) 20 MG tablet TAKE ONE TABLET BY MOUTH EVERY DAY. 90 tablet 3    buPROPion (WELLBUTRIN XL) 300 MG 24 hr tablet Take 300 mg by mouth at bedtime.      buPROPion 450 MG TB24 Take 450 mg by mouth every morning 30 tablet 1    busPIRone (BUSPAR) 15 MG tablet Increase if anxiety not doing well: 0.5 tab 2x/day x 3 days, then 1 tab 2x/day x 3 days, then 1.5 tab 2x/day x 3 days, then 2 tab 2x/day. 90 tablet 0    clonazePAM (KLONOPIN) 0.5 MG tablet Take 0.5 mg by mouth every evening.      diltiazem ER (TIAZAC) 120 MG 24 hr ER beaded capsule Take 240 mg by mouth daily.       diltiazem ER COATED BEADS (CARDIZEM CD) 360 MG 24 hr capsule Take 1 capsule (360 mg) by mouth daily 90 capsule 0    docusate sodium (COLACE) 100 MG capsule TAKE ONE CAPSULE BY MOUTH TWICE DAILY  60 capsule 11    DULoxetine (CYMBALTA) 60 MG capsule Take 60 mg by mouth 2 times daily.      hydrALAZINE (APRESOLINE) 25 MG tablet Take 0.5 tablets (12.5 mg) by mouth every 6 hours as needed (for SBP >175 or DBP >110) 30 tablet 0    hydrochlorothiazide (MICROZIDE) 12.5 MG capsule Take 1 capsule by mouth daily.      lamoTRIgine (LAMICTAL) 100 MG tablet Take 100 mg by mouth 2 times daily.      lisinopril (PRINIVIL/ZESTRIL) 20 MG tablet Take 1 tablet (20 mg) by mouth daily (Patient taking differently: Take 40 mg by mouth daily.) 90 tablet 0    lisinopril (ZESTRIL) 40 MG tablet Take 40 mg by mouth daily.      multivitamin (CENTRUM SILVER) tablet Take 1 tablet by mouth at bedtime.      nitroFURantoin macrocrystal-monohydrate (MACROBID) 100 MG capsule Take 1 capsule (100 mg) by mouth 2 times daily 10 capsule 0    oxybutynin (DITROPAN) 5 MG tablet Take 1 tablet (5 mg) by mouth 2 times daily 60 tablet 0    phenazopyridine (PYRIDIUM) 200 MG tablet Take 1 tablet (200 mg) by mouth 3 times daily as needed for irritation. 9 tablet 0    tamsulosin (FLOMAX) 0.4 MG capsule Take 1 capsule (0.4 mg) by mouth daily 90 capsule 0    traZODone (DESYREL) 100 MG tablet Take 1 tablet (100 mg) by mouth nightly as needed for sleep 30 tablet 1    traZODone (DESYREL) 150 MG tablet Take 150 mg by mouth at bedtime.      diphenhydrAMINE (BENADRYL) 50 MG capsule Take 1 capsule (50 mg) by mouth every 6 hours as needed for itching or allergies Administer 30 min - 2 hours pre - IV contrast injection (Patient not taking: Reported on 6/3/2025) 1 capsule 0    methylPREDNISolone (MEDROL) 32 MG tablet Take 1 tablet (32 mg) by mouth daily 12 hours prior to the procedure with IV contrast (Patient not taking: Reported on 6/3/2025) 1 tablet 0    senna-docusate  (SENOKOT-S/PERICOLACE) 8.6-50 MG tablet Take 1-2 tablets by mouth 2 times daily (Patient not taking: Reported on 6/3/2025) 30 tablet 0       Social History     Tobacco Use    Smoking status: Never    Smokeless tobacco: Never   Substance Use Topics    Alcohol use: Not Currently    Drug use: No       What to expect after the procedure reviewed with patient: yes    Nasim Hudson  6/3/2025  10:42 AM     Invasive Procedure Safety Checklist:    Procedure: cystoscopy    Action: Complete sections and checkboxes as appropriate.    Pre-procedure:  1. Patient ID Verified with 2 identifiers (Marquita and  or MRN) : YES    2. Procedure and site verified with patient/designee (when able) : YES    3. Accurate consent documentation in medical record : YES    4. H&P (or appropriate assessment) documented in medical record : YES  H&P must be up to 30 days prior to procedure an updated within 24 hours of                 Procedure as applicable.     5. Relevant diagnostic and radiology test results appropriately labeled and displayed as applicable : YES    6. Blood products, implants, devices, and/or special equipment available for the procedure as applicable : YES    7. Procedure site(s) marked with provider initials [Exclusions: None] : NO    8. Marking not required. Reason : Yes  Procedure does not require site marking    Time Out:     Time-Out performed immediately prior to starting procedure, including verbal and active participation of all team members addressing: YES    1. Correct patient identity.  2. Confirmed that the correct side and site are marked.  3. An accurate procedure to be done.  4. Agreement on the procedure to be done.  5. Correct patient position.  6. Relevant images and results are properly labeled and appropriately displayed.  7. The need to administer antibiotics or fluids for irrigation purposes during the procedure as applicable.  8. Safety precautions based on patient history or medication use.    During  Procedure: Verification of correct person, site, and procedure occurs any time the responsibility for care of the patient is transferred to another member of the care team.    No medications administered during this procedure.    Nasim Hudson  Lupe 3, 2025

## 2025-06-04 LAB
PATH REPORT.COMMENTS IMP SPEC: NORMAL
PATH REPORT.FINAL DX SPEC: NORMAL
PATH REPORT.GROSS SPEC: NORMAL
PATH REPORT.MICROSCOPIC SPEC OTHER STN: NORMAL
PATH REPORT.RELEVANT HX SPEC: NORMAL

## (undated) DEVICE — SOL NACL 0.9% IRRIG 3000ML BAG 2B7477

## (undated) DEVICE — BASKET NITINOL TIPLESS HALO  1.5FRX120CM 554120

## (undated) DEVICE — SPECIMEN CONTAINER 5OZ STERILE 2600SA

## (undated) DEVICE — SUCTION MANIFOLD NEPTUNE 2 SYS 4 PORT 0702-020-000

## (undated) DEVICE — PACK CYSTO UMMC CUSTOM

## (undated) DEVICE — KIT ENDO FIRST STEP DISINFECTANT 200ML W/POUCH EP-4

## (undated) DEVICE — PAD CHUX UNDERPAD 30X30"

## (undated) DEVICE — GUIDEWIRE SENSOR DUAL FLEX STR 0.035"X150CM M0066703080

## (undated) DEVICE — PAD CHUX UNDERPAD 23X24" 7136

## (undated) DEVICE — DRAPE C-ARM W/STRAPS 42X72" 07-CA104

## (undated) DEVICE — GLOVE BIOGEL PI MICRO INDICATOR UNDERGLOVE SZ 8.0 48980

## (undated) DEVICE — PACK CYSTO CUSTOM ASC

## (undated) DEVICE — SOL NACL 0.9% IRRIG 500ML BOTTLE 2F7123

## (undated) DEVICE — PAD CHUX UNDERPAD 30X36" P3036C

## (undated) DEVICE — LINEN GOWN XLG 5407

## (undated) DEVICE — CATH FOLEY COUDE TIEMAN 16FR 30ML LATEX 0103SI16

## (undated) DEVICE — SOL WATER IRRIG 500ML BOTTLE 2F7113

## (undated) DEVICE — DRSG TELFA 3X8" 1238

## (undated) DEVICE — LINEN TOWEL PACK X5 5464

## (undated) DEVICE — CATH TRAY FOLEY SURESTEP 16FR W/URINE MTR STATLK LF A303416A

## (undated) DEVICE — SYR PISTON URETHRAL 60ML 68000

## (undated) DEVICE — ESU GROUND PAD ADULT W/CORD E7507

## (undated) DEVICE — GLOVE BIOGEL PI MICRO SZ 7.5 48575

## (undated) DEVICE — GUIDEWIRE AMPLATZ SUPER STIFF 0.035"X180CM M001465250

## (undated) RX ORDER — LABETALOL 20 MG/4 ML (5 MG/ML) INTRAVENOUS SYRINGE
Status: DISPENSED
Start: 2019-07-05

## (undated) RX ORDER — CAFFEINE AND SODIUM BENZOATE 125 MG/ML
INJECTION, SOLUTION INTRAMUSCULAR; INTRAVENOUS
Status: DISPENSED
Start: 2019-07-22

## (undated) RX ORDER — KETOROLAC TROMETHAMINE 30 MG/ML
INJECTION, SOLUTION INTRAMUSCULAR; INTRAVENOUS
Status: DISPENSED
Start: 2019-07-12

## (undated) RX ORDER — CIPROFLOXACIN 2 MG/ML
INJECTION, SOLUTION INTRAVENOUS
Status: DISPENSED
Start: 2022-04-14

## (undated) RX ORDER — LABETALOL 20 MG/4 ML (5 MG/ML) INTRAVENOUS SYRINGE
Status: DISPENSED
Start: 2019-07-22

## (undated) RX ORDER — CAFFEINE AND SODIUM BENZOATE 125 MG/ML
INJECTION, SOLUTION INTRAMUSCULAR; INTRAVENOUS
Status: DISPENSED
Start: 2019-07-05

## (undated) RX ORDER — LIDOCAINE HYDROCHLORIDE 20 MG/ML
INJECTION, SOLUTION EPIDURAL; INFILTRATION; INTRACAUDAL; PERINEURAL
Status: DISPENSED
Start: 2019-07-22

## (undated) RX ORDER — LIDOCAINE HYDROCHLORIDE 20 MG/ML
INJECTION, SOLUTION EPIDURAL; INFILTRATION; INTRACAUDAL; PERINEURAL
Status: DISPENSED
Start: 2019-07-05

## (undated) RX ORDER — HYDRALAZINE HYDROCHLORIDE 20 MG/ML
INJECTION INTRAMUSCULAR; INTRAVENOUS
Status: DISPENSED
Start: 2019-07-17

## (undated) RX ORDER — KETOROLAC TROMETHAMINE 30 MG/ML
INJECTION, SOLUTION INTRAMUSCULAR; INTRAVENOUS
Status: DISPENSED
Start: 2020-01-13

## (undated) RX ORDER — LIDOCAINE HYDROCHLORIDE 20 MG/ML
INJECTION, SOLUTION EPIDURAL; INFILTRATION; INTRACAUDAL; PERINEURAL
Status: DISPENSED
Start: 2019-07-19

## (undated) RX ORDER — ACETAMINOPHEN 325 MG/1
TABLET ORAL
Status: DISPENSED
Start: 2024-10-30

## (undated) RX ORDER — LIDOCAINE HYDROCHLORIDE 20 MG/ML
INJECTION, SOLUTION EPIDURAL; INFILTRATION; INTRACAUDAL; PERINEURAL
Status: DISPENSED
Start: 2020-01-13

## (undated) RX ORDER — ATROPA BELLADONNA AND OPIUM 16.2; 3 MG/1; MG/1
SUPPOSITORY RECTAL
Status: DISPENSED
Start: 2022-04-14

## (undated) RX ORDER — ACETAMINOPHEN 325 MG/1
TABLET ORAL
Status: DISPENSED
Start: 2022-04-14

## (undated) RX ORDER — VANCOMYCIN HYDROCHLORIDE 500 MG/10ML
INJECTION, POWDER, LYOPHILIZED, FOR SOLUTION INTRAVENOUS
Status: DISPENSED
Start: 2022-04-14

## (undated) RX ORDER — KETOROLAC TROMETHAMINE 30 MG/ML
INJECTION, SOLUTION INTRAMUSCULAR; INTRAVENOUS
Status: DISPENSED
Start: 2019-07-05

## (undated) RX ORDER — ONDANSETRON 2 MG/ML
INJECTION INTRAMUSCULAR; INTRAVENOUS
Status: DISPENSED
Start: 2020-01-13

## (undated) RX ORDER — KETOROLAC TROMETHAMINE 30 MG/ML
INJECTION, SOLUTION INTRAMUSCULAR; INTRAVENOUS
Status: DISPENSED
Start: 2019-07-22

## (undated) RX ORDER — CEFAZOLIN SODIUM/WATER 3 G/30 ML
SYRINGE (ML) INTRAVENOUS
Status: DISPENSED
Start: 2024-10-30

## (undated) RX ORDER — LIDOCAINE HYDROCHLORIDE 20 MG/ML
JELLY TOPICAL
Status: DISPENSED
Start: 2024-09-16

## (undated) RX ORDER — GABAPENTIN 300 MG/1
CAPSULE ORAL
Status: DISPENSED
Start: 2020-01-13

## (undated) RX ORDER — PROPOFOL 10 MG/ML
INJECTION, EMULSION INTRAVENOUS
Status: DISPENSED
Start: 2020-01-13

## (undated) RX ORDER — KETOROLAC TROMETHAMINE 30 MG/ML
INJECTION, SOLUTION INTRAMUSCULAR; INTRAVENOUS
Status: DISPENSED
Start: 2019-07-17

## (undated) RX ORDER — HYDRALAZINE HYDROCHLORIDE 20 MG/ML
INJECTION INTRAMUSCULAR; INTRAVENOUS
Status: DISPENSED
Start: 2019-07-22

## (undated) RX ORDER — LIDOCAINE HYDROCHLORIDE 20 MG/ML
JELLY TOPICAL
Status: DISPENSED
Start: 2022-02-28

## (undated) RX ORDER — FENTANYL CITRATE 50 UG/ML
INJECTION, SOLUTION INTRAMUSCULAR; INTRAVENOUS
Status: DISPENSED
Start: 2022-04-14

## (undated) RX ORDER — CEFAZOLIN SODIUM 1 G/3ML
INJECTION, POWDER, FOR SOLUTION INTRAMUSCULAR; INTRAVENOUS
Status: DISPENSED
Start: 2022-04-14

## (undated) RX ORDER — ACETAMINOPHEN 325 MG/1
TABLET ORAL
Status: DISPENSED
Start: 2020-01-13

## (undated) RX ORDER — VANCOMYCIN HYDROCHLORIDE 1 G/20ML
INJECTION, POWDER, LYOPHILIZED, FOR SOLUTION INTRAVENOUS
Status: DISPENSED
Start: 2022-04-14

## (undated) RX ORDER — KETAMINE HCL IN 0.9 % NACL 50 MG/5 ML
SYRINGE (ML) INTRAVENOUS
Status: DISPENSED
Start: 2020-01-13

## (undated) RX ORDER — KETOROLAC TROMETHAMINE 30 MG/ML
INJECTION, SOLUTION INTRAMUSCULAR; INTRAVENOUS
Status: DISPENSED
Start: 2019-07-15

## (undated) RX ORDER — CAFFEINE AND SODIUM BENZOATE 125 MG/ML
INJECTION, SOLUTION INTRAMUSCULAR; INTRAVENOUS
Status: DISPENSED
Start: 2019-07-12

## (undated) RX ORDER — HYDRALAZINE HYDROCHLORIDE 20 MG/ML
INJECTION INTRAMUSCULAR; INTRAVENOUS
Status: DISPENSED
Start: 2019-07-19

## (undated) RX ORDER — CEFAZOLIN SODIUM 2 G/50ML
SOLUTION INTRAVENOUS
Status: DISPENSED
Start: 2020-01-13

## (undated) RX ORDER — DEXAMETHASONE SODIUM PHOSPHATE 4 MG/ML
INJECTION, SOLUTION INTRA-ARTICULAR; INTRALESIONAL; INTRAMUSCULAR; INTRAVENOUS; SOFT TISSUE
Status: DISPENSED
Start: 2020-01-13